# Patient Record
Sex: MALE | Race: WHITE | NOT HISPANIC OR LATINO | Employment: PART TIME | URBAN - METROPOLITAN AREA
[De-identification: names, ages, dates, MRNs, and addresses within clinical notes are randomized per-mention and may not be internally consistent; named-entity substitution may affect disease eponyms.]

---

## 2017-06-01 PROBLEM — E78.5 DYSLIPIDEMIA: Status: ACTIVE | Noted: 2017-06-01

## 2017-06-01 PROBLEM — F41.1 GAD (GENERALIZED ANXIETY DISORDER): Status: ACTIVE | Noted: 2017-06-01

## 2017-06-01 PROBLEM — K21.00 GASTROESOPHAGEAL REFLUX DISEASE WITH ESOPHAGITIS: Status: ACTIVE | Noted: 2017-06-01

## 2018-11-08 ENCOUNTER — TELEPHONE (OUTPATIENT)
Dept: FAMILY MEDICINE | Facility: CLINIC | Age: 28
End: 2018-11-08

## 2018-11-08 ENCOUNTER — OFFICE VISIT (OUTPATIENT)
Dept: FAMILY MEDICINE | Facility: CLINIC | Age: 28
End: 2018-11-08
Payer: COMMERCIAL

## 2018-11-08 ENCOUNTER — HOSPITAL ENCOUNTER (OUTPATIENT)
Dept: RADIOLOGY | Facility: HOSPITAL | Age: 28
Discharge: HOME | End: 2018-11-08
Attending: FAMILY MEDICINE
Payer: COMMERCIAL

## 2018-11-08 VITALS
RESPIRATION RATE: 17 BRPM | HEIGHT: 66 IN | WEIGHT: 243.63 LBS | DIASTOLIC BLOOD PRESSURE: 78 MMHG | HEART RATE: 92 BPM | BODY MASS INDEX: 39.15 KG/M2 | OXYGEN SATURATION: 98 % | SYSTOLIC BLOOD PRESSURE: 122 MMHG

## 2018-11-08 DIAGNOSIS — E78.5 DYSLIPIDEMIA: Primary | ICD-10-CM

## 2018-11-08 DIAGNOSIS — R20.2 NUMBNESS AND TINGLING OF HAND: ICD-10-CM

## 2018-11-08 DIAGNOSIS — M79.671 RIGHT FOOT PAIN: ICD-10-CM

## 2018-11-08 DIAGNOSIS — R20.0 NUMBNESS AND TINGLING OF HAND: ICD-10-CM

## 2018-11-08 DIAGNOSIS — D72.828 OTHER ELEVATED WHITE BLOOD CELL (WBC) COUNT: ICD-10-CM

## 2018-11-08 DIAGNOSIS — E66.09 CLASS 2 OBESITY DUE TO EXCESS CALORIES WITHOUT SERIOUS COMORBIDITY WITH BODY MASS INDEX (BMI) OF 39.0 TO 39.9 IN ADULT: ICD-10-CM

## 2018-11-08 DIAGNOSIS — E55.9 VITAMIN D DEFICIENCY: ICD-10-CM

## 2018-11-08 DIAGNOSIS — R74.8 ABNORMAL LIVER ENZYMES: ICD-10-CM

## 2018-11-08 DIAGNOSIS — S92.324A NONDISPLACED FRACTURE OF SECOND METATARSAL BONE, RIGHT FOOT, INITIAL ENCOUNTER FOR CLOSED FRACTURE: ICD-10-CM

## 2018-11-08 DIAGNOSIS — M25.561 CHRONIC PAIN OF RIGHT KNEE: ICD-10-CM

## 2018-11-08 DIAGNOSIS — G89.29 CHRONIC PAIN OF RIGHT KNEE: ICD-10-CM

## 2018-11-08 DIAGNOSIS — R00.0 TACHYCARDIA: ICD-10-CM

## 2018-11-08 PROCEDURE — 99214 OFFICE O/P EST MOD 30 MIN: CPT | Mod: S$GLB,,, | Performed by: FAMILY MEDICINE

## 2018-11-08 PROCEDURE — 73630 X-RAY EXAM OF FOOT: CPT | Mod: TC,FY,PO,RT

## 2018-11-08 PROCEDURE — 99999 PR PBB SHADOW E&M-NEW PATIENT-LVL IV: CPT | Mod: PBBFAC,,, | Performed by: FAMILY MEDICINE

## 2018-11-08 PROCEDURE — 3008F BODY MASS INDEX DOCD: CPT | Mod: CPTII,S$GLB,, | Performed by: FAMILY MEDICINE

## 2018-11-08 PROCEDURE — 73630 X-RAY EXAM OF FOOT: CPT | Mod: 26,RT,, | Performed by: RADIOLOGY

## 2018-11-08 RX ORDER — DEXTROAMPHETAMINE SACCHARATE, AMPHETAMINE ASPARTATE MONOHYDRATE, DEXTROAMPHETAMINE SULFATE AND AMPHETAMINE SULFATE 7.5; 7.5; 7.5; 7.5 MG/1; MG/1; MG/1; MG/1
CAPSULE, EXTENDED RELEASE ORAL
Refills: 0 | COMMUNITY
Start: 2018-10-23 | End: 2023-03-01 | Stop reason: ALTCHOICE

## 2018-11-08 RX ORDER — GABAPENTIN 300 MG/1
300 CAPSULE ORAL NIGHTLY
Qty: 30 CAPSULE | Refills: 2 | Status: SHIPPED | OUTPATIENT
Start: 2018-11-08 | End: 2019-01-03

## 2018-11-08 RX ORDER — PROPRANOLOL HYDROCHLORIDE 20 MG/1
TABLET ORAL
Refills: 5 | COMMUNITY
Start: 2018-09-14 | End: 2018-11-08

## 2018-11-08 NOTE — TELEPHONE ENCOUNTER
----- Message from Jessie Mcclain sent at 11/8/2018  3:52 PM CST -----  Contact: Self  Type:  Patient Returning Call    Who Called:  Patient   Who Left Message for Patient:  Nurse   Does the patient know what this is regarding?:  Yes   Best Call Back Number:  [phone] (home)     Additional Information:  Schedule a test

## 2018-11-08 NOTE — PATIENT INSTRUCTIONS
Carpal Tunnel Syndrome    Carpal tunnel syndrome is a painful condition of the wrist and arm. It is caused by pressure on the median nerve.  The median nerve is one of the nerves that give feeling and movement to the hand. It passes through a tunnel in the wrist called the carpal tunnel. This tunnel is made up of bones and ligaments. Narrowing of this tunnel or swelling of the tissues inside the tunnel puts pressure on the median nerve. This causes numbness, pins and needles, or electric shooting pains in your hand and forearm. Often the pain is worse at night and may wake you when you are asleep.  Carpal tunnel syndrome may occur during pregnancy and with use of birth control pills. It is more common in workers who must often bend their wrists. It is also common in people who work with power tools that cause strong vibrations.  Home care  · Rest the painful wrist. Avoid repeated bending of the wrist back and forth. This puts pressure on the median nerve. Avoid using power tools with strong vibrations.  · If you were given a splint, wear it at night while you sleep. You may also wear it during the day for comfort.  · Move your fingers and wrists often to avoid stiffness.  · Elevate your arms on pillows when you lie down.  · Try using the unaffected hand more.  · Try not to hold your wrists in a bent, downward position.  · Sometimes changes in the work place may ease symptoms. If you type most of the day, it may help to change the position of your keyboard or add a wrist support. Your wrist should be in a neutral position and not bent back when typing.  · You may use over-the-counter pain medicine to treat pain and inflammation, unless another medicine was prescribed. Anti-inflammatory pain medicines, such as ibuprofen or naproxen may be more effective than acetaminophen, which treats pain, but not inflammation. If you have chronic liver or kidney disease or ever had a stomach ulcer or GI bleeding, talk with your  doctor before using these medicines.  · Opioid pain medicine will only give temporary relief and does not treat the problem. If pain continues, you may need a shot of a steroid drug into your wrist.  · If the above methods fail, you may need surgery. This will open the carpal tunnel and release the pressure on the trapped nerve.  Follow-up care  Follow up with your healthcare provider, or as advised, if the pain doesnt begin to improve within the next week.  If X-rays were taken, you will be notified of any new findings that may affect your care.  When to seek medical advice  Call your healthcare provider right away if any of these occur:  · Pain not improving with the above treatment  · Fingers or hand become cold, blue, numb, or tingly  · Your whole arm becomes swollen or weak  Date Last Reviewed: 11/23/2015  © 6138-3925 [employer]. [address] [place] [address]. All rights reserved. This information is not intended as a substitute for professional medical care. Always follow your healthcare professional's instructions.        Eating to Prevent Gout  Gout is a painful form of arthritis caused by an excess of uric acid. This is a waste product made by the body. It builds up in the body and forms crystals that collect in the joints, bringing on a gout attack. Alcohol and certain foods can trigger a gout attack. Below are some guidelines for changing your diet to help you manage gout. Your healthcare provider can work with you to determine the best eating plan for you. Know that diet is only one part of managing gout. Take your medicines as prescribed and follow the other guidelines your healthcare provider has given you.  Foods to limit  Eating too many foods containing purines may increase the levels of uric acid in your body and increase your risk for a gout attack. It may be best to limit these high-purine foods:  · Alcohol (beer, red wine). You may be told to avoid alcohol  completely.  · Certain fish (anchovies, sardines, fish roes, herring, tuna, mussels, codfish, scallops, trout, and goldie)  · Certain meats (red meat, processed meat, rodgers, turkey, wild game, and goose)  · Sauces and gravies made with meat  · Organ meats (such as liver, kidneys, sweetbreads, and tripe)  · Legumes (such as dried beans, peas)  · Mushrooms, spinach, asparagus, and cauliflower  · Yeast and yeast extract supplements  Foods to try  Some foods may be helpful for people with gout. You may want to try adding some of the following foods to your diet:  · Dark berries: These include blueberries, blackberries, and cherries. These berries contain chemicals that may lower uric acid.  · Tofu: Tofu, which is made from soy, is a good source of protein. Studies have shown that it may be a better choice than meat for people with gout.  · Omega fatty acids: These acids are found in fatty fish (such as salmon), certain oils (such as flax, olive, or nut oils), or nuts. They may help prevent inflammation due to gout.  The following guidelines are recommended by the American Medical Association for people with gout. Your diet should be:  · High in fiber, whole grains, fruits, and vegetables.  · Low in protein (15% of calories should come from protein. Choose lean sources such as soy, lean meats, and poultry).  · Low in fat (no more than 30% of calories should come from fat, with only 10% coming from animal fat).   Date Last Reviewed: 6/17/2015  © 5047-9485 [employer]. [address] [place] [address], [place] [address]. All rights reserved. This information is not intended as a substitute for professional medical care. Always follow your healthcare professional's instructions.        Eating Heart-Healthy Foods  Eating has a big impact on your heart health. In fact, eating healthier can improve several of your heart risks at once. For instance, it helps you manage weight, cholesterol, and blood pressure. Here are  ideas to help you make heart-healthy changes without giving up all the foods and flavors you love.  Getting started  · Talk with your health care provider about eating plans, such as the DASH or Mediterranean diet. You may also be referred to a dietitian.  · Change a few things at a time. Give yourself time to get used to a few eating changes before adding more.  · Work to create a tasty, healthy eating plan that you can stick to for the rest of your life.    Goals for healthy eating  Below are some tips to improve your eating habits:  · Limit saturated fats and trans fats. Saturated fats raise your levels of cholesterol, so keep these fats to a minimum. They are found in foods such as fatty meats, whole milk, cheese, and palm and coconut oils. Avoid trans fats because they lower good cholesterol as well as raise bad cholesterol. Trans fats are most often found in processed foods.  · Reduce sodium (salt) intake. Eating too much salt may increase your blood pressure. Limit your sodium intake to 2,300 milligrams (mg) per day, or less if your health care provider recommends it. Dining out less often and eating fewer processed foods are two great ways to decrease the amount of salt you consume.  · Managing calories. A calorie is a unit of energy. Your body burns calories for fuel, but if you eat more calories than your body burns, the extras are stored as fat. Your health care provider can help you create a diet plan to manage your calories. This will likely include eating healthier foods as well as exercising regularly. To help you track your progress, keep a diary to record what you eat and how often you exercise.  Choose the right foods  Aim to make these foods staples of your diet. If you have diabetes, you may have different recommendations than what is listed here:  · Fruits and vegetable provide plenty of nutrients without a lot of calories. At meals, fill half your plate with these foods. Split the other half of  your plate between whole grains and lean protein.  · Whole grains are high in fiber and rich in vitamins and nutrients. Good choices include whole-wheat bread, pasta, and brown rice.  · Lean proteins give you nutrition with less fat. Good choices include fish, skinless chicken, and beans.  · Low-fat or nonfat dairy provides nutrients without a lot of fat. Try low-fat or nonfat milk, cheese, or yogurt.  · Healthy fats can be good for you in small amounts. These are unsaturated fats, such as olive oil, nuts, and fish. Try to have at least 2 servings per week of fatty fish such as salmon, sardines, mackerel, rainbow trout, and albacore tuna. These contain omega-3 fatty acids, which are good for your heart. Flaxseed is another source of a heart-healthy fat.  More on heart healthy eating    Read food labels  Healthy eating starts at the grocery store. Be sure to pay attention to food labels on packaged foods. Look for products that are high in fiber and protein, and low in saturated fat, cholesterol, and sodium. Avoid products that contain trans fat. And pay close attention to serving size. For instance, if you plan to eat two servings, double all the numbers on the label.  Prepare food right  A key part of healthy cooking is cutting down on added fat and salt. Look on the internet for lower-fat, lower-sodium recipes. Also, try these tips:  · Remove fat from meat and skin from poultry before cooking.  · Skim fat from the surface of soups and sauces.  · Broil, boil, bake, steam, grill, and microwave food without added fats.  · Choose ingredients that spice up your food without adding calories, fat, or sodium. Try these items: horseradish, hot sauce, lemon, mustard, nonfat salad dressings, and vinegar. For salt-free herbs and spices, try basil, cilantro, cinnamon, pepper, and rosemary.  Date Last Reviewed: 6/25/2015  © 0895-9894 [employer]. [address] [place] [address], [place] [address]. All rights reserved. This  information is not intended as a substitute for professional medical care. Always follow your healthcare professional's instructions.

## 2018-11-08 NOTE — PROGRESS NOTES
Subjective:       Patient ID: Yovani Malik is a 28 y.o. male.    Chief Complaint: Establish Care (numbess in right arm, right foot pain that began last week )    The patient complains of numbness and tingling sensation on the hands and goes all the way up to the elbow, the symptoms are getting worse, he has this before but is coming more often and daily, the patient is coming here for assessment.    Upon review of the blood work, the patient leukocyte count was elevated, cholesterol and liver enzymes were elevated, vitamin-D levels were low, does levels were checked in 2016.      Hyperlipidemia   This is a chronic problem. The current episode started more than 1 year ago. The problem is uncontrolled. Recent lipid tests were reviewed and are high. Exacerbating diseases include obesity. He has no history of chronic renal disease, diabetes, hypothyroidism, liver disease or nephrotic syndrome. Factors aggravating his hyperlipidemia include fatty foods. Pertinent negatives include no chest pain, focal sensory loss, focal weakness, leg pain, myalgias or shortness of breath. He is currently on no antihyperlipidemic treatment. The current treatment provides no improvement of lipids. Compliance problems include adherence to diet and adherence to exercise.  Risk factors for coronary artery disease include male sex, obesity and dyslipidemia.   Foot Injury    There was no injury mechanism. The pain is present in the right foot. The pain is severe. The pain has been worsening since onset. Associated symptoms include an inability to bear weight, a loss of motion and numbness (Right hand). Pertinent negatives include no loss of sensation or muscle weakness. He reports no foreign bodies present. The symptoms are aggravated by weight bearing and movement. He has tried nothing for the symptoms. The treatment provided no relief.   Knee Pain    Incident onset: The patient has history of surgery on the right knee arthroscopic, after 6  months of surgery started to develop pain and discomfort, was told that he needed an MRI but he never did, the patient stated that his knee feels weak. The pain is present in the right knee. The quality of the pain is described as aching. The pain is moderate. The pain has been intermittent since onset. Associated symptoms include an inability to bear weight, a loss of motion and numbness (Right hand). Pertinent negatives include no loss of sensation or muscle weakness. He reports no foreign bodies present. The symptoms are aggravated by weight bearing. He has tried elevation and rest for the symptoms. The treatment provided mild relief.        Past medical history, past social history, past Family history, past surgical history was reviewed and discussed with the patient.    Review of Systems   Constitutional: Negative for activity change and appetite change.   HENT: Negative for congestion and ear discharge.    Eyes: Negative for discharge and itching.   Respiratory: Negative for choking, chest tightness and shortness of breath.    Cardiovascular: Negative for chest pain and leg swelling.   Gastrointestinal: Negative for abdominal distention and abdominal pain.   Endocrine: Negative for cold intolerance and heat intolerance.   Genitourinary: Negative for dysuria and flank pain.   Musculoskeletal: Positive for arthralgias. Negative for back pain and myalgias.   Skin: Negative for pallor and rash.   Allergic/Immunologic: Negative for environmental allergies and food allergies.   Neurological: Positive for numbness (Right hand). Negative for dizziness, focal weakness and facial asymmetry.   Hematological: Negative for adenopathy. Does not bruise/bleed easily.   Psychiatric/Behavioral: Negative for agitation, confusion and sleep disturbance.       Objective:      Physical Exam   Constitutional: He appears well-developed and well-nourished. No distress.   HENT:   Head: Normocephalic and atraumatic.   Right Ear:  External ear normal.   Left Ear: External ear normal.   Nose: Nose normal.   Eyes: Pupils are equal, round, and reactive to light. Left eye exhibits no discharge.   Neck: Normal range of motion. Neck supple.   Cardiovascular: Normal rate, regular rhythm and normal heart sounds. Exam reveals no friction rub.   No murmur heard.  Pulmonary/Chest: Effort normal and breath sounds normal. No respiratory distress.   Abdominal: Soft. Bowel sounds are normal. There is no tenderness.   Musculoskeletal: He exhibits edema (Right foot) and tenderness (Tenderness to palpation on the right foot with decreased range of motion, swelling).   Neurological: No cranial nerve deficit.   Tinel sign on the right hand was positive   Skin: Skin is warm and dry. No rash noted. He is not diaphoretic. No erythema.   Psychiatric: He has a normal mood and affect. His behavior is normal. Judgment and thought content normal.   Nursing note and vitals reviewed.      Assessment:       1. Dyslipidemia    2. Abnormal liver enzymes    3. Other elevated white blood cell (WBC) count    4. Vitamin D deficiency    5. Tachycardia    6. Class 2 obesity due to excess calories without serious comorbidity with body mass index (BMI) of 39.0 to 39.9 in adult    7. Numbness and tingling of hand    8. Right foot pain    9. Chronic pain of right knee    10. Nondisplaced fracture of second metatarsal bone, right foot, initial encounter for closed fracture        Plan:       Dyslipidemia:  Uncontrolled  -     Lipid panel; Future; Expected date: 11/09/2018    Abnormal liver enzymes:  New problem, workup needed  -     Comprehensive metabolic panel; Future; Expected date: 11/09/2018    Other elevated white blood cell (WBC) count:  New problem, workup needed  -     CBC auto differential; Future; Expected date: 11/09/2018    Vitamin D deficiency:  New problem, workup needed  -     Vitamin D; Future; Expected date: 11/09/2018    Tachycardia:  New problem, workup  needed    Class 2 obesity due to excess calories without serious comorbidity with body mass index (BMI) of 39.0 to 39.9 in adult:  Worsening    Numbness and tingling of hand:  New problem, workup needed  -     Vitamin B12; Future; Expected date: 11/09/2018  -     Folate; Future; Expected date: 11/09/2018  -     EMG W/ ULTRASOUND AND NERVE CONDUCTION TEST 1 Extremity; Future; Expected date: 11/09/2018  -     gabapentin (NEURONTIN) 300 MG capsule; Take 1 capsule (300 mg total) by mouth every evening.  Dispense: 30 capsule; Refill: 2  Right foot pain:  New problem, workup needed  -     Uric acid; Future; Expected date: 11/08/2018  -     X-Ray Foot Complete Right; Future; Expected date: 11/08/2018    Chronic pain of right knee:  New problem, workup needed  -     MRI Knee Without Contrast Right; Future; Expected date: 11/08/2018    Nondisplaced fracture of second metatarsal bone, right foot, initial encounter for closed fracture:  New problem workup needed  -     Ambulatory referral to Orthopedics      Will call the patient after we have the results of the test.  Will refer the patient to orthopedic specialist as soon as possible.  The patient's BMI has been recorded in the chart. The patient has been provided educational materials regarding the benefits of attaining and maintaining a normal weight. We will continue to address and follow this issue during follow up visits.Patient agreed with assessment and plan. Patient verbalized understanding.

## 2018-11-09 ENCOUNTER — LAB VISIT (OUTPATIENT)
Dept: LAB | Facility: HOSPITAL | Age: 28
End: 2018-11-09
Attending: FAMILY MEDICINE
Payer: COMMERCIAL

## 2018-11-09 ENCOUNTER — OFFICE VISIT (OUTPATIENT)
Dept: ORTHOPEDICS | Facility: CLINIC | Age: 28
End: 2018-11-09
Payer: COMMERCIAL

## 2018-11-09 VITALS
HEART RATE: 94 BPM | DIASTOLIC BLOOD PRESSURE: 91 MMHG | HEIGHT: 66 IN | BODY MASS INDEX: 39.15 KG/M2 | SYSTOLIC BLOOD PRESSURE: 132 MMHG | WEIGHT: 243.63 LBS

## 2018-11-09 DIAGNOSIS — E78.5 DYSLIPIDEMIA: ICD-10-CM

## 2018-11-09 DIAGNOSIS — M79.671 RIGHT FOOT PAIN: ICD-10-CM

## 2018-11-09 DIAGNOSIS — R20.0 NUMBNESS AND TINGLING OF HAND: ICD-10-CM

## 2018-11-09 DIAGNOSIS — R20.2 NUMBNESS AND TINGLING OF HAND: ICD-10-CM

## 2018-11-09 DIAGNOSIS — M84.374A METATARSAL STRESS FRACTURE OF RIGHT FOOT, INITIAL ENCOUNTER: ICD-10-CM

## 2018-11-09 DIAGNOSIS — D72.828 OTHER ELEVATED WHITE BLOOD CELL (WBC) COUNT: ICD-10-CM

## 2018-11-09 DIAGNOSIS — E55.9 VITAMIN D DEFICIENCY: ICD-10-CM

## 2018-11-09 DIAGNOSIS — R74.8 ABNORMAL LIVER ENZYMES: ICD-10-CM

## 2018-11-09 LAB
25(OH)D3+25(OH)D2 SERPL-MCNC: 26 NG/ML
ALBUMIN SERPL BCP-MCNC: 3.9 G/DL
ALP SERPL-CCNC: 54 U/L
ALT SERPL W/O P-5'-P-CCNC: 47 U/L
ANION GAP SERPL CALC-SCNC: 9 MMOL/L
AST SERPL-CCNC: 33 U/L
BASOPHILS # BLD AUTO: 0.05 K/UL
BASOPHILS NFR BLD: 0.7 %
BILIRUB SERPL-MCNC: 0.7 MG/DL
BUN SERPL-MCNC: 15 MG/DL
CALCIUM SERPL-MCNC: 9.7 MG/DL
CHLORIDE SERPL-SCNC: 101 MMOL/L
CHOLEST SERPL-MCNC: 214 MG/DL
CHOLEST/HDLC SERPL: 4.9 {RATIO}
CO2 SERPL-SCNC: 29 MMOL/L
CREAT SERPL-MCNC: 0.9 MG/DL
DIFFERENTIAL METHOD: ABNORMAL
EOSINOPHIL # BLD AUTO: 0.5 K/UL
EOSINOPHIL NFR BLD: 7.1 %
ERYTHROCYTE [DISTWIDTH] IN BLOOD BY AUTOMATED COUNT: 12.7 %
EST. GFR  (AFRICAN AMERICAN): >60 ML/MIN/1.73 M^2
EST. GFR  (NON AFRICAN AMERICAN): >60 ML/MIN/1.73 M^2
FOLATE SERPL-MCNC: 10.7 NG/ML
GLUCOSE SERPL-MCNC: 97 MG/DL
HCT VFR BLD AUTO: 44.2 %
HDLC SERPL-MCNC: 44 MG/DL
HDLC SERPL: 20.6 %
HGB BLD-MCNC: 14.2 G/DL
IMM GRANULOCYTES # BLD AUTO: 0.01 K/UL
IMM GRANULOCYTES NFR BLD AUTO: 0.1 %
LDLC SERPL CALC-MCNC: 146.2 MG/DL
LYMPHOCYTES # BLD AUTO: 2 K/UL
LYMPHOCYTES NFR BLD: 27.4 %
MCH RBC QN AUTO: 26.9 PG
MCHC RBC AUTO-ENTMCNC: 32.1 G/DL
MCV RBC AUTO: 84 FL
MONOCYTES # BLD AUTO: 0.6 K/UL
MONOCYTES NFR BLD: 8.2 %
NEUTROPHILS # BLD AUTO: 4.1 K/UL
NEUTROPHILS NFR BLD: 56.5 %
NONHDLC SERPL-MCNC: 170 MG/DL
NRBC BLD-RTO: 0 /100 WBC
PLATELET # BLD AUTO: 337 K/UL
PMV BLD AUTO: 11.1 FL
POTASSIUM SERPL-SCNC: 4.7 MMOL/L
PROT SERPL-MCNC: 7.7 G/DL
RBC # BLD AUTO: 5.28 M/UL
SODIUM SERPL-SCNC: 139 MMOL/L
TRIGL SERPL-MCNC: 119 MG/DL
URATE SERPL-MCNC: 7.1 MG/DL
VIT B12 SERPL-MCNC: 592 PG/ML
WBC # BLD AUTO: 7.2 K/UL

## 2018-11-09 PROCEDURE — 99203 OFFICE O/P NEW LOW 30 MIN: CPT | Mod: 57,25,S$GLB, | Performed by: ORTHOPAEDIC SURGERY

## 2018-11-09 PROCEDURE — 80053 COMPREHEN METABOLIC PANEL: CPT

## 2018-11-09 PROCEDURE — 80061 LIPID PANEL: CPT

## 2018-11-09 PROCEDURE — 28470 CLTX METATARSAL FX WO MNP EA: CPT | Mod: RT,S$GLB,, | Performed by: ORTHOPAEDIC SURGERY

## 2018-11-09 PROCEDURE — 84550 ASSAY OF BLOOD/URIC ACID: CPT

## 2018-11-09 PROCEDURE — 99999 PR PBB SHADOW E&M-EST. PATIENT-LVL III: CPT | Mod: PBBFAC,,, | Performed by: ORTHOPAEDIC SURGERY

## 2018-11-09 PROCEDURE — 36415 COLL VENOUS BLD VENIPUNCTURE: CPT | Mod: PO

## 2018-11-09 PROCEDURE — 82306 VITAMIN D 25 HYDROXY: CPT

## 2018-11-09 PROCEDURE — 3008F BODY MASS INDEX DOCD: CPT | Mod: CPTII,S$GLB,, | Performed by: ORTHOPAEDIC SURGERY

## 2018-11-09 PROCEDURE — 97760 ORTHOTIC MGMT&TRAING 1ST ENC: CPT | Mod: GP,S$GLB,, | Performed by: ORTHOPAEDIC SURGERY

## 2018-11-09 PROCEDURE — 85025 COMPLETE CBC W/AUTO DIFF WBC: CPT

## 2018-11-09 PROCEDURE — 82746 ASSAY OF FOLIC ACID SERUM: CPT

## 2018-11-09 PROCEDURE — 82607 VITAMIN B-12: CPT

## 2018-11-09 NOTE — LETTER
November 13, 2018      Courtney Donnelly MD  [address] [facility] [address] [place] [address]           [facility]  [address] [facility] [address] [place] [address]  Phone: [phone]          Patient: Yovani Malik   MR Number: [id number]   YOB: 1990   Date of Visit: 11/9/2018       Dear Dr. Courtney Donnelly:    Thank you for referring Yovani Malik to me for evaluation. Attached you will find relevant portions of my assessment and plan of care.    If you have questions, please do not hesitate to call me. I look forward to following Yovani Malik along with you.    Sincerely,    Emmanuel Peterson MD    Enclosure  CC:  No Recipients    If you would like to receive this communication electronically, please contact [email or web address] or [phone] to request more information on [employer] Link access.    For providers and/or their staff who would like to refer a patient to [facility], please contact us through our one-stop-shop provider referral line, [facility] , at [phone].    If you feel you have received this communication in error or would no longer like to receive these types of communications, please e-mail [email or web address]

## 2018-11-12 ENCOUNTER — TELEPHONE (OUTPATIENT)
Dept: FAMILY MEDICINE | Facility: CLINIC | Age: 28
End: 2018-11-12

## 2018-11-12 DIAGNOSIS — M1A.0710 IDIOPATHIC CHRONIC GOUT OF RIGHT FOOT WITHOUT TOPHUS: ICD-10-CM

## 2018-11-12 DIAGNOSIS — R74.8 INCREASED LIVER ENZYMES: Primary | ICD-10-CM

## 2018-11-12 RX ORDER — ALLOPURINOL 100 MG/1
TABLET ORAL
Qty: 90 TABLET | Refills: 1 | Status: SHIPPED | OUTPATIENT
Start: 2018-11-12 | End: 2019-01-03

## 2018-11-12 RX ORDER — COLCHICINE 0.6 MG/1
0.6 TABLET ORAL DAILY
Qty: 30 TABLET | Refills: 1 | Status: SHIPPED | OUTPATIENT
Start: 2018-11-12 | End: 2019-01-03

## 2018-11-12 RX ORDER — ALLOPURINOL 100 MG/1
100 TABLET ORAL DAILY
Qty: 30 TABLET | Refills: 1 | Status: SHIPPED | OUTPATIENT
Start: 2018-11-12 | End: 2018-11-12 | Stop reason: SDUPTHER

## 2018-11-12 RX ORDER — VIT C/E/ZN/COPPR/LUTEIN/ZEAXAN 250MG-90MG
1000 CAPSULE ORAL DAILY
Qty: 90 CAPSULE | Refills: 2 | Status: SHIPPED | OUTPATIENT
Start: 2018-11-12 | End: 2019-01-03

## 2018-11-13 ENCOUNTER — PATIENT MESSAGE (OUTPATIENT)
Dept: ORTHOPEDICS | Facility: CLINIC | Age: 28
End: 2018-11-13

## 2018-11-13 PROBLEM — M84.374A: Status: ACTIVE | Noted: 2018-11-13

## 2018-11-13 RX ORDER — ERGOCALCIFEROL 1.25 MG/1
50000 CAPSULE ORAL
Qty: 4 CAPSULE | Refills: 0 | Status: SHIPPED | OUTPATIENT
Start: 2018-11-13 | End: 2018-11-13 | Stop reason: SDUPTHER

## 2018-11-13 RX ORDER — ERGOCALCIFEROL 1.25 MG/1
CAPSULE ORAL
Qty: 12 CAPSULE | Refills: 0 | Status: ON HOLD | OUTPATIENT
Start: 2018-11-13 | End: 2020-01-23

## 2018-11-13 NOTE — TELEPHONE ENCOUNTER
[facility] is requesting additional information, clinicals have already been sent.  Please have rebekah johnson or ma call [facility] @ [phone] with case # [id number] to provide additional information to see if they will approve the MRI.  Thanks Jessica

## 2018-11-13 NOTE — PROGRESS NOTES
"HPI: Yovani Malik is a 28 y.o. male who was referred to me by Dr. Donnelly and was seen in consultation today for right foot pain. He says the pain began about 2 months ago when he hit a baby gate. He says he is not sure that is what did it as the pain resolved.  He noticed increased sharp on 11/7/18. The pain is worse with walking and standing.     PAST MEDICAL/SURGICAL/FAMILY/SOCIAL/ HISTORY: REVIEWED    ALLERGIES/MEDICATIONS: REVIEWED       Review of Systems:     Constitution: Negative.   HEENT: Negative.   Eyes: Negative.   Cardiovascular: Negative.   Respiratory: Negative.   Endocrine: Negative.   Hematologic/Lymphatic: Negative.   Skin: Negative.   Musculoskeletal: Positive for right foot pain   Gastrointestinal: Negative.   Genitourinary: Negative.   Neurological: Negative.   Psychiatric/Behavioral: Negative.   Allergic/Immunologic: Negative.       PHYSICAL EXAM:  Vitals:    11/09/18 1046   BP: (!) 132/91   Pulse: 94     Ht Readings from Last 1 Encounters:   11/09/18 5' 6" (1.676 m)     Wt Readings from Last 1 Encounters:   11/09/18 110.5 kg (243 lb 9.7 oz)       GENERAL: Well developed, well nourished, no acute distress.  SKIN: Skin is intact. No atrophy, abrasions or lesions are noted.   Neurological: Normal mental status. Appropriate and conversant. Alert and oriented x 3.  GAIT: Walks with an antalgic gait.    Right lower extremity compared with LLE:  2+ dorsalis pedis pulse.  Capillary refill < 3 seconds.  Normal range of motion tibiotalar and subtalar joints. Normal alignment of the forefoot and the hindfoot.  5/5 strength EHL, FHL, tibialis anterior, gastrocsoleus, tibialis posterior and peroneals. Sensation to light touch intact sural, saphenous, superficial peroneal and deep peroneal nerves. + swelling of the forefoot. very tender to palpation at the 2nd metatarsal neck. No ecchymosis or deformity. No lymphadenopathy, no masses or tumors palpated.      XRAYS:   3 views of right foot obtained and " reviewed today reveal non-displaced stress fracture of the 2nd metatarsal neck.       ASSESSMENT:        Encounter Diagnosis   Name Primary?    Metatarsal stress fracture of right foot, initial encounter        PLAN:    We performed a custom orthotic/brace adjustment, fitting and training with the patient today. The patient demonstrated understanding and proper care. This was performed for 15 minutes.  Short boot  was given.   Weight bearing as tolerated. I will check his Vitamin D level which is pending and supplement if needed. F/u 3 weeks with xray of the right foot.

## 2018-11-13 NOTE — TELEPHONE ENCOUNTER
Can you please contact them, patient has history of chronic knee pain, and weakness that happened 6 months after his  meniscal surgery, he was told that he needed MRI, but he never did, the symptoms are getting worse.  Thank you

## 2018-11-14 ENCOUNTER — TELEPHONE (OUTPATIENT)
Dept: FAMILY MEDICINE | Facility: CLINIC | Age: 28
End: 2018-11-14

## 2018-11-14 NOTE — TELEPHONE ENCOUNTER
----- Message from Teofilo Barba sent at 11/13/2018  4:27 PM CST -----  Type:  Patient Returning Call    Who Called:  Patient  Who Left Message for Patient:  Niru  Does the patient know what this is regarding?:  Test results  Best Call Back Number:  [phone]

## 2018-11-15 ENCOUNTER — TELEPHONE (OUTPATIENT)
Dept: FAMILY MEDICINE | Facility: CLINIC | Age: 28
End: 2018-11-15

## 2018-11-15 DIAGNOSIS — R20.0 HAND NUMBNESS: Primary | ICD-10-CM

## 2018-11-15 NOTE — TELEPHONE ENCOUNTER
----- Message from Margareth Irwin sent at 11/15/2018  8:41 AM CST -----  Contact: self   Patient need a referral for Dr Larsen, patient rescheduled appointment and need a updated referral please call back at [phone] (home)

## 2018-11-15 NOTE — TELEPHONE ENCOUNTER
----- Message from Margareth Irwin sent at 11/15/2018  8:41 AM CST -----  Contact: self   Patient need a referral for Dr Larsen, patient rescheduled appointment and need a updated referral please call back at [phone] (home)

## 2018-11-28 DIAGNOSIS — M84.374A METATARSAL STRESS FRACTURE OF RIGHT FOOT, INITIAL ENCOUNTER: Primary | ICD-10-CM

## 2018-11-30 ENCOUNTER — HOSPITAL ENCOUNTER (OUTPATIENT)
Dept: RADIOLOGY | Facility: HOSPITAL | Age: 28
Discharge: HOME | End: 2018-11-30
Attending: ORTHOPAEDIC SURGERY
Payer: COMMERCIAL

## 2018-11-30 ENCOUNTER — OFFICE VISIT (OUTPATIENT)
Dept: ORTHOPEDICS | Facility: CLINIC | Age: 28
End: 2018-11-30
Payer: COMMERCIAL

## 2018-11-30 VITALS
WEIGHT: 243.63 LBS | BODY MASS INDEX: 39.15 KG/M2 | DIASTOLIC BLOOD PRESSURE: 98 MMHG | HEART RATE: 93 BPM | SYSTOLIC BLOOD PRESSURE: 136 MMHG | HEIGHT: 66 IN

## 2018-11-30 DIAGNOSIS — M84.374A METATARSAL STRESS FRACTURE OF RIGHT FOOT, INITIAL ENCOUNTER: ICD-10-CM

## 2018-11-30 DIAGNOSIS — M84.374A METATARSAL STRESS FRACTURE OF RIGHT FOOT, INITIAL ENCOUNTER: Primary | ICD-10-CM

## 2018-11-30 PROCEDURE — 99024 POSTOP FOLLOW-UP VISIT: CPT | Mod: S$GLB,,, | Performed by: ORTHOPAEDIC SURGERY

## 2018-11-30 PROCEDURE — 3008F BODY MASS INDEX DOCD: CPT | Mod: CPTII,S$GLB,, | Performed by: ORTHOPAEDIC SURGERY

## 2018-11-30 PROCEDURE — 73630 X-RAY EXAM OF FOOT: CPT | Mod: TC,PO,RT

## 2018-11-30 PROCEDURE — 99999 PR PBB SHADOW E&M-EST. PATIENT-LVL III: CPT | Mod: PBBFAC,,, | Performed by: ORTHOPAEDIC SURGERY

## 2018-11-30 PROCEDURE — 73630 X-RAY EXAM OF FOOT: CPT | Mod: 26,RT,, | Performed by: RADIOLOGY

## 2018-12-04 NOTE — PROGRESS NOTES
"HPI: Yovani Malik is a 28 y.o. male who is here for f/u today for right foot 2nd metatarsal stress frx.  He noticed increased sharp on 11/7/18. The pain is improving.   PAST MEDICAL/SURGICAL/FAMILY/SOCIAL/ HISTORY: REVIEWED    ALLERGIES/MEDICATIONS: REVIEWED     PHYSICAL EXAM:  Vitals:    11/30/18 0810   BP: (!) 136/98   Pulse: 93     Ht Readings from Last 1 Encounters:   11/30/18 5' 6" (1.676 m)     Wt Readings from Last 1 Encounters:   11/30/18 110.5 kg (243 lb 9.7 oz)       GENERAL: Well developed, well nourished, no acute distress.  GAIT: Walks with an antalgic gait.    Right lower extremity compared with LLE:  2+ dorsalis pedis pulse.  Capillary refill < 3 seconds.  + Mild swelling of the forefoot. + tender to palpation at the 2nd metatarsal neck.    XRAYS:   3 views of right foot obtained and reviewed today reveal non-displaced stress fracture of the 2nd metatarsal neck. There is interval progression of healing.       ASSESSMENT:        Encounter Diagnosis   Name Primary?    Metatarsal stress fracture of right foot, initial encounter Yes       PLAN:    Continue wbat in boot.  F/u 4 weeks with xray of the right foot.   "

## 2019-01-02 DIAGNOSIS — M84.374A METATARSAL STRESS FRACTURE OF RIGHT FOOT, INITIAL ENCOUNTER: Primary | ICD-10-CM

## 2019-01-03 ENCOUNTER — OFFICE VISIT (OUTPATIENT)
Dept: ORTHOPEDICS | Facility: CLINIC | Age: 29
End: 2019-01-03
Payer: COMMERCIAL

## 2019-01-03 ENCOUNTER — HOSPITAL ENCOUNTER (OUTPATIENT)
Dept: RADIOLOGY | Facility: HOSPITAL | Age: 29
Discharge: HOME | End: 2019-01-03
Attending: ORTHOPAEDIC SURGERY
Payer: COMMERCIAL

## 2019-01-03 VITALS
WEIGHT: 243.63 LBS | HEART RATE: 92 BPM | DIASTOLIC BLOOD PRESSURE: 98 MMHG | BODY MASS INDEX: 39.15 KG/M2 | SYSTOLIC BLOOD PRESSURE: 146 MMHG | HEIGHT: 66 IN

## 2019-01-03 DIAGNOSIS — M84.374A METATARSAL STRESS FRACTURE OF RIGHT FOOT, INITIAL ENCOUNTER: ICD-10-CM

## 2019-01-03 DIAGNOSIS — M84.374A METATARSAL STRESS FRACTURE OF RIGHT FOOT, INITIAL ENCOUNTER: Primary | ICD-10-CM

## 2019-01-03 PROCEDURE — 3008F BODY MASS INDEX DOCD: CPT | Mod: CPTII,S$GLB,, | Performed by: ORTHOPAEDIC SURGERY

## 2019-01-03 PROCEDURE — 99024 PR POST-OP FOLLOW-UP VISIT: ICD-10-PCS | Mod: S$GLB,,, | Performed by: ORTHOPAEDIC SURGERY

## 2019-01-03 PROCEDURE — 3008F PR BODY MASS INDEX (BMI) DOCUMENTED: ICD-10-PCS | Mod: CPTII,S$GLB,, | Performed by: ORTHOPAEDIC SURGERY

## 2019-01-03 PROCEDURE — 73630 X-RAY EXAM OF FOOT: CPT | Mod: 26,RT,, | Performed by: RADIOLOGY

## 2019-01-03 PROCEDURE — 73630 XR FOOT COMPLETE 3 VIEW RIGHT: ICD-10-PCS | Mod: 26,RT,, | Performed by: RADIOLOGY

## 2019-01-03 PROCEDURE — 99024 POSTOP FOLLOW-UP VISIT: CPT | Mod: S$GLB,,, | Performed by: ORTHOPAEDIC SURGERY

## 2019-01-03 PROCEDURE — 99999 PR PBB SHADOW E&M-EST. PATIENT-LVL III: ICD-10-PCS | Mod: PBBFAC,,, | Performed by: ORTHOPAEDIC SURGERY

## 2019-01-03 PROCEDURE — 73630 X-RAY EXAM OF FOOT: CPT | Mod: TC,PO,RT

## 2019-01-03 PROCEDURE — 99999 PR PBB SHADOW E&M-EST. PATIENT-LVL III: CPT | Mod: PBBFAC,,, | Performed by: ORTHOPAEDIC SURGERY

## 2019-01-29 ENCOUNTER — TELEPHONE (OUTPATIENT)
Dept: NEUROLOGY | Facility: CLINIC | Age: 29
End: 2019-01-29

## 2019-02-13 DIAGNOSIS — M25.561 ACUTE PAIN OF BOTH KNEES: Primary | ICD-10-CM

## 2019-02-13 DIAGNOSIS — M25.562 ACUTE PAIN OF BOTH KNEES: Primary | ICD-10-CM

## 2019-02-28 ENCOUNTER — HOSPITAL ENCOUNTER (OUTPATIENT)
Dept: RADIOLOGY | Facility: HOSPITAL | Age: 29
Discharge: HOME | End: 2019-02-28
Attending: ORTHOPAEDIC SURGERY
Payer: COMMERCIAL

## 2019-02-28 ENCOUNTER — OFFICE VISIT (OUTPATIENT)
Dept: ORTHOPEDICS | Facility: CLINIC | Age: 29
End: 2019-02-28
Payer: COMMERCIAL

## 2019-02-28 VITALS — BODY MASS INDEX: 39.05 KG/M2 | WEIGHT: 243 LBS | HEIGHT: 66 IN

## 2019-02-28 DIAGNOSIS — M25.562 ACUTE PAIN OF BOTH KNEES: ICD-10-CM

## 2019-02-28 DIAGNOSIS — G89.29 CHRONIC PAIN OF RIGHT KNEE: Primary | ICD-10-CM

## 2019-02-28 DIAGNOSIS — M25.561 ACUTE PAIN OF BOTH KNEES: ICD-10-CM

## 2019-02-28 DIAGNOSIS — M25.561 CHRONIC PAIN OF RIGHT KNEE: Primary | ICD-10-CM

## 2019-02-28 PROCEDURE — 73562 X-RAY EXAM OF KNEE 3: CPT | Mod: TC,50,PO

## 2019-02-28 PROCEDURE — 73562 PR  X-RAY KNEE 3 VIEW: ICD-10-PCS | Mod: 26,RT,, | Performed by: RADIOLOGY

## 2019-02-28 PROCEDURE — 73562 X-RAY EXAM OF KNEE 3: CPT | Mod: 26,LT,, | Performed by: RADIOLOGY

## 2019-02-28 PROCEDURE — 73562 X-RAY EXAM OF KNEE 3: CPT | Mod: 26,RT,, | Performed by: RADIOLOGY

## 2019-02-28 PROCEDURE — 99999 PR PBB SHADOW E&M-EST. PATIENT-LVL III: CPT | Mod: PBBFAC,,, | Performed by: ORTHOPAEDIC SURGERY

## 2019-02-28 PROCEDURE — 99999 PR PBB SHADOW E&M-EST. PATIENT-LVL III: ICD-10-PCS | Mod: PBBFAC,,, | Performed by: ORTHOPAEDIC SURGERY

## 2019-02-28 PROCEDURE — 99243 OFF/OP CNSLTJ NEW/EST LOW 30: CPT | Mod: S$GLB,,, | Performed by: ORTHOPAEDIC SURGERY

## 2019-02-28 PROCEDURE — 99243 PR OFFICE CONSULTATION,LEVEL III: ICD-10-PCS | Mod: S$GLB,,, | Performed by: ORTHOPAEDIC SURGERY

## 2019-02-28 NOTE — PROGRESS NOTES
A 28 years old, right knee pain for a few years' time.  Apparently had knee   arthroscopy, but remained persistent pain in the lateral side of the knee.  Pain   with walking long distances.  Pain has been driving for a while with his knee   bent.  Pain is 5/10 on good days, 10/10 on bad days.  Ice and heat seemed to   help.  He has tried bracing.  Again, he had knee arthroscopy in 2016.    PHYSICAL EXAMINATION:  Today shows that he has tenderness at the lateral side of   the knee.  Kwadwo testing is positive.  Skin is intact.  Compartments are   soft.  Positive patellar crunch test.    X-rays show well-preserved joint spacing.    ASSESSMENT:  Right knee pain in a 28-year-old, possible meniscal tear,   chondromalacia of the knee.    PLAN:  Get an MRI of that right knee.  We will see him back after that to   discuss different treatment options.      JL/VITO  dd: 02/28/2019 15:17:53 (CST)  td: 03/01/2019 01:23:50 (CST)  Doc ID   #[id number]  Job ID #[id number]    CC:      Consult from Dr. NETTA Peterson.. Communication via Epic    Further History  Aching pain  Worse with activity  Relieved with rest  No other associated symptoms  No other radiation    Further Exam  Alert and oriented  Pleasant  Contralateral limb has appropriate range of motion for age and condition  Contralateral limb has appropriate strength for age and condition  Contralateral limb has appropriate stability  for age and condition  No adenopathy  Pulses are appropriate for current condition  Skin is intact        Chief Complaint    Chief Complaint   Patient presents with    Right Knee - Pain       HPI  Yovani Malik is a 28 y.o.  male who presents with       Past Medical History  Past Medical History:   Diagnosis Date    Anxiety     Borderline diabetes     Hyperlipidemia     Hypertension        Past Surgical History  Past Surgical History:   Procedure Laterality Date    KNEE SURGERY      x 2        Medications  Current Outpatient Medications   Medication  Sig    dextroamphetamine-amphetamine (ADDERALL XR) 30 MG 24 hr capsule TK TWO CAPSULES PO D IN THE EARLY MORNING    ergocalciferol (ERGOCALCIFEROL) 50,000 unit Cap TAKE 1 CAPSULE BY MOUTH EVERY 7 DAYS     No current facility-administered medications for this visit.        Allergies  Review of patient's allergies indicates:  No Known Allergies    Family History  Family History   Problem Relation Age of Onset    Diabetes Mother     Hypertension Mother     Hyperlipidemia Mother     Diabetes Father     Diabetes Maternal Grandmother     Cancer Maternal Grandmother         breast       Social History  Social History     Socioeconomic History    Marital status:      Spouse name: Not on file    Number of children: Not on file    Years of education: Not on file    Highest education level: Not on file   Social Needs    Financial resource strain: Not on file    Food insecurity - worry: Not on file    Food insecurity - inability: Not on file    Transportation needs - medical: Not on file    Transportation needs - non-medical: Not on file   Occupational History    Not on file   Tobacco Use    Smoking status: Former Smoker     Last attempt to quit: 2017     Years since quittin.7    Smokeless tobacco: Never Used   Substance and Sexual Activity    Alcohol use: No    Drug use: No    Sexual activity: Yes     Partners: Female   Other Topics Concern    Not on file   Social History Narrative    Not on file               Review of Systems     Constitutional: Negative    HENT: Negative  Eyes: Negative  Respiratory: Negative  Cardiovascular: Negative  Musculoskeletal: HPI  Skin: Negative  Neurological: Negative  Hematological: Negative  Endocrine: Negative                 Physical Exam    There were no vitals filed for this visit.  Body mass index is 39.22 kg/m².  Physical Examination:     General appearance -  well appearing, and in no distress  Mental status - awake  Neck - supple  Chest -   symmetric air entry  Heart - normal rate   Abdomen - soft      Assessment     1. Chronic pain of right knee          Plan

## 2019-02-28 NOTE — LETTER
February 28, 2019      Emmanuel Peterson MD  [address] [facility] [address] [place] [address]           [facility]  [address] [facility] [address] [place] [address]  Phone: [phone]          Patient: Yovani Malik   MR Number: [id number]   YOB: 1990   Date of Visit: 2/28/2019       Dear Dr. Emmanuel Petesron:    Thank you for referring Yovani Mlaik to me for evaluation. Attached you will find relevant portions of my assessment and plan of care.    If you have questions, please do not hesitate to call me. I look forward to following Yovani Malik along with you.    Sincerely,    Ifrah Win LPN    Enclosure  CC:  No Recipients    If you would like to receive this communication electronically, please contact [email or web address] or [phone] to request more information on [employer] Link access.    For providers and/or their staff who would like to refer a patient to [facility], please contact us through our one-stop-shop provider referral line, [facility], at [phone].    If you feel you have received this communication in error or would no longer like to receive these types of communications, please e-mail [email or web address]

## 2019-03-07 ENCOUNTER — HOSPITAL ENCOUNTER (OUTPATIENT)
Dept: RADIOLOGY | Facility: HOSPITAL | Age: 29
Discharge: HOME | End: 2019-03-07
Attending: ORTHOPAEDIC SURGERY
Payer: COMMERCIAL

## 2019-03-07 DIAGNOSIS — G89.29 CHRONIC PAIN OF RIGHT KNEE: ICD-10-CM

## 2019-03-07 DIAGNOSIS — M25.561 CHRONIC PAIN OF RIGHT KNEE: ICD-10-CM

## 2019-03-07 PROCEDURE — 73721 MRI JNT OF LWR EXTRE W/O DYE: CPT | Mod: TC,PO,RT

## 2019-03-07 PROCEDURE — 73721 MRI KNEE WITHOUT CONTRAST RIGHT: ICD-10-PCS | Mod: 26,RT,, | Performed by: RADIOLOGY

## 2019-03-07 PROCEDURE — 73721 MRI JNT OF LWR EXTRE W/O DYE: CPT | Mod: 26,RT,, | Performed by: RADIOLOGY

## 2019-03-15 ENCOUNTER — OFFICE VISIT (OUTPATIENT)
Dept: ORTHOPEDICS | Facility: CLINIC | Age: 29
End: 2019-03-15
Payer: COMMERCIAL

## 2019-03-15 VITALS — HEIGHT: 66 IN | WEIGHT: 243 LBS | BODY MASS INDEX: 39.05 KG/M2

## 2019-03-15 DIAGNOSIS — Z71.2 ENCOUNTER TO DISCUSS TEST RESULTS: ICD-10-CM

## 2019-03-15 DIAGNOSIS — G89.29 CHRONIC PAIN OF RIGHT KNEE: Primary | ICD-10-CM

## 2019-03-15 DIAGNOSIS — M25.561 CHRONIC PAIN OF RIGHT KNEE: Primary | ICD-10-CM

## 2019-03-15 DIAGNOSIS — M22.41 PATELLOFEMORAL CHONDROSIS OF RIGHT KNEE: ICD-10-CM

## 2019-03-15 PROCEDURE — 99213 OFFICE O/P EST LOW 20 MIN: CPT | Mod: S$GLB,,, | Performed by: ORTHOPAEDIC SURGERY

## 2019-03-15 PROCEDURE — 99213 PR OFFICE/OUTPT VISIT, EST, LEVL III, 20-29 MIN: ICD-10-PCS | Mod: S$GLB,,, | Performed by: ORTHOPAEDIC SURGERY

## 2019-03-15 PROCEDURE — 99999 PR PBB SHADOW E&M-EST. PATIENT-LVL II: ICD-10-PCS | Mod: PBBFAC,,, | Performed by: ORTHOPAEDIC SURGERY

## 2019-03-15 PROCEDURE — 3008F PR BODY MASS INDEX (BMI) DOCUMENTED: ICD-10-PCS | Mod: CPTII,S$GLB,, | Performed by: ORTHOPAEDIC SURGERY

## 2019-03-15 PROCEDURE — 3008F BODY MASS INDEX DOCD: CPT | Mod: CPTII,S$GLB,, | Performed by: ORTHOPAEDIC SURGERY

## 2019-03-15 PROCEDURE — 99999 PR PBB SHADOW E&M-EST. PATIENT-LVL II: CPT | Mod: PBBFAC,,, | Performed by: ORTHOPAEDIC SURGERY

## 2019-03-15 NOTE — PROGRESS NOTES
HISTORY OF PRESENT ILLNESS:  A 28-year-old, followup of MRI of his knee, showed   certain changes in the lateral compartment including lateral meniscus.  Again   had arthroscopic surgery in 2016.    At this point, we recommended conservative course, strengthening, activity   modifications, bracing as needed.  Follow up as needed.      JL/VITO  dd: 03/15/2019 10:52:04 (CDT)  td: 03/16/2019 01:05:37 (NINFAT)  Doc ID   #[id number]  Job ID #[id number]    CC:

## 2019-07-12 ENCOUNTER — OFFICE VISIT (OUTPATIENT)
Dept: FAMILY MEDICINE | Facility: CLINIC | Age: 29
End: 2019-07-12
Payer: COMMERCIAL

## 2019-07-12 ENCOUNTER — TELEPHONE (OUTPATIENT)
Dept: PHYSICAL MEDICINE AND REHAB | Facility: CLINIC | Age: 29
End: 2019-07-12

## 2019-07-12 ENCOUNTER — PATIENT MESSAGE (OUTPATIENT)
Dept: PHYSICAL MEDICINE AND REHAB | Facility: CLINIC | Age: 29
End: 2019-07-12

## 2019-07-12 VITALS
HEIGHT: 66 IN | DIASTOLIC BLOOD PRESSURE: 86 MMHG | SYSTOLIC BLOOD PRESSURE: 126 MMHG | WEIGHT: 250 LBS | HEART RATE: 80 BPM | BODY MASS INDEX: 40.18 KG/M2 | OXYGEN SATURATION: 95 %

## 2019-07-12 DIAGNOSIS — R20.2 NUMBNESS AND TINGLING OF RIGHT ARM: ICD-10-CM

## 2019-07-12 DIAGNOSIS — R74.8 INCREASED LIVER ENZYMES: ICD-10-CM

## 2019-07-12 DIAGNOSIS — M25.521 RIGHT ELBOW PAIN: Primary | ICD-10-CM

## 2019-07-12 DIAGNOSIS — K21.00 GASTROESOPHAGEAL REFLUX DISEASE WITH ESOPHAGITIS: ICD-10-CM

## 2019-07-12 DIAGNOSIS — R20.0 NUMBNESS AND TINGLING OF RIGHT ARM: ICD-10-CM

## 2019-07-12 DIAGNOSIS — G89.29 CHRONIC PAIN OF RIGHT KNEE: ICD-10-CM

## 2019-07-12 DIAGNOSIS — M25.561 CHRONIC PAIN OF RIGHT KNEE: ICD-10-CM

## 2019-07-12 DIAGNOSIS — E66.01 CLASS 3 SEVERE OBESITY DUE TO EXCESS CALORIES WITH SERIOUS COMORBIDITY AND BODY MASS INDEX (BMI) OF 40.0 TO 44.9 IN ADULT: ICD-10-CM

## 2019-07-12 PROCEDURE — 99999 PR PBB SHADOW E&M-EST. PATIENT-LVL IV: ICD-10-PCS | Mod: PBBFAC,,, | Performed by: FAMILY MEDICINE

## 2019-07-12 PROCEDURE — 3008F BODY MASS INDEX DOCD: CPT | Mod: CPTII,S$GLB,, | Performed by: FAMILY MEDICINE

## 2019-07-12 PROCEDURE — 99214 OFFICE O/P EST MOD 30 MIN: CPT | Mod: S$GLB,,, | Performed by: FAMILY MEDICINE

## 2019-07-12 PROCEDURE — 99999 PR PBB SHADOW E&M-EST. PATIENT-LVL IV: CPT | Mod: PBBFAC,,, | Performed by: FAMILY MEDICINE

## 2019-07-12 PROCEDURE — 99214 PR OFFICE/OUTPT VISIT, EST, LEVL IV, 30-39 MIN: ICD-10-PCS | Mod: S$GLB,,, | Performed by: FAMILY MEDICINE

## 2019-07-12 PROCEDURE — 3008F PR BODY MASS INDEX (BMI) DOCUMENTED: ICD-10-PCS | Mod: CPTII,S$GLB,, | Performed by: FAMILY MEDICINE

## 2019-07-12 RX ORDER — ALPRAZOLAM 0.25 MG/1
TABLET ORAL
Refills: 1 | COMMUNITY
Start: 2019-05-08 | End: 2022-08-09

## 2019-07-12 RX ORDER — PANTOPRAZOLE SODIUM 40 MG/1
40 TABLET, DELAYED RELEASE ORAL DAILY
Qty: 30 TABLET | Refills: 11 | Status: SHIPPED | OUTPATIENT
Start: 2019-07-12 | End: 2020-06-17

## 2019-07-12 NOTE — PROGRESS NOTES
Subjective:       Patient ID: Yovani Malik is a 29 y.o. male.    Chief Complaint: Gastroesophageal Reflux; Carpal Tunnel; and Skin Tags    The patient is coming here today for a follow-up visit, the patient stated that he has still eating unhealthy food, he gained 7 lb since his last office visit.    Elbow Injury   This is a chronic problem. The current episode started more than 1 month ago. The problem occurs intermittently. The problem has been gradually worsening. Associated symptoms include arthralgias and numbness. Pertinent negatives include no abdominal pain, chest pain, chills, congestion, coughing, fatigue, fever, headaches, joint swelling, myalgias, nausea, neck pain, sore throat, swollen glands, urinary symptoms, vertigo or visual change. The symptoms are aggravated by bending (Picking up objects). He has tried acetaminophen, NSAIDs and position changes for the symptoms. The treatment provided mild relief.   Gastroesophageal Reflux   He complains of belching and heartburn. He reports no abdominal pain, no chest pain, no choking, no coughing, no dysphagia, no globus sensation, no hoarse voice, no nausea, no sore throat, no stridor, no tooth decay, no water brash or no wheezing. This is a chronic problem. The current episode started more than 1 year ago. The problem occurs frequently. The problem has been gradually worsening. The heartburn duration is more than one hour. The heartburn is located in the substernum. The heartburn is of moderate intensity. The heartburn does not wake him from sleep. The heartburn limits his activity. The symptoms are aggravated by certain foods. Pertinent negatives include no anemia, fatigue, melena, muscle weakness, orthopnea or weight loss. Risk factors include obesity, lack of exercise and smoking/tobacco exposure. He has tried nothing for the symptoms. The treatment provided no relief. Past procedures do not include an EGD. Past invasive treatments do not include  gastroplasty.   Knee Pain    There was no injury mechanism. The pain is present in the right knee. The quality of the pain is described as aching. The pain is moderate. The pain has been intermittent since onset. Associated symptoms include numbness. Pertinent negatives include no inability to bear weight, loss of sensation or muscle weakness. He reports no foreign bodies present. The symptoms are aggravated by movement and weight bearing. He has tried elevation, acetaminophen and NSAIDs (The patient stated that he had surgery on the same knee secondary to meniscal tear in the past, he was recommended by the orthopedic doctor to have another surgery but he does not want to have surgeries at this time.  He wants 2nd opinion) for the symptoms. The treatment provided mild relief.      Past medical history, past social history was reviewed and discussed with the patient.      Review of Systems   Constitutional: Negative for activity change, appetite change, chills, fatigue, fever and weight loss.   HENT: Negative for congestion, hoarse voice and sore throat.    Respiratory: Negative for cough, choking, chest tightness and wheezing.    Cardiovascular: Negative for chest pain.   Gastrointestinal: Positive for abdominal distention and heartburn. Negative for abdominal pain, blood in stool, diarrhea, dysphagia, melena and nausea.        Acid reflux   Musculoskeletal: Positive for arthralgias. Negative for joint swelling, myalgias, muscle weakness and neck pain.   Neurological: Positive for numbness. Negative for vertigo and headaches.       Objective:      Physical Exam   Constitutional: He appears well-developed and well-nourished. No distress.   HENT:   Head: Normocephalic and atraumatic.   Right Ear: External ear normal.   Left Ear: External ear normal.   Nose: Nose normal.   Mouth/Throat: No oropharyngeal exudate.   Eyes: Pupils are equal, round, and reactive to light. Right eye exhibits no discharge. Left eye exhibits  no discharge.   Neck: Normal range of motion. Neck supple. No thyromegaly present.   Cardiovascular: Normal rate, regular rhythm and normal heart sounds.   No murmur heard.  Pulmonary/Chest: Effort normal and breath sounds normal. No respiratory distress. He has no wheezes.   Abdominal: There is tenderness (Epigastrium).   Musculoskeletal: He exhibits tenderness (Right elbow with decreased range of motion, tenderness to palpation.). He exhibits no edema.   Right knee with decreased range of motion, crepitus on the right knee   Neurological: No cranial nerve deficit. Coordination normal.   Skin: Skin is warm and dry. No rash noted. He is not diaphoretic. No erythema.   Psychiatric: He has a normal mood and affect. His behavior is normal. Judgment and thought content normal.   Nursing note and vitals reviewed.      Assessment:       1. Right elbow pain    2. Numbness and tingling of right arm    3. Chronic pain of right knee    4. Gastroesophageal reflux disease with esophagitis    5. Increased liver enzymes    6. Class 3 severe obesity due to excess calories with serious comorbidity and body mass index (BMI) of 40.0 to 44.9 in adult        Plan:     Right elbow pain:  Worsening  -     EMG W/ ULTRASOUND AND NERVE CONDUCTION TEST 1 Extremity; Future    Numbness and tingling of right arm:  Worsening  -     EMG W/ ULTRASOUND AND NERVE CONDUCTION TEST 1 Extremity; Future    Chronic pain of right knee:  Worsening  -     Ambulatory referral to Physical Medicine Rehab    Gastroesophageal reflux disease with esophagitis:  Worsening  -     pantoprazole (PROTONIX) 40 MG tablet; Take 1 tablet (40 mg total) by mouth once daily.  Dispense: 30 tablet; Refill: 11  -     Ambulatory referral to Gastroenterology    Increased liver enzymes:  New problem, workup needed    Class 3 severe obesity due to excess calories with serious comorbidity and body mass index (BMI) of 40.0 to 44.9 in adult:  Worsening    Will schedule the patient for  electromyography studies secondary to numbness and tingling sensation in worsening pain on the right elbow.  Will refer the patient to physical medicine for 2nd opinion per patient preference on the right knee.  For acid reflux will start patient on Protonix 40 mg 1 tablet p.o. q.day, because of the severity of the symptoms, will refer the patient to the gastroenterologist.  Will recheck liver enzymes, will call the patient after we have the results of the test, advised the patient for healthy eating habits, 3 meals a day, 3 snacks and small portions, decrease carbohydrate intake, drink more water.  He can applied on the elbow eyes alternated with hot compresses, and take Tylenol extra-strength 500 mg every 6 hr as needed for pain.The patient's BMI has been recorded in the chart. The patient has been provided educational materials regarding the benefits of attaining and maintaining a normal weight. We will continue to address and follow this issue during follow up visits.Patient agreed with assessment and plan. Patient verbalized understanding.

## 2019-07-12 NOTE — TELEPHONE ENCOUNTER
----- Message from Lauren Khan MA sent at 7/12/2019  9:13 AM CDT -----  Type: Needs Medical Advice    Who Called: Patient    Best Call Back Number: [phone] (home)     Additional Information: Patient has an order for EMG study in epic from Dr. Donnelly, please contact patient to schedule appt. Thank you

## 2019-07-12 NOTE — PATIENT INSTRUCTIONS

## 2019-07-25 ENCOUNTER — INITIAL CONSULT (OUTPATIENT)
Dept: PHYSICAL MEDICINE AND REHAB | Facility: CLINIC | Age: 29
End: 2019-07-25
Payer: COMMERCIAL

## 2019-07-25 VITALS — BODY MASS INDEX: 40.18 KG/M2 | HEIGHT: 66 IN | WEIGHT: 250 LBS

## 2019-07-25 DIAGNOSIS — G89.29 CHRONIC PAIN OF RIGHT KNEE: Primary | ICD-10-CM

## 2019-07-25 DIAGNOSIS — M25.561 CHRONIC PAIN OF RIGHT KNEE: Primary | ICD-10-CM

## 2019-07-25 PROCEDURE — 99999 PR PBB SHADOW E&M-EST. PATIENT-LVL II: ICD-10-PCS | Mod: PBBFAC,,, | Performed by: PHYSICAL MEDICINE & REHABILITATION

## 2019-07-25 PROCEDURE — 99204 OFFICE O/P NEW MOD 45 MIN: CPT | Mod: 25,S$GLB,, | Performed by: PHYSICAL MEDICINE & REHABILITATION

## 2019-07-25 PROCEDURE — 99204 PR OFFICE/OUTPT VISIT, NEW, LEVL IV, 45-59 MIN: ICD-10-PCS | Mod: 25,S$GLB,, | Performed by: PHYSICAL MEDICINE & REHABILITATION

## 2019-07-25 PROCEDURE — 20611 DRAIN/INJ JOINT/BURSA W/US: CPT | Mod: RT,S$GLB,, | Performed by: PHYSICAL MEDICINE & REHABILITATION

## 2019-07-25 PROCEDURE — 20611 LARGE JOINT ASPIRATION/INJECTION: R KNEE: ICD-10-PCS | Mod: RT,S$GLB,, | Performed by: PHYSICAL MEDICINE & REHABILITATION

## 2019-07-25 PROCEDURE — 99999 PR PBB SHADOW E&M-EST. PATIENT-LVL II: CPT | Mod: PBBFAC,,, | Performed by: PHYSICAL MEDICINE & REHABILITATION

## 2019-07-25 RX ORDER — TRIAMCINOLONE ACETONIDE 40 MG/ML
40 INJECTION, SUSPENSION INTRA-ARTICULAR; INTRAMUSCULAR
Status: DISCONTINUED | OUTPATIENT
Start: 2019-07-25 | End: 2019-07-25 | Stop reason: HOSPADM

## 2019-07-25 RX ADMIN — TRIAMCINOLONE ACETONIDE 40 MG: 40 INJECTION, SUSPENSION INTRA-ARTICULAR; INTRAMUSCULAR at 06:07
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
## 2019-07-25 NOTE — LETTER
July 25, 2019      Courtney Donnelly MD  [address] [facility] [address] [place] [address]           [facility]  [address] [facility] [address] [place] [address]  Phone: [phone]  Fax: [phone]          Patient: Yovani Malik   MR Number: [id number]   YOB: 1990   Date of Visit: 7/25/2019       Dear Dr. Courtney Donnelly:    Thank you for referring Yovani Malik to me for evaluation. Attached you will find relevant portions of my assessment and plan of care.    If you have questions, please do not hesitate to call me. I look forward to following Yovani Malik along with you.    Sincerely,    Chris Larsen MD    Enclosure  CC:  No Recipients    If you would like to receive this communication electronically, please contact [email or web address] or [phone] to request more information on [employer] Link access.    For providers and/or their staff who would like to refer a patient to [facility], please contact us through our one-stop-shop provider referral line, [facility], at [phone].    If you feel you have received this communication in error or would no longer like to receive these types of communications, please e-mail [email or web address]

## 2019-07-25 NOTE — PROCEDURES
Large Joint Aspiration/Injection: R knee  Date/Time: 7/25/2019 6:51 PM  Performed by: Chris Larsen MD  Authorized by: Chris Larsen MD     Consent Done?:  Yes (Verbal)  Indications:  Pain  Procedure site marked: Yes    Timeout: Prior to procedure the correct patient, procedure, and site was verified      Location:  Knee  Site:  R knee  Prep: Patient was prepped and draped in usual sterile fashion    Ultrasonic Guidance for needle placement: Yes  Images are saved and documented.  Needle size:  25 G  Approach: Needle in plane, lateral to medial.  Medications:  40 mg triamcinolone acetonide 40 mg/mL  Patient tolerance:  Patient tolerated the procedure well with no immediate complications    Additional Comments: Ultrasound guidance was used for correct needle placement, the images were saved will be uploaded to EMR.

## 2019-08-15 ENCOUNTER — OFFICE VISIT (OUTPATIENT)
Dept: GASTROENTEROLOGY | Facility: CLINIC | Age: 29
End: 2019-08-15
Payer: COMMERCIAL

## 2019-08-15 VITALS — WEIGHT: 243.81 LBS | BODY MASS INDEX: 39.18 KG/M2 | HEIGHT: 66 IN

## 2019-08-15 DIAGNOSIS — R10.13 DYSPEPSIA: ICD-10-CM

## 2019-08-15 DIAGNOSIS — K21.9 GASTROESOPHAGEAL REFLUX DISEASE, ESOPHAGITIS PRESENCE NOT SPECIFIED: Primary | ICD-10-CM

## 2019-08-15 PROCEDURE — 99203 OFFICE O/P NEW LOW 30 MIN: CPT | Mod: S$GLB,,, | Performed by: INTERNAL MEDICINE

## 2019-08-15 PROCEDURE — 3008F BODY MASS INDEX DOCD: CPT | Mod: CPTII,S$GLB,, | Performed by: INTERNAL MEDICINE

## 2019-08-15 PROCEDURE — 99999 PR PBB SHADOW E&M-EST. PATIENT-LVL II: CPT | Mod: PBBFAC,,, | Performed by: INTERNAL MEDICINE

## 2019-08-15 PROCEDURE — 99999 PR PBB SHADOW E&M-EST. PATIENT-LVL II: ICD-10-PCS | Mod: PBBFAC,,, | Performed by: INTERNAL MEDICINE

## 2019-08-15 PROCEDURE — 3008F PR BODY MASS INDEX (BMI) DOCUMENTED: ICD-10-PCS | Mod: CPTII,S$GLB,, | Performed by: INTERNAL MEDICINE

## 2019-08-15 PROCEDURE — 99203 PR OFFICE/OUTPT VISIT, NEW, LEVL III, 30-44 MIN: ICD-10-PCS | Mod: S$GLB,,, | Performed by: INTERNAL MEDICINE

## 2019-08-15 NOTE — PROGRESS NOTES
Pt presents for evaluation chronic GERD symptoms. Pt describes many years of pyrosis, waterbrash, and regurgitation. Positive dyspepsia and belching. Positive vomiting. No dysphagia. No abd pain. No bleeding. Denies diarrhea or constipation. Appetite and weight stable. Symptoms progressing, worse past several months. No fever or jaundice. No prior EGD. Symptoms worse supine position. Tried OTC antacids and prescription acid suppressive medication without benefit.    REVIEW OF SYSTEMS:   Constitutional: Negative for fever, appetite change and unexpected weight change.  HENT: Negative for sore throat and trouble swallowing.  Eyes: Negative for visual disturbance.  Respiratory: Negative for chest tightness, shortness of breath and wheezing.  Cardiovascular: Negative for chest pain.  Gastrointestinal:  as per HPI  Genitourinary: Negative for dysuria, frequency and hematuria.  Musculoskeletal: Negative for myalgias, joint swelling and arthralgias.  Skin: Negative for color change and rash.   Neurological: Negative for syncope, weakness and headaches.    PHYSICAL EXAMINATION:                                                        GENERAL:  Comfortable, in no acute distress.      SKIN: Non-jaundiced.                             HEENT EXAM:  Nonicteric.  No adenopathy.  Oropharynx is clear.               NECK:  Supple.                                                               LUNGS:  Clear.                                                               CARDIAC:  Regular rate and rhythm.  S1, S2.  No murmur.                      ABDOMEN:  Soft, positive bowel sounds, nontender.  No hepatosplenomegaly or masses.  No rebound or guarding.                                             EXTREMITIES:  No edema.     NEURO: CN II-XII intact; motor/sensory non-focal.    IMP: 1. GERD          2. Dyspepsia          3. N/V    PLAN: EGD

## 2019-08-15 NOTE — LETTER
August 15, 2019      Courtney Donnelly MD  [address] [facility] [address] [place] [address]           [facility]  [address] [facility] [address] [place] [address]  Phone: [phone]          Patient: Yovani Malik   MR Number: [id number]   YOB: 1990   Date of Visit: 8/15/2019       Dear Dr. Courtney Donnelly:    Thank you for referring Yovani Malik to me for evaluation. Attached you will find relevant portions of my assessment and plan of care.    If you have questions, please do not hesitate to call me. I look forward to following Yovani Malik along with you.    Sincerely,    Yovani Dominguez MD    Enclosure  CC:  No Recipients    If you would like to receive this communication electronically, please contact [email or web address] or [phone] to request more information on [employer] Link access.    For providers and/or their staff who would like to refer a patient to [facility], please contact us through our one-stop-shop provider referral line, [facility] , at [phone].    If you feel you have received this communication in error or would no longer like to receive these types of communications, please e-mail [email or web address]

## 2019-08-22 ENCOUNTER — OFFICE VISIT (OUTPATIENT)
Dept: PHYSICAL MEDICINE AND REHAB | Facility: CLINIC | Age: 29
End: 2019-08-22
Payer: COMMERCIAL

## 2019-08-22 VITALS — WEIGHT: 243 LBS | HEIGHT: 66 IN | BODY MASS INDEX: 39.05 KG/M2

## 2019-08-22 DIAGNOSIS — M25.561 CHRONIC PAIN OF RIGHT KNEE: ICD-10-CM

## 2019-08-22 DIAGNOSIS — G89.29 CHRONIC KNEE PAIN, UNSPECIFIED LATERALITY: Primary | ICD-10-CM

## 2019-08-22 DIAGNOSIS — G89.29 CHRONIC PAIN OF RIGHT KNEE: ICD-10-CM

## 2019-08-22 DIAGNOSIS — M25.569 CHRONIC KNEE PAIN, UNSPECIFIED LATERALITY: Primary | ICD-10-CM

## 2019-08-22 PROCEDURE — 99999 PR PBB SHADOW E&M-EST. PATIENT-LVL III: CPT | Mod: PBBFAC,,, | Performed by: PHYSICAL MEDICINE & REHABILITATION

## 2019-08-22 PROCEDURE — 99999 PR PBB SHADOW E&M-EST. PATIENT-LVL III: ICD-10-PCS | Mod: PBBFAC,,, | Performed by: PHYSICAL MEDICINE & REHABILITATION

## 2019-08-22 PROCEDURE — 99213 PR OFFICE/OUTPT VISIT, EST, LEVL III, 20-29 MIN: ICD-10-PCS | Mod: S$GLB,,, | Performed by: PHYSICAL MEDICINE & REHABILITATION

## 2019-08-22 PROCEDURE — 99213 OFFICE O/P EST LOW 20 MIN: CPT | Mod: S$GLB,,, | Performed by: PHYSICAL MEDICINE & REHABILITATION

## 2019-08-22 PROCEDURE — 3008F PR BODY MASS INDEX (BMI) DOCUMENTED: ICD-10-PCS | Mod: CPTII,S$GLB,, | Performed by: PHYSICAL MEDICINE & REHABILITATION

## 2019-08-22 PROCEDURE — 3008F BODY MASS INDEX DOCD: CPT | Mod: CPTII,S$GLB,, | Performed by: PHYSICAL MEDICINE & REHABILITATION
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
## 2019-08-28 DIAGNOSIS — M25.561 RIGHT KNEE PAIN, UNSPECIFIED CHRONICITY: Primary | ICD-10-CM

## 2019-08-29 ENCOUNTER — HOSPITAL ENCOUNTER (OUTPATIENT)
Dept: RADIOLOGY | Facility: HOSPITAL | Age: 29
Discharge: HOME | End: 2019-08-29
Attending: ORTHOPAEDIC SURGERY
Payer: COMMERCIAL

## 2019-08-29 ENCOUNTER — OFFICE VISIT (OUTPATIENT)
Dept: ORTHOPEDICS | Facility: CLINIC | Age: 29
End: 2019-08-29
Payer: COMMERCIAL

## 2019-08-29 VITALS
SYSTOLIC BLOOD PRESSURE: 125 MMHG | DIASTOLIC BLOOD PRESSURE: 70 MMHG | BODY MASS INDEX: 39.05 KG/M2 | HEIGHT: 66 IN | WEIGHT: 243 LBS | HEART RATE: 60 BPM

## 2019-08-29 DIAGNOSIS — M23.206 OLD COMPLEX TEAR OF MENISCUS OF RIGHT KNEE, UNSPECIFIED MENISCUS: Primary | ICD-10-CM

## 2019-08-29 DIAGNOSIS — M25.561 RIGHT KNEE PAIN, UNSPECIFIED CHRONICITY: ICD-10-CM

## 2019-08-29 PROCEDURE — 3008F PR BODY MASS INDEX (BMI) DOCUMENTED: ICD-10-PCS | Mod: CPTII,S$GLB,, | Performed by: ORTHOPAEDIC SURGERY

## 2019-08-29 PROCEDURE — 99999 PR PBB SHADOW E&M-EST. PATIENT-LVL III: CPT | Mod: PBBFAC,,, | Performed by: ORTHOPAEDIC SURGERY

## 2019-08-29 PROCEDURE — 73562 X-RAY EXAM OF KNEE 3: CPT | Mod: 26,RT,, | Performed by: RADIOLOGY

## 2019-08-29 PROCEDURE — 3008F BODY MASS INDEX DOCD: CPT | Mod: CPTII,S$GLB,, | Performed by: ORTHOPAEDIC SURGERY

## 2019-08-29 PROCEDURE — 99214 PR OFFICE/OUTPT VISIT, EST, LEVL IV, 30-39 MIN: ICD-10-PCS | Mod: S$GLB,,, | Performed by: ORTHOPAEDIC SURGERY

## 2019-08-29 PROCEDURE — 73560 X-RAY EXAM OF KNEE 1 OR 2: CPT | Mod: TC,PO,LT

## 2019-08-29 PROCEDURE — 73560 XR KNEE ORTHO RIGHT: ICD-10-PCS | Mod: 26,59,LT, | Performed by: RADIOLOGY

## 2019-08-29 PROCEDURE — 99214 OFFICE O/P EST MOD 30 MIN: CPT | Mod: S$GLB,,, | Performed by: ORTHOPAEDIC SURGERY

## 2019-08-29 PROCEDURE — 99999 PR PBB SHADOW E&M-EST. PATIENT-LVL III: ICD-10-PCS | Mod: PBBFAC,,, | Performed by: ORTHOPAEDIC SURGERY

## 2019-08-29 PROCEDURE — 73560 X-RAY EXAM OF KNEE 1 OR 2: CPT | Mod: 26,59,LT, | Performed by: RADIOLOGY

## 2019-08-29 PROCEDURE — 73562 XR KNEE ORTHO RIGHT: ICD-10-PCS | Mod: 26,RT,, | Performed by: RADIOLOGY

## 2019-08-29 PROCEDURE — 73562 X-RAY EXAM OF KNEE 3: CPT | Mod: TC,PO,RT

## 2019-08-29 RX ORDER — DICLOFENAC SODIUM 10 MG/G
4 GEL TOPICAL 2 TIMES DAILY PRN
Qty: 100 G | Refills: 0 | Status: ON HOLD | OUTPATIENT
Start: 2019-08-29 | End: 2020-01-23

## 2019-08-29 NOTE — LETTER
September 4, 2019      Chris Larsen MD  [address] [place] [address]   [address]  [facility] [address]           [facility] [address] [facility] [address] [place] [address]  Phone: [phone]          Patient: Yovani Malik   MR Number: [id number]   YOB: 1990   Date of Visit: 8/29/2019       Dear Dr. Chris Larsen:    Thank you for referring Yovani Malik to me for evaluation. Attached you will find relevant portions of my assessment and plan of care.    If you have questions, please do not hesitate to call me. I look forward to following Yovani Malik along with you.    Sincerely,    Eagle Irwin MD    Enclosure  CC:  No Recipients    If you would like to receive this communication electronically, please contact [email or web address] or [phone] to request more information on [employer] Link access.    For providers and/or their staff who would like to refer a patient to [facility], please contact us through our one-stop-shop provider referral line, [facility], at [phone].    If you feel you have received this communication in error or would no longer like to receive these types of communications, please e-mail [email or web address]

## 2019-08-29 NOTE — PROGRESS NOTES
Chief Complaint   Patient presents with    Right Knee - Pain         HPI:   This is a 29 y.o. who presents to clinic today complaining of right knee pain for 1 years after no known trauma. He is status post right knee arthroscopy 7 years ago at [facility].  States he had an MRI in February that revealed a lateral meniscus tear. Pain is progressively worsening. No numbness or tingling. No associated signs or symptoms.    Past Medical History:   Diagnosis Date    Anxiety     Borderline diabetes     Hyperlipidemia     Hypertension      Past Surgical History:   Procedure Laterality Date    KNEE ARTHROSCOPY      KNEE SURGERY      x 2      Current Outpatient Medications on File Prior to Visit   Medication Sig Dispense Refill    ALPRAZolam (XANAX) 0.25 MG tablet   1    dextroamphetamine-amphetamine (ADDERALL XR) 30 MG 24 hr capsule TK TWO CAPSULES PO D IN THE EARLY MORNING  0    ergocalciferol (ERGOCALCIFEROL) 50,000 unit Cap TAKE 1 CAPSULE BY MOUTH EVERY 7 DAYS 12 capsule 0    pantoprazole (PROTONIX) 40 MG tablet Take 1 tablet (40 mg total) by mouth once daily. 30 tablet 11     No current facility-administered medications on file prior to visit.      Review of patient's allergies indicates:  No Known Allergies  Family History   Problem Relation Age of Onset    Diabetes Mother     Hypertension Mother     Hyperlipidemia Mother     Diabetes Father     Diabetes Maternal Grandmother     Cancer Maternal Grandmother         breast     Social History     Socioeconomic History    Marital status:      Spouse name: Not on file    Number of children: Not on file    Years of education: Not on file    Highest education level: Not on file   Occupational History    Not on file   Social Needs    Financial resource strain: Not on file    Food insecurity:     Worry: Not on file     Inability: Not on file    Transportation needs:     Medical: Not on file     Non-medical: Not on file   Tobacco Use    Smoking status:  Former Smoker     Last attempt to quit: 2017     Years since quittin.2    Smokeless tobacco: Never Used   Substance and Sexual Activity    Alcohol use: No    Drug use: No    Sexual activity: Yes     Partners: Female   Lifestyle    Physical activity:     Days per week: Not on file     Minutes per session: Not on file    Stress: Not on file   Relationships    Social connections:     Talks on phone: Not on file     Gets together: Not on file     Attends Buddhist service: Not on file     Active member of club or organization: Not on file     Attends meetings of clubs or organizations: Not on file     Relationship status: Not on file   Other Topics Concern    Not on file   Social History Narrative    Not on file       Review of Systems:  Constitutional:  Denies fever or chills   Eyes:  Denies change in visual acuity   HENT:  Denies nasal congestion or sore throat   Respiratory:  Denies cough or shortness of breath   Cardiovascular:  Denies chest pain or edema   GI:  Denies abdominal pain, nausea, vomiting, bloody stools or diarrhea   :  Denies dysuria   Integument:  Denies rash   Neurologic:  Denies headache, focal weakness or sensory changes   Endocrine:  Denies polyuria or polydipsia   Lymphatic:  Denies swollen glands   Psychiatric:  Denies depression or anxiety     Physical Exam:   Constitutional:  Well developed, well nourished, no acute distress, non-toxic appearance   Integument:  Well hydrated, no rash   Lymphatic:  No lymphadenopathy noted   Neurologic:  Alert & oriented x 3  Psychiatric:  Speech and behavior appropriate   Eyes: EOMI  Gi: abdomen soft    Bilateral Knee Exam    right Knee Exam     Tenderness   The patient is experiencing tenderness in the lateral joint line.    Range of Motion   Extension: abnormal   Flexion: abnormal     Muscle Strength     The patient has normal knee strength.    Tests   Kwadwo:  Medial - negative  Lachman:  Anterior - negative      Varus:  negative  Valgus: positive  Patellar Apprehension: negative    Other   Erythema: absent  Sensation: normal  Pulse: present  Swelling: mild      left Knee Exam   left knee exam performed same as contralateral side and is normal.        X-rays were performed, personally reviewed by me and findings discussed with the patient.  3 views of the right knee show tricompartmental degenerative change most pronounced in the medial compartment with complex tearing of the menisci    There are no diagnoses linked to this encounter.      Referral to Dr. Wells in 1 week.

## 2019-09-04 ENCOUNTER — LAB VISIT (OUTPATIENT)
Dept: LAB | Facility: HOSPITAL | Age: 29
End: 2019-09-04
Attending: ORTHOPAEDIC SURGERY
Payer: COMMERCIAL

## 2019-09-04 ENCOUNTER — OFFICE VISIT (OUTPATIENT)
Dept: ORTHOPEDICS | Facility: CLINIC | Age: 29
End: 2019-09-04
Payer: COMMERCIAL

## 2019-09-04 VITALS
HEIGHT: 66 IN | BODY MASS INDEX: 39.05 KG/M2 | HEART RATE: 122 BPM | SYSTOLIC BLOOD PRESSURE: 137 MMHG | DIASTOLIC BLOOD PRESSURE: 79 MMHG | WEIGHT: 243 LBS

## 2019-09-04 DIAGNOSIS — Z01.818 PRE-OP TESTING: Primary | ICD-10-CM

## 2019-09-04 DIAGNOSIS — M23.200 OLD COMPLEX TEAR OF LATERAL MENISCUS OF RIGHT KNEE: Primary | ICD-10-CM

## 2019-09-04 DIAGNOSIS — Z01.818 PRE-OP TESTING: ICD-10-CM

## 2019-09-04 PROBLEM — M23.206: Status: ACTIVE | Noted: 2019-09-04

## 2019-09-04 PROCEDURE — 99999 PR PBB SHADOW E&M-EST. PATIENT-LVL III: CPT | Mod: PBBFAC,,, | Performed by: ORTHOPAEDIC SURGERY

## 2019-09-04 PROCEDURE — 80048 BASIC METABOLIC PNL TOTAL CA: CPT

## 2019-09-04 PROCEDURE — 99999 PR PBB SHADOW E&M-EST. PATIENT-LVL III: ICD-10-PCS | Mod: PBBFAC,,, | Performed by: ORTHOPAEDIC SURGERY

## 2019-09-04 PROCEDURE — 3008F PR BODY MASS INDEX (BMI) DOCUMENTED: ICD-10-PCS | Mod: CPTII,S$GLB,, | Performed by: ORTHOPAEDIC SURGERY

## 2019-09-04 PROCEDURE — 3008F BODY MASS INDEX DOCD: CPT | Mod: CPTII,S$GLB,, | Performed by: ORTHOPAEDIC SURGERY

## 2019-09-04 PROCEDURE — 36415 COLL VENOUS BLD VENIPUNCTURE: CPT | Mod: PO

## 2019-09-04 PROCEDURE — 99214 PR OFFICE/OUTPT VISIT, EST, LEVL IV, 30-39 MIN: ICD-10-PCS | Mod: 57,S$GLB,, | Performed by: ORTHOPAEDIC SURGERY

## 2019-09-04 PROCEDURE — 99214 OFFICE O/P EST MOD 30 MIN: CPT | Mod: 57,S$GLB,, | Performed by: ORTHOPAEDIC SURGERY

## 2019-09-04 PROCEDURE — 85025 COMPLETE CBC W/AUTO DIFF WBC: CPT

## 2019-09-04 NOTE — LETTER
September 5, 2019      Eagle Irwin MD  [address] [facility] [address]  [facility] [address]           [facility]  [address] [facility] [place] [address]  Phone: [phone]          Patient: Yovani Malik   MR Number: [id number]   YOB: 1990   Date of Visit: 9/4/2019       Dear Dr. Eagle Irwin:    Thank you for referring Yovani Malik to me for evaluation. Attached you will find relevant portions of my assessment and plan of care.    If you have questions, please do not hesitate to call me. I look forward to following Yovani Malik along with you.    Sincerely,    Anurag Wells MD    Enclosure  CC:  No Recipients    If you would like to receive this communication electronically, please contact [email or web address] or [phone] to request more information on [employer] Link access.    For providers and/or their staff who would like to refer a patient to [facility], please contact us through our one-stop-shop provider referral line, [facility], at [phone].    If you feel you have received this communication in error or would no longer like to receive these types of communications, please e-mail [email or web address]

## 2019-09-05 ENCOUNTER — OFFICE VISIT (OUTPATIENT)
Dept: PHYSICAL MEDICINE AND REHAB | Facility: CLINIC | Age: 29
End: 2019-09-05
Payer: COMMERCIAL

## 2019-09-05 DIAGNOSIS — G56.03 BILATERAL CARPAL TUNNEL SYNDROME: Primary | ICD-10-CM

## 2019-09-05 DIAGNOSIS — R20.0 NUMBNESS AND TINGLING OF RIGHT ARM: ICD-10-CM

## 2019-09-05 DIAGNOSIS — R20.2 NUMBNESS AND TINGLING OF RIGHT ARM: ICD-10-CM

## 2019-09-05 DIAGNOSIS — G56.01 CARPAL TUNNEL SYNDROME OF RIGHT WRIST: Primary | ICD-10-CM

## 2019-09-05 DIAGNOSIS — M25.521 RIGHT ELBOW PAIN: ICD-10-CM

## 2019-09-05 LAB
ANION GAP SERPL CALC-SCNC: 11 MMOL/L (ref 8–16)
BASOPHILS # BLD AUTO: 0.03 K/UL (ref 0–0.2)
BASOPHILS NFR BLD: 0.5 % (ref 0–1.9)
BUN SERPL-MCNC: 14 MG/DL (ref 6–20)
CALCIUM SERPL-MCNC: 9.7 MG/DL (ref 8.7–10.5)
CHLORIDE SERPL-SCNC: 102 MMOL/L (ref 95–110)
CO2 SERPL-SCNC: 27 MMOL/L (ref 23–29)
CREAT SERPL-MCNC: 0.9 MG/DL (ref 0.5–1.4)
DIFFERENTIAL METHOD: NORMAL
EOSINOPHIL # BLD AUTO: 0.2 K/UL (ref 0–0.5)
EOSINOPHIL NFR BLD: 2.9 % (ref 0–8)
ERYTHROCYTE [DISTWIDTH] IN BLOOD BY AUTOMATED COUNT: 13.6 % (ref 11.5–14.5)
EST. GFR  (AFRICAN AMERICAN): >60 ML/MIN/1.73 M^2
EST. GFR  (NON AFRICAN AMERICAN): >60 ML/MIN/1.73 M^2
GLUCOSE SERPL-MCNC: 118 MG/DL (ref 70–110)
HCT VFR BLD AUTO: 45.3 % (ref 40–54)
HGB BLD-MCNC: 14.8 G/DL (ref 14–18)
IMM GRANULOCYTES # BLD AUTO: 0.01 K/UL (ref 0–0.04)
IMM GRANULOCYTES NFR BLD AUTO: 0.2 % (ref 0–0.5)
LYMPHOCYTES # BLD AUTO: 2.3 K/UL (ref 1–4.8)
LYMPHOCYTES NFR BLD: 39.1 % (ref 18–48)
MCH RBC QN AUTO: 27.3 PG (ref 27–31)
MCHC RBC AUTO-ENTMCNC: 32.7 G/DL (ref 32–36)
MCV RBC AUTO: 84 FL (ref 82–98)
MONOCYTES # BLD AUTO: 0.4 K/UL (ref 0.3–1)
MONOCYTES NFR BLD: 6.7 % (ref 4–15)
NEUTROPHILS # BLD AUTO: 3 K/UL (ref 1.8–7.7)
NEUTROPHILS NFR BLD: 50.6 % (ref 38–73)
NRBC BLD-RTO: 0 /100 WBC
PLATELET # BLD AUTO: 302 K/UL (ref 150–350)
PMV BLD AUTO: 11.5 FL (ref 9.2–12.9)
POTASSIUM SERPL-SCNC: 4.7 MMOL/L (ref 3.5–5.1)
RBC # BLD AUTO: 5.42 M/UL (ref 4.6–6.2)
SODIUM SERPL-SCNC: 140 MMOL/L (ref 136–145)
WBC # BLD AUTO: 5.93 K/UL (ref 3.9–12.7)

## 2019-09-05 PROCEDURE — 99211 OFF/OP EST MAY X REQ PHY/QHP: CPT | Mod: 25,S$GLB,, | Performed by: PHYSICAL MEDICINE & REHABILITATION

## 2019-09-05 PROCEDURE — 99999 PR PBB SHADOW E&M-EST. PATIENT-LVL I: ICD-10-PCS | Mod: PBBFAC,,, | Performed by: PHYSICAL MEDICINE & REHABILITATION

## 2019-09-05 PROCEDURE — 99999 PR PBB SHADOW E&M-EST. PATIENT-LVL I: CPT | Mod: PBBFAC,,, | Performed by: PHYSICAL MEDICINE & REHABILITATION

## 2019-09-05 PROCEDURE — 95886 PR EMG COMPLETE, W/ NERVE CONDUCTION STUDIES, 5+ MUSCLES: ICD-10-PCS | Mod: S$GLB,,, | Performed by: PHYSICAL MEDICINE & REHABILITATION

## 2019-09-05 PROCEDURE — 95910 NRV CNDJ TEST 7-8 STUDIES: CPT | Mod: S$GLB,,, | Performed by: PHYSICAL MEDICINE & REHABILITATION

## 2019-09-05 PROCEDURE — 99499 UNLISTED E&M SERVICE: CPT | Mod: S$GLB,,, | Performed by: PHYSICAL MEDICINE & REHABILITATION

## 2019-09-05 PROCEDURE — 76942 ECHO GUIDE FOR BIOPSY: CPT | Mod: S$GLB,,, | Performed by: PHYSICAL MEDICINE & REHABILITATION

## 2019-09-05 PROCEDURE — 99211 PR OFFICE/OUTPT VISIT, EST, LEVL I: ICD-10-PCS | Mod: 25,S$GLB,, | Performed by: PHYSICAL MEDICINE & REHABILITATION

## 2019-09-05 PROCEDURE — 99499 NO LOS: ICD-10-PCS | Mod: S$GLB,,, | Performed by: PHYSICAL MEDICINE & REHABILITATION

## 2019-09-05 PROCEDURE — 95886 MUSC TEST DONE W/N TEST COMP: CPT | Mod: S$GLB,,, | Performed by: PHYSICAL MEDICINE & REHABILITATION

## 2019-09-05 PROCEDURE — 20526 THER INJECTION CARP TUNNEL: CPT | Mod: RT,S$GLB,, | Performed by: PHYSICAL MEDICINE & REHABILITATION

## 2019-09-05 PROCEDURE — 20526 CARPAL TUNNEL: R INTERCARPAL: ICD-10-PCS | Mod: RT,S$GLB,, | Performed by: PHYSICAL MEDICINE & REHABILITATION

## 2019-09-05 PROCEDURE — 76942 CARPAL TUNNEL: R INTERCARPAL: ICD-10-PCS | Mod: S$GLB,,, | Performed by: PHYSICAL MEDICINE & REHABILITATION

## 2019-09-05 PROCEDURE — 95910 PR NERVE CONDUCTION STUDY; 7-8 STUDIES: ICD-10-PCS | Mod: S$GLB,,, | Performed by: PHYSICAL MEDICINE & REHABILITATION

## 2019-09-05 RX ORDER — BETAMETHASONE SODIUM PHOSPHATE AND BETAMETHASONE ACETATE 3; 3 MG/ML; MG/ML
6 INJECTION, SUSPENSION INTRA-ARTICULAR; INTRALESIONAL; INTRAMUSCULAR; SOFT TISSUE
Status: DISCONTINUED | OUTPATIENT
Start: 2019-09-05 | End: 2019-09-05 | Stop reason: HOSPADM

## 2019-09-05 RX ADMIN — BETAMETHASONE SODIUM PHOSPHATE AND BETAMETHASONE ACETATE 6 MG: 3; 3 INJECTION, SUSPENSION INTRA-ARTICULAR; INTRALESIONAL; INTRAMUSCULAR; SOFT TISSUE at 11:09

## 2019-09-05 NOTE — PROGRESS NOTES
[facility]    CHIEF COMPLAINT: Right arm pain    HISTORY OF PRESENT ILLNESS: Yovani Malik is a 29 y.o. male who presents to me for EMG/NCS of the right upper extremity.  Findings were consistent with carpal tunnel syndrome.  We discussed conservative treatment and wished to proceed with injection of corticosteroid to the right wrist.    Review of Systems   Constitutional: Negative for fever.   HENT: Negative for drooling.    Eyes: Negative for discharge.   Respiratory: Negative for choking.    Cardiovascular: Negative for chest pain.   Genitourinary: Negative for flank pain.   Skin: Negative for wound.   Allergic/Immunologic: Negative for immunocompromised state.   Neurological: Negative for tremors and syncope.   Psychiatric/Behavioral: Negative for behavioral problems.     Past Medical History:   Past Medical History:   Diagnosis Date    Anxiety     Borderline diabetes     Hyperlipidemia     Hypertension        Past Surgical History:   Past Surgical History:   Procedure Laterality Date    KNEE ARTHROSCOPY      KNEE SURGERY      x 2        Family History:   Family History   Problem Relation Age of Onset    Diabetes Mother     Hypertension Mother     Hyperlipidemia Mother     Diabetes Father     Diabetes Maternal Grandmother     Cancer Maternal Grandmother         breast       Medications:   Current Outpatient Medications on File Prior to Visit   Medication Sig Dispense Refill    ALPRAZolam (XANAX) 0.25 MG tablet   1    dextroamphetamine-amphetamine (ADDERALL XR) 30 MG 24 hr capsule TK TWO CAPSULES PO D IN THE EARLY MORNING  0    diclofenac sodium (VOLTAREN) 1 % Gel Apply 4 g topically 2 (two) times daily as needed. 100 g 0    ergocalciferol (ERGOCALCIFEROL) 50,000 unit Cap TAKE 1 CAPSULE BY MOUTH EVERY 7 DAYS 12 capsule 0    pantoprazole (PROTONIX) 40 MG tablet Take 1 tablet (40 mg total) by mouth once daily. 30 tablet 11     No current facility-administered medications on file  prior to visit.        Allergies: Review of patient's allergies indicates:  No Known Allergies    Social History:   Social History     Socioeconomic History    Marital status:      Spouse name: Not on file    Number of children: Not on file    Years of education: Not on file    Highest education level: Not on file   Occupational History    Not on file   Social Needs    Financial resource strain: Not on file    Food insecurity:     Worry: Not on file     Inability: Not on file    Transportation needs:     Medical: Not on file     Non-medical: Not on file   Tobacco Use    Smoking status: Former Smoker     Last attempt to quit: 2017     Years since quittin.2    Smokeless tobacco: Never Used   Substance and Sexual Activity    Alcohol use: No    Drug use: No    Sexual activity: Yes     Partners: Female   Lifestyle    Physical activity:     Days per week: Not on file     Minutes per session: Not on file    Stress: Not on file   Relationships    Social connections:     Talks on phone: Not on file     Gets together: Not on file     Attends Sikh service: Not on file     Active member of club or organization: Not on file     Attends meetings of clubs or organizations: Not on file     Relationship status: Not on file   Other Topics Concern    Not on file   Social History Narrative    Not on file     PHYSICAL EXAMINATION:   General  VSS  Constitutional: Oriented to person, place, and time. No apparent distress. Appears well-developed and well-nourished. Pleasant.  HENT:   Head: Normocephalic and atraumatic.   Eyes: Right eye exhibits no discharge. Left eye exhibits no discharge. No scleral icterus.   Pulmonary/Chest: Effort normal. No respiratory distress.   Abdominal: There is no guarding.   Neurological: Alert and oriented to person, place, and time.   Psychiatric: Behavior is normal.   Right Hand Exam     Tenderness   The patient is experiencing no tenderness.     Range of Motion    Wrist   Extension: 45   Flexion: 80   Pronation: normal   Supination: normal     Muscle Strength   Wrist extension: 5/5   Wrist flexion: 5/5   : 5/5     Other   Erythema: absent  Scars: absent  Sensation: normal  Pulse: present      Left Hand Exam     Tenderness   The patient is experiencing no tenderness.     Range of Motion   Wrist   Extension: normal   Flexion: normal   Pronation: normal   Supination: normal         INSPECTION: There is no swelling, ecchymoses, erythema or gross deformity of the hands.  No gross thenar atrophy.    ASSESSMENT:   1. Carpal tunnel syndrome of right wrist      PLAN:     1. Time was spent reviewing the above diagnosis in depth with Yovani today, including acute management and rehabilitation.     2.  We proceed today with ultrasound-guided injection of corticosteroid to the right carpal tunnel.  See separate procedure note.    3.  He was also issued a night splint to wear religiously.     4.  I recommend he return to clinic in the next 6-8 weeks if not improved.    The above note was completed, in part, with the aid of Dragon dictation software/hardware. Translation errors may be present.
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
## 2019-09-05 NOTE — PROGRESS NOTES
[facility]  [address] [facility] [address]  [place] [address]             Full Name: JADEN BLUE Gender: Male  Patient ID: [id number] YOB: 1990  History: Pt reports right sided numbness in the forearm and hand, worsening over the last few months. He denies neck pain or left sided symptoms.      Visit Date: 8/29/2019 13:40  Age: 29 Years 2 Months Old  Examining Physician: MIGUELINA  Referring Physician: EFFIE      Sensory NCS      Nerve / Sites Rec. Site Onset Lat Peak Lat NP Amp PP Amp Segments Distance Velocity     ms ms µV µV  cm m/s   L Median - Digit III (Antidromic)      Wrist Dig III 3.91 5.10 15.6 16.9 Wrist - Dig III 14 36      Mid palm Dig III 1.61 2.14 7.9 45.0 Mid palm - Dig III 7 43   R Median - Digit III (Antidromic)      Wrist Dig III 4.32 5.21 0.46 4.3 Wrist - Dig III 14 32      Mid palm Dig III 1.61 1.98 6.3 32.1 Mid palm - Dig III 7 43   L Ulnar - Digit V (Antidromic)      Wrist Dig V 2.34 3.18 35.2 55.2 Wrist - Dig V 14 60   R Ulnar - Digit V (Antidromic)      Wrist Dig V 2.45 3.13 29.5 49.6 Wrist - Dig V 14 57       Motor NCS      Nerve / Sites Muscle Latency Amplitude Amp % Duration Segments Distance Lat Diff Velocity     ms mV % ms  cm ms m/s   R Median - APB      Wrist APB 4.58 12.8 100 6.87 Wrist - APB 8        Elbow APB 9.79 12.0 94 6.82 Elbow - Wrist 23 5.21 44   L Median - APB      Wrist APB 4.79 8.3 100 5.83 Wrist - APB 8        Elbow APB 9.32 10.1 121 5.89 Elbow - Wrist 23 4.53 51   R Ulnar - ADM      Wrist ADM 2.92 9.4 100 7.71 Wrist - ADM 8        B.Elbow ADM 6.20 9.0 96.2 7.76 B.Elbow - Wrist 19 3.28 58      A.Elbow ADM 8.18 8.7 92.1 7.97 A.Elbow - B.Elbow 10 1.98 51       EMG         EMG Summary Table     Spontaneous MUAP Recruitment   Muscle IA Fib PSW Fasc H.F. Amp Dur. PPP Pattern   R. Deltoid N None None None None N N N N   R. Biceps brachii N None None None None N N N N   R. Triceps brachii N None None None None N N N N   R. Pronator teres N None None None None N N  N N   R. First dorsal interosseous N None None None None N N N N   R. Abductor pollicis brevis N None None None None N N N N       Summary    The motor conduction test was performed on 3 nerve(s). The results were normal in 1 nerve(s): R Ulnar - ADM. Results outside the specified normal range were found in 2 nerve(s), as follows:   In the R Median - APB study  o the take off latency result was increased for Wrist stimulation  o the take off velocity result was reduced for Elbow - Wrist segment   In the L Median - APB study  o the take off latency result was increased for Wrist stimulation    The sensory conduction test had results within normal range in 2 of the tested nerves:  R Ulnar - Digit V (Antidromic), L Ulnar - Digit V (Antidromic).  There were results outside of the specified normal range in 2 of the tested nerves:   In the L Median - Digit III (Antidromic) study  o the peak latency result was increased for Wrist stimulation   In the R Median - Digit III (Antidromic) study  o the peak latency result was increased for Wrist stimulation  o the peak amplitude result was reduced for Wrist stimulation    The needle EMG study was normal in all 6 tested muscles: R. Deltoid, R. Biceps brachii, R. Triceps brachii, R. Pronator teres, R. First dorsal interosseous, R. Abductor pollicis brevis.      Electrodiagnostic Impression:  1. There is electrodiagnostic evidence of mild to moderate left and moderate right median neuropathy at the wrists.  Needle EMG examination of the median nerve innervated abductor pollicis brevis muscle on the right failed to show signs of active axonal denervation.  2. There was insufficient electrodiagnostic evidence for diagnoses of peripheral polyneuropathy, myopathy, or active axonal cervical radiculopathy/brachial plexopathy of the right upper extremity.    Summary:  Mild to moderate left and moderate right carpal tunnel syndrome.    Plan:  We discussed conservative versus surgical  treatment for carpal tunnel syndrome.  He wishes to proceed with conservative treatment.  We will proceed today with ultrasound-guided injection of corticosteroid to the right carpal tunnel, as well as issue him a night splint.  See separate procedure note.  Today's test results will also be sent to his primary care physician, Dr. Donnelly, for further review.    Thank you very much for the referral. Please call if you have any questions regarding this study or the report.       -------------------------------  Chris Larsen M.D.
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
## 2019-09-05 NOTE — LETTER
September 5, 2019      Courtney Donnelly MD  [address] [facility] [address] [place] [address]           [facility]  [address] [facility] [place] [address]  Phone: [phone]  Fax: [phone]          Patient: Yovani Malik   MR Number: [id number]   YOB: 1990   Date of Visit: 9/5/2019       Dear Dr. Courtney Donnelly:    Thank you for referring Yovani Malik to me for evaluation. Attached you will find relevant portions of my assessment and plan of care.    If you have questions, please do not hesitate to call me. I look forward to following Yovani Malik along with you.    Sincerely,    Chris Larsen MD    Enclosure  CC:  No Recipients    If you would like to receive this communication electronically, please contact [email or web address] or [phone] to request more information on [email or web address] Link access.    For providers and/or their staff who would like to refer a patient to [facility], please contact us through our one-stop-shop provider referral line, [facility], at [phone].    If you feel you have received this communication in error or would no longer like to receive these types of communications, please e-mail [email or web address]

## 2019-09-05 NOTE — PROCEDURES
Carpal Tunnel: R intercarpal  Date/Time: 9/5/2019 11:26 AM  Performed by: Chris Larsen MD  Authorized by: Chris Larsen MD     Consent Done?:  Yes (Verbal)  Indications:  Pain  Site marked: The procedure site was marked    Timeout: Prior to procedure the correct patient, procedure, and site was verified      Location:  Wrist  Site:  R intercarpal  Prep: Patient was prepped and draped in usual sterile fashion    Ultrasonic Guidance for needle placement: Yes  Images are saved and documented.  Needle size:  27 G  Approach: Needle in plane, ulnar to radial.  Medications:  6 mg betamethasone acetate-betamethasone sodium phosphate 6 mg/mL  Patient tolerance:  Patient tolerated the procedure well with no immediate complications     Additional Comments: Ultrasound guidance was used for correct needle placement, the images were saved will be uploaded to EMR.         ORTHO PROGRESS NOTE    2019  Admit Date:   2019    Post Op day: 1 Day Post-Op    Subjective:    Ever Junk states that she continues with pain in the right shoulder > Left wrist > and left knee. She states that pain medication has not been helping much. States that she was able to ambulate without issue overnight. Trouble getting labs overnight due to vascular access issues. Now has PICC line. S/P washout of all three above joints yesterday which reportedly showed more synovitis that any gross purulence. On vanc and cefepime. G- Rods seen from cultures. Is tearful about having to stay in hospital for an extended period again.   Tolerating diet  Denies N/V/SOB or CP    PT/OT:   Gait:                    Vital Signs:    Patient Vitals for the past 8 hrs:   BP Temp Pulse Resp SpO2   19 0939 116/77 97.7 °F (36.5 °C) 69 16 97 %   19 0437 136/82 98.3 °F (36.8 °C) 60 16 96 %     Temp (24hrs), Av.1 °F (36.7 °C), Min:97.7 °F (36.5 °C), Max:98.7 °F (37.1 °C)      Pain Control:   Pain Assessment  Pain Scale 1: Numeric (0 - 10)  Pain Intensity 1: 7  Pain Location 1: Leg, Arm  Pain Orientation 1: Anterior  Pain Description 1: Aching  Pain Intervention(s) 1: Medication (see MAR)    Meds:    Current Facility-Administered Medications   Medication Dose Route Frequency    [Held by provider] methadone (DOLOPHINE) tablet 15 mg  15 mg Oral DAILY    oxyCODONE IR (ROXICODONE) tablet 5 mg  5 mg Oral ONCE    vancomycin (VANCOCIN) 1,000 mg in 0.9% sodium chloride (MBP/ADV) 250 mL  1,000 mg IntraVENous Q12H    LORazepam (ATIVAN) tablet 1 mg  1 mg Oral Q6H PRN    oxyCODONE IR (ROXICODONE) tablet 5 mg  5 mg Oral Q6H PRN    ketorolac (TORADOL) injection 15 mg  15 mg IntraVENous Q8H PRN    acetaminophen (TYLENOL) tablet 650 mg  650 mg Oral TID    sodium chloride (NS) flush 5-40 mL  5-40 mL IntraVENous Q8H    sodium chloride (NS) flush 5-40 mL  5-40 mL IntraVENous PRN    ondansetron (ZOFRAN) injection 4 mg  4 mg IntraVENous Q4H PRN    bisacodyl (DULCOLAX) tablet 5 mg  5 mg Oral DAILY PRN    heparin (porcine) injection 5,000 Units  5,000 Units SubCUTAneous Q8H    cefepime (MAXIPIME) 2 g in 0.9% sodium chloride (MBP/ADV) 100 mL  2 g IntraVENous Q12H    nicotine (NICODERM CQ) 21 mg/24 hr patch 1 Patch  1 Patch TransDERmal DAILY    0.9% sodium chloride infusion  125 mL/hr IntraVENous CONTINUOUS    Vancomycin- Pharmacy to Dose   Other Rx Dosing/Monitoring       LAB:    Recent Labs     09/03/19  0827   HCT 38.7   HGB 12.8       Transfuse PRBC's:      Assessment & Physician's Comment:  Right shoulder with clean and dry dressing but pain on palpation and with any movement  Left wrist TTP but moves actively  Left knee without palpable effusion and with minimal TTP - actively ranges    Dressing is clean, dry, and intact  Neurovascular checks within normal limits  Orientation:  Oriented    Principal Problem:    Pyogenic arthritis of right shoulder region [facility]) (9/2/2019)    Active Problems:    Septic arthritis ([facility] [address] (9/2/2019)        Plan:    Cont Abx for now - Awaiting ID   Labs  Pain control will be an issue - would consider restarting methadone as she is likely to be here an extended period  Question need for heparin as patient is able to mobilize on own  Medical management per primary team      Sheryl Pugh PA-C   Orthopedic Trauma Service  [address] [facility]

## 2019-09-06 RX ORDER — MUPIROCIN 20 MG/G
OINTMENT TOPICAL
Status: CANCELLED | OUTPATIENT
Start: 2019-09-06

## 2019-09-12 ENCOUNTER — ANESTHESIA EVENT (OUTPATIENT)
Dept: SURGERY | Facility: HOSPITAL | Age: 29
End: 2019-09-12
Payer: COMMERCIAL

## 2019-09-13 ENCOUNTER — ANESTHESIA (OUTPATIENT)
Dept: SURGERY | Facility: HOSPITAL | Age: 29
End: 2019-09-13
Payer: COMMERCIAL

## 2019-09-13 ENCOUNTER — HOSPITAL ENCOUNTER (OUTPATIENT)
Facility: HOSPITAL | Age: 29
Discharge: HOME | End: 2019-09-13
Attending: ORTHOPAEDIC SURGERY | Admitting: ORTHOPAEDIC SURGERY
Payer: COMMERCIAL

## 2019-09-13 DIAGNOSIS — M23.200 OLD COMPLEX TEAR OF LATERAL MENISCUS OF RIGHT KNEE: ICD-10-CM

## 2019-09-13 PROCEDURE — 25000003 PHARM REV CODE 250: Mod: PO | Performed by: ORTHOPAEDIC SURGERY

## 2019-09-13 PROCEDURE — 63600175 PHARM REV CODE 636 W HCPCS: Mod: PO | Performed by: NURSE ANESTHETIST, CERTIFIED REGISTERED

## 2019-09-13 PROCEDURE — 37000009 HC ANESTHESIA EA ADD 15 MINS: Mod: PO | Performed by: ORTHOPAEDIC SURGERY

## 2019-09-13 PROCEDURE — 25000003 PHARM REV CODE 250: Mod: PO | Performed by: ANESTHESIOLOGY

## 2019-09-13 PROCEDURE — 27201423 OPTIME MED/SURG SUP & DEVICES STERILE SUPPLY: Mod: PO | Performed by: ORTHOPAEDIC SURGERY

## 2019-09-13 PROCEDURE — 25000003 PHARM REV CODE 250: Mod: PO | Performed by: NURSE ANESTHETIST, CERTIFIED REGISTERED

## 2019-09-13 PROCEDURE — 63600175 PHARM REV CODE 636 W HCPCS: Mod: PO | Performed by: ANESTHESIOLOGY

## 2019-09-13 PROCEDURE — 63600175 PHARM REV CODE 636 W HCPCS: Mod: PO | Performed by: ORTHOPAEDIC SURGERY

## 2019-09-13 PROCEDURE — 27200651 HC AIRWAY, LMA: Mod: PO | Performed by: NURSE ANESTHETIST, CERTIFIED REGISTERED

## 2019-09-13 PROCEDURE — D9220A PRA ANESTHESIA: Mod: ANES,,, | Performed by: ANESTHESIOLOGY

## 2019-09-13 PROCEDURE — D9220A PRA ANESTHESIA: Mod: CRNA,,, | Performed by: NURSE ANESTHETIST, CERTIFIED REGISTERED

## 2019-09-13 PROCEDURE — 36000711: Mod: PO | Performed by: ORTHOPAEDIC SURGERY

## 2019-09-13 PROCEDURE — 71000015 HC POSTOP RECOV 1ST HR: Mod: PO | Performed by: ORTHOPAEDIC SURGERY

## 2019-09-13 PROCEDURE — 37000008 HC ANESTHESIA 1ST 15 MINUTES: Mod: PO | Performed by: ORTHOPAEDIC SURGERY

## 2019-09-13 PROCEDURE — 36000710: Mod: PO | Performed by: ORTHOPAEDIC SURGERY

## 2019-09-13 PROCEDURE — 29882 PR KNEE SCOPE,MED OR LAT MENIS REPAIR: ICD-10-PCS | Mod: RT,,, | Performed by: ORTHOPAEDIC SURGERY

## 2019-09-13 PROCEDURE — 71000033 HC RECOVERY, INTIAL HOUR: Mod: PO | Performed by: ORTHOPAEDIC SURGERY

## 2019-09-13 PROCEDURE — D9220A PRA ANESTHESIA: ICD-10-PCS | Mod: ANES,,, | Performed by: ANESTHESIOLOGY

## 2019-09-13 PROCEDURE — 29882 ARTHRS KNE SRG MNISC RPR M/L: CPT | Mod: RT,,, | Performed by: ORTHOPAEDIC SURGERY

## 2019-09-13 PROCEDURE — D9220A PRA ANESTHESIA: ICD-10-PCS | Mod: CRNA,,, | Performed by: NURSE ANESTHETIST, CERTIFIED REGISTERED

## 2019-09-13 PROCEDURE — C1713 ANCHOR/SCREW BN/BN,TIS/BN: HCPCS | Mod: PO | Performed by: ORTHOPAEDIC SURGERY

## 2019-09-13 DEVICE — CARTRIDGE NOVOSTITCH PLUS 2-0: Type: IMPLANTABLE DEVICE | Site: KNEE | Status: FUNCTIONAL

## 2019-09-13 DEVICE — SYS NOVOSTITCH PRO MENIS 2-0: Type: IMPLANTABLE DEVICE | Site: KNEE | Status: FUNCTIONAL

## 2019-09-13 RX ORDER — KETAMINE HYDROCHLORIDE 100 MG/ML
INJECTION, SOLUTION INTRAMUSCULAR; INTRAVENOUS
Status: DISCONTINUED | OUTPATIENT
Start: 2019-09-13 | End: 2019-09-13

## 2019-09-13 RX ORDER — PROMETHAZINE HYDROCHLORIDE 25 MG/ML
INJECTION, SOLUTION INTRAMUSCULAR; INTRAVENOUS
Status: DISCONTINUED | OUTPATIENT
Start: 2019-09-13 | End: 2019-09-13

## 2019-09-13 RX ORDER — CEFAZOLIN SODIUM 2 G/50ML
2 SOLUTION INTRAVENOUS
Status: COMPLETED | OUTPATIENT
Start: 2019-09-13 | End: 2019-09-13

## 2019-09-13 RX ORDER — LIDOCAINE HCL/PF 100 MG/5ML
SYRINGE (ML) INTRAVENOUS
Status: DISCONTINUED | OUTPATIENT
Start: 2019-09-13 | End: 2019-09-13

## 2019-09-13 RX ORDER — MEPERIDINE HYDROCHLORIDE 50 MG/ML
12.5 INJECTION INTRAMUSCULAR; INTRAVENOUS; SUBCUTANEOUS ONCE AS NEEDED
Status: DISCONTINUED | OUTPATIENT
Start: 2019-09-13 | End: 2019-09-13 | Stop reason: HOSPADM

## 2019-09-13 RX ORDER — MEPERIDINE HYDROCHLORIDE 50 MG/ML
25 INJECTION INTRAMUSCULAR; INTRAVENOUS; SUBCUTANEOUS ONCE
Status: COMPLETED | OUTPATIENT
Start: 2019-09-13 | End: 2019-09-13

## 2019-09-13 RX ORDER — SODIUM CHLORIDE 0.9 G/100ML
IRRIGANT IRRIGATION
Status: DISCONTINUED | OUTPATIENT
Start: 2019-09-13 | End: 2019-09-13 | Stop reason: HOSPADM

## 2019-09-13 RX ORDER — FENTANYL CITRATE 50 UG/ML
25 INJECTION, SOLUTION INTRAMUSCULAR; INTRAVENOUS EVERY 5 MIN PRN
Status: DISCONTINUED | OUTPATIENT
Start: 2019-09-13 | End: 2019-09-13 | Stop reason: HOSPADM

## 2019-09-13 RX ORDER — MIDAZOLAM HYDROCHLORIDE 1 MG/ML
INJECTION, SOLUTION INTRAMUSCULAR; INTRAVENOUS
Status: DISCONTINUED | OUTPATIENT
Start: 2019-09-13 | End: 2019-09-13

## 2019-09-13 RX ORDER — SODIUM CHLORIDE 0.9 % (FLUSH) 0.9 %
3 SYRINGE (ML) INJECTION
Status: DISCONTINUED | OUTPATIENT
Start: 2019-09-13 | End: 2019-09-13 | Stop reason: HOSPADM

## 2019-09-13 RX ORDER — SODIUM CHLORIDE, SODIUM LACTATE, POTASSIUM CHLORIDE, CALCIUM CHLORIDE 600; 310; 30; 20 MG/100ML; MG/100ML; MG/100ML; MG/100ML
INJECTION, SOLUTION INTRAVENOUS CONTINUOUS
Status: DISCONTINUED | OUTPATIENT
Start: 2019-09-13 | End: 2019-09-13 | Stop reason: HOSPADM

## 2019-09-13 RX ORDER — EPINEPHRINE 1 MG/ML
INJECTION, SOLUTION INTRACARDIAC; INTRAMUSCULAR; INTRAVENOUS; SUBCUTANEOUS
Status: DISCONTINUED | OUTPATIENT
Start: 2019-09-13 | End: 2019-09-13 | Stop reason: HOSPADM

## 2019-09-13 RX ORDER — HYDROMORPHONE HYDROCHLORIDE 2 MG/ML
0.2 INJECTION, SOLUTION INTRAMUSCULAR; INTRAVENOUS; SUBCUTANEOUS EVERY 5 MIN PRN
Status: DISCONTINUED | OUTPATIENT
Start: 2019-09-13 | End: 2019-09-13 | Stop reason: HOSPADM

## 2019-09-13 RX ORDER — BUPIVACAINE HYDROCHLORIDE AND EPINEPHRINE 2.5; 5 MG/ML; UG/ML
INJECTION, SOLUTION EPIDURAL; INFILTRATION; INTRACAUDAL; PERINEURAL
Status: DISCONTINUED | OUTPATIENT
Start: 2019-09-13 | End: 2019-09-13 | Stop reason: HOSPADM

## 2019-09-13 RX ORDER — DIPHENHYDRAMINE HYDROCHLORIDE 50 MG/ML
25 INJECTION INTRAMUSCULAR; INTRAVENOUS EVERY 6 HOURS PRN
Status: DISCONTINUED | OUTPATIENT
Start: 2019-09-13 | End: 2019-09-13 | Stop reason: HOSPADM

## 2019-09-13 RX ORDER — ONDANSETRON 2 MG/ML
INJECTION INTRAMUSCULAR; INTRAVENOUS
Status: DISCONTINUED | OUTPATIENT
Start: 2019-09-13 | End: 2019-09-13

## 2019-09-13 RX ORDER — HYDROCODONE BITARTRATE AND ACETAMINOPHEN 5; 325 MG/1; MG/1
1 TABLET ORAL EVERY 6 HOURS PRN
Qty: 28 TABLET | Refills: 0 | Status: SHIPPED | OUTPATIENT
Start: 2019-09-13 | End: 2019-09-16

## 2019-09-13 RX ORDER — FENTANYL CITRATE 50 UG/ML
INJECTION, SOLUTION INTRAMUSCULAR; INTRAVENOUS
Status: DISCONTINUED | OUTPATIENT
Start: 2019-09-13 | End: 2019-09-13

## 2019-09-13 RX ORDER — OXYCODONE HYDROCHLORIDE 5 MG/1
5 TABLET ORAL
Status: DISCONTINUED | OUTPATIENT
Start: 2019-09-13 | End: 2019-09-13 | Stop reason: HOSPADM

## 2019-09-13 RX ORDER — LIDOCAINE HYDROCHLORIDE 10 MG/ML
1 INJECTION, SOLUTION EPIDURAL; INFILTRATION; INTRACAUDAL; PERINEURAL ONCE
Status: DISCONTINUED | OUTPATIENT
Start: 2019-09-13 | End: 2019-09-13 | Stop reason: HOSPADM

## 2019-09-13 RX ORDER — PROPOFOL 10 MG/ML
VIAL (ML) INTRAVENOUS
Status: DISCONTINUED | OUTPATIENT
Start: 2019-09-13 | End: 2019-09-13

## 2019-09-13 RX ORDER — MUPIROCIN 20 MG/G
OINTMENT TOPICAL
Status: DISCONTINUED | OUTPATIENT
Start: 2019-09-13 | End: 2019-09-13 | Stop reason: HOSPADM

## 2019-09-13 RX ORDER — ACETAMINOPHEN 10 MG/ML
INJECTION, SOLUTION INTRAVENOUS
Status: DISCONTINUED | OUTPATIENT
Start: 2019-09-13 | End: 2019-09-13

## 2019-09-13 RX ADMIN — MIDAZOLAM HYDROCHLORIDE 2 MG: 1 INJECTION, SOLUTION INTRAMUSCULAR; INTRAVENOUS at 08:09

## 2019-09-13 RX ADMIN — FENTANYL CITRATE 100 MCG: 50 INJECTION INTRAMUSCULAR; INTRAVENOUS at 09:09

## 2019-09-13 RX ADMIN — HYDROMORPHONE HYDROCHLORIDE 0.2 MG: 2 INJECTION, SOLUTION INTRAMUSCULAR; INTRAVENOUS; SUBCUTANEOUS at 10:09

## 2019-09-13 RX ADMIN — FENTANYL CITRATE 50 MCG: 50 INJECTION, SOLUTION INTRAMUSCULAR; INTRAVENOUS at 08:09

## 2019-09-13 RX ADMIN — ONDANSETRON 4 MG: 2 INJECTION, SOLUTION INTRAMUSCULAR; INTRAVENOUS at 08:09

## 2019-09-13 RX ADMIN — SODIUM CHLORIDE, SODIUM LACTATE, POTASSIUM CHLORIDE, AND CALCIUM CHLORIDE: .6; .31; .03; .02 INJECTION, SOLUTION INTRAVENOUS at 08:09

## 2019-09-13 RX ADMIN — PROPOFOL 150 MG: 10 INJECTION, EMULSION INTRAVENOUS at 08:09

## 2019-09-13 RX ADMIN — SODIUM CHLORIDE, SODIUM LACTATE, POTASSIUM CHLORIDE, AND CALCIUM CHLORIDE: .6; .31; .03; .02 INJECTION, SOLUTION INTRAVENOUS at 09:09

## 2019-09-13 RX ADMIN — MUPIROCIN: 20 OINTMENT TOPICAL at 07:09

## 2019-09-13 RX ADMIN — MEPERIDINE HYDROCHLORIDE 25 MG: 50 INJECTION INTRAMUSCULAR; INTRAVENOUS; SUBCUTANEOUS at 10:09

## 2019-09-13 RX ADMIN — CEFAZOLIN SODIUM 2 G: 2 SOLUTION INTRAVENOUS at 08:09

## 2019-09-13 RX ADMIN — PROMETHAZINE HYDROCHLORIDE 6.25 MG: 25 INJECTION INTRAMUSCULAR; INTRAVENOUS at 09:09

## 2019-09-13 RX ADMIN — ACETAMINOPHEN 1000 MG: 10 INJECTION, SOLUTION INTRAVENOUS at 08:09

## 2019-09-13 RX ADMIN — LIDOCAINE HYDROCHLORIDE 75 MG: 20 INJECTION PARENTERAL at 08:09

## 2019-09-13 RX ADMIN — KETAMINE HYDROCHLORIDE 30 MG: 100 INJECTION, SOLUTION, CONCENTRATE INTRAMUSCULAR; INTRAVENOUS at 08:09

## 2019-09-13 RX ADMIN — OXYCODONE HYDROCHLORIDE 5 MG: 5 TABLET ORAL at 10:09

## 2019-09-13 NOTE — ANESTHESIA PREPROCEDURE EVALUATION
09/13/2019  Yovani Malik is a 29 y.o., male.    Anesthesia Evaluation    I have reviewed the Patient Summary Reports.    I have reviewed the Nursing Notes.   I have reviewed the Medications.     Review of Systems  Anesthesia Hx:  No problems with previous Anesthesia    Social:  Smoker    Cardiovascular:   Hypertension, well controlled hyperlipidemia    Pulmonary:  Pulmonary Normal    Renal/:  Renal/ Normal     Neurological:  Neurology Normal    Endocrine:  Endocrine Normal    Psych:   Psychiatric History anxiety          Physical Exam  General:  Well nourished, Obesity    Airway/Jaw/Neck:  Airway Findings: Mouth Opening: Normal Tongue: Normal  General Airway Assessment: Adult  Oropharynx Findings:  Mallampati: II  Jaw/Neck Findings:  Neck ROM: Normal ROM     Eyes/Ears/Nose:  Eyes/Ears/Nose Findings:    Dental:  Dental Findings:   Chest/Lungs:  Chest/Lungs Findings: Normal Respiratory Rate     Heart/Vascular:  Heart Findings: Rate: Normal  Rhythm: Regular Rhythm        Mental Status:  Mental Status Findings:  Cooperative, Alert and Oriented         Anesthesia Plan  Type of Anesthesia, risks & benefits discussed:  Anesthesia Type:  general  Patient's Preference:   Intra-op Monitoring Plan: standard ASA monitors  Intra-op Monitoring Plan Comments:   Post Op Pain Control Plan: multimodal analgesia  Post Op Pain Control Plan Comments:   Induction:   IV  Beta Blocker:  Patient is not currently on a Beta-Blocker (No further documentation required).       Informed Consent: Patient understands risks and agrees with Anesthesia plan.  Questions answered. Anesthesia consent signed with patient.  ASA Score: 2     Day of Surgery Review of History & Physical:  There are no significant changes.   H&P completed by Anesthesiologist.       Ready For Surgery From Anesthesia Perspective.

## 2019-09-13 NOTE — DISCHARGE INSTRUCTIONS
1.Diet: Ice chips, clear liquids, and then diet as tolerated. Drink plenty of liquids.  2.Ice the area at least three times a day (20 minutes per session).  POLAR ICE DEVICE CONNECTED IN RECOVERY ROOM  3.Elevate the extremity above the level of the heart to help reduce swelling.  4.Pain medication can be taken every four to six hours as needed. It is helpful to take pain medication prior to physical therapy.  5.Any activity that requires precise thinking or accuracy should be avoided for a minimum of 72 hours after surgery and while on narcotic pain medication. This includes operating machinery and/or driving a vehicle.  6.All sutures/staples will be removed approximately 14 days from the time of surgery. Leave steri-strips (skin tapes) in place until sutures are removed.  7. If skin glue is used instead of stitches, do not apply ointments or solutions to the incision. Keep the incision dry. The skin glue will peel off in 3-4 weeks.  8. Change dressing on the first post-op day. Use gauze for the first 3 days, then start using Band-Aids over the incision sites. Use an Ace Wrap for 2 weeks unless instructed otherwise.  9. All casts, splints, braces, slings, crutches, abduction pillows, etc... Are to be worn as instructed.Use crutchesas ordered  10. Keep the incision dry for 10-14 days. A waterproof dressing (purchase at [employer], [employer], etc) can be used to shower. No bath, pool, hot tub until instructed.  11. Call [phone] with any questions or concerns.      Discharge Instructions: After Your Surgery  Youve just had surgery. During surgery, you were given medicine called anesthesia to keep you relaxed and free of pain. After surgery, you may have some pain or nausea. This is common. Here are some tips for feeling better and getting well after surgery.     Stay on schedule with your medicine.   Going home  Your healthcare provider will show you how to take care of yourself when you go home. He or she will also answer  your questions. Have an adult family member or friend drive you home. For the first 24 hours after your surgery:  · Do not drive or use heavy equipment.  · Do not make important decisions or sign legal papers.  · Do not drink alcohol.  · Have someone stay with you, if needed. He or she can watch for problems and help keep you safe.  Be sure to go to all follow-up visits with your healthcare provider. And rest after your surgery for as long as your healthcare provider tells you to.  Coping with pain  If you have pain after surgery, pain medicine will help you feel better. Take it as told, before pain becomes severe. Also, ask your healthcare provider or pharmacist about other ways to control pain. This might be with heat, ice, or relaxation. And follow any other instructions your surgeon or nurse gives you.  Tips for taking pain medicine  To get the best relief possible, remember these points:  · Pain medicines can upset your stomach. Taking them with a little food may help.  · Most pain relievers taken by mouth need at least 20 to 30 minutes to start to work.  · Taking medicine on a schedule can help you remember to take it. Try to time your medicine so that you can take it before starting an activity. This might be before you get dressed, go for a walk, or sit down for dinner.  · Constipation is a common side effect of pain medicines. Call your healthcare provider before taking any medicines such as laxatives or stool softeners to help ease constipation. Also ask if you should skip any foods. Drinking lots of fluids and eating foods such as fruits and vegetables that are high in fiber can also help. Remember, do not take laxatives unless your surgeon has prescribed them.  · Drinking alcohol and taking pain medicine can cause dizziness and slow your breathing. It can even be deadly. Do not drink alcohol while taking pain medicine.  · Pain medicine can make you react more slowly to things. Do not drive or run  machinery while taking pain medicine.  Your healthcare provider may tell you to take acetaminophen to help ease your pain. Ask him or her how much you are supposed to take each day. Acetaminophen or other pain relievers may interact with your prescription medicines or other over-the-counter (OTC) medicines. Some prescription medicines have acetaminophen and other ingredients. Using both prescription and OTC acetaminophen for pain can cause you to overdose. Read the labels on your OTC medicines with care. This will help you to clearly know the list of ingredients, how much to take, and any warnings. It may also help you not take too much acetaminophen. If you have questions or do not understand the information, ask your pharmacist or healthcare provider to explain it to you before you take the OTC medicine.  Managing nausea  Some people have an upset stomach after surgery. This is often because of anesthesia, pain, or pain medicine, or the stress of surgery. These tips will help you handle nausea and eat healthy foods as you get better. If you were on a special food plan before surgery, ask your healthcare provider if you should follow it while you get better. These tips may help:  · Do not push yourself to eat. Your body will tell you when to eat and how much.  · Start off with clear liquids and soup. They are easier to digest.  · Next try semi-solid foods, such as mashed potatoes, applesauce, and gelatin, as you feel ready.  · Slowly move to solid foods. Dont eat fatty, rich, or spicy foods at first.  · Do not force yourself to have 3 large meals a day. Instead eat smaller amounts more often.  · Take pain medicines with a small amount of solid food, such as crackers or toast, to avoid nausea.     Call your surgeon if  · You still have pain an hour after taking medicine. The medicine may not be strong enough.  · You feel too sleepy, dizzy, or groggy. The medicine may be too strong.  · You have side effects like  nausea, vomiting, or skin changes, such as rash, itching, or hives.       If you have obstructive sleep apnea  You were given anesthesia medicine during surgery to keep you comfortable and free of pain. After surgery, you may have more apnea spells because of this medicine and other medicines you were given. The spells may last longer than usual.   At home:  · Keep using the continuous positive airway pressure (CPAP) device when you sleep. Unless your healthcare provider tells you not to, use it when you sleep, day or night. CPAP is a common device used to treat obstructive sleep apnea.  · Talk with your provider before taking any pain medicine, muscle relaxants, or sedatives. Your provider will tell you about the possible dangers of taking these medicines.  Date Last Reviewed: 12/1/2016  © 7547-7429 [employer]. [address] [place] [address], [place] [address]. All rights reserved. This information is not intended as a substitute for professional medical care. Always follow your healthcare professional's instructions.

## 2019-09-13 NOTE — ANESTHESIA POSTPROCEDURE EVALUATION
Anesthesia Post Evaluation    Patient: Yovani Malik    Procedure(s) Performed: Procedure(s) (LRB):  ARTHROSCOPY, KNEE (Right)  REPAIR, MENISCUS, KNEE (Right)    Final Anesthesia Type: general  Patient location during evaluation: PACU  Patient participation: Yes- Able to Participate  Level of consciousness: awake and alert and oriented  Post-procedure vital signs: reviewed and stable  Pain management: adequate  Airway patency: patent  PONV status at discharge: No PONV  Anesthetic complications: no      Cardiovascular status: blood pressure returned to baseline and stable  Respiratory status: unassisted and spontaneous ventilation  Hydration status: euvolemic  Follow-up not needed.          Vitals Value Taken Time   /87 9/13/2019 10:45 AM   Temp 36.3 °C (97.4 °F) 9/13/2019 10:45 AM   Pulse 72 9/13/2019 10:45 AM   Resp 14 9/13/2019 10:45 AM   SpO2 96 % 9/13/2019 10:45 AM         Event Time     Out of Recovery 10:30:00          Pain/Isaak Score: Pain Rating Prior to Med Admin: 6 (9/13/2019 10:45 AM)  Isaak Score: 8 (9/13/2019  9:59 AM)

## 2019-09-13 NOTE — DISCHARGE SUMMARY
"[facility]  Discharge Note  Short Stay    Admit Date: 9/13/2019    Discharge Date and Time: 9/13/2019    Attending Physician: Anurag Wells MD     Discharge Provider: Anurag Wells    Diagnoses:  Active Hospital Problems    Diagnosis  POA    *Old complex tear of lateral meniscus of right knee [M23.200]  Yes      Resolved Hospital Problems   No resolved problems to display.       Discharged Condition: good    Hospital Course: Patient was admitted for an outpatient procedure and tolerated the procedure well with no complications.    Final Diagnoses: Same as principal problem.    Disposition: Home or Self Care    Follow up/Patient Instructions:    Medications:  Reconciled Home Medications:      Medication List      START taking these medications    HYDROcodone-acetaminophen 5-325 mg per tablet  Commonly known as:  NORCO  Take 1 tablet by mouth every 6 (six) hours as needed for Pain.        CONTINUE taking these medications    ALPRAZolam 0.25 MG tablet  Commonly known as:  XANAX     dextroamphetamine-amphetamine 30 MG 24 hr capsule  Commonly known as:  ADDERALL XR  TK TWO CAPSULES PO D IN THE EARLY MORNING     diclofenac sodium 1 % Gel  Commonly known as:  VOLTAREN  Apply 4 g topically 2 (two) times daily as needed.     ergocalciferol 50,000 unit Cap  Commonly known as:  ERGOCALCIFEROL  TAKE 1 CAPSULE BY MOUTH EVERY 7 DAYS     pantoprazole 40 MG tablet  Commonly known as:  PROTONIX  Take 1 tablet (40 mg total) by mouth once daily.          Discharge Procedure Orders   CRUTCHES FOR HOME USE     Order Specific Question Answer Comments   Type: Axillary    Height: 5' 6" (1.676 m)    Weight: 111.1 kg (245 lb)    Length of need (1-99 months): 1      Remove dressing in 48 hours     Follow-up Information     Anurag Wells MD In 2 weeks.    Specialties:  Sports Medicine, Orthopedic Surgery  Contact information:  [address] [place] [address] [address] [place] [id number]  [phone]             " "      Discharge Procedure Orders (must include Diet, Follow-up, Activity):   Discharge Procedure Orders (must include Diet, Follow-up, Activity)   CRUTCHES FOR HOME USE     Order Specific Question Answer Comments   Type: Axillary    Height: 5' 6" (1.676 m)    Weight: 111.1 kg (245 lb)    Length of need (1-99 months): 1      Remove dressing in 48 hours        "

## 2019-09-13 NOTE — TRANSFER OF CARE
"Anesthesia Transfer of Care Note    Patient: Yovani Malik    Procedure(s) Performed: Procedure(s) (LRB):  ARTHROSCOPY, KNEE (Right)  REPAIR, MENISCUS, KNEE (Right)    Patient location: PACU    Anesthesia Type: general    Transport from OR: Transported from OR on room air with adequate spontaneous ventilation    Post pain: adequate analgesia    Post assessment: no apparent anesthetic complications and tolerated procedure well    Post vital signs: stable    Level of consciousness: sedated    Nausea/Vomiting: no nausea/vomiting    Complications: none    Transfer of care protocol was followed      Last vitals:   Visit Vitals  BP (!) 184/78   Pulse 64   Temp 36.3 °C (97.3 °F) (Skin)   Resp 15   Ht 5' 6" (1.676 m)   Wt 111.1 kg (245 lb)   SpO2 100%   BMI 39.54 kg/m²     "

## 2019-09-13 NOTE — OP NOTE
[facility]  Orthopedic Surgery  Operative Note    SUMMARY     Date of Procedure: 9/13/2019     Procedure: Procedure(s) (LRB):  ARTHROSCOPY, KNEE REPAIR, LATERAL MENISCUS, KNEE (Right)       Surgeon(s) and Role:     * Anurag Wells MD - Primary    Assistant: Nasim Phillips    Pre-Operative Diagnosis: Old complex tear of lateral meniscus of right knee [M23.200]    Post-Operative Diagnosis: Post-Op Diagnosis Codes:     * Old complex tear of lateral meniscus of right knee [M23.200]    Anesthesia: General    Diagnostic arthroscopy findings: Diagnostic arthroscopy findings, the patient's medial compartment was thoroughly examined. The patient had no cartilage wear and no distinct meniscal tear. ACL and PCL were both intact with   good tension. Lateral compartment showed a complex tear involving the posterior horn, body and extending to the anterior horn.  The primary component of the tear was horizontal with a large loose inferior portion.  wear. In the patellofemoral joint, the patient had good central tracking without tilt or chondromalacia    Complications: No    Estimated Blood Loss (EBL): 10ml    Tourniquet Time: 25min at 300mmHg           Implants:   Implant Name Type Inv. Item Serial No.  Lot No. LRB No. Used   CARTRIDGE NOVOSTITCH PLUS 2-0 - [id number]  CARTRIDGE NOVOSTITCH PLUS 2-0  [employer] [id number] Right 1   SYS NOVOSTITCH PLUS MENISCAL - [id number]  SYS NOVOSTITCH PLUS MENISCAL  [employer] [id number] Right 1       Specimens:   Specimen (12h ago, onward)    None                  Condition: Good    Disposition: PACU - hemodynamically stable.    Attestation: I was present and scrubbed for the entire procedure.    INDICATIONS FOR THE PROCEDURE:  29-year-old male with a history of a right knee injury.  Patient had an MRI which showed a lateral meniscal tear.  After discussion with the patient the patient was to proceed with possible meniscectomy versus  repair.    PROCEDURE IN DETAIL: Risks, benefits and alternatives of the procedure were   explained to the patient including, but not limited to damage to nerves,   arteries, blood vessels. Also explained risk of infection, DVT, PE, continued pain due to arthritis,  as well as   anesthetic complications including seizure, stroke, heart attack and death. They  understood this and signed informed consent. The patient's Right knee was marked prior to coming to the Operating Room. Once there a formal   timeout was done in which correct patient, procedure and op site were all   correctly identified and confirmed by the entire operating team. Ancef 2 g was   given prior to surgical incision. Laryngeal mask anesthesia was induced. The   patient's Right lower extremity was prepped and draped in normal sterile fashion.   Leg was then exsanguinated with a tourniquet and tourniquet was inflated up   300 mmHg. Standard inferior lateral portal was then made. A spinal needle was   used to localize an inferior medial portal and this was made under direct   arthroscopic visualization. Diagnostic arthroscopy was then performed with the   findings listed above. Shaver was used to remove redundant fat pad and   Synovium within the notch.  The lateral meniscal tear was inspected.  There was a large horizontal component extending from at the posterior horn near the posterior horn body junction anteriorly to the anterior root area.  There was no detachment of the anterior root.  We performed a resection of some of the more frayed and degenerative tissue as well as resecting a portion of the inferior leaflet.  We then prepared for repair of the horizontal component.  A meniscal rasp was used to rasp the tissue throughout.  We then used a series of microfracture awls to do a microfracture of the notch to get good bleeding and healing elements into the joint. We then used the Smith and Nephew Nova stitch to place a hay bale stitch right in  the posterior horn body junction.  This was then tied and cut. A 2nd stitch was placed in the posterior horn put the suture cutter actually cut the knot.  We then used a outside in series of spinal needles to perform a hay bale stitch from outside in around the anterior horn body junction and this was then tied and cut and buried underneath the skin.  Seeing good stabilization of the tear had both apex is, we proceeded with closing.  We did attempt to draw enough blood to do PRP but the patient's venous access was not adequate to obtain enough blood.    Proceeded with closing. All   excess water was removed from the knee joint. Portals were closed using   nylons. Portal was then injected with 0.25% Marcaine with epinephrine. Sterile   dressing was then applied. They were then extubated and awakened and transferred   from the Operating Room to the Recovery Room in stable condition.     Postop course is for an arthroscopic lateral meniscal repair with micro fracturing of the notch.

## 2019-09-16 VITALS
RESPIRATION RATE: 14 BRPM | WEIGHT: 245 LBS | HEIGHT: 66 IN | DIASTOLIC BLOOD PRESSURE: 87 MMHG | HEART RATE: 72 BPM | BODY MASS INDEX: 39.37 KG/M2 | OXYGEN SATURATION: 96 % | SYSTOLIC BLOOD PRESSURE: 124 MMHG | TEMPERATURE: 97 F

## 2019-09-16 RX ORDER — OXYCODONE AND ACETAMINOPHEN 5; 325 MG/1; MG/1
1 TABLET ORAL EVERY 4 HOURS PRN
Qty: 40 TABLET | Refills: 0 | Status: SHIPPED | OUTPATIENT
Start: 2019-09-16 | End: 2019-09-23 | Stop reason: SDUPTHER

## 2019-09-16 NOTE — TELEPHONE ENCOUNTER
Pt states pain medication not helping. Taking 2 tabs Norco 5-325mg q6.     Requesting something stronger for pain. Please advise. Thanks!

## 2019-09-16 NOTE — TELEPHONE ENCOUNTER
----- Message from Ganesh Gibson sent at 9/16/2019  9:25 AM CDT -----  Contact: Pema / wife  Post op care questions, [phone]

## 2019-09-23 RX ORDER — OXYCODONE AND ACETAMINOPHEN 5; 325 MG/1; MG/1
1 TABLET ORAL EVERY 4 HOURS PRN
Qty: 40 TABLET | Refills: 0 | Status: SHIPPED | OUTPATIENT
Start: 2019-09-23 | End: 2019-09-30 | Stop reason: SDUPTHER

## 2019-09-23 NOTE — TELEPHONE ENCOUNTER
----- Message from Yasmine Tena MA sent at 9/23/2019  8:56 AM CDT -----  Contact: wife, mayur   Refill on pain medication   pharmacy  Levine, Susan. \[facility].\""   Call back

## 2019-09-25 ENCOUNTER — OFFICE VISIT (OUTPATIENT)
Dept: ORTHOPEDICS | Facility: CLINIC | Age: 29
End: 2019-09-25
Payer: COMMERCIAL

## 2019-09-25 VITALS — HEIGHT: 66 IN | BODY MASS INDEX: 39.37 KG/M2 | WEIGHT: 245 LBS

## 2019-09-25 DIAGNOSIS — M25.561 RIGHT KNEE PAIN, UNSPECIFIED CHRONICITY: Primary | ICD-10-CM

## 2019-09-25 DIAGNOSIS — M23.200 OLD COMPLEX TEAR OF LATERAL MENISCUS OF RIGHT KNEE: Primary | ICD-10-CM

## 2019-09-25 PROCEDURE — 99024 PR POST-OP FOLLOW-UP VISIT: ICD-10-PCS | Mod: S$GLB,,, | Performed by: ORTHOPAEDIC SURGERY

## 2019-09-25 PROCEDURE — 99999 PR PBB SHADOW E&M-EST. PATIENT-LVL II: ICD-10-PCS | Mod: PBBFAC,,, | Performed by: ORTHOPAEDIC SURGERY

## 2019-09-25 PROCEDURE — 99999 PR PBB SHADOW E&M-EST. PATIENT-LVL II: CPT | Mod: PBBFAC,,, | Performed by: ORTHOPAEDIC SURGERY

## 2019-09-25 PROCEDURE — 99024 POSTOP FOLLOW-UP VISIT: CPT | Mod: S$GLB,,, | Performed by: ORTHOPAEDIC SURGERY

## 2019-09-25 NOTE — PROGRESS NOTES
Past Medical History:   Diagnosis Date    Anxiety     Borderline diabetes     Hyperlipidemia     Hypertension        Past Surgical History:   Procedure Laterality Date    ARTHROSCOPY OF KNEE Right 9/13/2019    Procedure: ARTHROSCOPY, KNEE;  Surgeon: Anurag Wells MD;  Location: [facility] OR;  Service: Orthopedics;  Laterality: Right;    KNEE ARTHROSCOPY      KNEE SURGERY Bilateral     x 2     REPAIR OF MENISCUS OF KNEE Right 9/13/2019    Procedure: REPAIR, MENISCUS, KNEE;  Surgeon: Anurag Wells MD;  Location: [facility] OR;  Service: Orthopedics;  Laterality: Right;  lateral meniscal repair       Current Outpatient Medications   Medication Sig    ALPRAZolam (XANAX) 0.25 MG tablet     dextroamphetamine-amphetamine (ADDERALL XR) 30 MG 24 hr capsule TK TWO CAPSULES PO D IN THE EARLY MORNING    diclofenac sodium (VOLTAREN) 1 % Gel Apply 4 g topically 2 (two) times daily as needed.    ergocalciferol (ERGOCALCIFEROL) 50,000 unit Cap TAKE 1 CAPSULE BY MOUTH EVERY 7 DAYS    oxyCODONE-acetaminophen (PERCOCET) 5-325 mg per tablet Take 1 tablet by mouth every 4 (four) hours as needed for Pain.    pantoprazole (PROTONIX) 40 MG tablet Take 1 tablet (40 mg total) by mouth once daily.     No current facility-administered medications for this visit.        Review of patient's allergies indicates:  No Known Allergies    Family History   Problem Relation Age of Onset    Diabetes Mother     Hypertension Mother     Hyperlipidemia Mother     Diabetes Father     Diabetes Maternal Grandmother     Cancer Maternal Grandmother         breast       Social History     Socioeconomic History    Marital status:      Spouse name: Not on file    Number of children: Not on file    Years of education: Not on file    Highest education level: Not on file   Occupational History    Not on file   Social Needs    Financial resource strain: Not on file    Food insecurity:     Worry: Not on file     Inability: Not  on file    Transportation needs:     Medical: Not on file     Non-medical: Not on file   Tobacco Use    Smoking status: Current Some Day Smoker     Packs/day: 0.50     Last attempt to quit: 2017     Years since quittin.3    Smokeless tobacco: Never Used    Tobacco comment: maybe 0.5 pack a week   Substance and Sexual Activity    Alcohol use: No    Drug use: No    Sexual activity: Yes     Partners: Female   Lifestyle    Physical activity:     Days per week: Not on file     Minutes per session: Not on file    Stress: Not on file   Relationships    Social connections:     Talks on phone: Not on file     Gets together: Not on file     Attends Jewish service: Not on file     Active member of club or organization: Not on file     Attends meetings of clubs or organizations: Not on file     Relationship status: Not on file   Other Topics Concern    Not on file   Social History Narrative    Not on file       Chief Complaint:   Chief Complaint   Patient presents with    Post-op Evaluation     s/p right knee scope 19        Date of surgery:  2019    History of present illness:  This is a 29-year-old male underwent right arthroscopic lateral meniscal repair for a horizontal tear of the anterior horn body and posterior horn.  Patient is doing very well.  He is compliant with the brace and the crutches.  Pain is a 3/10.  No significant swelling.      Review of Systems:    Musculoskeletal:  See HPI        Physical Examination:    Vital Signs:  There were no vitals filed for this visit.    Body mass index is 39.54 kg/m².    This a well-developed, well nourished patient in no acute distress.  They are alert and oriented and cooperative to examination.  Pt. walks using the crutches    Examination of the right knee shows well-healing surgical portals.  No erythema or drainage. No significant effusion. No calf pain. Negative Homans sign.    X-rays:  None     Assessment::  Status post right  arthroscopic lateral meniscal repair    Plan:  I reviewed the arthroscopic photos with him today.  We took out the stitches.  We will get him started with physical therapy for meniscal repair protocol.  Continue the brace and crutches for 2 more weeks.  Follow up in 4 weeks.    This note was created using [employer] voice recognition software that occasionally misinterpreted phrases or words.

## 2019-09-30 ENCOUNTER — CLINICAL SUPPORT (OUTPATIENT)
Dept: REHABILITATION | Facility: HOSPITAL | Age: 29
End: 2019-09-30
Attending: ORTHOPAEDIC SURGERY
Payer: COMMERCIAL

## 2019-09-30 DIAGNOSIS — G89.29 CHRONIC PAIN OF RIGHT KNEE: ICD-10-CM

## 2019-09-30 DIAGNOSIS — M25.561 CHRONIC PAIN OF RIGHT KNEE: ICD-10-CM

## 2019-09-30 DIAGNOSIS — M25.661 STIFFNESS OF RIGHT KNEE: ICD-10-CM

## 2019-09-30 PROCEDURE — 97110 THERAPEUTIC EXERCISES: CPT | Mod: PO

## 2019-09-30 PROCEDURE — 97162 PT EVAL MOD COMPLEX 30 MIN: CPT | Mod: PO

## 2019-09-30 NOTE — PLAN OF CARE
[facility]  Physical Therapy Initial Evaluation    Name: Yovani Malik  Clinic Number: [id number]    Therapy Diagnosis:   Encounter Diagnoses   Name Primary?    Chronic pain of right knee     Stiffness of right knee      Physician: Anurag Wells,*    Physician Orders: PT Eval and Treat  Medical Diagnosis from Referral: Right knee pain, unspecified chronicity   Evaluation Date: 9/30/2019  Authorization Period Expiration: 12/31/2019   Plan of Care Expiration: 12/13/2019  Visit # / Visits authorized: 1/ 20    Time In: 1500  Time Out: 1600  Total Billable Time: 60 minutes    Precautions: Standard and Weightbearing    Subjective   Date of onset: Chronic onset, hurting since 2013. S/p ARTHROSCOPY, KNEE REPAIR, LATERAL MENISCUS, KNEE (Right)    History of current condition - Yovani reports: He underwent surgery on his (R) lateral meniscus repair on 9/13/2019. He wore the brace for about 2 weeks, which was removed on 9/25/2019. He reports he is not allowed to put any weight through the leg for 2 more weeks per MD orders. He has been using the crutches at home. He reports a prior knee surgery in 2013 with a lateral menisectomy. Patient states constant pain throughout the day.      Medical History:   Past Medical History:   Diagnosis Date    Anxiety     Borderline diabetes     Hyperlipidemia     Hypertension        Surgical History:   Yovani Malik  has a past surgical history that includes Knee surgery (Bilateral); Knee arthroscopy; Arthroscopy of knee (Right, 9/13/2019); and Repair of meniscus of knee (Right, 9/13/2019).    Medications:   Yovani has a current medication list which includes the following prescription(s): alprazolam, dextroamphetamine-amphetamine, diclofenac sodium, ergocalciferol, oxycodone-acetaminophen, and pantoprazole.    Allergies:   Review of patient's allergies indicates:  No Known Allergies     Imaging, X-ray:   Medial compartment narrowing is noted  bilaterally.  Osseous demineralization is noted.  No significant suprapatellar joint effusion on the right is noted.  Minimal lateral subluxation may be noted to the right patella greater than left within the inter condylar notches.          Prior Therapy: None   Social History: Lives with wife and 3 year old girl   Occupation: Manages gas station, requires a lot of lifting, twisting, walkin, and standing  Prior Level of Function: Pain with prolonged walking, stairs, run around with his daughter  Current Level of Function: Unable to put weight through his (R) LE, currently on crutches     Pain:  Current 7/10, worst 7/10, best 7/10   Location: right knee   Description: Throbbing  Aggravating Factors: constant throughout the day   Easing Factors: pain medication and ice    Pts goals: To be able to run around with his daughter    Objective     Observation: Patient in no acute distress     Posture: Uses crutches for ambulation, with TTWB      Range of Motion:   Knee Left active Left Passive Right Active R passive   Flexion 122 NT NT 90 deg (seated)    Extension 0  5 2       Lower Extremity Strength  Right LE  Left LE    Knee extension: 3+/5 Knee extension: 5/5   Knee flexion: 3+/5 Knee flexion: 5/5   Hip flexion: 4-/5 Hip flexion: 5/5   Hip extension:  4+/5 Hip extension: 5/5   Hip abduction: 4+/5 Hip abduction: 5/5   Hip adduction: 4/5 Hip adduction 4/5   Ankle dorsiflexion: 4/5 Ankle dorsiflexion: 5/5   Ankle plantarflexion: 4/5 Ankle plantarflexion: 5/5     Special Tests: Not Tested s/p ARTHROSCOPY, KNEE REPAIR, LATERAL MENISCUS, KNEE (Right)      Function:    - SLS R: NT  - SLS L: NT  - Squat: NT   - Sit <--> Stand:Mod I with crutches  - Bed Mobility: Independent     Joint Mobility: Mild  Patellar hypomobility (R)     Palpation: Mild pain lateral (R) knee joint line, lateral quad     Sensation: Intact to LT (B) LEs     Flexibility:    Ely's test: R = NT degrees ; L = NT degrees   Popliteal Angle: R = NT degrees ; L  = NT degrees   Hamstring: R = Moderately limited; L = Moderately     Edema: Mild swelling noted at (R) knee    Girth Measurement Joint line 5 cm below 10 cm above   Left 44 cm 36.5 cm 47 cm   Right 46 cm 36 cm 44.5 cm       CMS Impairment/Limitation/Restriction for FOTO Knee Survey    Therapist reviewed FOTO scores for Yovani Malik on 9/30/2019.   FOTO documents entered into [facility] - see Media section.    Limitation Score: 74%  Category: Mobility         TREATMENT   Treatment Time In: 1535  Treatment Time Out: 1555  Total Treatment time separate from Evaluation: 20 minutes    Yovani received therapeutic exercises to develop strength and ROM for 20 minutes including:  Quad sets 2 x 10 (3 second hold)  Calf stretch with strap seated on mat 3 x 30s   SLR 2 x 10  SL Hip abduction 2 x 10  Prone hip extension 2 x 10  PROM to (R) into flexion seated    Home Exercises and Patient Education Provided    Education provided:   - Role of PT, PT POC, HEP    Written Home Exercises Provided: yes.  Exercises were reviewed and Yovani was able to demonstrate them prior to the end of the session.  Yovani demonstrated good  understanding of the education provided.     See EMR under Media for exercises provided 9/30/2019.    Assessment   Yovani is a 29 y.o. male referred to outpatient Physical Therapy with a medical diagnosis of ARTHROSCOPY, KNEE REPAIR, LATERAL MENISCUS, KNEE (Right). Physical exam is consistent with medical diagnosis. Quad lag noted and decreased knee ROM. Instructed patient to continue to avoid active knee flexion. Will progress per protocol. Primary impairments include AROM, PROM, joint mobility, strength, balance, soft tissue restrictions, and pain which limits functional mobility. This pt is a good candidate for skilled PT tx and stands to benefit from a combination of manual therapy including joint mobilizations with trigger point/myofacscial release, therapeutic exercise to establish core/joint stability,  neuromuscular re-education, dry needling and modalities Prn. The pt has been educated on their dx/POC and consents to further PT tx.    Pt prognosis is Good.   Pt will benefit from skilled outpatient Physical Therapy to address the deficits stated above and in the chart below, provide pt/family education, and to maximize pt's level of independence.     Plan of care discussed with patient: Yes  Pt's spiritual, cultural and educational needs considered and patient is agreeable to the plan of care and goals as stated below:     Anticipated Barriers for therapy: None    Medical Necessity is demonstrated by the following  History  Co-morbidities and personal factors that may impact the plan of care Co-morbidities:   anxiety and prior knee surgery    Personal Factors:   lifestyle  (job duties require prolonged standing and lifting, walking     moderate   Examination  Body Structures and Functions, activity limitations and participation restrictions that may impact the plan of care Body Regions:   lower extremities    Body Systems:    ROM  strength  gross coordinated movement  balance  gait    Participation Restrictions:   Unable to weight-bear s/p surgery    Activity limitations:   Learning and applying knowledge  no deficits    General Tasks and Commands  no deficits    Communication  no deficits    Mobility  walking  driving (bike, car, motorcycle)    Self care  dressing    Domestic Life  doing house work (cleaning house, washing dishes, laundry)    Interactions/Relationships  family relationships    Life Areas  employment    Community and Social Life  recreation and leisure         moderate   Clinical Presentation evolving clinical presentation with changing clinical characteristics moderate   Decision Making/ Complexity Score: moderate     Goals:  Short-Term Goals: 4 weeks  - The patient will be independent with initial home exercise program.  - The patient will increase strength to at least 4+/5 to perform functional  mobility including ascending/descending stairs and ambulation  - The patient will increase PROM grossly from 0 to 120 to perform ambulation with pain < 2/10.    Long-Term Goals: 8 weeks  - Pt to achieve <26% limitation as measured by the FOTO to demonstrate decreased disability.  - The patient will be independent with home exercise program and symptom management.  - The patient will be independent amb with no assistive device on all surfaces for community distances.  - The patient will increase strength to at least 5/5 to perform functional mobility including ambulation, ascending descending stairs, and job duties  - The patient will increase AROM grossly to 0 to 120 degrees L to perform ambulation with pain < 1/10.      Plan   Plan of care Certification: 9/30/2019 to 12/13/2019.    Outpatient Physical Therapy 2 times weekly for 16 visits to include the following interventions: Electrical Stimulation prn, Gait Training, Manual Therapy, Moist Heat/ Ice, Neuromuscular Re-ed, Patient Education, Therapeutic Activites and Therapeutic Exercise.     Sean Wakefield, PT

## 2019-09-30 NOTE — TELEPHONE ENCOUNTER
----- Message from Yasmine Tena MA sent at 9/30/2019  8:05 AM CDT -----  Contact: mayur  wife   Refill on pain medication   Pharmacy [facility]   Call back

## 2019-10-01 RX ORDER — OXYCODONE AND ACETAMINOPHEN 5; 325 MG/1; MG/1
1 TABLET ORAL EVERY 4 HOURS PRN
Qty: 40 TABLET | Refills: 0 | Status: SHIPPED | OUTPATIENT
Start: 2019-10-01 | End: 2019-10-14 | Stop reason: SDUPTHER

## 2019-10-03 ENCOUNTER — ANESTHESIA EVENT (OUTPATIENT)
Dept: ENDOSCOPY | Facility: HOSPITAL | Age: 29
End: 2019-10-03
Payer: COMMERCIAL

## 2019-10-03 ENCOUNTER — HOSPITAL ENCOUNTER (OUTPATIENT)
Facility: HOSPITAL | Age: 29
Discharge: HOME | End: 2019-10-03
Attending: INTERNAL MEDICINE | Admitting: INTERNAL MEDICINE
Payer: COMMERCIAL

## 2019-10-03 ENCOUNTER — ANESTHESIA (OUTPATIENT)
Dept: ENDOSCOPY | Facility: HOSPITAL | Age: 29
End: 2019-10-03
Payer: COMMERCIAL

## 2019-10-03 DIAGNOSIS — K21.9 GERD (GASTROESOPHAGEAL REFLUX DISEASE): ICD-10-CM

## 2019-10-03 PROCEDURE — 43239 EGD BIOPSY SINGLE/MULTIPLE: CPT | Mod: PO | Performed by: INTERNAL MEDICINE

## 2019-10-03 PROCEDURE — 88305 TISSUE EXAM BY PATHOLOGIST: CPT | Performed by: PATHOLOGY

## 2019-10-03 PROCEDURE — D9220A PRA ANESTHESIA: Mod: ANES,,, | Performed by: ANESTHESIOLOGY

## 2019-10-03 PROCEDURE — D9220A PRA ANESTHESIA: ICD-10-PCS | Mod: ANES,,, | Performed by: ANESTHESIOLOGY

## 2019-10-03 PROCEDURE — 63600175 PHARM REV CODE 636 W HCPCS: Mod: PO | Performed by: INTERNAL MEDICINE

## 2019-10-03 PROCEDURE — 43239 EGD BIOPSY SINGLE/MULTIPLE: CPT | Mod: ,,, | Performed by: INTERNAL MEDICINE

## 2019-10-03 PROCEDURE — 27201012 HC FORCEPS, HOT/COLD, DISP: Mod: PO | Performed by: INTERNAL MEDICINE

## 2019-10-03 PROCEDURE — 88342 IMHCHEM/IMCYTCHM 1ST ANTB: CPT | Performed by: PATHOLOGY

## 2019-10-03 PROCEDURE — D9220A PRA ANESTHESIA: Mod: CRNA,,, | Performed by: NURSE ANESTHETIST, CERTIFIED REGISTERED

## 2019-10-03 PROCEDURE — 88342 TISSUE SPECIMEN TO PATHOLOGY - SURGERY: ICD-10-PCS | Mod: 26,,, | Performed by: PATHOLOGY

## 2019-10-03 PROCEDURE — 63600175 PHARM REV CODE 636 W HCPCS: Mod: PO | Performed by: NURSE ANESTHETIST, CERTIFIED REGISTERED

## 2019-10-03 PROCEDURE — 88305 TISSUE SPECIMEN TO PATHOLOGY - SURGERY: ICD-10-PCS | Mod: 26,,, | Performed by: PATHOLOGY

## 2019-10-03 PROCEDURE — 37000009 HC ANESTHESIA EA ADD 15 MINS: Mod: PO | Performed by: INTERNAL MEDICINE

## 2019-10-03 PROCEDURE — 43239 PR EGD, FLEX, W/BIOPSY, SGL/MULTI: ICD-10-PCS | Mod: ,,, | Performed by: INTERNAL MEDICINE

## 2019-10-03 PROCEDURE — 37000008 HC ANESTHESIA 1ST 15 MINUTES: Mod: PO | Performed by: INTERNAL MEDICINE

## 2019-10-03 PROCEDURE — 88342 IMHCHEM/IMCYTCHM 1ST ANTB: CPT | Mod: 26,,, | Performed by: PATHOLOGY

## 2019-10-03 PROCEDURE — 88305 TISSUE EXAM BY PATHOLOGIST: CPT | Mod: 26,,, | Performed by: PATHOLOGY

## 2019-10-03 PROCEDURE — D9220A PRA ANESTHESIA: ICD-10-PCS | Mod: CRNA,,, | Performed by: NURSE ANESTHETIST, CERTIFIED REGISTERED

## 2019-10-03 RX ORDER — PROPOFOL 10 MG/ML
VIAL (ML) INTRAVENOUS
Status: DISCONTINUED | OUTPATIENT
Start: 2019-10-03 | End: 2019-10-03

## 2019-10-03 RX ORDER — LIDOCAINE HCL/PF 100 MG/5ML
SYRINGE (ML) INTRAVENOUS
Status: DISCONTINUED | OUTPATIENT
Start: 2019-10-03 | End: 2019-10-03

## 2019-10-03 RX ORDER — SODIUM CHLORIDE, SODIUM LACTATE, POTASSIUM CHLORIDE, CALCIUM CHLORIDE 600; 310; 30; 20 MG/100ML; MG/100ML; MG/100ML; MG/100ML
INJECTION, SOLUTION INTRAVENOUS CONTINUOUS
Status: DISCONTINUED | OUTPATIENT
Start: 2019-10-03 | End: 2019-10-03 | Stop reason: HOSPADM

## 2019-10-03 RX ORDER — SODIUM CHLORIDE 0.9 % (FLUSH) 0.9 %
10 SYRINGE (ML) INJECTION
Status: DISCONTINUED | OUTPATIENT
Start: 2019-10-03 | End: 2019-10-03 | Stop reason: HOSPADM

## 2019-10-03 RX ORDER — ESOMEPRAZOLE MAGNESIUM 40 MG/1
40 CAPSULE, DELAYED RELEASE ORAL DAILY
Qty: 30 CAPSULE | Refills: 2 | Status: ON HOLD | OUTPATIENT
Start: 2019-10-03 | End: 2020-01-23

## 2019-10-03 RX ADMIN — PROPOFOL 50 MG: 10 INJECTION, EMULSION INTRAVENOUS at 08:10

## 2019-10-03 RX ADMIN — SODIUM CHLORIDE, SODIUM LACTATE, POTASSIUM CHLORIDE, AND CALCIUM CHLORIDE: .6; .31; .03; .02 INJECTION, SOLUTION INTRAVENOUS at 07:10

## 2019-10-03 RX ADMIN — LIDOCAINE HYDROCHLORIDE 100 MG: 20 INJECTION, SOLUTION INTRAVENOUS at 08:10

## 2019-10-03 RX ADMIN — LIDOCAINE HYDROCHLORIDE 50 MG: 20 INJECTION, SOLUTION INTRAVENOUS at 08:10

## 2019-10-03 RX ADMIN — PROPOFOL 100 MG: 10 INJECTION, EMULSION INTRAVENOUS at 08:10

## 2019-10-03 NOTE — TRANSFER OF CARE
"Anesthesia Transfer of Care Note    Patient: Yovani Malik    Procedure(s) Performed: Procedure(s) (LRB):  EGD (ESOPHAGOGASTRODUODENOSCOPY) (N/A)    Patient location: GI    Anesthesia Type: general    Transport from OR: Transported from OR on room air with adequate spontaneous ventilation    Post pain: adequate analgesia    Post assessment: no apparent anesthetic complications and tolerated procedure well    Post vital signs: stable    Level of consciousness: awake, alert and oriented    Nausea/Vomiting: no nausea/vomiting    Complications: none    Transfer of care protocol was followed      Last vitals:   Visit Vitals  /78 (BP Location: Right arm, Patient Position: Lying)   Pulse 72   Temp 36.5 °C (97.7 °F) (Skin)   Resp 18   Ht 5' 6" (1.676 m)   Wt 113.4 kg (250 lb)   SpO2 98%   BMI 40.35 kg/m²     "

## 2019-10-03 NOTE — H&P
History & Physical - Short Stay  Gastroenterology      SUBJECTIVE:     Procedure: EGD    Chief Complaint/Indication for Procedure: Reflux    PTA Medications   Medication Sig    ALPRAZolam (XANAX) 0.25 MG tablet     dextroamphetamine-amphetamine (ADDERALL XR) 30 MG 24 hr capsule TK TWO CAPSULES PO D IN THE EARLY MORNING    oxyCODONE-acetaminophen (PERCOCET) 5-325 mg per tablet Take 1 tablet by mouth every 4 (four) hours as needed for Pain. Medication medically necessary for chronic pain >7days.    diclofenac sodium (VOLTAREN) 1 % Gel Apply 4 g topically 2 (two) times daily as needed.    ergocalciferol (ERGOCALCIFEROL) 50,000 unit Cap TAKE 1 CAPSULE BY MOUTH EVERY 7 DAYS    pantoprazole (PROTONIX) 40 MG tablet Take 1 tablet (40 mg total) by mouth once daily.       Review of patient's allergies indicates:  No Known Allergies     Past Medical History:   Diagnosis Date    Anxiety     Borderline diabetes     Hyperlipidemia     Hypertension      Past Surgical History:   Procedure Laterality Date    ARTHROSCOPY OF KNEE Right 9/13/2019    Procedure: ARTHROSCOPY, KNEE;  Surgeon: Anurag Wells MD;  Location: [facility] OR;  Service: Orthopedics;  Laterality: Right;    KNEE ARTHROSCOPY      KNEE SURGERY Bilateral     x 2     REPAIR OF MENISCUS OF KNEE Right 9/13/2019    Procedure: REPAIR, MENISCUS, KNEE;  Surgeon: Anurag Wells MD;  Location: [facility] OR;  Service: Orthopedics;  Laterality: Right;  lateral meniscal repair     Family History   Problem Relation Age of Onset    Diabetes Mother     Hypertension Mother     Hyperlipidemia Mother     Diabetes Father     Diabetes Maternal Grandmother     Cancer Maternal Grandmother         breast     Social History     Tobacco Use    Smoking status: Current Some Day Smoker     Packs/day: 0.50     Types: Cigarettes    Smokeless tobacco: Never Used    Tobacco comment: maybe 0.5 pack a week   Substance Use Topics    Alcohol use: No    Drug use: No          OBJECTIVE:     Vital Signs (Most Recent)  Temp: 97.7 °F (36.5 °C) (10/03/19 0741)  Pulse: 72 (10/03/19 0741)  Resp: 18 (10/03/19 0741)  BP: 130/78 (10/03/19 0741)  SpO2: 98 % (10/03/19 0741)    Physical Exam:                                                       GENERAL:  Comfortable, in no acute distress.                                 HEENT EXAM:  Nonicteric.  No adenopathy.  Oropharynx is clear.               NECK:  Supple.                                                               LUNGS:  Clear.                                                               CARDIAC:  Regular rate and rhythm.  S1, S2.  No murmur.                      ABDOMEN:  Soft, positive bowel sounds, nontender.  No hepatosplenomegaly or masses.  No rebound or guarding.                                             EXTREMITIES:  No edema.     MENTAL STATUS:  Normal, alert and oriented.      ASSESSMENT/PLAN:     Assessment: Reflux    Plan: EGD    Anesthesia Plan: General    ASA Grade: ASA 2 - Patient with mild systemic disease with no functional limitations    MALLAMPATI SCORE:  I (soft palate, uvula, fauces, and tonsillar pillars visible)

## 2019-10-03 NOTE — ANESTHESIA PREPROCEDURE EVALUATION
10/03/2019  Yovani Malik is a 29 y.o., male.    Pre-op Assessment    I have reviewed the Patient Summary Reports.     I have reviewed the Nursing Notes.   I have reviewed the Medications.     Review of Systems  Anesthesia Hx:  No problems with previous Anesthesia    Social:  Smoker    Cardiovascular:   Hypertension, well controlled hyperlipidemia    Pulmonary:  Pulmonary Normal    Renal/:  Renal/ Normal     Neurological:  Neurology Normal    Endocrine:  Endocrine Normal    Psych:   Psychiatric History anxiety          Physical Exam  General:  Well nourished, Obesity    Airway/Jaw/Neck:  Airway Findings: Mouth Opening: Normal Tongue: Normal  General Airway Assessment: Adult  Oropharynx Findings:  Mallampati: II  Jaw/Neck Findings:  Neck ROM: Normal ROM     Eyes/Ears/Nose:  Eyes/Ears/Nose Findings:    Dental:  Dental Findings:   Chest/Lungs:  Chest/Lungs Findings: Normal Respiratory Rate     Heart/Vascular:  Heart Findings: Rate: Normal  Rhythm: Regular Rhythm        Mental Status:  Mental Status Findings:  Cooperative, Alert and Oriented         Anesthesia Plan  Type of Anesthesia, risks & benefits discussed:  Anesthesia Type:  general  Patient's Preference:   Intra-op Monitoring Plan: standard ASA monitors  Intra-op Monitoring Plan Comments:   Post Op Pain Control Plan: multimodal analgesia  Post Op Pain Control Plan Comments:   Induction:   IV  Beta Blocker:  Patient is not currently on a Beta-Blocker (No further documentation required).       Informed Consent: Patient understands risks and agrees with Anesthesia plan.  Questions answered. Anesthesia consent signed with patient.  ASA Score: 2     Day of Surgery Review of History & Physical:    H&P update referred to the surgeon.         Ready For Surgery From Anesthesia Perspective.

## 2019-10-03 NOTE — [employer] PATIENT INSTRUCTIONS
Discharge Summary/Instructions after an Endoscopic Procedure  Patient Name: Yovani Malik  Patient MRN: [id number]  Patient YOB: 1990 Thursday, October 03, 2019  Yovani Dominguez MD  RESTRICTIONS:  During your procedure today, you received medications for sedation.  These   medications may affect your judgment, balance and coordination.  Therefore,   for 24 hours, you have the following restrictions:   - DO NOT drive a car, operate machinery, make legal/financial decisions,   sign important papers or drink alcohol.    ACTIVITY:  Today: no heavy lifting, straining or running due to procedural   sedation/anesthesia.  The following day: return to full activity including work.  DIET:  Eat and drink normally unless instructed otherwise.     TREATMENT FOR COMMON SIDE EFFECTS:  - Mild abdominal pain, nausea, belching, bloating or excessive gas:  rest,   eat lightly and use a heating pad.  - Sore Throat: treat with throat lozenges and/or gargle with warm salt   water.  - Because air was used during the procedure, expelling large amounts of air   from your rectum or belching is normal.  - If a bowel prep was taken, you may not have a bowel movement for 1-3 days.    This is normal.  SYMPTOMS TO WATCH FOR AND REPORT TO YOUR PHYSICIAN:  1. Abdominal pain or bloating, other than gas cramps.  2. Chest pain.  3. Back pain.  4. Signs of infection such as: chills or fever occurring within 24 hours   after the procedure.  5. Rectal bleeding, which would show as bright red, maroon, or black stools.   (A tablespoon of blood from the rectum is not serious, especially if   hemorrhoids are present.)  6. Vomiting.  7. Weakness or dizziness.  GO DIRECTLY TO THE NEAREST EMERGENCY ROOM IF YOU HAVE ANY OF THE FOLLOWING:      Difficulty breathing              Chills and/or fever over 101 F   Persistent vomiting and/or vomiting blood   Severe abdominal pain   Severe chest pain   Black, tarry stools   Bleeding- more than one  tablespoon   Any other symptom or condition that you feel may need urgent attention  Your doctor recommends these additional instructions:  If any biopsies were taken, your doctors clinic will contact you in 1 to 2   weeks with any results.  We are waiting for your pathology results.   Continue your present medications.   Your physician has recommended a repeat upper endoscopy in eight weeks to   check healing.   You are being discharged to home.  For questions, problems or results please call your physician - Yovani Dominguez MD at Work:  [phone].  EMERGENCY PHONE NUMBER: [phone], LAB RESULTS: [phone]  IF A COMPLICATION OR EMERGENCY SITUATION ARISES AND YOU ARE UNABLE TO REACH   YOUR PHYSICIAN - GO DIRECTLY TO THE EMERGENCY ROOM.  ___________________________________________  Nurse Signature  ___________________________________________  Patient/Designated Responsible Party Signature  Yovani Dominguez MD  10/3/2019 8:46:58 AM  This report has been verified and signed electronically.  [employer]

## 2019-10-03 NOTE — DISCHARGE SUMMARY
Discharge Note  Short Stay      SUMMARY     Admit Date: 10/3/2019    Attending Physician: Yovani Dominguez MD     Discharge Physician: Yovani Dominguez MD    Discharge Date: 10/3/2019 8:47 AM    Final Diagnosis: Gastroesophageal reflux disease, esophagitis presence not specified [K21.9]    Disposition: HOME OR SELF CARE    Patient Instructions:   Current Discharge Medication List      START taking these medications    Details   esomeprazole (NEXIUM) 40 MG capsule Take 1 capsule (40 mg total) by mouth once daily.  Qty: 30 capsule, Refills: 2         CONTINUE these medications which have NOT CHANGED    Details   ALPRAZolam (XANAX) 0.25 MG tablet Refills: 1      dextroamphetamine-amphetamine (ADDERALL XR) 30 MG 24 hr capsule TK TWO CAPSULES PO D IN THE EARLY MORNING  Refills: 0      oxyCODONE-acetaminophen (PERCOCET) 5-325 mg per tablet Take 1 tablet by mouth every 4 (four) hours as needed for Pain. Medication medically necessary for chronic pain >7days.  Qty: 40 tablet, Refills: 0    Comments: Quantity prescribed more than 7 day supply? Yes, quantity medically necessary for chronic pain >7days.      diclofenac sodium (VOLTAREN) 1 % Gel Apply 4 g topically 2 (two) times daily as needed.  Qty: 100 g, Refills: 0      ergocalciferol (ERGOCALCIFEROL) 50,000 unit Cap TAKE 1 CAPSULE BY MOUTH EVERY 7 DAYS  Qty: 12 capsule, Refills: 0    Comments: **Patient requests 90 days supply**      pantoprazole (PROTONIX) 40 MG tablet Take 1 tablet (40 mg total) by mouth once daily.  Qty: 30 tablet, Refills: 11    Associated Diagnoses: Gastroesophageal reflux disease with esophagitis             Discharge Procedure Orders (must include Diet, Follow-up, Activity)    Follow Up:  Follow up with PCP as previously scheduled  Resume routine diet.  Activity as tolerated.    No driving day of procedure.

## 2019-10-03 NOTE — ANESTHESIA POSTPROCEDURE EVALUATION
Anesthesia Post Evaluation    Patient: Yovani Malik    Procedure(s) Performed: Procedure(s) (LRB):  EGD (ESOPHAGOGASTRODUODENOSCOPY) (N/A)    Final Anesthesia Type: general  Patient location during evaluation: PACU  Patient participation: Yes- Able to Participate  Level of consciousness: sedated and awake  Post-procedure vital signs: reviewed and stable  Pain management: adequate  Airway patency: patent  PONV status at discharge: No PONV  Anesthetic complications: no      Cardiovascular status: hypertensive and blood pressure returned to baseline  Respiratory status: spontaneous ventilation  Hydration status: euvolemic  Follow-up not needed.          Vitals Value Taken Time   /87 10/3/2019  9:16 AM   Temp 36.6 °C (97.8 °F) 10/3/2019  8:50 AM   Pulse 88 10/3/2019  9:16 AM   Resp 18 10/3/2019  9:16 AM   SpO2 98 % 10/3/2019  9:16 AM         No case tracking events are documented in the log.      Pain/Isaak Score: Isaak Score: 10 (10/3/2019  9:17 AM)

## 2019-10-04 VITALS
WEIGHT: 250 LBS | BODY MASS INDEX: 40.18 KG/M2 | RESPIRATION RATE: 18 BRPM | SYSTOLIC BLOOD PRESSURE: 132 MMHG | HEART RATE: 88 BPM | HEIGHT: 66 IN | OXYGEN SATURATION: 98 % | DIASTOLIC BLOOD PRESSURE: 87 MMHG | TEMPERATURE: 98 F

## 2019-10-07 NOTE — PROGRESS NOTES
Physical Therapy Daily Treatment Note     Name: Yovani Malik  Clinic Number: [id number]    Therapy Diagnosis:   Encounter Diagnoses   Name Primary?    Chronic pain of right knee     Stiffness of right knee      Physician: Anurag Wells,*    Visit Date: 10/8/2019  Physician Orders: PT Eval and Treat  Medical Diagnosis from Referral: Right knee pain, unspecified chronicity   Evaluation Date: 9/30/2019  Authorization Period Expiration: 12/31/2019   Plan of Care Expiration: 12/13/2019  Visit # / Visits authorized: 2/ 20    Time In: 1300  Time Out: 1400  Total Billable Time: 50 minutes    Precautions: Standard, Weightbearing and s/p surgery on 9/13 (3 weeks post op)    Subjective     Pt reports: He reports he has been doing well at home with his exercises.  He was compliant with home exercise program.  Response to previous treatment: Muscle soreness  Functional change: Too soon to tell    Pain: 6/10  Location: right knee      Objective     Yovani received therapeutic exercises to develop strength, ROM, flexibility and posture for 30 minutes including:  Quad sets 2 x 10 (3 second hold)  Calf stretch with strap seated on mat 3 x 30s   SLR 2 x 10 2#  SL Hip abduction 2 x 10 2#  Prone hip extension 2 x 10 2#  LAQS 2 x 10 3#  PROM to (R) into flexion seated    Yovani received the following manual therapy techniques: Soft tissue Mobilization were applied to the: (L) quadriceps  for 10 minutes, including:  IASTM to quad     Yovani received the following supervised modalities after being cleared for contradictions: NMES Electrical Stimulation:  Yovani received NMES Electrical Stimulation to elicit muscle contraction of the (L) quadricep. Pt received stimulation, ramp of 3 seconds with 5 second on time and 10 second off time. Patient tolerated treatment well without any adverse effects.  Performed with quad sets x 15 min with contractions.       Home Exercises Provided and Patient Education Provided     Education  provided:   - Continue HEP    Written Home Exercises Provided: yes.  Exercises were reviewed and Yovani was able to demonstrate them prior to the end of the session.  Yovani demonstrated good  understanding of the education provided.     See EMR under Media for exercises provided prior visit.    Assessment     Yovani tolerated treatment very well including progression of resistance with straight leg raises. Able to achieve knee flexion PROM to ~105 degrees while seated on mat. Will continue to progress as tolerated per protocol.     Yovani is progressing well towards his goals.   Pt prognosis is Excellent.     Pt will continue to benefit from skilled outpatient physical therapy to address the deficits listed in the problem list box on initial evaluation, provide pt/family education and to maximize pt's level of independence in the home and community environment.     Pt's spiritual, cultural and educational needs considered and pt agreeable to plan of care and goals.    Anticipated barriers to physical therapy: None    Goals:   Short-Term Goals: 4 weeks  - The patient will be independent with initial home exercise program.  - The patient will increase strength to at least 4+/5 to perform functional mobility including ascending/descending stairs and ambulation  - The patient will increase PROM grossly from 0 to 120 to perform ambulation with pain < 2/10.     Long-Term Goals: 8 weeks  - Pt to achieve <26% limitation as measured by the FOTO to demonstrate decreased disability.  - The patient will be independent with home exercise program and symptom management.  - The patient will be independent amb with no assistive device on all surfaces for community distances.  - The patient will increase strength to at least 5/5 to perform functional mobility including ambulation, ascending descending stairs, and job duties  - The patient will increase AROM grossly to 0 to 120 degrees L to perform ambulation with pain <  1/10.    Plan     Continue progressing per protocol    Sean Wakefield, PT

## 2019-10-08 ENCOUNTER — CLINICAL SUPPORT (OUTPATIENT)
Dept: REHABILITATION | Facility: HOSPITAL | Age: 29
End: 2019-10-08
Attending: ORTHOPAEDIC SURGERY
Payer: COMMERCIAL

## 2019-10-08 DIAGNOSIS — G89.29 CHRONIC PAIN OF RIGHT KNEE: ICD-10-CM

## 2019-10-08 DIAGNOSIS — M25.561 CHRONIC PAIN OF RIGHT KNEE: ICD-10-CM

## 2019-10-08 DIAGNOSIS — M25.661 STIFFNESS OF RIGHT KNEE: ICD-10-CM

## 2019-10-08 PROCEDURE — 97014 ELECTRIC STIMULATION THERAPY: CPT | Mod: PO

## 2019-10-08 PROCEDURE — 97140 MANUAL THERAPY 1/> REGIONS: CPT | Mod: PO

## 2019-10-08 PROCEDURE — 97110 THERAPEUTIC EXERCISES: CPT | Mod: PO

## 2019-10-09 ENCOUNTER — TELEPHONE (OUTPATIENT)
Dept: GASTROENTEROLOGY | Facility: CLINIC | Age: 29
End: 2019-10-09

## 2019-10-14 ENCOUNTER — CLINICAL SUPPORT (OUTPATIENT)
Dept: REHABILITATION | Facility: HOSPITAL | Age: 29
End: 2019-10-14
Attending: ORTHOPAEDIC SURGERY
Payer: COMMERCIAL

## 2019-10-14 DIAGNOSIS — G89.29 CHRONIC PAIN OF RIGHT KNEE: ICD-10-CM

## 2019-10-14 DIAGNOSIS — M25.561 CHRONIC PAIN OF RIGHT KNEE: ICD-10-CM

## 2019-10-14 DIAGNOSIS — M25.661 STIFFNESS OF RIGHT KNEE: ICD-10-CM

## 2019-10-14 PROCEDURE — 97140 MANUAL THERAPY 1/> REGIONS: CPT | Mod: PO

## 2019-10-14 PROCEDURE — 97014 ELECTRIC STIMULATION THERAPY: CPT | Mod: PO

## 2019-10-14 PROCEDURE — 97110 THERAPEUTIC EXERCISES: CPT | Mod: PO

## 2019-10-14 RX ORDER — OXYCODONE AND ACETAMINOPHEN 5; 325 MG/1; MG/1
1 TABLET ORAL EVERY 6 HOURS PRN
Qty: 40 TABLET | Refills: 0 | Status: SHIPPED | OUTPATIENT
Start: 2019-10-14 | End: 2019-10-21 | Stop reason: ALTCHOICE

## 2019-10-14 NOTE — PROGRESS NOTES
Physical Therapy Daily Treatment Note     Name: Yovani Malik  Clinic Number: [id number]    Therapy Diagnosis:   Encounter Diagnoses   Name Primary?    Chronic pain of right knee     Stiffness of right knee      Physician: Anurag Wells,*    Visit Date: 10/14/2019  Physician Orders: PT Eval and Treat  Medical Diagnosis from Referral: Right knee pain, unspecified chronicity   Evaluation Date: 9/30/2019  Authorization Period Expiration: 12/31/2019   Plan of Care Expiration: 12/13/2019  Visit # / Visits authorized: 3/ 20    Time In: 1057  Time Out: 1200  Total Billable Time: 60 minutes    Precautions: Standard, Weightbearing and s/p surgery on 9/13 (4 weeks)    Subjective     Pt reports: He has been continuing to use the crutches and has not had any increased pain with his exercises.   He was compliant with home exercise program.  Response to previous treatment: Muscle soreness  Functional change: Too soon to tell    Pain: 3/10  Location: right knee      Objective     Yovani received therapeutic exercises to develop strength, ROM, flexibility and posture for 35 minutes including:  Quad sets 2 x 10 (3 second hold)  Calf stretch with strap seated on mat x 2 min  SLR 2 x 10 3#  SL Hip abduction 2 x 10 3#  Prone hip extension 2 x 10 3#  Hip adduction 2 x 10 3#  LAQS 2 x 10 3#  PROM to (R) into flexion seated, prone, supine   Heel slides 2 x 10 (strap and board)  Supine hip flexor stretch x 3 minutes with strap   Seated HS stretch 2  x 30s - Progress to supine     Yovani received the following manual therapy techniques: Soft tissue Mobilization were applied to the: (L) quadriceps  for 10 minutes, including:  IASTM to quad/HS    Yovani received the following supervised modalities after being cleared for contradictions: NMES Electrical Stimulation:  Yovani received NMES Electrical Stimulation to elicit muscle contraction of the (L) quadricep. Pt received stimulation, ramp of 3 seconds with 5 second on time and 10  second off time. Patient tolerated treatment well without any adverse effects.  Performed with quad sets x 10min with contractions.       Home Exercises Provided and Patient Education Provided     Education provided:   - Continue HEP    Written Home Exercises Provided: yes.  Exercises were reviewed and Yovani was able to demonstrate them prior to the end of the session.  Yovani demonstrated good  understanding of the education provided.     See EMR under Media for exercises provided prior visit.    Assessment     Yovani continues to respond well to physical therapy treatment. Instructed patient to bring hinge knee brace to therapy so he progress and weight-bear per protocol. Will continue to progress per protocol.  Able to achieve ~105 knee flexion in sitting, 85 degrees in supine with PROM.    Yovani is progressing well towards his goals.   Pt prognosis is Excellent.     Pt will continue to benefit from skilled outpatient physical therapy to address the deficits listed in the problem list box on initial evaluation, provide pt/family education and to maximize pt's level of independence in the home and community environment.     Pt's spiritual, cultural and educational needs considered and pt agreeable to plan of care and goals.    Anticipated barriers to physical therapy: None    Goals:   Short-Term Goals: 4 weeks  - The patient will be independent with initial home exercise program.  - The patient will increase strength to at least 4+/5 to perform functional mobility including ascending/descending stairs and ambulation  - The patient will increase PROM grossly from 0 to 120 to perform ambulation with pain < 2/10.     Long-Term Goals: 8 weeks  - Pt to achieve <26% limitation as measured by the FOTO to demonstrate decreased disability.  - The patient will be independent with home exercise program and symptom management.  - The patient will be independent amb with no assistive device on all surfaces for community  distances.  - The patient will increase strength to at least 5/5 to perform functional mobility including ambulation, ascending descending stairs, and job duties  - The patient will increase AROM grossly to 0 to 120 degrees L to perform ambulation with pain < 1/10.    Plan     Continue progressing per protocol    Sean Wakefield, PT

## 2019-10-14 NOTE — PROGRESS NOTES
Physical Therapy Daily Treatment Note     Name: Yovani Malik  Clinic Number: [id number]    Therapy Diagnosis:   Encounter Diagnoses   Name Primary?    Chronic pain of right knee     Stiffness of right knee      Physician: Anurag Wells,*    Visit Date: 10/16/2019  Physician Orders: PT Eval and Treat  Medical Diagnosis from Referral: Right knee pain, unspecified chronicity   Evaluation Date: 9/30/2019  Authorization Period Expiration: 12/31/2019   Plan of Care Expiration: 12/13/2019  Visit # / Visits authorized: 4/ 20    Time In: 1200  Time Out: 1300  Total Billable Time: 60 minutes    Precautions: Standard, Weightbearing and s/p surgery on 9/13 (4 weeks)    Subjective     Pt reports: He brought the hinge brace to increase his weight-bearing in standing. He reports he did a lot of hobbling around on his crutches yesterday and is in more pain.   He was compliant with home exercise program.  Response to previous treatment: Muscle soreness  Functional change: Too soon to tell    Pain: 6/10  Location: right knee      Objective     Knee Extension (Supine): 0 degrees   Knee flexion (Seated): 105 degrees      Yovani received therapeutic exercises to develop strength, ROM, flexibility and posture for 35 minutes including:  Quad sets 2 x 10 (3 second hold)  Calf stretch with strap seated on mat x 2 min  SLR 3 x 10 3#  SL Hip abduction 3 x 10 3#  Prone hip extension 3x 10 3#  Hip adduction 2 x 10 3#  LAQS 3 x 10 3#  PROM to (R) into flexion seated, prone, supine   Heel slides x 5 minutes  (strap and board)  Supine hip flexor stretch x 3 minutes with strap    Supine HS stretch with strap 2  x 30s     Yovani received the following manual therapy techniques: Soft tissue Mobilization were applied to the: (L) quadriceps  for 10 minutes, including:  IASTM to quad     Yovani received the following supervised modalities after being cleared for contradictions: NMES Electrical Stimulation:  Yovani received NMES Electrical  Stimulation to elicit muscle contraction of the (L) quadricep. Pt received stimulation, ramp of 3 seconds with 5 second on time and 10 second off time. Patient tolerated treatment well without any adverse effects.  Performed with quad sets x 10min with contractions.       Yovani participated in gait training to improve functional mobility and safety for 5  minutes, including:  Ambulation with crutches and partial weight-bearing while in hinge knee brace locked into extension      Home Exercises Provided and Patient Education Provided     Education provided:   - Continue HEP    Written Home Exercises Provided: yes.  Exercises were reviewed and Yovani was able to demonstrate them prior to the end of the session.  Yovani demonstrated good  understanding of the education provided.     See EMR under Media for exercises provided prior visit.    Assessment     Yovani reported increased knee pain yesterday after being on his feet a lot. Progressed with weight-bearing with brace locked per protocol with instruction to increase weight-bearing with ambulation as long as pain and swelling do not significantly increase. Pt verbalized understanding and is following up with MD next Wednesday.     Yovani is progressing well towards his goals.   Pt prognosis is Excellent.     Pt will continue to benefit from skilled outpatient physical therapy to address the deficits listed in the problem list box on initial evaluation, provide pt/family education and to maximize pt's level of independence in the home and community environment.     Pt's spiritual, cultural and educational needs considered and pt agreeable to plan of care and goals.    Anticipated barriers to physical therapy: None    Goals:   Short-Term Goals: 4 weeks  - The patient will be independent with initial home exercise program.  - The patient will increase strength to at least 4+/5 to perform functional mobility including ascending/descending stairs and ambulation  - The  patient will increase PROM grossly from 0 to 120 to perform ambulation with pain < 2/10.     Long-Term Goals: 8 weeks  - Pt to achieve <26% limitation as measured by the FOTO to demonstrate decreased disability.  - The patient will be independent with home exercise program and symptom management.  - The patient will be independent amb with no assistive device on all surfaces for community distances.  - The patient will increase strength to at least 5/5 to perform functional mobility including ambulation, ascending descending stairs, and job duties  - The patient will increase AROM grossly to 0 to 120 degrees L to perform ambulation with pain < 1/10.    Plan     Continue progressing per protocol    Sean Wakeifeld, PT

## 2019-10-15 ENCOUNTER — TELEPHONE (OUTPATIENT)
Dept: GASTROENTEROLOGY | Facility: CLINIC | Age: 29
End: 2019-10-15

## 2019-10-15 NOTE — TELEPHONE ENCOUNTER
----- Message from Lupe Dangelo sent at 10/15/2019  9:25 AM CDT -----  Contact: PT  Type:  Patient Returning Call    Who Called:  PT  Who Left Message for Patient:  Nurse  Does the patient know what this is regarding?:  unknown  Best Call Back Number:  [phone]  Additional Information:  Please Advise ---Thank you

## 2019-10-15 NOTE — TELEPHONE ENCOUNTER
Call placed to patient who stated he was returning a call from our office. Advised patient that I checked the messages and there are no open messages from anyone in Gastro trying to contact him. He stated a gentleman had called earlier from the department. Advised again that there are no males, except for the physicians in the gastro department. Understanding verbalized.

## 2019-10-16 ENCOUNTER — CLINICAL SUPPORT (OUTPATIENT)
Dept: REHABILITATION | Facility: HOSPITAL | Age: 29
End: 2019-10-16
Attending: ORTHOPAEDIC SURGERY
Payer: COMMERCIAL

## 2019-10-16 DIAGNOSIS — G89.29 CHRONIC PAIN OF RIGHT KNEE: ICD-10-CM

## 2019-10-16 DIAGNOSIS — M25.661 STIFFNESS OF RIGHT KNEE: ICD-10-CM

## 2019-10-16 DIAGNOSIS — M25.561 CHRONIC PAIN OF RIGHT KNEE: ICD-10-CM

## 2019-10-16 PROCEDURE — 97110 THERAPEUTIC EXERCISES: CPT | Mod: PO

## 2019-10-16 PROCEDURE — 97140 MANUAL THERAPY 1/> REGIONS: CPT | Mod: PO

## 2019-10-16 PROCEDURE — 97014 ELECTRIC STIMULATION THERAPY: CPT | Mod: PO

## 2019-10-21 RX ORDER — HYDROCODONE BITARTRATE AND ACETAMINOPHEN 5; 325 MG/1; MG/1
1 TABLET ORAL EVERY 6 HOURS PRN
Qty: 40 TABLET | Refills: 0 | Status: SHIPPED | OUTPATIENT
Start: 2019-10-21 | End: 2019-10-29 | Stop reason: SDUPTHER

## 2019-10-21 NOTE — PROGRESS NOTES
Physical Therapy Daily Treatment Note     Name: Yovani Malik  Clinic Number: [id number]    Therapy Diagnosis:   Encounter Diagnoses   Name Primary?    Chronic pain of right knee     Stiffness of right knee      Physician: Anurag Wells,*    Visit Date: 10/23/2019  Physician Orders: PT Eval and Treat  Medical Diagnosis from Referral: Right knee pain, unspecified chronicity   Evaluation Date: 9/30/2019  Authorization Period Expiration: 12/31/2019   Plan of Care Expiration: 12/13/2019  Visit # / Visits authorized: 5/ 20    Time In: 1100  Time Out: 1158  Total Billable Time: 58 minutes    Precautions: Standard, Weightbearing and s/p surgery on 9/13 (5 weeks)    Subjective     Pt reports: He is ambulating with the cane and no brace with no significant increase in pain.   He was compliant with home exercise program.  Response to previous treatment: Muscle soreness  Functional change: Too soon to tell    Pain: 3/10  Location: right knee      Objective     Knee Extension (Supine): 0 degrees   Knee flexion (Supine): 110 degrees    Yovani received therapeutic exercises to develop strength, ROM, flexibility and posture for 45 minutes including:  Quad sets 2 x 10 (3 second hold)  Heel slides x 5 minutes  (strap and board)  GS stretch x 3 min   Supine hip flexor stretch x 3 minutes with strap    Supine HS stretch with strap 3  x 30s   Calf stretch with strap seated on mat x 2 min  SLR 3 x 10 3#  SL Hip abduction 3 x 10 3#  Prone hip extension 3x 10 3#  Hip adduction 2 x 10 3#  Clamshells GTB 2 x 10   LAQS 3 x 10 3#    Standing hip abduction 2 x 10 RTB   Standing hip extension 2 x 10 RTB  Step ups 2 x 10 (6 inch)    Leg press (seat at 11) 2 x 10 60#  Mini Squats 2 x10 with green ball on wall  PROM to (R) into flexion seated, prone, supine     Yovani received the following manual therapy techniques: Soft tissue Mobilization were applied to the: (L) quadriceps  for 10 minutes, including:  IASTM to quad   Tibiofemoral  mobs grades I - II AP/PA  Patella mobs  Med/lat, inferior/superior    Yovani participated in neuromuscular re-education activities to improve: Balance, Sense and Proprioception for 5 minutes. The following activities were included:  Narrow AKILAH on foam x 30s  Tandem stance 2 x30s (modified tandem with (R) LE in back due to increased difficulty)  Weight shifting onto (R) LE x 10     Home Exercises Provided and Patient Education Provided     Education provided:   - Continue HEP    Written Home Exercises Provided: yes.  Exercises were reviewed and Yovani was able to demonstrate them prior to the end of the session.  Yovani demonstrated good  understanding of the education provided.     See EMR under Media for exercises provided prior visit.    Assessment     Yovani tolerated progression of LE strengthening and CKC very well with no increase in pain. Able to squat to about ~60 deg knee flexion without pain. His knee flexion ROM continues to progress. He is ambulating with an antalgic gait pattern, which improves with SPC to allow for good weightshift to (R) LE. Instructed patient to use SPC in contralateral hand, but he reported he prefers the cane on the ipsilateral side. Will continue to progress per protocol.     Yovani is progressing well towards his goals.   Pt prognosis is Excellent.     Pt will continue to benefit from skilled outpatient physical therapy to address the deficits listed in the problem list box on initial evaluation, provide pt/family education and to maximize pt's level of independence in the home and community environment.     Pt's spiritual, cultural and educational needs considered and pt agreeable to plan of care and goals.    Anticipated barriers to physical therapy: None    Goals:   Short-Term Goals: 4 weeks  - The patient will be independent with initial home exercise program.  - The patient will increase strength to at least 4+/5 to perform functional mobility including ascending/descending  stairs and ambulation  - The patient will increase PROM grossly from 0 to 120 to perform ambulation with pain < 2/10.     Long-Term Goals: 8 weeks  - Pt to achieve <26% limitation as measured by the FOTO to demonstrate decreased disability.  - The patient will be independent with home exercise program and symptom management.  - The patient will be independent amb with no assistive device on all surfaces for community distances.  - The patient will increase strength to at least 5/5 to perform functional mobility including ambulation, ascending descending stairs, and job duties  - The patient will increase AROM grossly to 0 to 120 degrees L to perform ambulation with pain < 1/10.    Plan     Continue progressing per protocol    Sean Wakefield, PT

## 2019-10-23 ENCOUNTER — CLINICAL SUPPORT (OUTPATIENT)
Dept: REHABILITATION | Facility: HOSPITAL | Age: 29
End: 2019-10-23
Attending: ORTHOPAEDIC SURGERY
Payer: COMMERCIAL

## 2019-10-23 ENCOUNTER — OFFICE VISIT (OUTPATIENT)
Dept: ORTHOPEDICS | Facility: CLINIC | Age: 29
End: 2019-10-23
Payer: COMMERCIAL

## 2019-10-23 VITALS
HEART RATE: 91 BPM | WEIGHT: 250 LBS | HEIGHT: 66 IN | DIASTOLIC BLOOD PRESSURE: 77 MMHG | SYSTOLIC BLOOD PRESSURE: 133 MMHG | BODY MASS INDEX: 40.18 KG/M2

## 2019-10-23 DIAGNOSIS — M23.200 OLD COMPLEX TEAR OF LATERAL MENISCUS OF RIGHT KNEE: Primary | ICD-10-CM

## 2019-10-23 DIAGNOSIS — G89.29 CHRONIC PAIN OF RIGHT KNEE: ICD-10-CM

## 2019-10-23 DIAGNOSIS — M25.561 CHRONIC PAIN OF RIGHT KNEE: ICD-10-CM

## 2019-10-23 DIAGNOSIS — M25.661 STIFFNESS OF RIGHT KNEE: ICD-10-CM

## 2019-10-23 PROCEDURE — 97140 MANUAL THERAPY 1/> REGIONS: CPT | Mod: PO

## 2019-10-23 PROCEDURE — 99024 POSTOP FOLLOW-UP VISIT: CPT | Mod: S$GLB,,, | Performed by: ORTHOPAEDIC SURGERY

## 2019-10-23 PROCEDURE — 97110 THERAPEUTIC EXERCISES: CPT | Mod: PO

## 2019-10-23 PROCEDURE — 99999 PR PBB SHADOW E&M-EST. PATIENT-LVL III: CPT | Mod: PBBFAC,,, | Performed by: ORTHOPAEDIC SURGERY

## 2019-10-23 PROCEDURE — 99024 PR POST-OP FOLLOW-UP VISIT: ICD-10-PCS | Mod: S$GLB,,, | Performed by: ORTHOPAEDIC SURGERY

## 2019-10-23 PROCEDURE — 99999 PR PBB SHADOW E&M-EST. PATIENT-LVL III: ICD-10-PCS | Mod: PBBFAC,,, | Performed by: ORTHOPAEDIC SURGERY

## 2019-10-23 NOTE — PROGRESS NOTES
Physical Therapy Daily Treatment Note     Name: Yovani Malik  Clinic Number: [id number]    Therapy Diagnosis:   Encounter Diagnoses   Name Primary?    Chronic pain of right knee     Stiffness of right knee      Physician: Anurag Wells,*    Visit Date: 10/25/2019  Physician Orders: PT Eval and Treat  Medical Diagnosis from Referral: Right knee pain, unspecified chronicity   Evaluation Date: 9/30/2019  Authorization Period Expiration: 12/31/2019   Plan of Care Expiration: 12/13/2019  Visit # / Visits authorized: 6/ 20    Time In: 0857  Time Out: 0950  Total Billable Time: 53 minutes    Precautions: Standard, Weightbearing and s/p surgery on 9/13 (5 weeks)    Subjective     Pt reports: He reports he is having increased generalized knee pain this morning. He reports good muscle soreness following.   He was compliant with home exercise program.  Response to previous treatment: Muscle soreness  Functional change: Tolerating increased weight    Pain: 7/10  Location: right knee      Objective     Knee Extension (Supine): 0 degrees   Knee flexion (Supine): 110 degrees    Yovani received therapeutic exercises to develop strength, ROM, flexibility and posture for 40 minutes including:  Quad sets 2 x 10 (3 second hold)  Heel slides x 5 minutes  (strap and board)  GS stretch x 3 min   Supine hip flexor stretch x 3 minutes with strap    Supine HS stretch with strap 3  x 30s   SLR 3 x 10 3#  SL Hip abduction 3 x 10 3#  Prone hip extension 3x 10 3#  Hip adduction 2 x 10 3#  Clamshells GTB 3 x 10   LAQS 3 x 10 3#  Heel prop passive extension x 2 minutes    Standing hip abduction 2 x 10 RTB   Standing hip extension 2 x 10 RTB  Step ups 2 x 10 (8 inch)    Leg press (seat at 11) 2 x 10 7 0#  Mini Squats 2 x10 with green ball on wall  PROM to (R) into flexion seated, prone, supine   Heel raises 2 x 10 (SL)     Yovani received the following manual therapy techniques: Soft tissue Mobilization were applied to the: (L)  quadriceps  for 10 minutes, including:  IASTM to quad   Tibiofemoral mobs grades I - II AP/PA  Patella mobs  Med/lat, inferior/superior    Yovani participated in neuromuscular re-education activities to improve: Balance, Sense and Proprioception for 3 minutes. The following activities were included:  Tandem stance 2 x30s (modified tandem with (R) LE in back due to increased difficulty)    Home Exercises Provided and Patient Education Provided     Education provided:   - Continue HEP    Written Home Exercises Provided: yes.  Exercises were reviewed and Yovani was able to demonstrate them prior to the end of the session.  Yovani demonstrated good  understanding of the education provided.     See EMR under Media for exercises provided prior visit.    Assessment     Yovani is ambulating without the SPC and demo decreased knee flexion with ambulation. Improved weight-bearing through (R) LE. He tolerated progression to closed chain exercises well with appropriate muscle soreness following PT.      Yovani is progressing well towards his goals.   Pt prognosis is Excellent.     Pt will continue to benefit from skilled outpatient physical therapy to address the deficits listed in the problem list box on initial evaluation, provide pt/family education and to maximize pt's level of independence in the home and community environment.     Pt's spiritual, cultural and educational needs considered and pt agreeable to plan of care and goals.    Anticipated barriers to physical therapy: None    Goals:   Short-Term Goals: 4 weeks  - The patient will be independent with initial home exercise program.  - The patient will increase strength to at least 4+/5 to perform functional mobility including ascending/descending stairs and ambulation  - The patient will increase PROM grossly from 0 to 120 to perform ambulation with pain < 2/10.     Long-Term Goals: 8 weeks  - Pt to achieve <26% limitation as measured by the FOTO to demonstrate  decreased disability.  - The patient will be independent with home exercise program and symptom management.  - The patient will be independent amb with no assistive device on all surfaces for community distances.  - The patient will increase strength to at least 5/5 to perform functional mobility including ambulation, ascending descending stairs, and job duties  - The patient will increase AROM grossly to 0 to 120 degrees L to perform ambulation with pain < 1/10.    Plan     Continue progressing per protocol    Sean Wakefield, PT

## 2019-10-23 NOTE — PROGRESS NOTES
Past Medical History:   Diagnosis Date    Anxiety     Borderline diabetes     Hyperlipidemia     Hypertension        Past Surgical History:   Procedure Laterality Date    ARTHROSCOPY OF KNEE Right 9/13/2019    Procedure: ARTHROSCOPY, KNEE;  Surgeon: Anurag Wells MD;  Location: [facility] OR;  Service: Orthopedics;  Laterality: Right;    ESOPHAGOGASTRODUODENOSCOPY N/A 10/3/2019    Procedure: EGD (ESOPHAGOGASTRODUODENOSCOPY);  Surgeon: Yovani Dominguez MD;  Location: [facility] ENDO;  Service: Endoscopy;  Laterality: N/A;    KNEE ARTHROSCOPY      KNEE SURGERY Bilateral     x 2     REPAIR OF MENISCUS OF KNEE Right 9/13/2019    Procedure: REPAIR, MENISCUS, KNEE;  Surgeon: Anurag Wells MD;  Location: [facility] OR;  Service: Orthopedics;  Laterality: Right;  lateral meniscal repair       Current Outpatient Medications   Medication Sig    ALPRAZolam (XANAX) 0.25 MG tablet     dextroamphetamine-amphetamine (ADDERALL XR) 30 MG 24 hr capsule TK TWO CAPSULES PO D IN THE EARLY MORNING    diclofenac sodium (VOLTAREN) 1 % Gel Apply 4 g topically 2 (two) times daily as needed.    ergocalciferol (ERGOCALCIFEROL) 50,000 unit Cap TAKE 1 CAPSULE BY MOUTH EVERY 7 DAYS    esomeprazole (NEXIUM) 40 MG capsule Take 1 capsule (40 mg total) by mouth once daily.    HYDROcodone-acetaminophen (NORCO) 5-325 mg per tablet Take 1 tablet by mouth every 6 (six) hours as needed for Pain.    pantoprazole (PROTONIX) 40 MG tablet Take 1 tablet (40 mg total) by mouth once daily.     No current facility-administered medications for this visit.        Review of patient's allergies indicates:  No Known Allergies    Family History   Problem Relation Age of Onset    Diabetes Mother     Hypertension Mother     Hyperlipidemia Mother     Diabetes Father     Diabetes Maternal Grandmother     Cancer Maternal Grandmother         breast       Social History     Socioeconomic History    Marital status:      Spouse name: Not on  file    Number of children: Not on file    Years of education: Not on file    Highest education level: Not on file   Occupational History    Not on file   Social Needs    Financial resource strain: Not on file    Food insecurity:     Worry: Not on file     Inability: Not on file    Transportation needs:     Medical: Not on file     Non-medical: Not on file   Tobacco Use    Smoking status: Current Some Day Smoker     Packs/day: 0.50     Types: Cigarettes    Smokeless tobacco: Never Used    Tobacco comment: maybe 0.5 pack a week   Substance and Sexual Activity    Alcohol use: No    Drug use: No    Sexual activity: Yes     Partners: Female   Lifestyle    Physical activity:     Days per week: Not on file     Minutes per session: Not on file    Stress: Not on file   Relationships    Social connections:     Talks on phone: Not on file     Gets together: Not on file     Attends Shinto service: Not on file     Active member of club or organization: Not on file     Attends meetings of clubs or organizations: Not on file     Relationship status: Not on file   Other Topics Concern    Not on file   Social History Narrative    Not on file       Chief Complaint:   Chief Complaint   Patient presents with    Post-op Evaluation     s/p right knee scope 9/13/19        Date of surgery:  September 13, 2019    History of present illness:  This is a 29-year-old male underwent right arthroscopic lateral meniscal repair for a horizontal tear of the anterior horn body and posterior horn.  Patient is doing very well.  He is compliant with a crutch and has been doing physical therapy.  Has some pain around the patella. Pain is up to an 8/10 at times.      Review of Systems:    Musculoskeletal:  See HPI        Physical Examination:    Vital Signs:    Vitals:    10/23/19 1018   BP: 133/77   Pulse: 91       Body mass index is 40.35 kg/m².    This a well-developed, well nourished patient in no acute distress.  They are alert  and oriented and cooperative to examination.  Pt. walks using the crutches    Examination of the right knee shows healed surgical portals.  No erythema or drainage. No significant effusion. No calf pain. Negative Homans sign.  Full range of motion.    X-rays:  None     Assessment::  Status post right arthroscopic lateral meniscal repair    Plan:  Continue with physical therapy for meniscal repair protocol.  Okay to stop the crutches.  Needs to start getting off the pain medication.  Follow-up in 6 weeks.    This note was created using M Modal voice recognition software that occasionally misinterpreted phrases or words.

## 2019-10-25 ENCOUNTER — CLINICAL SUPPORT (OUTPATIENT)
Dept: REHABILITATION | Facility: HOSPITAL | Age: 29
End: 2019-10-25
Attending: ORTHOPAEDIC SURGERY
Payer: COMMERCIAL

## 2019-10-25 DIAGNOSIS — G89.29 CHRONIC PAIN OF RIGHT KNEE: ICD-10-CM

## 2019-10-25 DIAGNOSIS — M25.661 STIFFNESS OF RIGHT KNEE: ICD-10-CM

## 2019-10-25 DIAGNOSIS — M25.561 CHRONIC PAIN OF RIGHT KNEE: ICD-10-CM

## 2019-10-25 PROCEDURE — 97110 THERAPEUTIC EXERCISES: CPT | Mod: PO

## 2019-10-25 PROCEDURE — 97140 MANUAL THERAPY 1/> REGIONS: CPT | Mod: PO

## 2019-10-29 RX ORDER — HYDROCODONE BITARTRATE AND ACETAMINOPHEN 5; 325 MG/1; MG/1
1 TABLET ORAL EVERY 6 HOURS PRN
Qty: 40 TABLET | Refills: 0 | Status: SHIPPED | OUTPATIENT
Start: 2019-10-29 | End: 2019-11-11 | Stop reason: SDUPTHER

## 2019-10-30 NOTE — PROGRESS NOTES
Physical Therapy Daily Treatment Note     Name: Yovani Malik  Clinic Number: [id number]    Therapy Diagnosis:   Encounter Diagnoses   Name Primary?   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   He was compliant with home exercise program.  Response to previous treatment: Muscle soreness  Functional change: Tolerating increased weight    Pain: 3/10  Location: right knee      Objective     Knee Extension (Supine): 0 degrees   Knee flexion (Supine): 110 degrees    Yovani received therapeutic exercises to develop strength, ROM, flexibility and posture for 39 minutes including:  Upright Bike seated 11 x 5 minutes for knee ROM and increased tissue perfusion   Quad sets 2 x 10 (3 second hold)  Heel slides x 5 minutes  (strap and board)  GS stretch x 3 min   Supine hip flexor stretch x 3 minutes with strap  - NP  Supine HS stretch with strap 3  x 30s  - NP  SLR 3 x 10 4#   SL Hip abduction 3 x 10 4#  Prone hip extension 3x 10 4#  Hip adduction 2 x 10 4#  Clamshells BTB 3 x 10   LAQS 3 x 10 4#    Standing hip abduction 2 x 10 GTB   Standing hip extension 2 x 10 GTB  Step ups 2 x 10 (8 inch)    Leg press (seat at 9) 3 x 10 80#  Mini Squats 2 x10 with green ball on wall  Heel raises 2 x 10 (SL)      Yovani received the following manual therapy techniques: Soft tissue Mobilization were applied to the: (L)  quadriceps  for 9 minutes, including:  IASTM to quad   Tibiofemoral mobs grades I - II AP/PA  Patella mobs  Med/lat, inferior/superior    Yovani participated in neuromuscular re-education activities to improve: Balance, Sense and Proprioception for 2 minutes. The following activities were included:  Tandem stance 2 x30s (modified tandem with (R) LE in back due to increased difficulty)    Home Exercises Provided and Patient Education Provided     Education provided:   - Continue HEP    Written Home Exercises Provided: yes.  Exercises were reviewed and Yovani was able to demonstrate them prior to the end of the session.  Yovani demonstrated good  understanding of the education provided.     See EMR under Media for exercises provided prior visit.    Assessment     Yovani tolerated treatment well including increased resistance with squats with increase knee ROM. He remains limited with his knee flexion ROM and functional mobility due to pain with increased activity. He will continue to benefit from physical therapy to maximize his LE strength and functional mobility.      Yovani is progressing well towards his goals.   Pt prognosis is Excellent.     Pt will continue to benefit from skilled outpatient physical therapy to address the deficits listed in the problem list box on initial evaluation, provide pt/family education and to maximize pt's level of independence in the home and community environment.     Pt's spiritual, cultural and educational needs considered and pt agreeable to plan of care and goals.    Anticipated barriers to physical therapy: None    Goals:   Short-Term Goals: 4 weeks  - The patient will be independent with initial home exercise program. (Met)  - The patient will increase strength to at least 4+/5 to perform functional mobility including ascending/descending stairs and ambulation. (Met)  - The patient will increase PROM grossly from 0 to 120 to perform ambulation with pain < 2/10. (Progressing,  not me)    Long-Term Goals: 8 weeks  - Pt to achieve <26% limitation as measured by the FOTO to demonstrate decreased disability. (Progressing, not met)  - The patient will be independent with home exercise program and symptom management. (Met)  - The patient will be independent amb with no assistive device on all surfaces for community distances. (Met)  - The patient will increase strength to at least 5/5 to perform functional mobility including ambulation, ascending descending stairs, and job duties. (Progressing, not met)  - The patient will increase AROM grossly to 0 to 120 degrees L to perform ambulation with pain < 1/10. (Progressing, not met)      Plan     Continue progressing per protocol    Sean Wakefield, PT

## 2019-10-31 ENCOUNTER — CLINICAL SUPPORT (OUTPATIENT)
Dept: REHABILITATION | Facility: HOSPITAL | Age: 29
End: 2019-10-31
Attending: ORTHOPAEDIC SURGERY
Payer: COMMERCIAL

## 2019-10-31 DIAGNOSIS — M25.661 STIFFNESS OF RIGHT KNEE: ICD-10-CM

## 2019-10-31 DIAGNOSIS — G89.29 CHRONIC PAIN OF RIGHT KNEE: ICD-10-CM

## 2019-10-31 DIAGNOSIS — M25.561 CHRONIC PAIN OF RIGHT KNEE: ICD-10-CM

## 2019-10-31 PROCEDURE — 97110 THERAPEUTIC EXERCISES: CPT | Mod: PO

## 2019-10-31 PROCEDURE — 97140 MANUAL THERAPY 1/> REGIONS: CPT | Mod: PO

## 2019-10-31 NOTE — PLAN OF CARE
"[facility]  Physical Therapy Reassessment    Name: Yovani Malik  Clinic Number: [id number]    Therapy Diagnosis:   Encounter Diagnoses   Name Primary?    Chronic pain of right knee     Stiffness of right knee      Physician: Anurag Wells,*    Visit Date: 10/31/2019  Physician Orders: PT Eval and Treat  Medical Diagnosis from Referral: Right knee pain, unspecified chronicity   Evaluation Date: 9/30/2019  Authorization Period Expiration: 12/31/2019   Plan of Care Expiration: 12/13/2019  Visit # / Visits authorized: 7/ 20    Precautions: Standard and Weightbearing    Subjective   Date of onset: Chronic onset, hurting since 2013. S/p ARTHROSCOPY, KNEE REPAIR, LATERAL MENISCUS, KNEE (Right)    History of current condition - Yovani reports: He underwent surgery on his (R) lateral meniscus repair on 9/13/2019. He wore the brace for about 2 weeks, which was removed on 9/25/2019. He reports he is not allowed to put any weight through the leg for 2 more weeks per MD orders. He has been using the crutches at home. He reports a prior knee surgery in 2013 with a lateral menisectomy. Patient states constant pain throughout the day.     Reassessment 10/31/2019: Yovani reports he is "a lot better" since beginning physical therapy, at least 50%". He reports he is walking better and has been able to modify his work duties to allow him to return to work. He is still having mild knee pain with ambulating longer distances. He reports he still has difficulty with lifting and carrying objects. He reports mild stiffness in the knee.      Medical History:   Past Medical History:   Diagnosis Date    Anxiety     Borderline diabetes     Hyperlipidemia     Hypertension        Surgical History:   Yovani Malik  has a past surgical history that includes Knee surgery (Bilateral); Knee arthroscopy; Arthroscopy of knee (Right, 9/13/2019); Repair of meniscus of knee (Right, 9/13/2019); and " Esophagogastroduodenoscopy (N/A, 10/3/2019).    Medications:   Yovani has a current medication list which includes the following prescription(s): alprazolam, dextroamphetamine-amphetamine, diclofenac sodium, ergocalciferol, esomeprazole, hydrocodone-acetaminophen, and pantoprazole.    Allergies:   Review of patient's allergies indicates:  No Known Allergies     Imaging, X-ray:   Medial compartment narrowing is noted bilaterally.  Osseous demineralization is noted.  No significant suprapatellar joint effusion on the right is noted.  Minimal lateral subluxation may be noted to the right patella greater than left within the inter condylar notches.          Prior Therapy: None   Social History: Lives with wife and 3 year old girl   Occupation: Manages gas station, requires a lot of lifting, twisting, walkin, and standing  Prior Level of Function: Pain with prolonged walking, stairs, run around with his daughter  Current Level of Function: Unable to put weight through his (R) LE, currently on crutches     Pain:  Current 7/10, worst 7/10, best 7/10   Location: right knee   Description: Throbbing  Aggravating Factors: constant throughout the day   Easing Factors: pain medication and ice    Pts goals: To be able to run around with his daughter    Objective     Observation: Patient in no acute distress     Posture: Uses crutches for ambulation, with TTWB      Range of Motion:   Knee Left active Left Passive Right Active R passive   Flexion 122  110 deg (supine)   Extension 0 NT 0 0       Lower Extremity Strength  Right LE  Left LE    Knee extension: 5/5 Knee extension: 5/5   Knee flexion: 5/5 Knee flexion: 5/5   Hip flexion: 5/5 Hip flexion: 5/5   Hip extension:  5/5 Hip extension: 5/5   Hip abduction: 4+/5 Hip abduction: 5/5   Hip adduction: 5/5 Hip adduction 5/5   Ankle dorsiflexion: 5/5 Ankle dorsiflexion: 5/5   Ankle plantarflexion: 5/5 Ankle plantarflexion: 5/5     Special Tests: Not Tested s/p ARTHROSCOPY, KNEE  REPAIR, LATERAL MENISCUS, KNEE (Right)      Function:    - SLS R: NT  - SLS L: NT  - Squat: NT   - Sit <--> Stand:Mod I with crutches  - Bed Mobility: Independent     Joint Mobility: Mild  Patellar hypomobility (R)     Palpation: Mild pain lateral (R) knee joint line, lateral quad     Sensation: Intact to LT (B) LEs     Flexibility:    Ely's test: R = NT degrees ; L = NT degrees   Popliteal Angle: R = NT degrees ; L = NT degrees   Hamstring: R = Moderately limited; L = Moderately     Edema: Mild swelling noted at (R) knee    Girth Measurement Joint line 5 cm below 10 cm above   Left 44 cm 36.5 cm 47 cm   Right 46 cm 36 cm 44.5 cm       CMS Impairment/Limitation/Restriction for FOTO Knee Survey    Therapist reviewed FOTO scores for Yovani Malik on 10/31/2019.   FOTO documents entered into EPIC - see Media section.    Limitation Score: 28%  Category: Mobility         TREATMENT   See Daily Note     Assessment   Yovani is a 29 y.o. male referred to outpatient Physical Therapy with a medical diagnosis of ARTHROSCOPY, KNEE REPAIR, LATERAL MENISCUS, KNEE (Right). Physical exam is consistent with medical diagnosis. Since beginning PT, Yovani has been seen 7 times since initial evaluation on 09/30/2019. Overall, Yovani has made good, steady progress with his PT treatments and has worked hard towards all of his PT goals as evidenced by subjective and objective improvements. Despite these improvements, he remains with deficits with LE strength, balance, and functional mobility. He still has difficulty with lifting, squatting, and turning at work. He will continue to benefit from skilled PT to address remaining limitations and increase functional mobility. Goals updated to reflect progress.     Pt prognosis is Good.   Pt will benefit from skilled outpatient Physical Therapy to address the deficits stated above and in the chart below, provide pt/family education, and to maximize pt's level of independence.     Plan of care  discussed with patient: Yes  Pt's spiritual, cultural and educational needs considered and patient is agreeable to the plan of care and goals as stated below:     Anticipated Barriers for therapy: None    Medical Necessity is demonstrated by the following  History  Co-morbidities and personal factors that may impact the plan of care Co-morbidities:   anxiety and prior knee surgery    Personal Factors:   lifestyle  (job duties require prolonged standing and lifting, walking     moderate   Examination  Body Structures and Functions, activity limitations and participation restrictions that may impact the plan of care Body Regions:   lower extremities    Body Systems:    ROM  strength  gross coordinated movement  balance  gait    Participation Restrictions:   Unable to weight-bear s/p surgery    Activity limitations:   Learning and applying knowledge  no deficits    General Tasks and Commands  no deficits    Communication  no deficits    Mobility  walking  driving (bike, car, motorcycle)    Self care  dressing    Domestic Life  doing house work (cleaning house, washing dishes, laundry)    Interactions/Relationships  family relationships    Life Areas  employment    Community and Social Life  recreation and leisure         moderate   Clinical Presentation evolving clinical presentation with changing clinical characteristics moderate   Decision Making/ Complexity Score: moderate     Goals:  Short-Term Goals: 4 weeks  - The patient will be independent with initial home exercise program. (Met)  - The patient will increase strength to at least 4+/5 to perform functional mobility including ascending/descending stairs and ambulation. (Met)  - The patient will increase PROM grossly from 0 to 120 to perform ambulation with pain < 2/10. (Progressing, not me)    Long-Term Goals: 8 weeks  - Pt to achieve <26% limitation as measured by the FOTO to demonstrate decreased disability. (Progressing, not met)  - The patient will be  independent with home exercise program and symptom management. (Met)  - The patient will be independent amb with no assistive device on all surfaces for community distances. (Met)  - The patient will increase strength to at least 5/5 to perform functional mobility including ambulation, ascending descending stairs, and job duties. (Progressing, not met)  - The patient will increase AROM grossly to 0 to 120 degrees L to perform ambulation with pain < 1/10. (Progressing, not met)      Plan   Plan of care Certification: 10/31/2019 to 12/13/2019.    Outpatient Physical Therapy 2 times weekly for 16 visits to include the following interventions: Electrical Stimulation prn, Gait Training, Manual Therapy, Moist Heat/ Ice, Neuromuscular Re-ed, Patient Education, Therapeutic Activites and Therapeutic Exercise.     Sean Wakefield, PT

## 2019-11-04 ENCOUNTER — TELEPHONE (OUTPATIENT)
Dept: GASTROENTEROLOGY | Facility: CLINIC | Age: 29
End: 2019-11-04

## 2019-11-04 NOTE — TELEPHONE ENCOUNTER
----- Message from Bhargavi Fish sent at 11/4/2019 10:26 AM CST -----  Contact: Patient  Type: Needs Medical Advice    Who Called:  Patient  Best Call Back Number:   Additional Information: Calling to reschedule his upcoming procedure.

## 2019-11-06 RX ORDER — OMEPRAZOLE 40 MG/1
40 CAPSULE, DELAYED RELEASE ORAL DAILY
Qty: 30 CAPSULE | Refills: 2 | Status: ON HOLD | OUTPATIENT
Start: 2019-11-06 | End: 2020-01-23

## 2019-11-07 ENCOUNTER — CLINICAL SUPPORT (OUTPATIENT)
Dept: REHABILITATION | Facility: HOSPITAL | Age: 29
End: 2019-11-07
Attending: ORTHOPAEDIC SURGERY
Payer: COMMERCIAL

## 2019-11-07 DIAGNOSIS — M25.561 CHRONIC PAIN OF RIGHT KNEE: ICD-10-CM

## 2019-11-07 DIAGNOSIS — M25.661 STIFFNESS OF RIGHT KNEE: ICD-10-CM

## 2019-11-07 DIAGNOSIS — G89.29 CHRONIC PAIN OF RIGHT KNEE: ICD-10-CM

## 2019-11-07 PROCEDURE — 97140 MANUAL THERAPY 1/> REGIONS: CPT | Mod: PO

## 2019-11-07 PROCEDURE — 97110 THERAPEUTIC EXERCISES: CPT | Mod: PO

## 2019-11-07 NOTE — PROGRESS NOTES
Physical Therapy Daily Treatment Note     Name: Yovani Malik  Clinic Number: [id number]    Therapy Diagnosis:   Encounter Diagnoses   Name Primary?    Chronic pain of right knee     Stiffness of right knee      Physician: Anurag Wells,*    Visit Date: 11/7/2019  Physician Orders: PT Eval and Treat  Medical Diagnosis from Referral: Right knee pain, unspecified chronicity   Evaluation Date: 9/30/2019  Authorization Period Expiration: 12/31/2019   Plan of Care Expiration: 12/13/2019  Visit # / Visits authorized: 8/ 20    Time In: 4:00 (pt 2 hrs late for appoint)  Time Out: 500  Total Billable Time: 55 minutes    Precautions: Standard, Weightbearing and s/p surgery on 9/13 (8 weeks)    Subjective     Pt reports: that his knee pn is 4/10 today. He states that he gest feeling of instability and popping with pivoting and certain movements .   He was compliant with home exercise program.  Response to previous treatment: Muscle soreness  Functional change: Tolerating increased weight    Pain: 4/10  Location: right knee      Objective     Yovani received therapeutic exercises to develop strength, ROM, flexibility and posture for 39 minutes including:  Upright Bike seated 11 x 5 minutes for knee ROM and increased tissue perfusion   Quad sets 2 x 10 (3 second hold)  Heel slides x 5 minutes  (strap and board)  GS stretch x 3 min   Supine hip flexor stretch x 3 minutes with strap  - NP  Supine HS stretch with strap 3  x 30s  - NP  SLR 3 x 10 4#   SL Hip abduction 3 x 10 4#  Prone hip extension 3x 10 4#  Hip adduction 2 x 10 4#  Clamshells BTB 3 x 10   LAQS 3 x 10 4#    Standing hip abduction 2 x 10 GTB   Standing hip extension 2 x 10 GTB  Step ups 2 x 10 (8 inch)    Leg press (seat at 9) 3 x 10 80#  Mini Squats 2 x10 with green ball on wall  Heel raises 2 x 10 (SL)      Yovani received the following manual therapy techniques: Soft tissue Mobilization were applied to the: (L) quadriceps  for 10 minutes,  including:  Tibiofemoral mobs grades I - II AP/PA  Patella mobs  Med/lat, inferior/superior    Yovani participated in neuromuscular re-education activities to improve: Balance, Sense and Proprioception for 2 minutes. The following activities were included:  Tandem stance 2 x30s (modified tandem with (R) LE in back due to increased difficulty)    Home Exercises Provided and Patient Education Provided     Education provided:   - Continue HEP    Written Home Exercises Provided: yes.  Exercises were reviewed and Yovani was able to demonstrate them prior to the end of the session.  Yovani demonstrated good  understanding of the education provided.     See EMR under Media for exercises provided prior visit.    Assessment     Yovani tolerated treatment well  He remains limited with his knee strength and ROM and functional mobility due to pain and weakness . He presents with decreased patella mobility at onset of mobs but does achieve improved with reps.. He will continue to benefit from physical therapy to maximize his LE strength and functional mobility.      Yovani is progressing well towards his goals.   Pt prognosis is Excellent.     Pt will continue to benefit from skilled outpatient physical therapy to address the deficits listed in the problem list box on initial evaluation, provide pt/family education and to maximize pt's level of independence in the home and community environment.     Pt's spiritual, cultural and educational needs considered and pt agreeable to plan of care and goals.    Anticipated barriers to physical therapy: None    Goals:   Short-Term Goals: 4 weeks  - The patient will be independent with initial home exercise program. (Met)  - The patient will increase strength to at least 4+/5 to perform functional mobility including ascending/descending stairs and ambulation. (Met)  - The patient will increase PROM grossly from 0 to 120 to perform ambulation with pain < 2/10. (Progressing, not  me)    Long-Term Goals: 8 weeks  - Pt to achieve <26% limitation as measured by the FOTO to demonstrate decreased disability. (Progressing, not met)  - The patient will be independent with home exercise program and symptom management. (Met)  - The patient will be independent amb with no assistive device on all surfaces for community distances. (Met)  - The patient will increase strength to at least 5/5 to perform functional mobility including ambulation, ascending descending stairs, and job duties. (Progressing, not met)  - The patient will increase AROM grossly to 0 to 120 degrees L to perform ambulation with pain < 1/10. (Progressing, not met)      Plan     Continue progressing per protocol    Fransisco Franco, PTA

## 2019-11-07 NOTE — PROGRESS NOTES
Physical Therapy Daily Treatment Note     Name: Yovani Malik  Clinic Number: [id number]    Therapy Diagnosis:   Encounter Diagnoses   Name Primary?    Chronic pain of right knee     Stiffness of right knee      Physician: Anurag Wells,*    Visit Date: 11/8/2019  Physician Orders: PT Eval and Treat  Medical Diagnosis from Referral: Right knee pain, unspecified chronicity   Evaluation Date: 9/30/2019  Authorization Period Expiration: 12/31/2019   Plan of Care Expiration: 12/13/2019  Visit # / Visits authorized: 7/ 20    Time In: 1010  Time Out: 1100  Total Billable Time: 25 minutes    Precautions: Standard, Weightbearing and s/p surgery on 9/13 (8 weeks)    Subjective     Pt reports: He reports he felt perfectly fine yesterday when he went to bed, but reports a significant increase in pain this morning. He states today he woke up unable to fully bend and straighten his knee. He is ambulating with the SPC today.   He was compliant with home exercise program.  Response to previous treatment: Muscle soreness  Functional change: Tolerating increased weight    Pain: 9/10  Location: right knee      Objective     Knee Extension (Supine): 0 degrees   Knee flexion (Supine): 110 degrees    Yovani received hot pack for 10 minutes to (R) knee to increase circulation and promote tissue healing at the end of treatment.     Yovani received therapeutic exercises to develop strength, ROM, flexibility and posture for 25 minutes including:  Quad sets 2 x 10 (3 second hold)  Heel slides x 5 minutes  (strap and board)  GS stretch x 3 min   Supine hip flexor stretch x 3 minutes with strap    Supine HS stretch with strap 3  x 30s     SLR 3 x 10 3#  SL Hip abduction 3 x 10 3#  Prone hip extension 3x 10 3#  Hip adduction 2 x 10 3#  Clamshells BTB 3 x 10   LAQS 3 x 10 3#  Heel prop passive extension x 2 minutes    Not performed due to increased pain :  Standing hip abduction 2 x 10 RTB   Standing hip extension 2 x 10 RTB  Step ups  2 x 10 (8 inch)    Leg press (seat at 11) 2 x 10 7 0#  Mini Squats 2 x10 with green ball on wall  PROM to (R) into flexion seated, prone, supine   Heel raises 2 x 10 (SL)     Yovani received the following manual therapy techniques: Soft tissue Mobilization were applied to the: (L) quadriceps  for 15 minutes, including:  IASTM to quad   Tibiofemoral mobs grades I - II AP/PA  Patella mobs  Med/lat, inferior/superior    Yovani participated in neuromuscular re-education activities to improve: Balance, Sense and Proprioception for 00 minutes. The following activities were included:  Tandem stance 2 x30s (modified tandem with (R) LE in back due to increased difficulty) - NP    Home Exercises Provided and Patient Education Provided     Education provided:   - Continue HEP    Written Home Exercises Provided: yes.  Exercises were reviewed and Yovani was able to demonstrate them prior to the end of the session.  Yovani demonstrated good  understanding of the education provided.     See EMR under Media for exercises provided prior visit.    Assessment     Yovani is ambulating with the SPC today due a significant increase in lateral knee pain this morning. He reports he felt good after physical therapy yesterday afternoon with no pain the rest of the day or that night. Held standing exercises today to focus on unloaded motions, which he tolerated well. He reported a decrease in pain following treatment to 7/10 with improvements with his knee ROM.     Yovani is progressing well towards his goals.   Pt prognosis is Excellent.     Pt will continue to benefit from skilled outpatient physical therapy to address the deficits listed in the problem list box on initial evaluation, provide pt/family education and to maximize pt's level of independence in the home and community environment.     Pt's spiritual, cultural and educational needs considered and pt agreeable to plan of care and goals.    Anticipated barriers to physical therapy:  None    Goals:   Short-Term Goals: 4 weeks  - The patient will be independent with initial home exercise program.  - The patient will increase strength to at least 4+/5 to perform functional mobility including ascending/descending stairs and ambulation  - The patient will increase PROM grossly from 0 to 120 to perform ambulation with pain < 2/10.     Long-Term Goals: 8 weeks  - Pt to achieve <26% limitation as measured by the FOTO to demonstrate decreased disability.  - The patient will be independent with home exercise program and symptom management.  - The patient will be independent amb with no assistive device on all surfaces for community distances.  - The patient will increase strength to at least 5/5 to perform functional mobility including ambulation, ascending descending stairs, and job duties  - The patient will increase AROM grossly to 0 to 120 degrees L to perform ambulation with pain < 1/10.    Plan     Continue progressing per protocol    Sean Wakefield, PT

## 2019-11-08 ENCOUNTER — CLINICAL SUPPORT (OUTPATIENT)
Dept: REHABILITATION | Facility: HOSPITAL | Age: 29
End: 2019-11-08
Attending: ORTHOPAEDIC SURGERY
Payer: COMMERCIAL

## 2019-11-08 DIAGNOSIS — M25.661 STIFFNESS OF RIGHT KNEE: ICD-10-CM

## 2019-11-08 DIAGNOSIS — G89.29 CHRONIC PAIN OF RIGHT KNEE: ICD-10-CM

## 2019-11-08 DIAGNOSIS — M25.561 CHRONIC PAIN OF RIGHT KNEE: ICD-10-CM

## 2019-11-08 PROCEDURE — 97110 THERAPEUTIC EXERCISES: CPT | Mod: PO

## 2019-11-11 ENCOUNTER — PATIENT MESSAGE (OUTPATIENT)
Dept: ORTHOPEDICS | Facility: CLINIC | Age: 29
End: 2019-11-11

## 2019-11-11 RX ORDER — METHYLPREDNISOLONE 4 MG/1
TABLET ORAL
Qty: 1 PACKAGE | Refills: 0 | Status: SHIPPED | OUTPATIENT
Start: 2019-11-11 | End: 2019-12-02

## 2019-11-11 RX ORDER — HYDROCODONE BITARTRATE AND ACETAMINOPHEN 5; 325 MG/1; MG/1
1 TABLET ORAL EVERY 6 HOURS PRN
Qty: 15 TABLET | Refills: 0 | Status: SHIPPED | OUTPATIENT
Start: 2019-11-11 | End: 2019-11-27 | Stop reason: SDUPTHER

## 2019-11-11 NOTE — PROGRESS NOTES
Physical Therapy Daily Treatment Note     Name: Yovani Malik  Clinic Number: [id number]    Therapy Diagnosis:   Encounter Diagnoses   Name Primary?    Chronic pain of right knee     Stiffness of right knee      Physician: Anurag Wells,*    Visit Date: 11/13/2019  Physician Orders: PT Eval and Treat  Medical Diagnosis from Referral: Right knee pain, unspecified chronicity   Evaluation Date: 9/30/2019  Authorization Period Expiration: 12/31/2019   Plan of Care Expiration: 12/13/2019  Visit # / Visits authorized: 8/ 20    Time In: 1201  Time Out: 1255  Total Billable Time: 53 minutes    Precautions: Standard, Weightbearing and s/p surgery on 9/13 (8.5weeks)    Subjective     Pt reports: He reports his knee started feeling better over the weekend and he has not needed to use the cane. He started taking a steroid pack from the MD yesterday.   He was compliant with home exercise program.  Response to previous treatment: Muscle soreness  Functional change: Tolerating increased weight    Pain: 3/10  Location: right knee      Objective     Knee Extension (Supine): 0 degrees   Knee flexion (Supine): 110 degrees    Yovani received therapeutic exercises to develop strength, ROM, flexibility and posture for 40 minutes including:  Quad sets 2 x 10 (3 second hold)  Heel slides 2 x 10 minutes  (strap and board)  GS stretch x 3 min   Supine hip flexor stretch x 3 minutes with strap    Supine HS stretch with strap 3  x 30s     SLR 3 x 10 3#  SL Hip abduction 3 x 10 3#  Prone hip extension 3x 10 3#  Hip adduction 2 x 10 3#  Clamshells BTB 3 x 10   Heel prop passive extension x 2 minutes - NP  Bridges on the ball  LAQS 3 x 10 3#    Standing hip abduction 2 x 10 GTB   Standing hip extension 2 x 10 GTB  Step ups 2 x 10 (8 inch)    Lateral step ups 2 x 10 (8 inch)  Leg press (seat at 11) 2 x 10 80#  Mini Squats 2 x10 with green ball on wall  Heel raises 2 x 10 (SL)     Yovani received the following manual therapy techniques:  Soft tissue Mobilization were applied to the: (L) quadriceps  for 10 minutes, including:  IASTM to quad   Tibiofemoral mobs grades I - II AP/PA  Patella mobs  Med/lat, inferior/superior    Yovani participated in neuromuscular re-education activities to improve: Balance, Sense and Proprioception for 3 minutes. The following activities were included:  Tandem stance 2 x30s (modified tandem with (R) LE in back due to increased difficulty)     Home Exercises Provided and Patient Education Provided     Education provided:   - Continue HEP    Written Home Exercises Provided: yes.  Exercises were reviewed and Yovani was able to demonstrate them prior to the end of the session.  Yovani demonstrated good  understanding of the education provided.     See EMR under Media for exercises provided prior visit.    Assessment     Yovani reported significant improvements with his pain over the weekend and he is ambulating with no assistive device today. He tolerated re-introduction of exercises very well with no increase in pain or his signs/symptoms and is doing well with squats through limited ROM per the protocol.     Yovani is progressing well towards his goals.   Pt prognosis is Excellent.     Pt will continue to benefit from skilled outpatient physical therapy to address the deficits listed in the problem list box on initial evaluation, provide pt/family education and to maximize pt's level of independence in the home and community environment.     Pt's spiritual, cultural and educational needs considered and pt agreeable to plan of care and goals.    Anticipated barriers to physical therapy: None    Goals:   Short-Term Goals: 4 weeks  - The patient will be independent with initial home exercise program.  - The patient will increase strength to at least 4+/5 to perform functional mobility including ascending/descending stairs and ambulation  - The patient will increase PROM grossly from 0 to 120 to perform ambulation with pain <  2/10.     Long-Term Goals: 8 weeks  - Pt to achieve <26% limitation as measured by the FOTO to demonstrate decreased disability.  - The patient will be independent with home exercise program and symptom management.  - The patient will be independent amb with no assistive device on all surfaces for community distances.  - The patient will increase strength to at least 5/5 to perform functional mobility including ambulation, ascending descending stairs, and job duties  - The patient will increase AROM grossly to 0 to 120 degrees L to perform ambulation with pain < 1/10.    Plan     Continue progressing per protocol    Sean Wakefield, PT

## 2019-11-13 ENCOUNTER — CLINICAL SUPPORT (OUTPATIENT)
Dept: REHABILITATION | Facility: HOSPITAL | Age: 29
End: 2019-11-13
Attending: ORTHOPAEDIC SURGERY
Payer: COMMERCIAL

## 2019-11-13 DIAGNOSIS — M25.661 STIFFNESS OF RIGHT KNEE: ICD-10-CM

## 2019-11-13 DIAGNOSIS — M25.561 CHRONIC PAIN OF RIGHT KNEE: ICD-10-CM

## 2019-11-13 DIAGNOSIS — G89.29 CHRONIC PAIN OF RIGHT KNEE: ICD-10-CM

## 2019-11-13 PROCEDURE — 97110 THERAPEUTIC EXERCISES: CPT | Mod: PO

## 2019-11-13 PROCEDURE — 97140 MANUAL THERAPY 1/> REGIONS: CPT | Mod: PO

## 2019-11-14 NOTE — PROGRESS NOTES
Physical Therapy Daily Treatment Note     Name: Yovani Malik  Clinic Number: [id number]    Therapy Diagnosis:   Encounter Diagnoses   Name Primary?    Chronic pain of right knee     Stiffness of right knee      Physician: Anurag Wells,*    Visit Date: 11/15/2019  Physician Orders: PT Eval and Treat  Medical Diagnosis from Referral: Right knee pain, unspecified chronicity   Evaluation Date: 9/30/2019  Authorization Period Expiration: 12/31/2019   Plan of Care Expiration: 12/13/2019  Visit # / Visits authorized: 9/ 20    Time In: 11:52  Time Out: 12:48pm  Total Billable Time: 56 minutes    Precautions: Standard, Weightbearing and s/p surgery on 9/13 (9 weeks)    Subjective     Pt reports: He reports his knee has been feeling much better. He reports he still has some mild pain with ambulation, but he is overall doing much better.   He was compliant with home exercise program.  Response to previous treatment: Muscle soreness  Functional change: Tolerating increased weight    Pain: 2/10  Location: right knee      Objective     Knee Extension (Supine): 0 degrees   Knee flexion (Supine): 110 degrees    Yovani received therapeutic exercises to develop strength, ROM, flexibility and posture for 41 minutes including:  Quad sets 2 x 10 (3 second hold)  Heel slides 2 x 10 minutes  (strap and board) - NP  GS stretch x 3 min   Supine hip flexor stretch x 3 minutes with strap    Supine HS stretch with strap 3  x 30s     SLR 3 x 10 4#  SL Hip abduction 3 x 10 4#  Prone hip extension 3x 10 4#  Hip adduction 2 x 10 4#  Clamshells Black TB 3 x 10   Heel prop passive extension x 2 minutes - NP  Bridges on the ball 2 x 10  LAQS 3 x 10 4#    Standing hip abduction 2 x 10 GTB   Standing hip extension 2 x 10 GTB  Step ups 2 x 10 (8 inch)    Lateral step ups 2 x 10 (8 inch)  Leg press (seat at 10) 2 x 10 90#  Squats 2 x10 with green ball on wall  Heel raises 2 x 10 (SL)     Yovani received the following manual therapy  techniques: Soft tissue Mobilization were applied to the: (L) quadriceps  for 10 minutes, including:  IASTM to quad   Tibiofemoral mobs grades I - II AP/PA  Patella mobs  Med/lat, inferior/superior    Yovani participated in neuromuscular re-education activities to improve: Balance, Sense and Proprioception for 5 minutes. The following activities were included:  Tandem stance 2 x30s (modified tandem with (R) LE in back due to increased difficulty)   SLS 2 x 30s on green theradisk    Home Exercises Provided and Patient Education Provided     Education provided:   - Continue HEP    Written Home Exercises Provided: yes.  Exercises were reviewed and Yovani was able to demonstrate them prior to the end of the session.  Yovani demonstrated good  understanding of the education provided.     See EMR under Media for exercises provided prior visit.    Assessment     Yovani continues to respond well to LE strengthening and he tolerated increased resistance very well today with no episodes of pain or increased signs and symptoms. Will reduce frequency to 1x/week as he continues to progress.     Yovani is progressing well towards his goals.   Pt prognosis is Excellent.     Pt will continue to benefit from skilled outpatient physical therapy to address the deficits listed in the problem list box on initial evaluation, provide pt/family education and to maximize pt's level of independence in the home and community environment.     Pt's spiritual, cultural and educational needs considered and pt agreeable to plan of care and goals.    Anticipated barriers to physical therapy: None    Goals:   Short-Term Goals: 4 weeks  - The patient will be independent with initial home exercise program.  - The patient will increase strength to at least 4+/5 to perform functional mobility including ascending/descending stairs and ambulation  - The patient will increase PROM grossly from 0 to 120 to perform ambulation with pain < 2/10.     Long-Term  Goals: 8 weeks  - Pt to achieve <26% limitation as measured by the FOTO to demonstrate decreased disability.  - The patient will be independent with home exercise program and symptom management.  - The patient will be independent amb with no assistive device on all surfaces for community distances.  - The patient will increase strength to at least 5/5 to perform functional mobility including ambulation, ascending descending stairs, and job duties  - The patient will increase AROM grossly to 0 to 120 degrees L to perform ambulation with pain < 1/10.    Plan     Continue progressing per protocol    Sean Wakefield, PT

## 2019-11-15 ENCOUNTER — CLINICAL SUPPORT (OUTPATIENT)
Dept: REHABILITATION | Facility: HOSPITAL | Age: 29
End: 2019-11-15
Attending: ORTHOPAEDIC SURGERY
Payer: COMMERCIAL

## 2019-11-15 DIAGNOSIS — G89.29 CHRONIC PAIN OF RIGHT KNEE: ICD-10-CM

## 2019-11-15 DIAGNOSIS — M25.561 CHRONIC PAIN OF RIGHT KNEE: ICD-10-CM

## 2019-11-15 DIAGNOSIS — M25.661 STIFFNESS OF RIGHT KNEE: ICD-10-CM

## 2019-11-15 PROCEDURE — 97140 MANUAL THERAPY 1/> REGIONS: CPT | Mod: PO

## 2019-11-15 PROCEDURE — 97110 THERAPEUTIC EXERCISES: CPT | Mod: PO

## 2019-11-26 NOTE — TELEPHONE ENCOUNTER
Patient is requesting a refill on meds. Patient was last refilled Norco 5/325mg tabs # 15 on 11/11/19

## 2019-11-27 RX ORDER — HYDROCODONE BITARTRATE AND ACETAMINOPHEN 5; 325 MG/1; MG/1
1 TABLET ORAL EVERY 6 HOURS PRN
Qty: 15 TABLET | Refills: 0 | Status: SHIPPED | OUTPATIENT
Start: 2019-11-27 | End: 2019-12-04 | Stop reason: SDUPTHER

## 2019-12-03 ENCOUNTER — TELEPHONE (OUTPATIENT)
Dept: GASTROENTEROLOGY | Facility: CLINIC | Age: 29
End: 2019-12-03

## 2019-12-04 ENCOUNTER — OFFICE VISIT (OUTPATIENT)
Dept: ORTHOPEDICS | Facility: CLINIC | Age: 29
End: 2019-12-04
Payer: COMMERCIAL

## 2019-12-04 VITALS
HEART RATE: 107 BPM | HEIGHT: 66 IN | SYSTOLIC BLOOD PRESSURE: 143 MMHG | BODY MASS INDEX: 40.18 KG/M2 | DIASTOLIC BLOOD PRESSURE: 95 MMHG | WEIGHT: 250 LBS

## 2019-12-04 DIAGNOSIS — M17.11 ARTHRITIS OF RIGHT KNEE: ICD-10-CM

## 2019-12-04 DIAGNOSIS — M23.200 OLD COMPLEX TEAR OF LATERAL MENISCUS OF RIGHT KNEE: Primary | ICD-10-CM

## 2019-12-04 PROCEDURE — 99999 PR PBB SHADOW E&M-EST. PATIENT-LVL III: CPT | Mod: PBBFAC,,, | Performed by: ORTHOPAEDIC SURGERY

## 2019-12-04 PROCEDURE — 99024 PR POST-OP FOLLOW-UP VISIT: ICD-10-PCS | Mod: S$GLB,,, | Performed by: ORTHOPAEDIC SURGERY

## 2019-12-04 PROCEDURE — 99999 PR PBB SHADOW E&M-EST. PATIENT-LVL III: ICD-10-PCS | Mod: PBBFAC,,, | Performed by: ORTHOPAEDIC SURGERY

## 2019-12-04 PROCEDURE — 99024 POSTOP FOLLOW-UP VISIT: CPT | Mod: S$GLB,,, | Performed by: ORTHOPAEDIC SURGERY

## 2019-12-04 RX ORDER — HYDROCODONE BITARTRATE AND ACETAMINOPHEN 5; 325 MG/1; MG/1
1 TABLET ORAL EVERY 6 HOURS PRN
Qty: 15 TABLET | Refills: 0 | Status: SHIPPED | OUTPATIENT
Start: 2019-12-04 | End: 2019-12-23 | Stop reason: SDUPTHER

## 2019-12-04 NOTE — PROGRESS NOTES
Past Medical History:   Diagnosis Date    Anxiety     Borderline diabetes     Hyperlipidemia     Hypertension        Past Surgical History:   Procedure Laterality Date    ARTHROSCOPY OF KNEE Right 9/13/2019    Procedure: ARTHROSCOPY, KNEE;  Surgeon: Anurag Wells MD;  Location: [facility] OR;  Service: Orthopedics;  Laterality: Right;    ESOPHAGOGASTRODUODENOSCOPY N/A 10/3/2019    Procedure: EGD (ESOPHAGOGASTRODUODENOSCOPY);  Surgeon: Yovani Dominguez MD;  Location: [facility] ENDO;  Service: Endoscopy;  Laterality: N/A;    KNEE ARTHROSCOPY      KNEE SURGERY Bilateral     x 2     REPAIR OF MENISCUS OF KNEE Right 9/13/2019    Procedure: REPAIR, MENISCUS, KNEE;  Surgeon: Anurag Wells MD;  Location: [facility] OR;  Service: Orthopedics;  Laterality: Right;  lateral meniscal repair       Current Outpatient Medications   Medication Sig    ALPRAZolam (XANAX) 0.25 MG tablet     dextroamphetamine-amphetamine (ADDERALL XR) 30 MG 24 hr capsule TK TWO CAPSULES PO D IN THE EARLY MORNING    diclofenac sodium (VOLTAREN) 1 % Gel Apply 4 g topically 2 (two) times daily as needed.    ergocalciferol (ERGOCALCIFEROL) 50,000 unit Cap TAKE 1 CAPSULE BY MOUTH EVERY 7 DAYS    esomeprazole (NEXIUM) 40 MG capsule Take 1 capsule (40 mg total) by mouth once daily.    HYDROcodone-acetaminophen (NORCO) 5-325 mg per tablet Take 1 tablet by mouth every 6 (six) hours as needed for Pain.    omeprazole (PRILOSEC) 40 MG capsule Take 1 capsule (40 mg total) by mouth once daily.    pantoprazole (PROTONIX) 40 MG tablet Take 1 tablet (40 mg total) by mouth once daily.     No current facility-administered medications for this visit.        Review of patient's allergies indicates:  No Known Allergies    Family History   Problem Relation Age of Onset    Diabetes Mother     Hypertension Mother     Hyperlipidemia Mother     Diabetes Father     Diabetes Maternal Grandmother     Cancer Maternal Grandmother         breast        Social History     Socioeconomic History    Marital status:      Spouse name: Not on file    Number of children: Not on file    Years of education: Not on file    Highest education level: Not on file   Occupational History    Not on file   Social Needs    Financial resource strain: Not on file    Food insecurity:     Worry: Not on file     Inability: Not on file    Transportation needs:     Medical: Not on file     Non-medical: Not on file   Tobacco Use    Smoking status: Current Some Day Smoker     Packs/day: 0.50     Types: Cigarettes    Smokeless tobacco: Never Used    Tobacco comment: maybe 0.5 pack a week   Substance and Sexual Activity    Alcohol use: No    Drug use: No    Sexual activity: Yes     Partners: Female   Lifestyle    Physical activity:     Days per week: Not on file     Minutes per session: Not on file    Stress: Not on file   Relationships    Social connections:     Talks on phone: Not on file     Gets together: Not on file     Attends Congregation service: Not on file     Active member of club or organization: Not on file     Attends meetings of clubs or organizations: Not on file     Relationship status: Not on file   Other Topics Concern    Not on file   Social History Narrative    Not on file       Chief Complaint:   Chief Complaint   Patient presents with    Right Knee - Post-op Evaluation       Date of surgery:  September 13, 2019    History of present illness:  This is a 29-year-old male underwent right arthroscopic lateral meniscal repair for a horizontal tear of the anterior horn body and posterior horn.  Patient is having a little more pain over the lateral compartment now.  The pain over the patella has improved.  Pain is a 6/10 at times.  Still doing the physical therapy.      Review of Systems:    Musculoskeletal:  See HPI        Physical Examination:    Vital Signs:    Vitals:    12/04/19 1018   BP: (!) 143/95   Pulse: 107       Body mass index is 40.35  kg/m².    This a well-developed, well nourished patient in no acute distress.  They are alert and oriented and cooperative to examination.  Pt. walks using the crutches    Examination of the right knee shows healed surgical portals.  No erythema or drainage. No significant effusion. No calf pain. Negative Homans sign.  Full range of motion. Pain over the lateral compartment.    X-rays:  None     Assessment::  Status post right arthroscopic lateral meniscal repair  Right knee arthritis    Plan:  Continue with physical therapy for meniscal repair protocol.  I will trying get authorization for some hyaluronic acid to treat his arthritis and joint line pain.  I do not want to put cortisone in at this time and possibly slow down the healing of his repair.  Follow-up once approval is obtained.    This note was created using [employer] voice recognition software that occasionally misinterpreted phrases or words.

## 2019-12-11 ENCOUNTER — OFFICE VISIT (OUTPATIENT)
Dept: FAMILY MEDICINE | Facility: CLINIC | Age: 29
End: 2019-12-11
Payer: COMMERCIAL

## 2019-12-11 VITALS
HEART RATE: 75 BPM | OXYGEN SATURATION: 95 % | WEIGHT: 254 LBS | SYSTOLIC BLOOD PRESSURE: 122 MMHG | HEIGHT: 66 IN | TEMPERATURE: 98 F | BODY MASS INDEX: 40.82 KG/M2 | DIASTOLIC BLOOD PRESSURE: 78 MMHG

## 2019-12-11 DIAGNOSIS — R68.89 FLU-LIKE SYMPTOMS: Primary | ICD-10-CM

## 2019-12-11 DIAGNOSIS — R05.9 COUGH: ICD-10-CM

## 2019-12-11 DIAGNOSIS — J02.9 SORE THROAT: ICD-10-CM

## 2019-12-11 DIAGNOSIS — R09.82 POST-NASAL DRIP: ICD-10-CM

## 2019-12-11 LAB
INFLUENZA A, MOLECULAR: NEGATIVE
INFLUENZA B, MOLECULAR: NEGATIVE
SPECIMEN SOURCE: NORMAL

## 2019-12-11 PROCEDURE — 99999 PR PBB SHADOW E&M-EST. PATIENT-LVL III: ICD-10-PCS | Mod: PBBFAC,,, | Performed by: FAMILY MEDICINE

## 2019-12-11 PROCEDURE — 87502 INFLUENZA DNA AMP PROBE: CPT | Mod: PO

## 2019-12-11 PROCEDURE — 3008F BODY MASS INDEX DOCD: CPT | Mod: CPTII,S$GLB,, | Performed by: FAMILY MEDICINE

## 2019-12-11 PROCEDURE — 3008F PR BODY MASS INDEX (BMI) DOCUMENTED: ICD-10-PCS | Mod: CPTII,S$GLB,, | Performed by: FAMILY MEDICINE

## 2019-12-11 PROCEDURE — 87081 CULTURE SCREEN ONLY: CPT

## 2019-12-11 PROCEDURE — 99213 PR OFFICE/OUTPT VISIT, EST, LEVL III, 20-29 MIN: ICD-10-PCS | Mod: S$GLB,,, | Performed by: FAMILY MEDICINE

## 2019-12-11 PROCEDURE — 99999 PR PBB SHADOW E&M-EST. PATIENT-LVL III: CPT | Mod: PBBFAC,,, | Performed by: FAMILY MEDICINE

## 2019-12-11 PROCEDURE — 99213 OFFICE O/P EST LOW 20 MIN: CPT | Mod: S$GLB,,, | Performed by: FAMILY MEDICINE

## 2019-12-11 RX ORDER — MONTELUKAST SODIUM 10 MG/1
10 TABLET ORAL NIGHTLY
Qty: 30 TABLET | Refills: 0 | Status: SHIPPED | OUTPATIENT
Start: 2019-12-11 | End: 2020-01-10

## 2019-12-11 RX ORDER — PROMETHAZINE HYDROCHLORIDE AND DEXTROMETHORPHAN HYDROBROMIDE 6.25; 15 MG/5ML; MG/5ML
5 SYRUP ORAL EVERY 6 HOURS PRN
Qty: 180 ML | Refills: 0 | Status: SHIPPED | OUTPATIENT
Start: 2019-12-11 | End: 2019-12-21

## 2019-12-11 NOTE — LETTER
December 11, 2019      [facility]  [address] [place] [address]  Phone: [phone]  Fax: [phone]       Patient: Yovani Malik   YOB: 1990  Date of Visit: 12/11/2019    To Whom It May Concern:    Kamran Malik  was at [facility] on 12/11/2019. He may return to work/school on 12/13/2019 with no restrictions. If you have any questions or concerns, or if I can be of further assistance, please do not hesitate to contact me.    Sincerely,          Dr. Courtney Bearden M.D

## 2019-12-11 NOTE — PATIENT INSTRUCTIONS
Viral Upper Respiratory Illness (Adult)  You have a viral upper respiratory illness (URI), which is another term for the common cold. This illness is contagious during the first few days. It is spread through the air by coughing and sneezing. It may also be spread by direct contact (touching the sick person and then touching your own eyes, nose, or mouth). Frequent handwashing will decrease risk of spread. Most viral illnesses go away within 7 to 10 days with rest and simple home remedies. Sometimes the illness may last for several weeks. Antibiotics will not kill a virus, and they are generally not prescribed for this condition.    Home care  · If symptoms are severe, rest at home for the first 2 to 3 days. When you resume activity, don't let yourself get too tired.  · Avoid being exposed to cigarette smoke (yours or others).  · You may use acetaminophen or ibuprofen to control pain and fever, unless another medicine was prescribed. (Note: If you have chronic liver or kidney disease, have ever had a stomach ulcer or gastrointestinal bleeding, or are taking blood-thinning medicines, talk with your healthcare provider before using these medicines.) Aspirin should never be given to anyone under 18 years of age who is ill with a viral infection or fever. It may cause severe liver or brain damage.  · Your appetite may be poor, so a light diet is fine. Avoid dehydration by drinking 6 to 8 glasses of fluids per day (water, soft drinks, juices, tea, or soup). Extra fluids will help loosen secretions in the nose and lungs.  · Over-the-counter cold medicines will not shorten the length of time youre sick, but they may be helpful for the following symptoms: cough, sore throat, and nasal and sinus congestion. (Note: Do not use decongestants if you have high blood pressure.)  Follow-up care  Follow up with your healthcare provider, or as advised.  When to seek medical advice  Call your healthcare provider right away if any  of these occur:  · Cough with lots of colored sputum (mucus)  · Severe headache; face, neck, or ear pain  · Difficulty swallowing due to throat pain  · Fever of 100.4°F (38°C)  Call 911, or get immediate medical care  Call emergency services right away if any of these occur:  · Chest pain, shortness of breath, wheezing, or difficulty breathing  · Coughing up blood  · Inability to swallow due to throat pain  Date Last Reviewed: 9/13/2015 © 2000-2017 [employer]. [address] [place] [address]. All rights reserved. This information is not intended as a substitute for professional medical care. Always follow your healthcare professional's instructions.        Eating Heart-Healthy Food: Using the DASH Plan    Eating for your heart doesnt have to be hard or boring. You just need to know how to make healthier choices. The DASH eating plan has been developed to help you do just that. DASH stands for Dietary Approaches to Stop Hypertension. It is a plan that has been proven to be healthier for your heart and to lower your risk for high blood pressure. It can also help lower your risk for cancer, heart disease, osteoporosis, and diabetes.  Choosing from each food group  Choose foods from each of the food groups below each day. Try to get the recommended number of servings for each food group. The serving numbers are based on a diet of 2,000 calories a day. Talk to your doctor if youre unsure about your calorie needs. Along with getting the correct servings, the DASH plan also recommends a sodium intake less than 2,300 mg per day.        Grains  Servings: 6 to 8 a day  A serving is:  · 1 slice bread  · 1 ounce dry cereal  · Half a cup cooked rice, pasta or cereal  Best choices: Whole grains and any grains high in fiber. Vegetables  Servings: 4 to 5 a day  A serving is:  · 1 cup raw leafy vegetable  · Half a cup cut-up raw or cooked vegetable  · Half a cup vegetable juice  Best choices: Fresh or  frozen vegetables prepared without added salt or fat.   Fruits  Servings: 4 to 5 a day  A serving is:  · 1 medium fruit  · One-quarter cup dried fruit  · Half a cup fresh, frozen, or canned fruit  · Half a cup of 100% fruit juices  Best choices: A variety of fresh fruits of different colors. Whole fruits are a better choice than fruit juices. Low-fat or fat-free dairy  Servings: 2 to 3 a day  A serving is:  · 1 cup milk  · 1 cup yogurt  · One and a half ounces cheese  Best choices: Skim or 1% milk, low-fat or fat-free yogurt or buttermilk, and low-fat cheeses.         Lean meats, poultry, fish  Servings: 6 or fewer a day  A serving is:  · 1 ounce cooked meats, poultry, or fish  · 1 egg  Best choices: Lean poultry and fish. Trim away visible fat. Broil, grill, roast, or boil instead of frying. Remove skin from poultry before eating. Limit how much red meat you eat.  Nuts, seeds, beans  Servings: 4 to 5 a week  A serving is:  · One-third cup nuts (one and a half ounces)  · 2 tablespoons nut butter or seeds  · Half a cup cooked dry beans or legumes  Best choices: Dry roasted nuts with no salt added, lentils, kidney beans, garbanzo beans, and whole khalil beans.   Fats and oils  Servings: 2 to 3 a day  A serving is:  · 1 teaspoon vegetable oil  · 1 teaspoon soft margarine  · 1 tablespoon mayonnaise  · 2 tablespoons salad dressing  Best choices: Nut and vegetable oils (nontropical vegetable oils), such as olive and canola oil. Sweets  Servings: 5 a week or fewer  A serving is:  · 1 tablespoon sugar, maple syrup, or honey  · 1 tablespoon jam or jelly  · 1 half-ounce jelly beans (about 15)  · 1 cup lemonade  Best choices: Dried fruit can be a satisfying sweet. Choose low-fat sweets. And watch your serving sizes!      For more on the DASH eating plan, visit:  www.nhlbi.nih.gov/health/health-topics/topics/dash   Date Last Reviewed: 6/1/2016  © 2244-0049 [employer]. [address] [place] [address], [place] [address]. All  rights reserved. This information is not intended as a substitute for professional medical care. Always follow your healthcare professional's instructions.

## 2019-12-11 NOTE — PROGRESS NOTES
Subjective:       Patient ID: Yovani Malik is a 29 y.o. male.    Chief Complaint: Fatigue; Sinus Problem; Sore Throat; and Shortness of Breath    Cough   This is a new problem. The current episode started in the past 7 days. The problem has been gradually worsening. The problem occurs every few minutes. The cough is non-productive. Associated symptoms include chills, myalgias, nasal congestion, postnasal drip, rhinorrhea, a sore throat, shortness of breath and sweats. Pertinent negatives include no chest pain, ear congestion, ear pain, fever, headaches, heartburn, rash, weight loss or wheezing. Nothing aggravates the symptoms. Treatments tried: Over-the-counter DayQuil. The treatment provided mild relief. There is no history of asthma, bronchiectasis, COPD, emphysema, environmental allergies or pneumonia.     Past medical history, past social history was reviewed and discussed with the patient.    Review of Systems   Constitutional: Positive for chills and fatigue. Negative for activity change, appetite change, fever and weight loss.   HENT: Positive for congestion, postnasal drip, rhinorrhea, sinus pressure and sore throat. Negative for ear discharge and ear pain.    Eyes: Negative for discharge and itching.   Respiratory: Positive for cough and shortness of breath. Negative for choking, chest tightness and wheezing.    Cardiovascular: Negative for chest pain and leg swelling.   Gastrointestinal: Negative for abdominal distention, abdominal pain and heartburn.   Endocrine: Negative for cold intolerance and heat intolerance.   Genitourinary: Negative for dysuria and flank pain.   Musculoskeletal: Positive for myalgias. Negative for arthralgias and back pain.   Skin: Negative for pallor and rash.   Allergic/Immunologic: Negative for environmental allergies and food allergies.   Neurological: Negative for dizziness, facial asymmetry and headaches.   Hematological: Negative for adenopathy. Does not bruise/bleed  easily.   Psychiatric/Behavioral: Negative for agitation and confusion.       Objective:      Physical Exam   Constitutional: He appears well-developed and well-nourished. No distress.   HENT:   Head: Normocephalic and atraumatic.   Right Ear: External ear normal.   Left Ear: External ear normal.   Nose: Mucosal edema and rhinorrhea present.   Mouth/Throat: Posterior oropharyngeal erythema present. No oropharyngeal exudate.   Eyes: Pupils are equal, round, and reactive to light. Left eye exhibits no discharge. No scleral icterus.   Neck: Normal range of motion. Neck supple. No tracheal deviation present. No thyromegaly present.   Cardiovascular: Normal rate, regular rhythm and normal heart sounds. Exam reveals no friction rub.   No murmur heard.  Pulmonary/Chest: Effort normal and breath sounds normal. No respiratory distress. He has no wheezes.   Abdominal: Soft. Bowel sounds are normal. There is no tenderness. There is no guarding.   Musculoskeletal: He exhibits no edema or tenderness.   Neurological: He displays normal reflexes. No cranial nerve deficit. He exhibits normal muscle tone. Coordination normal.   Skin: Skin is warm and dry. No rash noted. He is not diaphoretic. No erythema.   Psychiatric: He has a normal mood and affect. His behavior is normal. Judgment and thought content normal.   Nursing note and vitals reviewed.      Assessment:       1. Flu-like symptoms    2. Sore throat        Plan:       Flu-like symptoms:  New problem workup needed  -     Influenza A & B by Molecular    Sore throat:  New problem workup needed  -     POCT Rapid Strep A  -     Strep A culture, throat    Post-nasal drip:  New problem, next visit workup  -     montelukast (SINGULAIR) 10 mg tablet; Take 1 tablet (10 mg total) by mouth every evening.  Dispense: 30 tablet; Refill: 0    Cough:  New problem, next visit workup  -     promethazine-dextromethorphan (PROMETHAZINE-DM) 6.25-15 mg/5 mL Syrp; Take 5 mLs by mouth every 6 (six)  hours as needed.  Dispense: 180 mL; Refill: 0    The patient was advised to drink plenty water, montelukast 10 mg at bedtime promethazine DM was given to the patient.  Will call the patient when we have the results of the test.Patient agreed with assessment and plan. Patient verbalized understanding.

## 2019-12-13 LAB — BACTERIA THROAT CULT: NORMAL

## 2019-12-23 ENCOUNTER — DOCUMENTATION ONLY (OUTPATIENT)
Dept: REHABILITATION | Facility: HOSPITAL | Age: 29
End: 2019-12-23

## 2019-12-23 RX ORDER — HYDROCODONE BITARTRATE AND ACETAMINOPHEN 5; 325 MG/1; MG/1
1 TABLET ORAL EVERY 6 HOURS PRN
Qty: 15 TABLET | Refills: 0 | Status: SHIPPED | OUTPATIENT
Start: 2019-12-23 | End: 2020-01-06 | Stop reason: SDUPTHER

## 2019-12-23 NOTE — PROGRESS NOTES
Outpatient Therapy Discharge Summary     Name: Yovani Malik  [facility] Number: [id number]    Therapy Diagnosis: Chronic pain of right knee, Stiffness of right knee   Physician: Anurag Wells,*    Physician Orders: PT Eval and Treat  Medical Diagnosis from Referral: Right knee pain, unspecified chronicity   Evaluation Date: 9/30/2019    Date of Last visit: 11/15/2019  Total Visits Received: 9  Cancelled Visits: 3  No Show Visits: 1    Assessment    Goals:   Short-Term Goals: 4 weeks  - The patient will be independent with initial home exercise program.  - The patient will increase strength to at least 4+/5 to perform functional mobility including ascending/descending stairs and ambulation  - The patient will increase PROM grossly from 0 to 120 to perform ambulation with pain < 2/10.     Long-Term Goals: 8 weeks  - Pt to achieve <26% limitation as measured by the FOTO to demonstrate decreased disability.  - The patient will be independent with home exercise program and symptom management.  - The patient will be independent amb with no assistive device on all surfaces for community distances.  - The patient will increase strength to at least 5/5 to perform functional mobility including ambulation, ascending descending stairs, and job duties  - The patient will increase AROM grossly to 0 to 120 degrees L to perform ambulation with pain < 1/10.    Discharge reason: Patient has not attended therapy since 11/15/2019    Plan   This patient is discharged from Physical Therapy    Sean Wakefield, PT, DPT  12/23/2019

## 2020-01-06 RX ORDER — HYDROCODONE BITARTRATE AND ACETAMINOPHEN 5; 325 MG/1; MG/1
1 TABLET ORAL EVERY 6 HOURS PRN
Qty: 15 TABLET | Refills: 0 | Status: SHIPPED | OUTPATIENT
Start: 2020-01-06 | End: 2020-01-20 | Stop reason: SDUPTHER

## 2020-01-08 ENCOUNTER — OFFICE VISIT (OUTPATIENT)
Dept: ORTHOPEDICS | Facility: CLINIC | Age: 30
End: 2020-01-08
Payer: COMMERCIAL

## 2020-01-08 VITALS — BODY MASS INDEX: 40.66 KG/M2 | HEIGHT: 66 IN | RESPIRATION RATE: 18 BRPM | WEIGHT: 253 LBS

## 2020-01-08 DIAGNOSIS — M17.11 ARTHRITIS OF RIGHT KNEE: Primary | ICD-10-CM

## 2020-01-08 PROCEDURE — 20610 DRAIN/INJ JOINT/BURSA W/O US: CPT | Mod: RT,S$GLB,, | Performed by: ORTHOPAEDIC SURGERY

## 2020-01-08 PROCEDURE — 99499 NO LOS: ICD-10-PCS | Mod: S$GLB,,, | Performed by: ORTHOPAEDIC SURGERY

## 2020-01-08 PROCEDURE — 99499 UNLISTED E&M SERVICE: CPT | Mod: S$GLB,,, | Performed by: ORTHOPAEDIC SURGERY

## 2020-01-08 PROCEDURE — 99999 PR PBB SHADOW E&M-EST. PATIENT-LVL III: CPT | Mod: PBBFAC,,, | Performed by: ORTHOPAEDIC SURGERY

## 2020-01-08 PROCEDURE — 20610 LARGE JOINT ASPIRATION/INJECTION: R KNEE: ICD-10-PCS | Mod: RT,S$GLB,, | Performed by: ORTHOPAEDIC SURGERY

## 2020-01-08 PROCEDURE — 99999 PR PBB SHADOW E&M-EST. PATIENT-LVL III: ICD-10-PCS | Mod: PBBFAC,,, | Performed by: ORTHOPAEDIC SURGERY

## 2020-01-08 NOTE — PROCEDURES
Large Joint Aspiration/Injection: R knee  Date/Time: 1/8/2020 4:15 PM  Performed by: Anurag Wells MD  Authorized by: Anurag Wells MD     Consent Done?:  Yes (Verbal)  Indications:  Pain  Procedure site marked: Yes    Timeout: Prior to procedure the correct patient, procedure, and site was verified      Location:  Knee  Site:  R knee  Prep: Patient was prepped and draped in usual sterile fashion    Needle size:  20 G  Approach:  Anterolateral  Medications:  20 mg sodium hyaluronate (EUFLEXXA) 10 mg/mL(mw 2.4 -3.6 million)  Patient tolerance:  Patient tolerated the procedure well with no immediate complications

## 2020-01-08 NOTE — PROGRESS NOTES
Past Medical History:   Diagnosis Date    Anxiety     Borderline diabetes     Hyperlipidemia     Hypertension        Past Surgical History:   Procedure Laterality Date    ARTHROSCOPY OF KNEE Right 9/13/2019    Procedure: ARTHROSCOPY, KNEE;  Surgeon: Anurag Wells MD;  Location: [facility] OR;  Service: Orthopedics;  Laterality: Right;    ESOPHAGOGASTRODUODENOSCOPY N/A 10/3/2019    Procedure: EGD (ESOPHAGOGASTRODUODENOSCOPY);  Surgeon: Yovani Dominguez MD;  Location: [facility] ENDO;  Service: Endoscopy;  Laterality: N/A;    KNEE ARTHROSCOPY      KNEE SURGERY Bilateral     x 2     REPAIR OF MENISCUS OF KNEE Right 9/13/2019    Procedure: REPAIR, MENISCUS, KNEE;  Surgeon: Anurag Wells MD;  Location: [facility] OR;  Service: Orthopedics;  Laterality: Right;  lateral meniscal repair       Current Outpatient Medications   Medication Sig    ALPRAZolam (XANAX) 0.25 MG tablet     dextroamphetamine-amphetamine (ADDERALL XR) 30 MG 24 hr capsule TK TWO CAPSULES PO D IN THE EARLY MORNING    diclofenac sodium (VOLTAREN) 1 % Gel Apply 4 g topically 2 (two) times daily as needed.    ergocalciferol (ERGOCALCIFEROL) 50,000 unit Cap TAKE 1 CAPSULE BY MOUTH EVERY 7 DAYS    esomeprazole (NEXIUM) 40 MG capsule Take 1 capsule (40 mg total) by mouth once daily.    HYDROcodone-acetaminophen (NORCO) 5-325 mg per tablet Take 1 tablet by mouth every 6 (six) hours as needed for Pain.    montelukast (SINGULAIR) 10 mg tablet Take 1 tablet (10 mg total) by mouth every evening.    omeprazole (PRILOSEC) 40 MG capsule Take 1 capsule (40 mg total) by mouth once daily.    pantoprazole (PROTONIX) 40 MG tablet Take 1 tablet (40 mg total) by mouth once daily.     No current facility-administered medications for this visit.        Review of patient's allergies indicates:  No Known Allergies    Family History   Problem Relation Age of Onset    Diabetes Mother     Hypertension Mother     Hyperlipidemia Mother     Diabetes Father      Diabetes Maternal Grandmother     Cancer Maternal Grandmother         breast       Social History     Socioeconomic History    Marital status:      Spouse name: Not on file    Number of children: Not on file    Years of education: Not on file    Highest education level: Not on file   Occupational History    Not on file   Social Needs    Financial resource strain: Not on file    Food insecurity:     Worry: Not on file     Inability: Not on file    Transportation needs:     Medical: Not on file     Non-medical: Not on file   Tobacco Use    Smoking status: Current Some Day Smoker     Packs/day: 0.50     Types: Cigarettes    Smokeless tobacco: Never Used    Tobacco comment: maybe 0.5 pack a week   Substance and Sexual Activity    Alcohol use: No    Drug use: No    Sexual activity: Yes     Partners: Female   Lifestyle    Physical activity:     Days per week: Not on file     Minutes per session: Not on file    Stress: Not on file   Relationships    Social connections:     Talks on phone: Not on file     Gets together: Not on file     Attends Pentecostalism service: Not on file     Active member of club or organization: Not on file     Attends meetings of clubs or organizations: Not on file     Relationship status: Not on file   Other Topics Concern    Not on file   Social History Narrative    Not on file       Chief Complaint:   Chief Complaint   Patient presents with    Right Knee - Injections       Date of surgery:  September 13, 2019    History of present illness:  This is a 29-year-old male underwent right arthroscopic lateral meniscal repair for a horizontal tear of the anterior horn body and posterior horn.  Patient is having a little more pain over the lateral compartment now.  The pain over the patella has improved.  Pain is a 6/10 at times.  Still doing the physical therapy.      Review of Systems:    Musculoskeletal:  See HPI        Physical Examination:    Vital Signs:    Vitals:     01/08/20 1613   Resp: 18       Body mass index is 40.84 kg/m².    This a well-developed, well nourished patient in no acute distress.  They are alert and oriented and cooperative to examination.  Pt. walks using the crutches    Examination of the right knee shows healed surgical portals.  No erythema or drainage. No significant effusion. No calf pain. Negative Homans sign.  Full range of motion. Pain over the lateral compartment.    X-rays:  None     Assessment::  Status post right arthroscopic lateral meniscal repair  Right knee arthritis    Plan:  Injected his right knee with Euflexxa 1 of 3.  Follow up next week.    This note was created using [employer] voice recognition software that occasionally misinterpreted phrases or words.

## 2020-01-15 ENCOUNTER — OFFICE VISIT (OUTPATIENT)
Dept: ORTHOPEDICS | Facility: CLINIC | Age: 30
End: 2020-01-15
Payer: COMMERCIAL

## 2020-01-15 VITALS — HEIGHT: 66 IN | BODY MASS INDEX: 40.66 KG/M2 | WEIGHT: 253 LBS

## 2020-01-15 DIAGNOSIS — M17.11 ARTHRITIS OF RIGHT KNEE: Primary | ICD-10-CM

## 2020-01-15 PROCEDURE — 99999 PR PBB SHADOW E&M-EST. PATIENT-LVL II: ICD-10-PCS | Mod: PBBFAC,,, | Performed by: ORTHOPAEDIC SURGERY

## 2020-01-15 PROCEDURE — 20610 LARGE JOINT ASPIRATION/INJECTION: R KNEE: ICD-10-PCS | Mod: RT,S$GLB,, | Performed by: ORTHOPAEDIC SURGERY

## 2020-01-15 PROCEDURE — 99499 UNLISTED E&M SERVICE: CPT | Mod: S$GLB,,, | Performed by: ORTHOPAEDIC SURGERY

## 2020-01-15 PROCEDURE — 99999 PR PBB SHADOW E&M-EST. PATIENT-LVL II: CPT | Mod: PBBFAC,,, | Performed by: ORTHOPAEDIC SURGERY

## 2020-01-15 PROCEDURE — 20610 DRAIN/INJ JOINT/BURSA W/O US: CPT | Mod: RT,S$GLB,, | Performed by: ORTHOPAEDIC SURGERY

## 2020-01-15 PROCEDURE — 99499 NO LOS: ICD-10-PCS | Mod: S$GLB,,, | Performed by: ORTHOPAEDIC SURGERY

## 2020-01-20 RX ORDER — HYDROCODONE BITARTRATE AND ACETAMINOPHEN 5; 325 MG/1; MG/1
1 TABLET ORAL EVERY 6 HOURS PRN
Qty: 15 TABLET | Refills: 0 | Status: SHIPPED | OUTPATIENT
Start: 2020-01-20 | End: 2020-02-04 | Stop reason: SDUPTHER

## 2020-01-20 NOTE — PROGRESS NOTES
Past Medical History:   Diagnosis Date    Anxiety     Borderline diabetes     Hyperlipidemia     Hypertension        Past Surgical History:   Procedure Laterality Date    ARTHROSCOPY OF KNEE Right 9/13/2019    Procedure: ARTHROSCOPY, KNEE;  Surgeon: Anurag Wells MD;  Location: [facility] OR;  Service: Orthopedics;  Laterality: Right;    ESOPHAGOGASTRODUODENOSCOPY N/A 10/3/2019    Procedure: EGD (ESOPHAGOGASTRODUODENOSCOPY);  Surgeon: Yovani Dominguez MD;  Location: [facility] ENDO;  Service: Endoscopy;  Laterality: N/A;    KNEE ARTHROSCOPY      KNEE SURGERY Bilateral     x 2     REPAIR OF MENISCUS OF KNEE Right 9/13/2019    Procedure: REPAIR, MENISCUS, KNEE;  Surgeon: Anurag Wells MD;  Location: [facility] OR;  Service: Orthopedics;  Laterality: Right;  lateral meniscal repair       Current Outpatient Medications   Medication Sig    ALPRAZolam (XANAX) 0.25 MG tablet     dextroamphetamine-amphetamine (ADDERALL XR) 30 MG 24 hr capsule TK TWO CAPSULES PO D IN THE EARLY MORNING    diclofenac sodium (VOLTAREN) 1 % Gel Apply 4 g topically 2 (two) times daily as needed.    omeprazole (PRILOSEC) 40 MG capsule Take 1 capsule (40 mg total) by mouth once daily.    ergocalciferol (ERGOCALCIFEROL) 50,000 unit Cap TAKE 1 CAPSULE BY MOUTH EVERY 7 DAYS    esomeprazole (NEXIUM) 40 MG capsule Take 1 capsule (40 mg total) by mouth once daily.    HYDROcodone-acetaminophen (NORCO) 5-325 mg per tablet Take 1 tablet by mouth every 6 (six) hours as needed for Pain.    pantoprazole (PROTONIX) 40 MG tablet Take 1 tablet (40 mg total) by mouth once daily.     No current facility-administered medications for this visit.        Review of patient's allergies indicates:  No Known Allergies    Family History   Problem Relation Age of Onset    Diabetes Mother     Hypertension Mother     Hyperlipidemia Mother     Diabetes Father     Diabetes Maternal Grandmother     Cancer Maternal Grandmother         breast        Social History     Socioeconomic History    Marital status:      Spouse name: Not on file    Number of children: Not on file    Years of education: Not on file    Highest education level: Not on file   Occupational History    Not on file   Social Needs    Financial resource strain: Not on file    Food insecurity:     Worry: Not on file     Inability: Not on file    Transportation needs:     Medical: Not on file     Non-medical: Not on file   Tobacco Use    Smoking status: Current Some Day Smoker     Packs/day: 0.50     Types: Cigarettes    Smokeless tobacco: Never Used    Tobacco comment: maybe 0.5 pack a week   Substance and Sexual Activity    Alcohol use: No    Drug use: No    Sexual activity: Yes     Partners: Female   Lifestyle    Physical activity:     Days per week: Not on file     Minutes per session: Not on file    Stress: Not on file   Relationships    Social connections:     Talks on phone: Not on file     Gets together: Not on file     Attends Confucianist service: Not on file     Active member of club or organization: Not on file     Attends meetings of clubs or organizations: Not on file     Relationship status: Not on file   Other Topics Concern    Not on file   Social History Narrative    Not on file       Chief Complaint:   Chief Complaint   Patient presents with    Knee Pain     right knee-Euflexxa 2/3        Date of surgery:  September 13, 2019    History of present illness:  This is a 29-year-old male underwent right arthroscopic lateral meniscal repair for a horizontal tear of the anterior horn body and posterior horn.  Patient is having a little more pain over the lateral compartment now.  The pain over the patella has improved.  Pain is a 6/10 at times.  Still doing the physical therapy.      Review of Systems:    Musculoskeletal:  See HPI        Physical Examination:    Vital Signs:    There were no vitals filed for this visit.    Body mass index is 40.84  kg/m².    This a well-developed, well nourished patient in no acute distress.  They are alert and oriented and cooperative to examination.  Pt. walks using the crutches    Examination of the right knee shows healed surgical portals.  No erythema or drainage. No significant effusion. No calf pain. Negative Homans sign.  Full range of motion. Pain over the lateral compartment.    X-rays:  None     Assessment::  Status post right arthroscopic lateral meniscal repair  Right knee arthritis    Plan:  Injected his right knee with Euflexxa 2 of 3.  Follow up next week.    This note was created using [employer] voice recognition software that occasionally misinterpreted phrases or words.

## 2020-01-20 NOTE — PROCEDURES
Large Joint Aspiration/Injection: R knee  Date/Time: 1/15/2020 4:15 PM  Performed by: Anurag Wells MD  Authorized by: Anurag Wells MD     Consent Done?:  Yes (Verbal)  Indications:  Pain  Procedure site marked: Yes    Timeout: Prior to procedure the correct patient, procedure, and site was verified      Location:  Knee  Site:  R knee  Prep: Patient was prepped and draped in usual sterile fashion    Needle size:  20 G  Approach:  Anterolateral  Medications:  20 mg sodium hyaluronate (EUFLEXXA) 10 mg/mL(mw 2.4 -3.6 million)  Patient tolerance:  Patient tolerated the procedure well with no immediate complications

## 2020-01-22 ENCOUNTER — OFFICE VISIT (OUTPATIENT)
Dept: ORTHOPEDICS | Facility: CLINIC | Age: 30
End: 2020-01-22
Payer: COMMERCIAL

## 2020-01-22 VITALS — BODY MASS INDEX: 40.66 KG/M2 | HEIGHT: 66 IN | WEIGHT: 253 LBS | RESPIRATION RATE: 18 BRPM

## 2020-01-22 DIAGNOSIS — M17.11 ARTHRITIS OF RIGHT KNEE: Primary | ICD-10-CM

## 2020-01-22 PROCEDURE — 20610 DRAIN/INJ JOINT/BURSA W/O US: CPT | Mod: RT,S$GLB,, | Performed by: ORTHOPAEDIC SURGERY

## 2020-01-22 PROCEDURE — 20610 LARGE JOINT ASPIRATION/INJECTION: R KNEE: ICD-10-PCS | Mod: RT,S$GLB,, | Performed by: ORTHOPAEDIC SURGERY

## 2020-01-22 PROCEDURE — 99499 UNLISTED E&M SERVICE: CPT | Mod: S$GLB,,, | Performed by: ORTHOPAEDIC SURGERY

## 2020-01-22 PROCEDURE — 99499 NO LOS: ICD-10-PCS | Mod: S$GLB,,, | Performed by: ORTHOPAEDIC SURGERY

## 2020-01-22 PROCEDURE — 99999 PR PBB SHADOW E&M-EST. PATIENT-LVL III: CPT | Mod: PBBFAC,,, | Performed by: ORTHOPAEDIC SURGERY

## 2020-01-22 PROCEDURE — 99999 PR PBB SHADOW E&M-EST. PATIENT-LVL III: ICD-10-PCS | Mod: PBBFAC,,, | Performed by: ORTHOPAEDIC SURGERY

## 2020-01-22 NOTE — PROGRESS NOTES
Past Medical History:   Diagnosis Date    Anxiety     Borderline diabetes     Hyperlipidemia     Hypertension        Past Surgical History:   Procedure Laterality Date    ARTHROSCOPY OF KNEE Right 9/13/2019    Procedure: ARTHROSCOPY, KNEE;  Surgeon: Anurag Wlels MD;  Location: [facility] OR;  Service: Orthopedics;  Laterality: Right;    ESOPHAGOGASTRODUODENOSCOPY N/A 10/3/2019    Procedure: EGD (ESOPHAGOGASTRODUODENOSCOPY);  Surgeon: Yovani Dominguez MD;  Location: [facility] ENDO;  Service: Endoscopy;  Laterality: N/A;    KNEE ARTHROSCOPY      KNEE SURGERY Bilateral     x 2     REPAIR OF MENISCUS OF KNEE Right 9/13/2019    Procedure: REPAIR, MENISCUS, KNEE;  Surgeon: Anurag Wells MD;  Location: [facility] OR;  Service: Orthopedics;  Laterality: Right;  lateral meniscal repair       Current Outpatient Medications   Medication Sig    ALPRAZolam (XANAX) 0.25 MG tablet     dextroamphetamine-amphetamine (ADDERALL XR) 30 MG 24 hr capsule TK TWO CAPSULES PO D IN THE EARLY MORNING    diclofenac sodium (VOLTAREN) 1 % Gel Apply 4 g topically 2 (two) times daily as needed.    HYDROcodone-acetaminophen (NORCO) 5-325 mg per tablet Take 1 tablet by mouth every 6 (six) hours as needed for Pain.    omeprazole (PRILOSEC) 40 MG capsule Take 1 capsule (40 mg total) by mouth once daily.    ergocalciferol (ERGOCALCIFEROL) 50,000 unit Cap TAKE 1 CAPSULE BY MOUTH EVERY 7 DAYS    esomeprazole (NEXIUM) 40 MG capsule Take 1 capsule (40 mg total) by mouth once daily.    pantoprazole (PROTONIX) 40 MG tablet Take 1 tablet (40 mg total) by mouth once daily.     No current facility-administered medications for this visit.        Review of patient's allergies indicates:  No Known Allergies    Family History   Problem Relation Age of Onset    Diabetes Mother     Hypertension Mother     Hyperlipidemia Mother     Diabetes Father     Diabetes Maternal Grandmother     Cancer Maternal Grandmother         breast        Social History     Socioeconomic History    Marital status:      Spouse name: Not on file    Number of children: Not on file    Years of education: Not on file    Highest education level: Not on file   Occupational History    Not on file   Social Needs    Financial resource strain: Not on file    Food insecurity:     Worry: Not on file     Inability: Not on file    Transportation needs:     Medical: Not on file     Non-medical: Not on file   Tobacco Use    Smoking status: Current Some Day Smoker     Packs/day: 0.50     Types: Cigarettes    Smokeless tobacco: Never Used    Tobacco comment: maybe 0.5 pack a week   Substance and Sexual Activity    Alcohol use: No    Drug use: No    Sexual activity: Yes     Partners: Female   Lifestyle    Physical activity:     Days per week: Not on file     Minutes per session: Not on file    Stress: Not on file   Relationships    Social connections:     Talks on phone: Not on file     Gets together: Not on file     Attends Christian service: Not on file     Active member of club or organization: Not on file     Attends meetings of clubs or organizations: Not on file     Relationship status: Not on file   Other Topics Concern    Not on file   Social History Narrative    Not on file       Chief Complaint:   Chief Complaint   Patient presents with    Knee Pain     right knee-Euflexxa 3/3        Date of surgery:  September 13, 2019    History of present illness:  This is a 29-year-old male underwent right arthroscopic lateral meniscal repair for a horizontal tear of the anterior horn body and posterior horn.  Patient is having a little more pain over the lateral compartment now.  The pain over the patella has improved.  Pain is a 6/10 at times.  Still doing the physical therapy.      Review of Systems:    Musculoskeletal:  See HPI        Physical Examination:    Vital Signs:    Vitals:    01/22/20 1306   Resp: 18       Body mass index is 40.84  kg/m².    This a well-developed, well nourished patient in no acute distress.  They are alert and oriented and cooperative to examination.  Pt. walks using the crutches    Examination of the right knee shows healed surgical portals.  No erythema or drainage. No significant effusion. No calf pain. Negative Homans sign.  Full range of motion. Pain over the lateral compartment.    X-rays:  None     Assessment::  Status post right arthroscopic lateral meniscal repair  Right knee arthritis    Plan:  Injected his right knee with Euflexxa 3 of 3.  Follow up in 4 weeks.    This note was created using [employer] voice recognition software that occasionally misinterpreted phrases or words.

## 2020-01-22 NOTE — PROCEDURES
Large Joint Aspiration/Injection: R knee  Date/Time: 1/22/2020 1:15 PM  Performed by: Anurag Wells MD  Authorized by: Anurag Wells MD     Consent Done?:  Yes (Verbal)  Indications:  Pain  Procedure site marked: Yes    Timeout: Prior to procedure the correct patient, procedure, and site was verified      Location:  Knee  Site:  R knee  Prep: Patient was prepped and draped in usual sterile fashion    Needle size:  20 G  Approach:  Anterolateral  Medications:  20 mg sodium hyaluronate (EUFLEXXA) 10 mg/mL(mw 2.4 -3.6 million)  Patient tolerance:  Patient tolerated the procedure well with no immediate complications

## 2020-01-23 ENCOUNTER — ANESTHESIA (OUTPATIENT)
Dept: ENDOSCOPY | Facility: HOSPITAL | Age: 30
End: 2020-01-23
Payer: COMMERCIAL

## 2020-01-23 ENCOUNTER — ANESTHESIA EVENT (OUTPATIENT)
Dept: ENDOSCOPY | Facility: HOSPITAL | Age: 30
End: 2020-01-23
Payer: COMMERCIAL

## 2020-01-23 ENCOUNTER — HOSPITAL ENCOUNTER (OUTPATIENT)
Facility: HOSPITAL | Age: 30
Discharge: HOME | End: 2020-01-23
Attending: INTERNAL MEDICINE | Admitting: INTERNAL MEDICINE
Payer: COMMERCIAL

## 2020-01-23 DIAGNOSIS — K21.9 GERD (GASTROESOPHAGEAL REFLUX DISEASE): ICD-10-CM

## 2020-01-23 PROCEDURE — 27201012 HC FORCEPS, HOT/COLD, DISP: Mod: PO | Performed by: INTERNAL MEDICINE

## 2020-01-23 PROCEDURE — 88305 TISSUE EXAM BY PATHOLOGIST: CPT | Mod: 59 | Performed by: PATHOLOGY

## 2020-01-23 PROCEDURE — 43239 EGD BIOPSY SINGLE/MULTIPLE: CPT | Mod: ,,, | Performed by: INTERNAL MEDICINE

## 2020-01-23 PROCEDURE — 63600175 PHARM REV CODE 636 W HCPCS: Mod: PO | Performed by: INTERNAL MEDICINE

## 2020-01-23 PROCEDURE — D9220A PRA ANESTHESIA: ICD-10-PCS | Mod: ANES,,, | Performed by: ANESTHESIOLOGY

## 2020-01-23 PROCEDURE — 88305 TISSUE EXAM BY PATHOLOGIST: CPT | Mod: 26,,, | Performed by: PATHOLOGY

## 2020-01-23 PROCEDURE — 88305 TISSUE EXAM BY PATHOLOGIST: ICD-10-PCS | Mod: 26,,, | Performed by: PATHOLOGY

## 2020-01-23 PROCEDURE — D9220A PRA ANESTHESIA: ICD-10-PCS | Mod: CRNA,,, | Performed by: NURSE ANESTHETIST, CERTIFIED REGISTERED

## 2020-01-23 PROCEDURE — 43239 EGD BIOPSY SINGLE/MULTIPLE: CPT | Mod: PO | Performed by: INTERNAL MEDICINE

## 2020-01-23 PROCEDURE — 43239 PR EGD, FLEX, W/BIOPSY, SGL/MULTI: ICD-10-PCS | Mod: ,,, | Performed by: INTERNAL MEDICINE

## 2020-01-23 PROCEDURE — D9220A PRA ANESTHESIA: Mod: ANES,,, | Performed by: ANESTHESIOLOGY

## 2020-01-23 PROCEDURE — 63600175 PHARM REV CODE 636 W HCPCS: Mod: PO | Performed by: NURSE ANESTHETIST, CERTIFIED REGISTERED

## 2020-01-23 PROCEDURE — 37000009 HC ANESTHESIA EA ADD 15 MINS: Mod: PO | Performed by: INTERNAL MEDICINE

## 2020-01-23 PROCEDURE — 37000008 HC ANESTHESIA 1ST 15 MINUTES: Mod: PO | Performed by: INTERNAL MEDICINE

## 2020-01-23 PROCEDURE — D9220A PRA ANESTHESIA: Mod: CRNA,,, | Performed by: NURSE ANESTHETIST, CERTIFIED REGISTERED

## 2020-01-23 RX ORDER — SODIUM CHLORIDE, SODIUM LACTATE, POTASSIUM CHLORIDE, CALCIUM CHLORIDE 600; 310; 30; 20 MG/100ML; MG/100ML; MG/100ML; MG/100ML
INJECTION, SOLUTION INTRAVENOUS CONTINUOUS
Status: DISCONTINUED | OUTPATIENT
Start: 2020-01-23 | End: 2020-01-23 | Stop reason: HOSPADM

## 2020-01-23 RX ORDER — FAMOTIDINE 40 MG/1
40 TABLET, FILM COATED ORAL DAILY
Qty: 30 TABLET | Refills: 2 | Status: SHIPPED | OUTPATIENT
Start: 2020-01-23 | End: 2020-07-22

## 2020-01-23 RX ORDER — PROPOFOL 10 MG/ML
VIAL (ML) INTRAVENOUS
Status: DISCONTINUED | OUTPATIENT
Start: 2020-01-23 | End: 2020-01-23

## 2020-01-23 RX ORDER — SODIUM CHLORIDE 0.9 % (FLUSH) 0.9 %
10 SYRINGE (ML) INJECTION
Status: DISCONTINUED | OUTPATIENT
Start: 2020-01-23 | End: 2020-01-23 | Stop reason: HOSPADM

## 2020-01-23 RX ORDER — FENTANYL CITRATE 50 UG/ML
INJECTION, SOLUTION INTRAMUSCULAR; INTRAVENOUS
Status: DISCONTINUED | OUTPATIENT
Start: 2020-01-23 | End: 2020-01-23

## 2020-01-23 RX ORDER — LIDOCAINE HCL/PF 100 MG/5ML
SYRINGE (ML) INTRAVENOUS
Status: DISCONTINUED | OUTPATIENT
Start: 2020-01-23 | End: 2020-01-23

## 2020-01-23 RX ADMIN — PROPOFOL 50 MG: 10 INJECTION, EMULSION INTRAVENOUS at 09:01

## 2020-01-23 RX ADMIN — FENTANYL CITRATE 50 MCG: 50 INJECTION, SOLUTION INTRAMUSCULAR; INTRAVENOUS at 09:01

## 2020-01-23 RX ADMIN — PROPOFOL 25 MG: 10 INJECTION, EMULSION INTRAVENOUS at 09:01

## 2020-01-23 RX ADMIN — PROPOFOL 125 MG: 10 INJECTION, EMULSION INTRAVENOUS at 09:01

## 2020-01-23 RX ADMIN — LIDOCAINE HYDROCHLORIDE 100 MG: 20 INJECTION, SOLUTION INTRAVENOUS at 09:01

## 2020-01-23 RX ADMIN — SODIUM CHLORIDE, SODIUM LACTATE, POTASSIUM CHLORIDE, AND CALCIUM CHLORIDE: .6; .31; .03; .02 INJECTION, SOLUTION INTRAVENOUS at 08:01

## 2020-01-23 NOTE — DISCHARGE INSTRUCTIONS
Recovery After Procedural Sedation (Adult)  You have been given medicine by vein to make you sleep during your surgery. This may have included both a pain medicine and sleeping medicine. Most of the effects have worn off. But you may still have some drowsiness for the next 6 to 8 hours.  Home care  Follow these guidelines when you get home:  · For the next 8 hours, you should be watched by a responsible adult. This person should make sure your condition is not getting worse.  · Don't drink any alcohol for the next 24 hours.  · Don't drive, operate dangerous machinery, or make important business or personal decisions during the next 24 hours.  Note: Your healthcare provider may tell you not to take any medicine by mouth for pain or sleep in the next 4 hours. These medicines may react with the medicines you were given in the hospital. This could cause a much stronger response than usual.  Follow-up care  Follow up with your healthcare provider if you are not alert and back to your usual level of activity within 12 hours.  When to seek medical advice  Call your healthcare provider right away if any of these occur:  · Drowsiness gets worse  · Weakness or dizziness gets worse  · Repeated vomiting  · You can't be awakened   Date Last Reviewed: 10/18/2016  © 1415-9988 [email or web address]. [address] [place] [address], [place] [address]. All rights reserved. This information is not intended as a substitute for professional medical care. Always follow your healthcare professional's instructions.

## 2020-01-23 NOTE — DISCHARGE SUMMARY
Discharge Note  Short Stay      SUMMARY     Admit Date: 1/23/2020    Attending Physician: Yovani Dominguez MD     Discharge Physician: Yovani Dominguez MD    Discharge Date: 1/23/2020 9:28 AM    Final Diagnosis: Dyspepsia [R10.13]  Heartburn [R12]    Disposition: HOME OR SELF CARE    Patient Instructions:   Current Discharge Medication List      START taking these medications    Details   famotidine (PEPCID) 40 MG tablet Take 1 tablet (40 mg total) by mouth once daily.  Qty: 30 tablet, Refills: 2         CONTINUE these medications which have NOT CHANGED    Details   ALPRAZolam (XANAX) 0.25 MG tablet Refills: 1      dextroamphetamine-amphetamine (ADDERALL XR) 30 MG 24 hr capsule TK TWO CAPSULES PO D IN THE EARLY MORNING  Refills: 0      HYDROcodone-acetaminophen (NORCO) 5-325 mg per tablet Take 1 tablet by mouth every 6 (six) hours as needed for Pain.  Qty: 15 tablet, Refills: 0    Comments: Quantity prescribed more than 7 day supply? Yes, quantity medically necessary for chronic pain >7days      pantoprazole (PROTONIX) 40 MG tablet Take 1 tablet (40 mg total) by mouth once daily.  Qty: 30 tablet, Refills: 11    Associated Diagnoses: Gastroesophageal reflux disease with esophagitis             Discharge Procedure Orders (must include Diet, Follow-up, Activity)    Follow Up:  Follow up with PCP as previously scheduled  Resume routine diet.  Activity as tolerated.    No driving day of procedure.

## 2020-01-23 NOTE — H&P
History & Physical - Short Stay  Gastroenterology      SUBJECTIVE:     Procedure: EGD    Chief Complaint/Indication for Procedure: Reflux    PTA Medications   Medication Sig    ALPRAZolam (XANAX) 0.25 MG tablet     dextroamphetamine-amphetamine (ADDERALL XR) 30 MG 24 hr capsule TK TWO CAPSULES PO D IN THE EARLY MORNING    HYDROcodone-acetaminophen (NORCO) 5-325 mg per tablet Take 1 tablet by mouth every 6 (six) hours as needed for Pain.    omeprazole (PRILOSEC) 40 MG capsule Take 1 capsule (40 mg total) by mouth once daily.    diclofenac sodium (VOLTAREN) 1 % Gel Apply 4 g topically 2 (two) times daily as needed. (Patient not taking: Reported on 1/22/2020)    ergocalciferol (ERGOCALCIFEROL) 50,000 unit Cap TAKE 1 CAPSULE BY MOUTH EVERY 7 DAYS    esomeprazole (NEXIUM) 40 MG capsule Take 1 capsule (40 mg total) by mouth once daily. (Patient not taking: Reported on 1/22/2020)    pantoprazole (PROTONIX) 40 MG tablet Take 1 tablet (40 mg total) by mouth once daily.       Review of patient's allergies indicates:  No Known Allergies     Past Medical History:   Diagnosis Date    Anxiety     Borderline diabetes     Hyperlipidemia     Hypertension      Past Surgical History:   Procedure Laterality Date    ARTHROSCOPY OF KNEE Right 9/13/2019    Procedure: ARTHROSCOPY, KNEE;  Surgeon: Anurag Wells MD;  Location: [facility] OR;  Service: Orthopedics;  Laterality: Right;    ESOPHAGOGASTRODUODENOSCOPY N/A 10/3/2019    Procedure: EGD (ESOPHAGOGASTRODUODENOSCOPY);  Surgeon: Yovani Dominguez MD;  Location: [facility] ENDO;  Service: Endoscopy;  Laterality: N/A;    KNEE ARTHROSCOPY      KNEE SURGERY Bilateral     x 2     REPAIR OF MENISCUS OF KNEE Right 9/13/2019    Procedure: REPAIR, MENISCUS, KNEE;  Surgeon: Anurag Wells MD;  Location: [facility] OR;  Service: Orthopedics;  Laterality: Right;  lateral meniscal repair     Family History   Problem Relation Age of Onset    Diabetes Mother     Hypertension Mother      Hyperlipidemia Mother     Diabetes Father     Diabetes Maternal Grandmother     Cancer Maternal Grandmother         breast     Social History     Tobacco Use    Smoking status: Current Some Day Smoker     Packs/day: 0.50     Types: Cigarettes    Smokeless tobacco: Never Used    Tobacco comment: maybe 0.5 pack a week   Substance Use Topics    Alcohol use: No    Drug use: No         OBJECTIVE:     Vital Signs (Most Recent)       Physical Exam:                                                       GENERAL:  Comfortable, in no acute distress.                                 HEENT EXAM:  Nonicteric.  No adenopathy.  Oropharynx is clear.               NECK:  Supple.                                                               LUNGS:  Clear.                                                               CARDIAC:  Regular rate and rhythm.  S1, S2.  No murmur.                      ABDOMEN:  Soft, positive bowel sounds, nontender.  No hepatosplenomegaly or masses.  No rebound or guarding.                                             EXTREMITIES:  No edema.     MENTAL STATUS:  Normal, alert and oriented.      ASSESSMENT/PLAN:     Assessment: Reflux    Plan: EGD    Anesthesia Plan: General    ASA Grade: ASA 2 - Patient with mild systemic disease with no functional limitations    MALLAMPATI SCORE:  I (soft palate, uvula, fauces, and tonsillar pillars visible)

## 2020-01-23 NOTE — [employer] PATIENT INSTRUCTIONS
Discharge Summary/Instructions after an Endoscopic Procedure  Patient Name: Yovani Malik  Patient MRN: [id number]  Patient YOB: 1990 Thursday, January 23, 2020  Yovani Dominguez MD  RESTRICTIONS:  During your procedure today, you received medications for sedation.  These   medications may affect your judgment, balance and coordination.  Therefore,   for 24 hours, you have the following restrictions:   - DO NOT drive a car, operate machinery, make legal/financial decisions,   sign important papers or drink alcohol.    ACTIVITY:  Today: no heavy lifting, straining or running due to procedural   sedation/anesthesia.  The following day: return to full activity including work.  DIET:  Eat and drink normally unless instructed otherwise.     TREATMENT FOR COMMON SIDE EFFECTS:  - Mild abdominal pain, nausea, belching, bloating or excessive gas:  rest,   eat lightly and use a heating pad.  - Sore Throat: treat with throat lozenges and/or gargle with warm salt   water.  - Because air was used during the procedure, expelling large amounts of air   from your rectum or belching is normal.  - If a bowel prep was taken, you may not have a bowel movement for 1-3 days.    This is normal.  SYMPTOMS TO WATCH FOR AND REPORT TO YOUR PHYSICIAN:  1. Abdominal pain or bloating, other than gas cramps.  2. Chest pain.  3. Back pain.  4. Signs of infection such as: chills or fever occurring within 24 hours   after the procedure.  5. Rectal bleeding, which would show as bright red, maroon, or black stools.   (A tablespoon of blood from the rectum is not serious, especially if   hemorrhoids are present.)  6. Vomiting.  7. Weakness or dizziness.  GO DIRECTLY TO THE NEAREST EMERGENCY ROOM IF YOU HAVE ANY OF THE FOLLOWING:      Difficulty breathing              Chills and/or fever over 101 F   Persistent vomiting and/or vomiting blood   Severe abdominal pain   Severe chest pain   Black, tarry stools   Bleeding- more than one  tablespoon   Any other symptom or condition that you feel may need urgent attention  Your doctor recommends these additional instructions:  If any biopsies were taken, your doctors clinic will contact you in 1 to 2   weeks with any results.  We are waiting for your pathology results.   Continue your present medications.   You are being discharged to home.  For questions, problems or results please call your physician - Yovani Dominguez MD at Work:  [phone].  EMERGENCY PHONE NUMBER: [phone], LAB RESULTS: [phone]  IF A COMPLICATION OR EMERGENCY SITUATION ARISES AND YOU ARE UNABLE TO REACH   YOUR PHYSICIAN - GO DIRECTLY TO THE EMERGENCY ROOM.  ___________________________________________  Nurse Signature  ___________________________________________  Patient/Designated Responsible Party Signature  Yovani Dominguez MD  1/23/2020 9:27:53 AM  This report has been verified and signed electronically.  [employer]

## 2020-01-23 NOTE — ANESTHESIA PREPROCEDURE EVALUATION
01/23/2020  Yovani Malik is a 29 y.o., male.    Pre-op Assessment    I have reviewed the Patient Summary Reports.     I have reviewed the Nursing Notes.   I have reviewed the Medications.     Review of Systems  Anesthesia Hx:  No problems with previous Anesthesia    Social:  Smoker    Cardiovascular:   Hypertension, well controlled hyperlipidemia    Pulmonary:  Pulmonary Normal    Renal/:  Renal/ Normal     Neurological:  Neurology Normal    Endocrine:  Endocrine Normal    Psych:   Psychiatric History anxiety          Physical Exam  General:  Well nourished, Obesity    Airway/Jaw/Neck:  Airway Findings: Mouth Opening: Normal Tongue: Normal  General Airway Assessment: Adult  Oropharynx Findings:  Mallampati: II  Jaw/Neck Findings:  Neck ROM: Normal ROM     Eyes/Ears/Nose:  Eyes/Ears/Nose Findings:    Dental:  Dental Findings:   Chest/Lungs:  Chest/Lungs Findings: Normal Respiratory Rate     Heart/Vascular:  Heart Findings: Rate: Normal  Rhythm: Regular Rhythm        Mental Status:  Mental Status Findings:  Cooperative, Alert and Oriented         Anesthesia Plan  Type of Anesthesia, risks & benefits discussed:  Anesthesia Type:  general  Patient's Preference:   Intra-op Monitoring Plan: standard ASA monitors  Intra-op Monitoring Plan Comments:   Post Op Pain Control Plan: multimodal analgesia  Post Op Pain Control Plan Comments:   Induction:   IV  Beta Blocker:  Patient is not currently on a Beta-Blocker (No further documentation required).       Informed Consent: Patient understands risks and agrees with Anesthesia plan.  Questions answered. Anesthesia consent signed with patient.  ASA Score: 2     Day of Surgery Review of History & Physical:    H&P update referred to the surgeon.         Ready For Surgery From Anesthesia Perspective.

## 2020-01-23 NOTE — ANESTHESIA POSTPROCEDURE EVALUATION
Anesthesia Post Evaluation    Patient: Yovani Malik    Procedure(s) Performed: Procedure(s) (LRB):  EGD (ESOPHAGOGASTRODUODENOSCOPY) (N/A)    Final Anesthesia Type: general    Patient location during evaluation: PACU  Patient participation: Yes- Able to Participate  Level of consciousness: sedated and awake  Post-procedure vital signs: reviewed and stable  Pain management: adequate  Airway patency: patent    PONV status at discharge: No PONV  Anesthetic complications: no      Cardiovascular status: blood pressure returned to baseline  Respiratory status: spontaneous ventilation  Hydration status: euvolemic  Follow-up not needed.          Vitals Value Taken Time   BP 99/59 1/23/2020  9:32 AM   Temp 36.8 °C (98.2 °F) 1/23/2020  9:32 AM   Pulse 68 1/23/2020  9:32 AM   Resp 21 1/23/2020  9:32 AM   SpO2 97 % 1/23/2020  9:32 AM         No case tracking events are documented in the log.      Pain/Isaak Score: Isaak Score: 4 (1/23/2020  9:32 AM)

## 2020-01-23 NOTE — TRANSFER OF CARE
"Anesthesia Transfer of Care Note    Patient: Yovani Malik    Procedure(s) Performed: Procedure(s) (LRB):  EGD (ESOPHAGOGASTRODUODENOSCOPY) (N/A)    Patient location: PACU    Anesthesia Type: general    Transport from OR: Transported from OR on room air with adequate spontaneous ventilation    Post pain: adequate analgesia    Post assessment: no apparent anesthetic complications and tolerated procedure well    Post vital signs: stable    Level of consciousness: awake    Nausea/Vomiting: no nausea/vomiting    Complications: none    Transfer of care protocol was followed      Last vitals:   Visit Vitals  /66 (BP Location: Right arm, Patient Position: Lying)   Pulse 69   Temp 36.6 °C (97.9 °F) (Skin)   Resp 18   Ht 5' 6" (1.676 m)   Wt 114.8 kg (253 lb)   SpO2 98%   BMI 40.84 kg/m²     "

## 2020-01-24 VITALS
HEIGHT: 66 IN | HEART RATE: 72 BPM | WEIGHT: 253 LBS | BODY MASS INDEX: 40.66 KG/M2 | TEMPERATURE: 98 F | SYSTOLIC BLOOD PRESSURE: 114 MMHG | OXYGEN SATURATION: 100 % | RESPIRATION RATE: 18 BRPM | DIASTOLIC BLOOD PRESSURE: 80 MMHG

## 2020-02-04 LAB
FINAL PATHOLOGIC DIAGNOSIS: NORMAL
GROSS: NORMAL

## 2020-02-04 RX ORDER — HYDROCODONE BITARTRATE AND ACETAMINOPHEN 5; 325 MG/1; MG/1
1 TABLET ORAL EVERY 6 HOURS PRN
Qty: 15 TABLET | Refills: 0 | Status: SHIPPED | OUTPATIENT
Start: 2020-02-04 | End: 2020-03-06 | Stop reason: SDUPTHER

## 2020-02-22 ENCOUNTER — PATIENT OUTREACH (OUTPATIENT)
Dept: ADMINISTRATIVE | Facility: OTHER | Age: 30
End: 2020-02-22

## 2020-03-01 ENCOUNTER — PATIENT OUTREACH (OUTPATIENT)
Dept: ADMINISTRATIVE | Facility: OTHER | Age: 30
End: 2020-03-01

## 2020-03-06 RX ORDER — HYDROCODONE BITARTRATE AND ACETAMINOPHEN 5; 325 MG/1; MG/1
1 TABLET ORAL EVERY 6 HOURS PRN
Qty: 15 TABLET | Refills: 0 | Status: SHIPPED | OUTPATIENT
Start: 2020-03-06 | End: 2020-03-16 | Stop reason: SDUPTHER

## 2020-03-09 ENCOUNTER — PATIENT OUTREACH (OUTPATIENT)
Dept: ADMINISTRATIVE | Facility: OTHER | Age: 30
End: 2020-03-09

## 2020-03-16 ENCOUNTER — PATIENT OUTREACH (OUTPATIENT)
Dept: ADMINISTRATIVE | Facility: OTHER | Age: 30
End: 2020-03-16

## 2020-03-16 RX ORDER — HYDROCODONE BITARTRATE AND ACETAMINOPHEN 5; 325 MG/1; MG/1
1 TABLET ORAL EVERY 6 HOURS PRN
Qty: 15 TABLET | Refills: 0 | Status: SHIPPED | OUTPATIENT
Start: 2020-03-16 | End: 2020-03-25 | Stop reason: SDUPTHER

## 2020-03-25 RX ORDER — HYDROCODONE BITARTRATE AND ACETAMINOPHEN 5; 325 MG/1; MG/1
1 TABLET ORAL EVERY 6 HOURS PRN
Qty: 15 TABLET | Refills: 0 | Status: SHIPPED | OUTPATIENT
Start: 2020-03-25 | End: 2020-04-07 | Stop reason: SDUPTHER

## 2020-04-07 RX ORDER — HYDROCODONE BITARTRATE AND ACETAMINOPHEN 5; 325 MG/1; MG/1
1 TABLET ORAL EVERY 6 HOURS PRN
Qty: 15 TABLET | Refills: 0 | Status: SHIPPED | OUTPATIENT
Start: 2020-04-07 | End: 2020-04-18 | Stop reason: SDUPTHER

## 2020-04-14 ENCOUNTER — TELEPHONE (OUTPATIENT)
Dept: ORTHOPEDICS | Facility: CLINIC | Age: 30
End: 2020-04-14

## 2020-04-14 NOTE — TELEPHONE ENCOUNTER
Left message on voicemail for pt to call back. Calling to see if they would like to reschedule appt with Dr. Wells that was canceled due to COVID-19 concerns? Thanks, eLyla

## 2020-04-20 RX ORDER — HYDROCODONE BITARTRATE AND ACETAMINOPHEN 5; 325 MG/1; MG/1
1 TABLET ORAL EVERY 6 HOURS PRN
Qty: 15 TABLET | Refills: 0 | Status: SHIPPED | OUTPATIENT
Start: 2020-04-20 | End: 2020-04-29 | Stop reason: SDUPTHER

## 2020-04-29 RX ORDER — HYDROCODONE BITARTRATE AND ACETAMINOPHEN 5; 325 MG/1; MG/1
1 TABLET ORAL EVERY 12 HOURS PRN
Qty: 28 TABLET | Refills: 0 | Status: SHIPPED | OUTPATIENT
Start: 2020-04-29 | End: 2020-06-08 | Stop reason: SDUPTHER

## 2020-06-01 ENCOUNTER — PATIENT OUTREACH (OUTPATIENT)
Dept: ADMINISTRATIVE | Facility: OTHER | Age: 30
End: 2020-06-01

## 2020-06-03 ENCOUNTER — OFFICE VISIT (OUTPATIENT)
Dept: ORTHOPEDICS | Facility: CLINIC | Age: 30
End: 2020-06-03
Payer: COMMERCIAL

## 2020-06-03 VITALS — RESPIRATION RATE: 16 BRPM | HEIGHT: 66 IN | WEIGHT: 253 LBS | BODY MASS INDEX: 40.66 KG/M2

## 2020-06-03 DIAGNOSIS — M25.561 RIGHT KNEE PAIN, UNSPECIFIED CHRONICITY: Primary | ICD-10-CM

## 2020-06-03 DIAGNOSIS — S83.281D ACUTE LATERAL MENISCAL TEAR, RIGHT, SUBSEQUENT ENCOUNTER: Primary | ICD-10-CM

## 2020-06-03 PROCEDURE — 99213 PR OFFICE/OUTPT VISIT, EST, LEVL III, 20-29 MIN: ICD-10-PCS | Mod: S$GLB,,, | Performed by: ORTHOPAEDIC SURGERY

## 2020-06-03 PROCEDURE — 3008F PR BODY MASS INDEX (BMI) DOCUMENTED: ICD-10-PCS | Mod: CPTII,S$GLB,, | Performed by: ORTHOPAEDIC SURGERY

## 2020-06-03 PROCEDURE — 99999 PR PBB SHADOW E&M-EST. PATIENT-LVL III: CPT | Mod: PBBFAC,,, | Performed by: ORTHOPAEDIC SURGERY

## 2020-06-03 PROCEDURE — 99999 PR PBB SHADOW E&M-EST. PATIENT-LVL III: ICD-10-PCS | Mod: PBBFAC,,, | Performed by: ORTHOPAEDIC SURGERY

## 2020-06-03 PROCEDURE — 3008F BODY MASS INDEX DOCD: CPT | Mod: CPTII,S$GLB,, | Performed by: ORTHOPAEDIC SURGERY

## 2020-06-03 PROCEDURE — 99213 OFFICE O/P EST LOW 20 MIN: CPT | Mod: S$GLB,,, | Performed by: ORTHOPAEDIC SURGERY

## 2020-06-03 NOTE — PROGRESS NOTES
Past Medical History:   Diagnosis Date    Anxiety     Borderline diabetes     Hyperlipidemia     Hypertension        Past Surgical History:   Procedure Laterality Date    ARTHROSCOPY OF KNEE Right 9/13/2019    Procedure: ARTHROSCOPY, KNEE;  Surgeon: Anurag Wells MD;  Location: [facility] OR;  Service: Orthopedics;  Laterality: Right;    ESOPHAGOGASTRODUODENOSCOPY N/A 10/3/2019    Procedure: EGD (ESOPHAGOGASTRODUODENOSCOPY);  Surgeon: Yovani Dominguez MD;  Location: [facility] ENDO;  Service: Endoscopy;  Laterality: N/A;    ESOPHAGOGASTRODUODENOSCOPY N/A 1/23/2020    Procedure: EGD (ESOPHAGOGASTRODUODENOSCOPY);  Surgeon: Yovani Dominguez MD;  Location: [facility] ENDO;  Service: Endoscopy;  Laterality: N/A;    KNEE ARTHROSCOPY      KNEE SURGERY Bilateral     x 2     REPAIR OF MENISCUS OF KNEE Right 9/13/2019    Procedure: REPAIR, MENISCUS, KNEE;  Surgeon: Anurag Wells MD;  Location: [facility] OR;  Service: Orthopedics;  Laterality: Right;  lateral meniscal repair    TONSILLECTOMY         Current Outpatient Medications   Medication Sig    ALPRAZolam (XANAX) 0.25 MG tablet     dextroamphetamine-amphetamine (ADDERALL XR) 30 MG 24 hr capsule TK TWO CAPSULES PO D IN THE EARLY MORNING    famotidine (PEPCID) 40 MG tablet Take 1 tablet (40 mg total) by mouth once daily.    HYDROcodone-acetaminophen (NORCO) 5-325 mg per tablet Take 1 tablet by mouth every 12 (twelve) hours as needed for Pain.    pantoprazole (PROTONIX) 40 MG tablet Take 1 tablet (40 mg total) by mouth once daily.     No current facility-administered medications for this visit.        Review of patient's allergies indicates:  No Known Allergies    Family History   Problem Relation Age of Onset    Diabetes Mother     Hypertension Mother     Hyperlipidemia Mother     Diabetes Father     Diabetes Maternal Grandmother     Cancer Maternal Grandmother         breast       Social History     Socioeconomic History    Marital status:       Spouse name: Not on file    Number of children: Not on file    Years of education: Not on file    Highest education level: Not on file   Occupational History    Not on file   Social Needs    Financial resource strain: Not on file    Food insecurity:     Worry: Not on file     Inability: Not on file    Transportation needs:     Medical: Not on file     Non-medical: Not on file   Tobacco Use    Smoking status: Current Some Day Smoker     Packs/day: 0.50     Types: Cigarettes    Smokeless tobacco: Never Used    Tobacco comment: maybe 0.5 pack a week   Substance and Sexual Activity    Alcohol use: No    Drug use: No    Sexual activity: Yes     Partners: Female   Lifestyle    Physical activity:     Days per week: Not on file     Minutes per session: Not on file    Stress: Not on file   Relationships    Social connections:     Talks on phone: Not on file     Gets together: Not on file     Attends Taoist service: Not on file     Active member of club or organization: Not on file     Attends meetings of clubs or organizations: Not on file     Relationship status: Not on file   Other Topics Concern    Not on file   Social History Narrative    Not on file       Chief Complaint:   Chief Complaint   Patient presents with    Right Knee - Post-op Evaluation, Pain       Date of surgery:  September 13, 2019    History of present illness:  This is a 29-year-old male underwent right arthroscopic lateral meniscal repair for a horizontal tear of the anterior horn body and posterior horn.  Patient is having more pain over the lateral compartment now.  The pain over the patella has improved.  Pain is a 7/10 at times.  He feels like gets the same way as before surgery now.  Has a stabbing pain with walking.      Review of Systems:    Musculoskeletal:  See HPI        Physical Examination:    Vital Signs:    Vitals:    06/03/20 1112   Resp: 16       Body mass index is 40.84 kg/m².    This a well-developed, well  nourished patient in no acute distress.  They are alert and oriented and cooperative to examination.  Pt. walks using the crutches    Examination of the right knee shows healed surgical portals.  No erythema or drainage. No significant effusion. No calf pain. Negative Homans sign.  Full range of motion. Pain over the lateral compartment.    X-rays:  None     Assessment::  Status post right arthroscopic lateral meniscal repair  Possible recurrent meniscal tear versus arthritic change    Plan:  I am Going to check an MRI to see if the meniscus healed.  We can proceed from there.  Patient might end up needing a osteotomy to offload that lateral compartment depending what the MRI shows.      This note was created using [employer] voice recognition software that occasionally misinterpreted phrases or words.

## 2020-06-06 ENCOUNTER — HOSPITAL ENCOUNTER (OUTPATIENT)
Dept: RADIOLOGY | Facility: HOSPITAL | Age: 30
Discharge: HOME | End: 2020-06-06
Attending: ORTHOPAEDIC SURGERY
Payer: COMMERCIAL

## 2020-06-06 DIAGNOSIS — M25.561 RIGHT KNEE PAIN, UNSPECIFIED CHRONICITY: ICD-10-CM

## 2020-06-06 PROCEDURE — 73721 MRI JNT OF LWR EXTRE W/O DYE: CPT | Mod: 26,RT,, | Performed by: RADIOLOGY

## 2020-06-06 PROCEDURE — 73721 MRI KNEE WITHOUT CONTRAST RIGHT: ICD-10-PCS | Mod: 26,RT,, | Performed by: RADIOLOGY

## 2020-06-06 PROCEDURE — 73721 MRI JNT OF LWR EXTRE W/O DYE: CPT | Mod: TC,PO,RT

## 2020-06-08 RX ORDER — HYDROCODONE BITARTRATE AND ACETAMINOPHEN 5; 325 MG/1; MG/1
1 TABLET ORAL EVERY 12 HOURS PRN
Qty: 28 TABLET | Refills: 0 | Status: SHIPPED | OUTPATIENT
Start: 2020-06-08 | End: 2020-06-29 | Stop reason: SDUPTHER

## 2020-06-09 ENCOUNTER — TELEPHONE (OUTPATIENT)
Dept: ORTHOPEDICS | Facility: CLINIC | Age: 30
End: 2020-06-09

## 2020-06-09 NOTE — TELEPHONE ENCOUNTER
----- Message from Anurag Wells MD sent at 6/8/2020  7:29 AM CDT -----  Results noted. Pt needs appt to discuss results and treatment options.

## 2020-06-10 ENCOUNTER — OFFICE VISIT (OUTPATIENT)
Dept: ORTHOPEDICS | Facility: CLINIC | Age: 30
End: 2020-06-10
Payer: COMMERCIAL

## 2020-06-10 VITALS — BODY MASS INDEX: 40.66 KG/M2 | RESPIRATION RATE: 16 BRPM | HEIGHT: 66 IN | WEIGHT: 253 LBS

## 2020-06-10 DIAGNOSIS — M23.200 OLD COMPLEX TEAR OF LATERAL MENISCUS OF RIGHT KNEE: Primary | ICD-10-CM

## 2020-06-10 DIAGNOSIS — G89.29 CHRONIC PAIN OF RIGHT KNEE: ICD-10-CM

## 2020-06-10 DIAGNOSIS — M25.561 CHRONIC PAIN OF RIGHT KNEE: ICD-10-CM

## 2020-06-10 PROCEDURE — 99999 PR PBB SHADOW E&M-EST. PATIENT-LVL III: ICD-10-PCS | Mod: PBBFAC,,, | Performed by: ORTHOPAEDIC SURGERY

## 2020-06-10 PROCEDURE — 3008F BODY MASS INDEX DOCD: CPT | Mod: CPTII,S$GLB,, | Performed by: ORTHOPAEDIC SURGERY

## 2020-06-10 PROCEDURE — 3008F PR BODY MASS INDEX (BMI) DOCUMENTED: ICD-10-PCS | Mod: CPTII,S$GLB,, | Performed by: ORTHOPAEDIC SURGERY

## 2020-06-10 PROCEDURE — 99999 PR PBB SHADOW E&M-EST. PATIENT-LVL III: CPT | Mod: PBBFAC,,, | Performed by: ORTHOPAEDIC SURGERY

## 2020-06-10 PROCEDURE — 99213 PR OFFICE/OUTPT VISIT, EST, LEVL III, 20-29 MIN: ICD-10-PCS | Mod: S$GLB,,, | Performed by: ORTHOPAEDIC SURGERY

## 2020-06-10 PROCEDURE — 99213 OFFICE O/P EST LOW 20 MIN: CPT | Mod: S$GLB,,, | Performed by: ORTHOPAEDIC SURGERY

## 2020-06-10 NOTE — PROGRESS NOTES
Past Medical History:   Diagnosis Date    Anxiety     Borderline diabetes     Hyperlipidemia     Hypertension        Past Surgical History:   Procedure Laterality Date    ARTHROSCOPY OF KNEE Right 9/13/2019    Procedure: ARTHROSCOPY, KNEE;  Surgeon: Anurag Wells MD;  Location: [facility] OR;  Service: Orthopedics;  Laterality: Right;    ESOPHAGOGASTRODUODENOSCOPY N/A 10/3/2019    Procedure: EGD (ESOPHAGOGASTRODUODENOSCOPY);  Surgeon: Yovani Dominguez MD;  Location: [facility] ENDO;  Service: Endoscopy;  Laterality: N/A;    ESOPHAGOGASTRODUODENOSCOPY N/A 1/23/2020    Procedure: EGD (ESOPHAGOGASTRODUODENOSCOPY);  Surgeon: Yovani Dominguez MD;  Location: [facility] ENDO;  Service: Endoscopy;  Laterality: N/A;    KNEE ARTHROSCOPY      KNEE SURGERY Bilateral     x 2     REPAIR OF MENISCUS OF KNEE Right 9/13/2019    Procedure: REPAIR, MENISCUS, KNEE;  Surgeon: Anurag Wells MD;  Location: [facility] OR;  Service: Orthopedics;  Laterality: Right;  lateral meniscal repair    TONSILLECTOMY         Current Outpatient Medications   Medication Sig    ALPRAZolam (XANAX) 0.25 MG tablet     dextroamphetamine-amphetamine (ADDERALL XR) 30 MG 24 hr capsule TK TWO CAPSULES PO D IN THE EARLY MORNING    famotidine (PEPCID) 40 MG tablet Take 1 tablet (40 mg total) by mouth once daily.    HYDROcodone-acetaminophen (NORCO) 5-325 mg per tablet Take 1 tablet by mouth every 12 (twelve) hours as needed for Pain.    pantoprazole (PROTONIX) 40 MG tablet Take 1 tablet (40 mg total) by mouth once daily.     No current facility-administered medications for this visit.        Review of patient's allergies indicates:  No Known Allergies    Family History   Problem Relation Age of Onset    Diabetes Mother     Hypertension Mother     Hyperlipidemia Mother     Diabetes Father     Diabetes Maternal Grandmother     Cancer Maternal Grandmother         breast       Social History     Socioeconomic History    Marital status:       Spouse name: Not on file    Number of children: Not on file    Years of education: Not on file    Highest education level: Not on file   Occupational History    Not on file   Social Needs    Financial resource strain: Not on file    Food insecurity:     Worry: Not on file     Inability: Not on file    Transportation needs:     Medical: Not on file     Non-medical: Not on file   Tobacco Use    Smoking status: Current Some Day Smoker     Packs/day: 0.50     Types: Cigarettes    Smokeless tobacco: Never Used    Tobacco comment: maybe 0.5 pack a week   Substance and Sexual Activity    Alcohol use: No    Drug use: No    Sexual activity: Yes     Partners: Female   Lifestyle    Physical activity:     Days per week: Not on file     Minutes per session: Not on file    Stress: Not on file   Relationships    Social connections:     Talks on phone: Not on file     Gets together: Not on file     Attends [employer] service: Not on file     Active member of club or organization: Not on file     Attends meetings of clubs or organizations: Not on file     Relationship status: Not on file   Other Topics Concern    Not on file   Social History Narrative    Not on file       Chief Complaint:   Chief Complaint   Patient presents with    Knee Pain     right knee-mri results        Date of surgery:  September 13, 2019    History of present illness:  This is a 29-year-old male underwent right arthroscopic lateral meniscal repair for a horizontal tear of the anterior horn body and posterior horn.  Patient is having more pain over the lateral compartment now.  The pain over the patella has improved.  Pain is a 7/10 at times.  He feels like gets the same way as before surgery now.  Has a stabbing pain with walking.      Review of Systems:    Musculoskeletal:  See HPI        Physical Examination:    Vital Signs:    Vitals:    06/10/20 1314   Resp: 16       Body mass index is 40.84 kg/m².    This a well-developed, well  nourished patient in no acute distress.  They are alert and oriented and cooperative to examination.  Pt. walks using the crutches    Examination of the right knee shows healed surgical portals.  No erythema or drainage. No significant effusion. No calf pain. Negative Homans sign.  Full range of motion. Pain over the lateral compartment.    MRI of the right knee:Status post partial meniscectomy of the lateral meniscus, with probable recurrent tear.  Small joint effusion.     Assessment::  Status post right arthroscopic lateral meniscal repair  Possible recurrent meniscal tear versus arthritic change    Plan:  I reviewed the MRI with him today.  It certainly looks like it might be torn.  Patient has been told in the past he might benefit from a lateral meniscal allograft.  He also might need a distal femoral osteotomy to offload the lateral compartment.  I will send him to Dr. Lopez.    This note was created using [employer] voice recognition software that occasionally misinterpreted phrases or words.

## 2020-06-16 ENCOUNTER — PATIENT OUTREACH (OUTPATIENT)
Dept: ADMINISTRATIVE | Facility: OTHER | Age: 30
End: 2020-06-16

## 2020-06-17 ENCOUNTER — OFFICE VISIT (OUTPATIENT)
Dept: UROLOGY | Facility: CLINIC | Age: 30
End: 2020-06-17
Payer: COMMERCIAL

## 2020-06-17 VITALS
WEIGHT: 253.06 LBS | BODY MASS INDEX: 40.67 KG/M2 | SYSTOLIC BLOOD PRESSURE: 123 MMHG | DIASTOLIC BLOOD PRESSURE: 78 MMHG | HEIGHT: 66 IN | HEART RATE: 70 BPM

## 2020-06-17 DIAGNOSIS — Z30.2 ENCOUNTER FOR STERILIZATION: ICD-10-CM

## 2020-06-17 DIAGNOSIS — N40.0 BENIGN PROSTATIC HYPERPLASIA, UNSPECIFIED WHETHER LOWER URINARY TRACT SYMPTOMS PRESENT: Primary | ICD-10-CM

## 2020-06-17 DIAGNOSIS — A63.0 CONDYLOMA: ICD-10-CM

## 2020-06-17 PROCEDURE — 99204 PR OFFICE/OUTPT VISIT, NEW, LEVL IV, 45-59 MIN: ICD-10-PCS | Mod: S$GLB,,, | Performed by: UROLOGY

## 2020-06-17 PROCEDURE — 99204 OFFICE O/P NEW MOD 45 MIN: CPT | Mod: S$GLB,,, | Performed by: UROLOGY

## 2020-06-17 PROCEDURE — 3008F BODY MASS INDEX DOCD: CPT | Mod: CPTII,S$GLB,, | Performed by: UROLOGY

## 2020-06-17 PROCEDURE — 99999 PR PBB SHADOW E&M-EST. PATIENT-LVL III: CPT | Mod: PBBFAC,,, | Performed by: UROLOGY

## 2020-06-17 PROCEDURE — 99999 PR PBB SHADOW E&M-EST. PATIENT-LVL III: ICD-10-PCS | Mod: PBBFAC,,, | Performed by: UROLOGY

## 2020-06-17 PROCEDURE — 3008F PR BODY MASS INDEX (BMI) DOCUMENTED: ICD-10-PCS | Mod: CPTII,S$GLB,, | Performed by: UROLOGY

## 2020-06-17 RX ORDER — DIAZEPAM 10 MG/1
10 TABLET ORAL ONCE
Qty: 1 TABLET | Refills: 0 | Status: SHIPPED | OUTPATIENT
Start: 2020-06-17 | End: 2020-07-22

## 2020-06-17 NOTE — PROGRESS NOTES
Subjective:       Patient ID: Yovani Malik is a 30 y.o. male.    Chief Complaint: Consult (vasectomy)    HPI     29 year old with 1 kid.  He and his wife desire permanent sterility.  He says he has given this careful thought and he knows that vasectomy with result in permanent sterility.  He is otherwise healthy and he has no voiding complaints.   I explained the procedure in detail.  We discussed the risks including bleeding, infection, hematoma, persistent pain and failure.  He knows that he will not be immediately sterile and that he should use alternative birth control until azospermia can be confirmed microscopically.  We discussed expected post procedure pain and limitations.  He was given an Rx for Valium to be taken before procedure and he knows that he cannot drive home.  He was given written instructions and I answered all questions.  He also complains of a skin tag at the base of his penis which has been present for years but getting larger in size.  It does not cause any pain or discomfort.  I spent 30 minutes with the patient. Over 50% of the visit was spent in counseling.    Past Medical History:   Diagnosis Date    Anxiety     Borderline diabetes     Hyperlipidemia     Hypertension      Past Surgical History:   Procedure Laterality Date    ARTHROSCOPY OF KNEE Right 9/13/2019    Procedure: ARTHROSCOPY, KNEE;  Surgeon: Anurag Wells MD;  Location: [facility] OR;  Service: Orthopedics;  Laterality: Right;    ESOPHAGOGASTRODUODENOSCOPY N/A 10/3/2019    Procedure: EGD (ESOPHAGOGASTRODUODENOSCOPY);  Surgeon: Yovani Dominguez MD;  Location: [facility] ENDO;  Service: Endoscopy;  Laterality: N/A;    ESOPHAGOGASTRODUODENOSCOPY N/A 1/23/2020    Procedure: EGD (ESOPHAGOGASTRODUODENOSCOPY);  Surgeon: Yovani Dominguez MD;  Location: [facility] ENDO;  Service: Endoscopy;  Laterality: N/A;    KNEE ARTHROSCOPY      KNEE SURGERY Bilateral     x 2     REPAIR OF MENISCUS OF KNEE Right 9/13/2019    Procedure:  REPAIR, MENISCUS, KNEE;  Surgeon: Anurag Wells MD;  Location: [facility] OR;  Service: Orthopedics;  Laterality: Right;  lateral meniscal repair    TONSILLECTOMY         Current Outpatient Medications:     ALPRAZolam (XANAX) 0.25 MG tablet, , Disp: , Rfl: 1    dextroamphetamine-amphetamine (ADDERALL XR) 30 MG 24 hr capsule, TK TWO CAPSULES PO D IN THE EARLY MORNING, Disp: , Rfl: 0    famotidine (PEPCID) 40 MG tablet, Take 1 tablet (40 mg total) by mouth once daily., Disp: 30 tablet, Rfl: 2    HYDROcodone-acetaminophen (NORCO) 5-325 mg per tablet, Take 1 tablet by mouth every 12 (twelve) hours as needed for Pain., Disp: 28 tablet, Rfl: 0    diazePAM (VALIUM) 10 MG Tab, Take 1 tablet (10 mg total) by mouth once. for 1 dose, Disp: 1 tablet, Rfl: 0      Review of Systems   Constitutional: Negative for fever.   Eyes: Negative for visual disturbance.   Respiratory: Negative for shortness of breath.    Cardiovascular: Negative for chest pain.   Gastrointestinal: Negative for nausea.   Genitourinary: Negative for dysuria and hematuria.   Musculoskeletal: Negative for gait problem.   Skin: Negative for rash.   Neurological: Negative for seizures.   Psychiatric/Behavioral: Negative for confusion.       Objective:      Physical Exam  Vitals signs reviewed.   Constitutional:       Appearance: He is well-developed.   HENT:      Head: Normocephalic and atraumatic.   Eyes:      Conjunctiva/sclera: Conjunctivae normal.   Cardiovascular:      Rate and Rhythm: Normal rate.   Pulmonary:      Effort: Pulmonary effort is normal.   Genitourinary:     Penis: Normal and circumcised.       Scrotum/Testes: Normal.      Prostate: Enlarged. Not tender.      Rectum: No mass. Normal anal tone.          Comments: Bilateral vas are palpable  Musculoskeletal: Normal range of motion.      Right lower leg: No edema.      Left lower leg: No edema.   Skin:     General: Skin is warm and dry.      Findings: No rash.   Neurological:      Mental  Status: He is alert and oriented to person, place, and time.         Assessment:       1. Benign prostatic hyperplasia, unspecified whether lower urinary tract symptoms present    2. Encounter for sterilization    3. Condyloma        Plan:       Benign prostatic hyperplasia, unspecified whether lower urinary tract symptoms present  -     POCT URINE DIPSTICK WITHOUT MICROSCOPE    Encounter for sterilization  -     Vasectomy; Future    Condyloma    Other orders  -     diazePAM (VALIUM) 10 MG Tab; Take 1 tablet (10 mg total) by mouth once. for 1 dose  Dispense: 1 tablet; Refill: 0        Schedule vasectomy next available date.  We will plan on treating the condyloma at the same time.

## 2020-06-29 RX ORDER — HYDROCODONE BITARTRATE AND ACETAMINOPHEN 5; 325 MG/1; MG/1
1 TABLET ORAL EVERY 12 HOURS PRN
Qty: 28 TABLET | Refills: 0 | Status: SHIPPED | OUTPATIENT
Start: 2020-06-29 | End: 2020-07-17 | Stop reason: SDUPTHER

## 2020-06-29 RX ORDER — HYDROCODONE BITARTRATE AND ACETAMINOPHEN 5; 325 MG/1; MG/1
1 TABLET ORAL EVERY 12 HOURS PRN
Qty: 28 TABLET | Refills: 0 | Status: CANCELLED | OUTPATIENT
Start: 2020-06-29

## 2020-07-07 NOTE — PROGRESS NOTES
Subjective:          Chief Complaint: Yovani Malik is a 30 y.o. male who had concerns including Pain of the Right Knee.    Yovani Malik is a 30 y.o. male presents today for evaluation of his right knee.  Patient has a long history of surgery on the right knee.  He initially injured the knee in 2016 underwent a partial lateral meniscectomy.  This is done by a doctor at [facility] that he cannot remember.  Recovered fairly level continues to have symptoms when he saw Dr. Jhon Wells and underwent a lateral meniscus repair.  This was done September 13, 2019.  He has not recovered well from the surgery.  He continues to have lateral-sided pain.  No mechanical symptoms.  No instability.  He continues to have some swelling in the knee as well.  His pain is mostly lateral.  It is worse with weight-bearing worse with activity.  He has tried physical therapy to some relief.    Body mass index is 40.84 kg/m².           Review of Systems   Constitution: Negative. Negative for fever and night sweats.   HENT: Negative.  Negative for hearing loss.    Eyes: Negative.  Negative for blurred vision and visual disturbance.   Cardiovascular: Negative.  Negative for chest pain and leg swelling.   Respiratory: Negative.  Negative for shortness of breath.    Endocrine: Negative.  Negative for polyuria.   Hematologic/Lymphatic: Negative.  Negative for bleeding problem.   Skin: Negative.  Negative for rash.   Musculoskeletal: Positive for joint pain. Negative for back pain, joint swelling, muscle cramps and muscle weakness.   Gastrointestinal: Negative.  Negative for melena.   Genitourinary: Negative.  Negative for hematuria.   Neurological: Negative.  Negative for loss of balance, numbness and paresthesias.   Psychiatric/Behavioral: Negative.  Negative for altered mental status.   Allergic/Immunologic: Negative.        Pain Related Questions  Over the past 3 days, what was your average pain during activity? (I.e. running, jogging, walking,  climbing stairs, getting dressed, ect.): 7  Over the past 3 days, what was your highest pain level?: 9  Over the past 3 days, what was your lowest pain level? : 5    Other  How many nights a week are you awakened by your affected body part?: 7  Was the patient's HEIGHT measured or patient reported?: Patient Reported  Was the patient's WEIGHT measured or patient reported?: Measured      Objective:        General: Yovani is well-developed, well-nourished, appears stated age, in no acute distress, alert and oriented to time, place and person.     General    Vitals reviewed.  Constitutional: He is oriented to person, place, and time. He appears well-developed and well-nourished. No distress.   HENT:   Mouth/Throat: No oropharyngeal exudate.   Eyes: Right eye exhibits no discharge. Left eye exhibits no discharge.   Neck: Normal range of motion.   Pulmonary/Chest: Effort normal and breath sounds normal. No respiratory distress.   Neurological: He is alert and oriented to person, place, and time. He has normal reflexes. No cranial nerve deficit. Coordination normal.   Psychiatric: He has a normal mood and affect. His behavior is normal. Judgment and thought content normal.     General Musculoskeletal Exam   Gait: abnormal and antalgic       Right Knee Exam     Inspection   Erythema: absent  Scars: absent  Swelling: absent  Effusion: absent  Deformity: absent  Bruising: absent    Tenderness   The patient is tender to palpation of the lateral joint line.    Crepitus   The patient has crepitus of the lateral joint line (moderate).    Range of Motion   Extension: 0   Flexion: 140     Tests   Meniscus   Kwadwo:  Medial - negative Lateral - negative  Ligament Examination Lachman: normal (-1 to 2mm) PCL-Posterior Drawer: normal (0 to 2mm)     MCL - Valgus: normal (0 to 2mm)  LCL - Varus: normalPivot Shift: normal (Equal)Reverse Pivot Shift: normal (Equal)Dial Test at 30 degrees: normal (< 5 degrees)Dial Test at 90 degrees:  normal (< 5 degrees)  Posterior Sag Test: negative  Posterolateral Corner: unstable (>15 degrees difference)  Patella   Patellar apprehension: negative  Passive Patellar Tilt: neutral  Patellar Tracking: normal  Patellar Glide (quadrants): Lateral - 1   Medial - 2  Q-Angle at 90 degrees: normal  Patellar Grind: negative  J-Sign: none    Other   Meniscal Cyst: absent  Popliteal (Baker's) Cyst: absent  Sensation: normal    Comments:  Genu valgum    Left Knee Exam     Inspection   Erythema: absent  Scars: absent  Swelling: absent  Effusion: absent  Deformity: absent  Bruising: absent    Tenderness   The patient is experiencing no tenderness.     Range of Motion   Extension: 0   Flexion: 140     Tests   Meniscus   Kwadwo:  Medial - negative Lateral - negative  Stability Lachman: normal (-1 to 2mm) PCL-Posterior Drawer: normal (0 to 2mm)  MCL - Valgus: normal (0 to 2mm)  LCL - Varus: normal (0 to 2mm)Pivot Shift: normal (Equal)Reverse Pivot Shift: normal (Equal)Dial Test at 30 degrees: normal (< 5 degrees)Dial Test at 90 degrees: normal (< 5 degrees)  Posterior Sag Test: negative  Posterolateral Corner: unstable (>15 degrees difference)  Patella   Patellar apprehension: negative  Passive Patellar Tilt: neutral  Patellar Tracking: normal  Patellar Glide (Quadrants): Lateral - 1 Medial - 2  Q-Angle at 90 degrees: normal  Patellar Grind: negative  J-Sign: J sign absent    Other   Meniscal Cyst: absent  Popliteal (Baker's) Cyst: absent  Sensation: normal    Right Hip Exam     Tests   Marisa: negative  Left Hip Exam     Tests   Marisa: negative          Muscle Strength   Right Lower Extremity   Hip Abduction: 5/5   Quadriceps:  4/5   Hamstrin/5   Left Lower Extremity   Hip Abduction: 5/5   Quadriceps:  5/5   Hamstrin/5     Reflexes     Left Side  Quadriceps:  2+  Achilles:  2+    Right Side   Quadriceps:  2+  Achilles:  2+    Vascular Exam     Right Pulses  Dorsalis Pedis:      2+  Posterior Tibial:       2+        Left Pulses  Dorsalis Pedis:      2+  Posterior Tibial:      2+        Narrative & Impression    EXAMINATION:  XR KNEE ORTHO BILAT WITH FLEXION     CLINICAL HISTORY:  Pain in right knee     FINDINGS:  Four views bilateral.     Right: No fracture dislocation bone destruction or OCD seen.     Left: No fracture dislocation bone destruction or OCD seen.        Electronically signed by: Waldo Shelby MD  Date:                                            07/08/2020       EXAMINATION:  MRI KNEE WITHOUT CONTRAST RIGHT     CLINICAL HISTORY:  right knee pain;Pain in right knee     TECHNIQUE:  Multiplanar, multisequence images were performed about the knee.     COMPARISON:  03/07/2019     FINDINGS:  There is truncation of the body segment and anterior horn of the lateral meniscus compatible with prior tear and meniscectomy.  The body segment demonstrates oblique hyperintense signal extending to the inferior meniscal surface likely indicating a recurrent tear.     The medial meniscus, ACL, PCL, MCL, LCL complex, quadriceps, and patellar tendons are intact.     A small joint effusion is present.  No high-grade articular cartilage defects are identified.  Minimal marginal osteophyte formation about the lateral compartment is present.     Impression:     Status post partial meniscectomy of the lateral meniscus, with probable recurrent tear.     Small joint effusion.        Electronically signed by: Eyad Lopez MD  Date:                                            06/06/2020  Time:                                           12:17    Hip to ankle:  3 degrees valgus right Neutral left knee        Assessment:       Encounter Diagnoses   Name Primary?    Right knee pain, unspecified chronicity Yes    Acute lateral meniscus tear of right knee, initial encounter     Chondromalacia of right knee     Morbid obesity     Genu valgum, acquired, right           Plan:       1. IKDC, SF-12 and KOOS was filled out today in  clinic.     RTC in 6 weeks for preop appt with midlevel provider. Patient will not fill out IKDC, SF-12 and KOOS on return.     2. [id number] Misael Summers, performed a custom orthotic / brace adjustment, fitting and training with the patient. The patient demonstrated understanding and proper care. This was performed for 15 minutes.    3. Weight loss management discussed with patient with goal BMI <35    4. We reviewed with Yovani today, the pathology and natural history of his diagnosis. We have discussed a variety of treatment options including medications, physical therapy and other alternative treatments. I also explained the indications, risks and benefits of surgery. After discussion, Yovani decided to proceed with surgery. The decision was made to go forward with     Stage 1  1. Right distal femoral opening wedge osteotomy  2. Right knee arthroscopic lateral meniscectomy  3. Right knee arthroscopic limited synovectomy  4. Right knee arthroscopic lysis of adhesions     Stage 2  1. Right knee lateral meniscus allograft transplant  2. Right knee possible osteochondral allograft    The details of the surgical procedure were explained, including the location of probable incisions and a description of likely hardware and/or grafts to be used.  The patient understands the likely convalescence after surgery.  Also, we have thoroughly discussed the risks, benefits and alternatives to surgery, including, but not limited to, the risk of infection, joint stiffness, blood clot (including DVT and/or pulmonary embolus), neurologic and vascular injury.  It was explained that, if tissue has been repaired or reconstructed, there is a chance of failure, which may require further management.      All of the patient's questions were answered and informed consent was obtained. The patient will contact us if they have any questions or concerns in the interim.                     [facility] patient questionnaires have been collected  today.

## 2020-07-08 ENCOUNTER — PATIENT OUTREACH (OUTPATIENT)
Dept: ADMINISTRATIVE | Facility: OTHER | Age: 30
End: 2020-07-08

## 2020-07-08 ENCOUNTER — OFFICE VISIT (OUTPATIENT)
Dept: SPORTS MEDICINE | Facility: CLINIC | Age: 30
End: 2020-07-08
Payer: COMMERCIAL

## 2020-07-08 ENCOUNTER — HOSPITAL ENCOUNTER (OUTPATIENT)
Dept: RADIOLOGY | Facility: HOSPITAL | Age: 30
Discharge: HOME | End: 2020-07-08
Attending: ORTHOPAEDIC SURGERY
Payer: COMMERCIAL

## 2020-07-08 VITALS
HEIGHT: 66 IN | WEIGHT: 253 LBS | DIASTOLIC BLOOD PRESSURE: 91 MMHG | HEART RATE: 98 BPM | BODY MASS INDEX: 40.66 KG/M2 | SYSTOLIC BLOOD PRESSURE: 125 MMHG

## 2020-07-08 DIAGNOSIS — M25.561 RIGHT KNEE PAIN, UNSPECIFIED CHRONICITY: ICD-10-CM

## 2020-07-08 DIAGNOSIS — M21.061 GENU VALGUM, ACQUIRED, RIGHT: ICD-10-CM

## 2020-07-08 DIAGNOSIS — E66.01 MORBID OBESITY: ICD-10-CM

## 2020-07-08 DIAGNOSIS — S83.281A ACUTE LATERAL MENISCUS TEAR OF RIGHT KNEE, INITIAL ENCOUNTER: ICD-10-CM

## 2020-07-08 DIAGNOSIS — M94.261 CHONDROMALACIA OF RIGHT KNEE: ICD-10-CM

## 2020-07-08 DIAGNOSIS — M25.561 RIGHT KNEE PAIN, UNSPECIFIED CHRONICITY: Primary | ICD-10-CM

## 2020-07-08 PROCEDURE — 77073 BONE LENGTH STUDIES: CPT | Mod: TC

## 2020-07-08 PROCEDURE — 77073 BONE LENGTH STUDIES: CPT | Mod: 26,,, | Performed by: RADIOLOGY

## 2020-07-08 PROCEDURE — 73564 X-RAY EXAM KNEE 4 OR MORE: CPT | Mod: 26,RT,, | Performed by: RADIOLOGY

## 2020-07-08 PROCEDURE — 73564 X-RAY EXAM KNEE 4 OR MORE: CPT | Mod: TC,50

## 2020-07-08 PROCEDURE — 73560 X-RAY EXAM OF KNEE 1 OR 2: CPT | Mod: 26,59,RT, | Performed by: RADIOLOGY

## 2020-07-08 PROCEDURE — 99999 PR PBB SHADOW E&M-EST. PATIENT-LVL IV: ICD-10-PCS | Mod: PBBFAC,,, | Performed by: ORTHOPAEDIC SURGERY

## 2020-07-08 PROCEDURE — 3008F PR BODY MASS INDEX (BMI) DOCUMENTED: ICD-10-PCS | Mod: CPTII,S$GLB,, | Performed by: ORTHOPAEDIC SURGERY

## 2020-07-08 PROCEDURE — 99214 OFFICE O/P EST MOD 30 MIN: CPT | Mod: S$GLB,,, | Performed by: ORTHOPAEDIC SURGERY

## 2020-07-08 PROCEDURE — 73560 XR KNEE 1 OR 2 VIEW RIGHT: ICD-10-PCS | Mod: 26,59,RT, | Performed by: RADIOLOGY

## 2020-07-08 PROCEDURE — 99999 PR PBB SHADOW E&M-EST. PATIENT-LVL IV: CPT | Mod: PBBFAC,,, | Performed by: ORTHOPAEDIC SURGERY

## 2020-07-08 PROCEDURE — 77073 XR HIP TO ANKLE: ICD-10-PCS | Mod: 26,,, | Performed by: RADIOLOGY

## 2020-07-08 PROCEDURE — 73564 X-RAY EXAM KNEE 4 OR MORE: CPT | Mod: 26,LT,, | Performed by: RADIOLOGY

## 2020-07-08 PROCEDURE — 73560 X-RAY EXAM OF KNEE 1 OR 2: CPT | Mod: TC,RT

## 2020-07-08 PROCEDURE — 3008F BODY MASS INDEX DOCD: CPT | Mod: CPTII,S$GLB,, | Performed by: ORTHOPAEDIC SURGERY

## 2020-07-08 PROCEDURE — 99214 PR OFFICE/OUTPT VISIT, EST, LEVL IV, 30-39 MIN: ICD-10-PCS | Mod: S$GLB,,, | Performed by: ORTHOPAEDIC SURGERY

## 2020-07-08 PROCEDURE — 73564 PR  X-RAY KNEE 4+ VIEW: ICD-10-PCS | Mod: 26,LT,, | Performed by: RADIOLOGY

## 2020-07-08 NOTE — PATIENT INSTRUCTIONS
The knee is the most frequently injured joint in the body. Most injuries are due to the extreme stresses during twisting or turning activities or sports. The knee joint is made up of three bones, four major ligaments and two types of cartilage.    FEMUR (Thigh Bone)  The femoral condyles are the two rounded prominences at the end of the femur. The motion of the condyles include rocking, gliding and rotating. Any abnormal surface structure or cartilage damage can lead to cartilage breakdown and arthritis (loss of cartilage padding).    TIBIA (Shin Bone)  The Tibia meets the Femur at the knee in two areas on which the Femur rides. This area is called the Tibial Plateau.    PATELLA (Knee Cap)  The Patella is a bone that lies within the quadriceps tendon. It rides in the shallow groove over the front part if the Femur called the Trochlea. The Patella acts as a lever arm to help the quadriceps muscle extend the knee.    Articular Cartilage covers the ends of these bones at the knee joint. This glistening white substance has the consistency of firm rubber but is actually a mixture of collagen and special large sponge-like molecules all maintained by living cartilage cells (chondrocytes). With normal joint fluid for lubrication, the surface is more slippery than ice on ice and allows smooth and easy knee joint motion.      MENISCUS  The other type of knee cartilage is the Meniscal Cartilage (fibrocartilage). These C-shaped pads are found between the thigh bone and shin bone -- one on each side -- thus called the Medial Meniscus (inner thigh aspect) and Lateral Meniscus (outer aspect). The menisci are attached to the Tibial Plateaus, and serve to cushion and transfer joint force more evenly to the tibia. They accomplish this by distributing joint forces over a larger area of the joint -- transferring force from the curved femoral condylar margins to the flatter tibial plateaus. Damage to the meniscal cushion may result  "in increased stress on the articular cartilage of the tibia and femur and early arthritis.      The smooth, white shiny covering of the bones in the joint is known as Articular Cartilage, and is made of a material called "hyaline cartilage". Damage to this articular cartilage can occur from trauma (such as a fall or car accident), but more often, it is the result of repetitive injuries over a long period of time.    Articular cartilage injuries are common, occurring in 20- 70% of knee injuries. The function of articular cartilage is to optimize joint function by reducing friction and increasing shock absorption. Articular cartilage is similar to the "tread on a car tire".    Injuries to the articular cartilage have a low capacity for healing. Both superficial and full-thickness cartilage injuries may progress to the mechanical wear and breakdown of the cartilage matrix.  The breakdown of articular cartilage will eventually cause osteoarthritis, which is a very serious painful condition. With a full-thickness cartilage injury, continued activity may cause the articular cartilage injury to progress rapidly to traumatic arthritis. The larger the initial cartilage injury, the faster the potential progression of arthritis. Articular cartilage injury or wear leads to joint pain.      Common symptoms include pain when the joint is moved or loaded (like walking or running). Catching, locking, or creaking (crepitation) may occur with joint motion or weight bearing (like twisting or standing  from a seated position). Articular cartilage damage may be superficial (partial), deep (complete full-thickness), or osteochondral (bone and cartilage).    Superficial cartilage injuries extend into the upper 50% of the depth of the cartilage. These injuries typically do not heal, but may not progress to arthritis unless they are large and located in a weight-bearing area of the knee.  Deep or full-thickness lesions extend down to the " "bone, but not through it. These injuries have very little healing potential and tend to progress to osteoarthritis if located in a weight-bearing area.    Osteochondral (bone plus cartilage) injuries extend down through the subchondral bone (deep to the cartilage). These injuries may heal by allowing blood vessel ingrowth with fibrous cells to produce a fibrous (scar-like) tissue repair.      Treatment Options For Smaller Defects  Most studies suggest the repair tissue formed from bone marrow stimulation techniques is fibrocartilage (scar tissue -not hyaline cartilage), which is less resistant to wear with the forces in the knee joint and often deteriorates over time Bone marrow stimulation techniques can be successful in eliminating symptoms in many patients, especially young patients with small lesions.   Without early intervention, cartilage degeneration may proceed, and prevent a chance for successful pain- free function.    Arthroscopic Debridement  This is an arthroscopic procedure, often known as a "knee scope". The surgeon uses minimally invasive techniques with a small fiberoptic light source attached to a video camera to locate damaged cartilage and trim the loose edges away to smooth the surface. The surgeon may trim meniscus tears and remove loose cartilage that causes catching or locking symptoms and attempts to prevent any further flaking off that can irritate the knee joint lining and cause swelling.  Arthroscopic debridement is most effective for small lesions, less than 1 cm2 (3/8 inch). It is not as effective for larger lesions because it does not fill the lesion with any repair tissue, leaving the edges exposed to high forces in the knee and continued wear. Despite relieving some symptoms from cartilage tears or loss, arthroscopic cleaning does not prevent the progression of arthritis, but may relieve mechanical symptoms.    Bone Marrow Stimulation Techniques  Three different techniques are " "available to create bleeding and clot formation at the cartilage defect. Blood vessels and fibrous cells migrate to the area to form a fibrous scar-tissue repair tissue to fill the hole in the articular cartilage. Drilling creates multiple small holes through the subchondral bone to allow bleeding into the defect.   Microfracture or "picking" creates small fractures in the subchondral bone to encourage bleeding into the defect.      Abrasion arthroplasty is a superficial shaving of the subchondral bone surface to create bleeding into the defect. Bone marrow stimulation techniques are successful in eliminating symptoms in many patients, especially younger patients with smaller lesions well contained lesions.       For patients with more extensive cartilage damage there are several techniques available    Osteochondral Autograft  This technique is analogous to a hair-plug transfer. The surgeon removes a small section of the patient's own cartilage along with the underlying bone plug, hence the name osteochondral (bone and cartilage) graft.      Bone and cartilage cylinders are harvested from a minor weight bearing area of the knee joint and transplanted into prepared holes in the damage area. This process works much like a hair transplant. Unfortunately, a limited amount of normal osteochondral tissue is available for harvest. The typical size of defect treatable with this method is between 1 to 2 cm2.      Treatment Options For Larger Lesions    Autologous Chondrocyte Implantation (ACI) Carticel  For cartilage defects greater than 2 square centimeters or if there are multiple defects in the knee, one of the newer techniques for the cartilage regeneration, is ACI. ACI is FDA indicated for full-thickness cartilage or osteochondral lesions located in the knee.    ACI is performed in two stages. The first stage is performed when initially assessing the joint arthroscopically. A small amount of cartilage is harvested and " sent to a laboratory for cell culture and growth.    The patient's own cartilage cells (chondrocytes) are grown in culture increasing the number of cells by 10 fold.    Twelve million chondrocytes are then re-implanted into the cartilage defect and covered with a ceiling of native tissue (periosteum).    The cells then grow to fill the defect and resurface areas of cartilage loss with hyaline-like cartilage.    The long-term outcomes (of 14 years) are encouraging for treating medium and large cartilage lesions. ACI is the only procedure with a formal patient outcomes registry.    Osteochondral Allograft     For larger defects that involve both cartilage and bone loss, surgeons may custom fit the defect with a cadaver implant of donated cartilage and bone. The transplanted tissue is matched to the patient based on size of the knee, but tissue typing (similar to organ transplantation) is not necessary. Osteochondral transplantation may allow restoration of the joint surface. The long-term outcomes with fresh allograft (cadaver) tissue have been very encouraging.      RESTORING THE MENISCUS  Our goal is to maintain normal anatomy, if possible. In the case of meniscal tears, the first line of treatment is attempt at repair. Historically, in the days of open cartilage surgery, the entire meniscus was removed. Unfortunately, long term follow-up studies of these patients after removal of the meniscus have found that many go on to degenerative arthritis. Today, cartilage surgeons recognize the protective value of the meniscal cartilage and make every attempt to conserve this valuable tissue.    To maximally preserve function after a meniscus tear, surgeons may repair the meniscus using a variety of techniques. Options include using special sutures or absorbable implants to staple, raffi, or otherwise fix and secure the torn meniscal cushion.    Replacing The Meniscus  For patients who have had the meniscus removed, an  innovative solution called a meniscal transplant may be an option. The indications for transplant need to be assessed by your cartilage surgeon. Unlike some other forms of tissue transplantation, this procedure does not require patients to be on medications to prevent organ rejection. Intermediate term outcome studies are encouraging.        RESTORING KNEE ALIGNMENT    Osteotomy  When the bones of the knee do not align properly, joint forces are not evenly distributed and may overload one side of the knee causing pain and cartilage degeneration. This overload problem is made worse when there has been a traumatic cartilage injury or the meniscus has been removed.    An osteotomy is designed to shift the stress from the damaged part of the knee to a more normal area in the knee. An osteotomy requires the surgeon to cut the bone in order to remove a wedge of bone in order to adjust the angle of the knee. For individuals with medial compartment narrowing (the most common situation), there are three options for high tibial osteotomy (HTO).      One involves removing a wedge shape piece of bone from the lateral side of the tibia and then closing the remaining surfaces together and holding it with a plate and screws (Fig A).    The second technique involves making one cut partially across the top of the tibia and opening the bone to establish the correct alignment. This is held in place with a plate and screws and a bone graft. (Fig B)    The final technique is called medial hemicallotasis, which means stretching healing bone. This technique requires a single cut partially across the bone. The bone is then gradually opened on the medial side by an external fixation frame. This technique is attractive because it allows adjustments to be made in the angle of correction and the hardware is removed three months after the procedure (Fig C).      Each of these techniques require a period of non-weightbearing or partial  "weightbearing crutch ambulation. These techniques may be necessary at the same time or prior to other reconstructive procedures such as cartilage replacement or meniscus replacement surgery in order to remove excessive forces off the repair site.      OSTEOARTHRITIS    Partial (Unicompartmental) Joint Replacement  This procedure replaces only the damaged portion of the joint with metal and/or plastic, leaving the remaining portion of the joint intact. It is often known as a partial knee replacement. New techniques allow replacement of a portion of the knee joint through smaller incisions with fewer days of hospitalization required.    Total Joint Replacement  If there is extensive damage with bone-on-bone apposition, many of the above procedures are not indicated. In these cases of end-stage arthritis, the entire joint surface is replaced with artificial metal and plastic components, allowing most patients to return to pain-free activities.    Future Trends  Investigators are now examining the potential of using collagen or other biologic tissues to serve as a bridge or scaffold for the body's own healing/repair mechanism to use in reestablishing meniscal form and function. In the future, biological "healing glues" may become available to allow repair without sutures. Even with the newest techniques available, certain tears are not repairable and a portion of the meniscus is removed using the arthroscope (partial meniscectomy).    The term osteoarthritis indicates the degeneration and excessive wear of articular cartilage with gradual changes occurring in the underlying bone.    Initially the articular cartilage softens, the surface becomes uneven and the cartilage frays and develops cracks, which can extend down to bone.  In advanced osteoarthritis the cartilage is worn away to reveal the underlying bone and nerve endings causing pain.  The bone hardens, cysts begin to form, and new cartilage cells laid down around " the worn cartilage ossify and form bony projections (bone spurs).  Untreated articular cartilage defects can progress to osteoarthritis with both mechanical and enzymatic breakdown resulting in permanent dysfunction of the joint. Our goal is to diagnose and prevent or halt the progression of arthritis      Many patients suffer with end-stage arthritis that limits even the simplest activities of daily living, significantly altering the patient's quality of fife. For these patient's, current cartilage surgical options DO NOT apply. There are no curative therapies for end-stage arthritis. A wide range of methods may be used to relieve pain and restore function. Conventional treatment methods include exercise, physical therapy and medications, such as anti-inflammatories. Recently, new biological compounds have been shown to be effective and safe for treating arthritis. These compounds include lubricants (hyaluronic acid) and building blocks of cartilage (Chondroitin Sulfate and Glucosamine).    Medications  Oral medications such as non-steroidal anti-inflammatories (NSAID's) tend to have a beneficial effect on the degenerative conditions described above. Immediately following an injury, these medications help to decrease pain and swelling. On a more long term nature, these medications help to relieve the pain and swelling associated with arthritic joints. Newer specific anti-inflammatories medications have been developed to block the enzymes necessary for production of inflammation (cyclooxygenase-2 or PRYOR-2). These medications, such as Ceibrex or Bextra tend to have less adverse effects on the stomach and gastrointestinal tract than older, more traditional NSAID's. These prescription-strength NSAID medications are taken by mouth once or twice per day. Other non-prescription over-the-counter NSAID's are available.    Cartilage Supplements  Other oral medications which can be purchased without a prescription include  Glucosamine and Chondroitin Sulfate. These two compounds are normally found in the substance of articular cartilage. Several recent studies using Cosamin®DS have demonstrated a pain relieving effect with the combination of Glucosamine Hydrochloride, highly purified low molecular weight Chondroitin Sulfate and Manganese Ascorbate available only in this product. The National Institutes of Health ([facility]) has selected the exact same Glucosamine and Chondroitin Sulfate used in CosaminDS for a large multi-center clinical trial currently in progress.      Oral administration of these compounds does not have a cartilage building effect, but rather a cartilage sparing effect. Laboratory studies have confirmed a stimulatory action on cartilage cells as well as an inhibition of cartilage degeneration with CosaminDS, which can improve the health of existing cartilage. Few negative side-effects have been demonstrated in patients taking these compounds, and many patients with arthritis benefit from the addition of this supplement to their arthritis treatment regimen.    Cortisone (Steroid) Injections  Injection of steroids into joints have been performed for well over 100 years with good results. Typically, steroid injection is utilized in a patient with degenerative arthritic changes to treat acute inflammation.  Steroids are powerful anti-inflammatory substances. When injected into a joint, the steroid has primarily a local effect, not a whole-body or systemic effect. These medications tend to decrease pain and inflammation when used appropriately. If overused, local steroid injection can have a negative effect on the tissues, such as weakening of bone and tendons. These injections typically are not permanent in their beneficial effect.    Injectable Viscosupplementation  The space between the cartilage surfaces in the knee joint contains synovial fluid that acts as a lubricant for the joint. With the progression of arthritis,  "the synovial fluid becomes thinner and is ineffective as a shock absorber. As a result simple movements become painful. Hyaluronic acid injections supplement the existing synovial fluid and act as a shock absorber and lubricant for the knee.      Synvisc is a hyluronan based, naturally occurring lubricant with similar properties to synovial fluid found in healthy joints. Synvisc or Euflexxa are injected over a 3 week series to provide lubrication to the knee joint. For some patients, Synvisc One may be an option, this is a single-shot injection.    Braces  In some cases of arthritis, only a portion of the knee is degenerative and it may be advantageous to "unload" the affected area and force more weight towards the "good" part of the knee. Special braces called "unloaders" are used for this purpose. Typically the brace is worn for work or activity and tends to decrease the pain caused by single compartment arthritis.        Physical Therapy  Exercise is a vital component of the treatment of cartilage injury. If the knee becomes stiff after an injury or over the course of time, it may be difficult to regain the lost motion. In most cases, early range of motion exercises are instituted in order to prevent this problem. In some cases, a loss of strength in certain muscle groups can lead to increased stress on the already degenerative articular surfaces. If muscles are strong and working properly they will take up some of the stress and divert it away from the cartilage surfaces. As symptoms decrease exercise is increased, but always below the pain threshold. Muscle strength is never harmful to a joint. It is therefore common to have a doctor prescribe strengthening exercises as an integral part of the treatment program for arthritis.    TECHNIQUES  Biologic tissue engineering is continuing to bring exciting new approaches from the lab to the clinical arena. It may be possible to induce primitive cells from the bone " "marrow called pluripotential cells or mesenchymal stem cells to transform into hyaline articular cartilage with the use of a variety of growth factors or hormones. Genetic reprogram¬joseph and tissue engineering may eventually replace surgery - but not at this stage in the new millennium.  Other biological patches may act as a temporary home for chondrocytes or "prechondrocytes." This exciting field will offer many new surgical techniques, which will hopefully lead to opportunities to restore function with less invasive means.     "

## 2020-07-08 NOTE — LETTER
July 10, 2020      Anurag Wells MD  [address] [place] [address] [address]  [facility] [address]           [address]  [facility] [address]  Phone: [phone]          Patient: Yovani Malik   MR Number: [id number]   YOB: 1990   Date of Visit: 7/8/2020       Dear Dr. Anurag Wells:    Thank you for referring Yovani Malik to me for evaluation. Attached you will find relevant portions of my assessment and plan of care.    If you have questions, please do not hesitate to call me. I look forward to following Yovani Malik along with you.    Sincerely,    Barry Lopez MD    Enclosure  CC:  No Recipients    If you would like to receive this communication electronically, please contact [email or web address] or [phone] to request more information on [employer] Link access.    For providers and/or their staff who would like to refer a patient to [facility], please contact us through our one-stop-shop provider referral line, [facility], at [phone].    If you feel you have received this communication in error or would no longer like to receive these types of communications, please e-mail [email or web address]

## 2020-07-09 ENCOUNTER — TELEPHONE (OUTPATIENT)
Dept: SPORTS MEDICINE | Facility: CLINIC | Age: 30
End: 2020-07-09

## 2020-07-09 DIAGNOSIS — Z01.818 PRE-OP TESTING: Primary | ICD-10-CM

## 2020-07-17 RX ORDER — HYDROCODONE BITARTRATE AND ACETAMINOPHEN 5; 325 MG/1; MG/1
1 TABLET ORAL EVERY 12 HOURS PRN
Qty: 28 TABLET | Refills: 0 | Status: CANCELLED | OUTPATIENT
Start: 2020-07-17

## 2020-07-17 RX ORDER — HYDROCODONE BITARTRATE AND ACETAMINOPHEN 5; 325 MG/1; MG/1
1 TABLET ORAL EVERY 12 HOURS PRN
Qty: 28 TABLET | Refills: 0 | Status: SHIPPED | OUTPATIENT
Start: 2020-07-17 | End: 2020-07-31 | Stop reason: SDUPTHER

## 2020-07-21 ENCOUNTER — PATIENT OUTREACH (OUTPATIENT)
Dept: ADMINISTRATIVE | Facility: OTHER | Age: 30
End: 2020-07-21

## 2020-07-21 NOTE — PROGRESS NOTES
Requested updates within Care Everywhere.  Patient's chart was reviewed for overdue K[facility]ADRA topics.  Immunizations reconciled.

## 2020-07-22 ENCOUNTER — TELEPHONE (OUTPATIENT)
Dept: UROLOGY | Facility: CLINIC | Age: 30
End: 2020-07-22

## 2020-07-22 NOTE — TELEPHONE ENCOUNTER
----- Message from Jen Bell sent at 7/22/2020 12:23 PM CDT -----  Regarding: appt  Contact: pt  Appt     Patient called to reschedule his appt that is scheduled for tomorrow.   He is asking if it can be reschedule in October towards the end of the month   Due to Knee surgery.    Call back [phone]

## 2020-07-22 NOTE — TELEPHONE ENCOUNTER
----- Message from Jacquelin Arriola sent at 7/22/2020  3:06 PM CDT -----  Regarding: Returning call  Contact: Patient  Type:  Patient Returning Call    Who Called:  Patient  Who Left Message for Patient:  Aurelia  Does the patient know what this is regarding?:  yes  Best Call Back Number:  [phone]  Additional Information:

## 2020-07-31 RX ORDER — HYDROCODONE BITARTRATE AND ACETAMINOPHEN 5; 325 MG/1; MG/1
1 TABLET ORAL EVERY 12 HOURS PRN
Qty: 28 TABLET | Refills: 0 | Status: SHIPPED | OUTPATIENT
Start: 2020-07-31 | End: 2020-08-15 | Stop reason: SDUPTHER

## 2020-08-17 RX ORDER — HYDROCODONE BITARTRATE AND ACETAMINOPHEN 5; 325 MG/1; MG/1
1 TABLET ORAL EVERY 12 HOURS PRN
Qty: 28 TABLET | Refills: 0 | Status: SHIPPED | OUTPATIENT
Start: 2020-08-17 | End: 2020-08-29 | Stop reason: SDUPTHER

## 2020-08-19 ENCOUNTER — TELEPHONE (OUTPATIENT)
Dept: SPORTS MEDICINE | Facility: CLINIC | Age: 30
End: 2020-08-19

## 2020-08-19 DIAGNOSIS — M21.061 GENU VALGUM, ACQUIRED, RIGHT: Primary | ICD-10-CM

## 2020-08-19 DIAGNOSIS — M94.261 CHONDROMALACIA OF RIGHT KNEE: ICD-10-CM

## 2020-08-19 DIAGNOSIS — S83.281A ACUTE LATERAL MENISCUS TEAR OF RIGHT KNEE, INITIAL ENCOUNTER: ICD-10-CM

## 2020-08-19 NOTE — TELEPHONE ENCOUNTER
LVM in regard to surgery that I have scheduled for 9/15. Would like to follow up with the patient to confirm that this date still works for him.

## 2020-08-31 RX ORDER — HYDROCODONE BITARTRATE AND ACETAMINOPHEN 5; 325 MG/1; MG/1
1 TABLET ORAL EVERY 12 HOURS PRN
Qty: 28 TABLET | Refills: 0 | Status: ON HOLD | OUTPATIENT
Start: 2020-08-31 | End: 2020-09-15 | Stop reason: HOSPADM

## 2020-08-31 NOTE — TELEPHONE ENCOUNTER
Last fill 08/17/20 #28. DOS 09/13/19. Patient scheduled to have osetotomy with Dr. Lopez on 09/15/20. If agreeable, please review/sign/advise if Dr. Lopez should be filling. Sarah Tomas LPN

## 2020-09-08 NOTE — ANESTHESIA PAT ROS NOTE
09/08/2020  Yovani Malik is a 30 y.o., male.      Pre-op Assessment    I have reviewed the Patient Summary Reports.     I have reviewed the Nursing Notes. I have reviewed the NPO Status.      Review of Systems  Anesthesia Hx:  History of prior surgery of interest to airway management or planning: Denies Family Hx of Anesthesia complications.   Denies Personal Hx of Anesthesia complications.          Anesthesia Assessment: Preoperative EQUATION    Planned Procedure: Procedure(s) (LRB):  OSTEOTOMY, FEMUR (Right)  ARTHROSCOPY, KNEE, WITH MENISCECTOMY (Right)  CHONDROPLASTY, KNEE (Right)  SYNOVECTOMY, KNEE (Right)  Requested Anesthesia Type:Regional  Surgeon: Barry Lopez MD  Service: Orthopedics  Known or anticipated Date of Surgery:9/15/2020    Surgeon notes: reviewed   Past Surgical History:   Procedure Laterality Date    ARTHROSCOPY OF KNEE Right 9/13/2019    Procedure: ARTHROSCOPY, KNEE;  Surgeon: Anurag Wells MD;  Location: [facility];  Service: Orthopedics;  Laterality: Right;    ESOPHAGOGASTRODUODENOSCOPY N/A 10/3/2019    Procedure: EGD (ESOPHAGOGASTRODUODENOSCOPY);  Surgeon: Yovani Dominguez MD;  Location: [facility];  Service: Endoscopy;  Laterality: N/A;    ESOPHAGOGASTRODUODENOSCOPY N/A 1/23/2020    Procedure: EGD (ESOPHAGOGASTRODUODENOSCOPY);  Surgeon: Yovani Dominguez MD;  Location: [facility];  Service: Endoscopy;  Laterality: N/A;    KNEE ARTHROSCOPY      KNEE SURGERY Bilateral     x 2     REPAIR OF MENISCUS OF KNEE Right 9/13/2019    Procedure: REPAIR, MENISCUS, KNEE;  Surgeon: Anurag Wells MD;  Location: [facility];  Service: Orthopedics;  Laterality: Right;  lateral meniscal repair    TONSILLECTOMY         Electronic QUestionnaire Assessment completed via nurse interview with patient.        Triage considerations:     The patient has no apparent active cardiac  condition (No unstable coronary Syndrome such as severe unstable angina or recent [<1 month] myocardial infarction, decompensated CHF, severe valvular   disease or significant arrhythmia)    Previous anesthesia records:[facility]    Last PCP note: within 1 month , [facility]   Subspecialty notes: Ortho    Other important co-morbidities:   Past Medical History:   Diagnosis Date    Anxiety     Borderline diabetes     Hyperlipidemia     Hypertension          Tests already available:  Results have been reviewed.             Instructions given. (See in Nurse's note)    Optimization:  Anesthesia Preop Clinic Assessment  Indicated    Medical Opinion Indicated       Sub-specialist consult indicated:   TBD       Plan:    Testing:  See Orders.   Pre-anesthesia  visit       Visit focus: possible regional anesthesia and/or nerve block      Consultation:to be determined     Patient  has previously scheduled Medical Appointment:    Navigation: Tests Scheduled.              Consults scheduled.             Results will be tracked by Preop Clinic.                 Straight Line to surgery.               No tests, anesthesia preop clinic visit, or consult required.

## 2020-09-09 ENCOUNTER — PATIENT OUTREACH (OUTPATIENT)
Dept: ADMINISTRATIVE | Facility: OTHER | Age: 30
End: 2020-09-09

## 2020-09-11 ENCOUNTER — PATIENT MESSAGE (OUTPATIENT)
Dept: FAMILY MEDICINE | Facility: CLINIC | Age: 30
End: 2020-09-11

## 2020-09-11 ENCOUNTER — TELEPHONE (OUTPATIENT)
Dept: SPORTS MEDICINE | Facility: CLINIC | Age: 30
End: 2020-09-11

## 2020-09-11 ENCOUNTER — OFFICE VISIT (OUTPATIENT)
Dept: SPORTS MEDICINE | Facility: CLINIC | Age: 30
End: 2020-09-11
Payer: COMMERCIAL

## 2020-09-11 VITALS
DIASTOLIC BLOOD PRESSURE: 95 MMHG | SYSTOLIC BLOOD PRESSURE: 135 MMHG | HEIGHT: 66 IN | BODY MASS INDEX: 40.5 KG/M2 | HEART RATE: 87 BPM | WEIGHT: 252 LBS

## 2020-09-11 DIAGNOSIS — M21.061 GENU VALGUM, ACQUIRED, RIGHT: Primary | ICD-10-CM

## 2020-09-11 DIAGNOSIS — M94.261 CHONDROMALACIA OF RIGHT KNEE: ICD-10-CM

## 2020-09-11 DIAGNOSIS — M23.200 OTHER OLD TEAR OF LATERAL MENISCUS OF RIGHT KNEE: ICD-10-CM

## 2020-09-11 PROCEDURE — 99999 PR PBB SHADOW E&M-EST. PATIENT-LVL III: ICD-10-PCS | Mod: PBBFAC,,, | Performed by: PHYSICIAN ASSISTANT

## 2020-09-11 PROCEDURE — 99499 UNLISTED E&M SERVICE: CPT | Mod: S$GLB,,, | Performed by: PHYSICIAN ASSISTANT

## 2020-09-11 PROCEDURE — 99999 PR PBB SHADOW E&M-EST. PATIENT-LVL III: CPT | Mod: PBBFAC,,, | Performed by: PHYSICIAN ASSISTANT

## 2020-09-11 PROCEDURE — 99499 NO LOS: ICD-10-PCS | Mod: S$GLB,,, | Performed by: PHYSICIAN ASSISTANT

## 2020-09-11 RX ORDER — PROMETHAZINE HYDROCHLORIDE 25 MG/1
25 TABLET ORAL EVERY 6 HOURS PRN
Qty: 30 TABLET | Refills: 0 | Status: SHIPPED | OUTPATIENT
Start: 2020-09-11 | End: 2020-10-11

## 2020-09-11 RX ORDER — METHOCARBAMOL 500 MG/1
500 TABLET, FILM COATED ORAL 3 TIMES DAILY
Qty: 30 TABLET | Refills: 0 | Status: ON HOLD | OUTPATIENT
Start: 2020-09-11 | End: 2020-09-15 | Stop reason: HOSPADM

## 2020-09-11 RX ORDER — CELECOXIB 200 MG/1
200 CAPSULE ORAL 2 TIMES DAILY
Qty: 60 CAPSULE | Refills: 2 | Status: SHIPPED | OUTPATIENT
Start: 2020-09-11 | End: 2020-12-30 | Stop reason: SDUPTHER

## 2020-09-11 RX ORDER — PROMETHAZINE HYDROCHLORIDE 25 MG/1
25 TABLET ORAL EVERY 6 HOURS PRN
Qty: 30 TABLET | Refills: 0 | Status: ON HOLD | OUTPATIENT
Start: 2020-09-11 | End: 2020-09-15 | Stop reason: HOSPADM

## 2020-09-11 RX ORDER — SODIUM CHLORIDE 9 MG/ML
INJECTION, SOLUTION INTRAVENOUS CONTINUOUS
Status: CANCELLED | OUTPATIENT
Start: 2020-09-11

## 2020-09-11 RX ORDER — ROPIVACAINE/EPI/CLONIDINE/KET 2.46-0.005
SYRINGE (ML) INJECTION ONCE
Status: CANCELLED | OUTPATIENT
Start: 2020-09-11 | End: 2020-09-11

## 2020-09-11 RX ORDER — ASPIRIN 325 MG
325 TABLET, DELAYED RELEASE (ENTERIC COATED) ORAL DAILY
Qty: 42 TABLET | Refills: 0 | Status: SHIPPED | OUTPATIENT
Start: 2020-09-11 | End: 2021-01-21

## 2020-09-11 RX ORDER — OXYCODONE AND ACETAMINOPHEN 10; 325 MG/1; MG/1
1 TABLET ORAL EVERY 6 HOURS PRN
Qty: 28 TABLET | Refills: 0 | Status: ON HOLD | OUTPATIENT
Start: 2020-09-11 | End: 2020-09-15 | Stop reason: HOSPADM

## 2020-09-11 RX ORDER — OXYCODONE AND ACETAMINOPHEN 10; 325 MG/1; MG/1
1 TABLET ORAL EVERY 6 HOURS PRN
Qty: 28 TABLET | Refills: 0 | Status: SHIPPED | OUTPATIENT
Start: 2020-09-11 | End: 2020-09-18 | Stop reason: SDUPTHER

## 2020-09-11 RX ORDER — METHOCARBAMOL 500 MG/1
500 TABLET, FILM COATED ORAL 3 TIMES DAILY
Qty: 30 TABLET | Refills: 0 | Status: SHIPPED | OUTPATIENT
Start: 2020-09-11 | End: 2020-09-21

## 2020-09-11 RX ORDER — CELECOXIB 200 MG/1
200 CAPSULE ORAL 2 TIMES DAILY
Qty: 60 CAPSULE | Refills: 2 | Status: ON HOLD | OUTPATIENT
Start: 2020-09-11 | End: 2020-09-15 | Stop reason: HOSPADM

## 2020-09-11 RX ORDER — ASPIRIN 325 MG
325 TABLET, DELAYED RELEASE (ENTERIC COATED) ORAL DAILY
Qty: 42 TABLET | Refills: 0 | Status: ON HOLD | OUTPATIENT
Start: 2020-09-11 | End: 2020-09-15 | Stop reason: HOSPADM

## 2020-09-11 NOTE — TELEPHONE ENCOUNTER
Spoke to the patient in regard to message below and explained to him that he can use the antibacterial soap that is listed on his Pharmacy PreOp Checklist.       ----- Message from Varun Perez sent at 9/11/2020 10:37 AM CDT -----  Pt asking if you can send the soap you prescribed for him to send it to the pharmacy he states he lives a distance and cannot come back to get it please contact pt             Contact info [phone]

## 2020-09-11 NOTE — H&P (VIEW-ONLY)
Yovani Malik  is here for a completion of his perioperative paperwork. he  Is scheduled to undergo Stage 1  1. Right distal femoral opening wedge osteotomy  2. Right knee arthroscopic lateral meniscectomy  3. Right knee arthroscopic limited synovectomy  4. Right knee arthroscopic lysis of adhesions  on 9/15/20.  He is a healthy individual and does need clearance for this procedure.     Pending clearance by Dr. Donnelly, PCP.    Risks, indications and benefits of the surgical procedure were discussed with the patient. All questions with regard to surgery, rehab, expected return to functional activities, activities of daily living and recreational endeavors were answered to his satisfaction.    Patient was informed and understands the risks of surgery are greater for patients with a current condition or history of heart disease, obesity, clotting disorders, recurrent infections, steroid use, current or past smoking, and factors such as sedentary lifestyle and noncompliance with medications, therapy or follow-up. The degree of the increased risk is hard to estimate with any degree of precision.    Once no other questions were asked, a brief history and physical exam was then performed.    PAST MEDICAL HISTORY:   Past Medical History:   Diagnosis Date    Anxiety     Borderline diabetes     Hyperlipidemia     Hypertension      PAST SURGICAL HISTORY:   Past Surgical History:   Procedure Laterality Date    ARTHROSCOPY OF KNEE Right 9/13/2019    Procedure: ARTHROSCOPY, KNEE;  Surgeon: Anurag Wells MD;  Location: [facility] OR;  Service: Orthopedics;  Laterality: Right;    ESOPHAGOGASTRODUODENOSCOPY N/A 10/3/2019    Procedure: EGD (ESOPHAGOGASTRODUODENOSCOPY);  Surgeon: Yovani Dominguez MD;  Location: [facility] ENDO;  Service: Endoscopy;  Laterality: N/A;    ESOPHAGOGASTRODUODENOSCOPY N/A 1/23/2020    Procedure: EGD (ESOPHAGOGASTRODUODENOSCOPY);  Surgeon: Yovani Dominguez MD;  Location: [facility] ENDO;  Service:  Endoscopy;  Laterality: N/A;    KNEE ARTHROSCOPY      KNEE SURGERY Bilateral     x 2     REPAIR OF MENISCUS OF KNEE Right 9/13/2019    Procedure: REPAIR, MENISCUS, KNEE;  Surgeon: Anurag Wells MD;  Location: [facility] OR;  Service: Orthopedics;  Laterality: Right;  lateral meniscal repair    TONSILLECTOMY       FAMILY HISTORY:   Family History   Problem Relation Age of Onset    Diabetes Mother     Hypertension Mother     Hyperlipidemia Mother     Diabetes Father     Diabetes Maternal Grandmother     Cancer Maternal Grandmother         breast     SOCIAL HISTORY:   Social History     Socioeconomic History    Marital status:      Spouse name: Not on file    Number of children: Not on file    Years of education: Not on file    Highest education level: Not on file   Occupational History    Not on file   Social Needs    Financial resource strain: Not on file    Food insecurity     Worry: Not on file     Inability: Not on file    Transportation needs     Medical: Not on file     Non-medical: Not on file   Tobacco Use    Smoking status: Current Some Day Smoker     Packs/day: 0.50     Types: Cigarettes    Smokeless tobacco: Never Used    Tobacco comment: maybe 0.5 pack a week   Substance and Sexual Activity    Alcohol use: No    Drug use: No    Sexual activity: Yes     Partners: Female   Lifestyle    Physical activity     Days per week: Not on file     Minutes per session: Not on file    Stress: Not on file   Relationships    Social connections     Talks on phone: Not on file     Gets together: Not on file     Attends Jainism service: Not on file     Active member of club or organization: Not on file     Attends meetings of clubs or organizations: Not on file     Relationship status: Not on file   Other Topics Concern    Not on file   Social History Narrative    Not on file       MEDICATIONS:   Current Outpatient Medications:     ALPRAZolam (XANAX) 0.25 MG tablet, , Disp: , Rfl:  1    dextroamphetamine-amphetamine (ADDERALL XR) 30 MG 24 hr capsule, TK TWO CAPSULES PO D IN THE EARLY MORNING, Disp: , Rfl: 0    HYDROcodone-acetaminophen (NORCO) 5-325 mg per tablet, Take 1 tablet by mouth every 12 (twelve) hours as needed for Pain., Disp: 28 tablet, Rfl: 0  ALLERGIES: Review of patient's allergies indicates:  No Known Allergies    Review of Systems   Constitution: Negative. Negative for chills, fever and night sweats.   HENT: Negative for congestion and headaches.    Eyes: Negative for blurred vision, left vision loss and right vision loss.   Cardiovascular: Negative for chest pain and syncope.   Respiratory: Negative for cough and shortness of breath.    Endocrine: Negative for polydipsia, polyphagia and polyuria.   Hematologic/Lymphatic: Negative for bleeding problem. Does not bruise/bleed easily.   Skin: Negative for dry skin, itching and rash.   Musculoskeletal: Negative for falls and muscle weakness.   Gastrointestinal: Negative for abdominal pain and bowel incontinence.   Genitourinary: Negative for bladder incontinence and nocturia.   Neurological: Negative for disturbances in coordination, loss of balance and seizures.   Psychiatric/Behavioral: Negative for depression. The patient does not have insomnia.    Allergic/Immunologic: Negative for hives and persistent infections.     PHYSICAL EXAM:  GEN: A&Ox3, WD WN NAD  HEENT: WNL  CHEST: CTAB, no W/R/R  HEART: RRR, no M/R/G   ABD: Soft, NT ND, BS x4 QUADS  MS: Refer to previous note for detailed MS exam  NEURO: CN II-XII intact       The surgical consent was then reviewed with the patient, who agreed with all the contents of the consent form and it was signed. he was then given the [facility] [place] surgery packet to bring with him to [facility] [place] for the anesthesia portion of his perioperative paperwork.     PHYSICAL THERAPY:  He was also instructed regarding physical therapy and will begin POD # 1-3 at [facility].    POST OP CARE:instructions  were reviewed including care of the wound and dressing after surgery and when he can shower.     PAIN MANAGEMENT: Yovani Malik was also given a pain management regime, which includes the TENS unit given to him by Magali Richard along with the education required for its use. He was also instructed regarding the Polar ice unit that will be in place after surgery and his postoperative pain medications.     PAIN MEDICATION:  Percocet 10/325mg 1 po q 4-6 hours prn pain  Phenergan 25 mg one p.o. q.4-6 hours p.r.n. nausea and vomiting.  Celebrex 200 mg BID  ASA 325mg once a day x 6 weeks post op  Robaxin 500mg TID post op    Patient currently takes Norco 5mg BID, prescribed by Dr. Wells. He was told not to refill any narcotic prescriptions by Dr. Wells while we are prescribed post operative narcotics. He also understands not to take this medication while taking the percocet Rx that we are giving him.     POST op meds to be delivered bedside at [place]    Patient denies history of seizures.     Patient was instructed to buy and take:  Aspirin 325mg daily x 6 weeks for DVT prophylaxis starting on the evening after surgery.  Patient will also use bilateral TEDs on lower extremities, SCDs during surgery, and early ambulation post-op. If the patient was previously taking 81mg baby aspirin, they were told to not take it will using the above stated aspirin and to restart the 81mg aspirin after completion of the aspirin dose.       Patient was also told to buy over the counter Prilosec medication and take it once daily for GI protection as long as they are taking NSAIDs or Aspirin.    DVT prophylaxis was discussed with the patient today including risk factors for developing DVTs and history of DVTs. The patient was asked if any specific recommendations were given from the doctor/s that did pre-operative surgical clearance.      The patient was told that narcotic pain medications may make them drowsy and instructions were given  to not sign legal documents, drive or operate heavy machinery, cars, or equipment while under the influence of narcotic medications.     As there were no other questions to be asked, he was given my business card along with Barry Lopez MD business card if he has any questions or concerns prior to surgery or in the postop period.

## 2020-09-11 NOTE — H&P
Yovani Malik  is here for a completion of his perioperative paperwork. he  Is scheduled to undergo Stage 1  1. Right distal femoral opening wedge osteotomy  2. Right knee arthroscopic lateral meniscectomy  3. Right knee arthroscopic limited synovectomy  4. Right knee arthroscopic lysis of adhesions  on 9/15/20.  He is a healthy individual and does need clearance for this procedure.     Pending clearance by Dr. Donnelly, PCP.    Risks, indications and benefits of the surgical procedure were discussed with the patient. All questions with regard to surgery, rehab, expected return to functional activities, activities of daily living and recreational endeavors were answered to his satisfaction.    Patient was informed and understands the risks of surgery are greater for patients with a current condition or history of heart disease, obesity, clotting disorders, recurrent infections, steroid use, current or past smoking, and factors such as sedentary lifestyle and noncompliance with medications, therapy or follow-up. The degree of the increased risk is hard to estimate with any degree of precision.    Once no other questions were asked, a brief history and physical exam was then performed.    PAST MEDICAL HISTORY:   Past Medical History:   Diagnosis Date    Anxiety     Borderline diabetes     Hyperlipidemia     Hypertension      PAST SURGICAL HISTORY:   Past Surgical History:   Procedure Laterality Date    ARTHROSCOPY OF KNEE Right 9/13/2019    Procedure: ARTHROSCOPY, KNEE;  Surgeon: Anurag Wells MD;  Location: [facility] OR;  Service: Orthopedics;  Laterality: Right;    ESOPHAGOGASTRODUODENOSCOPY N/A 10/3/2019    Procedure: EGD (ESOPHAGOGASTRODUODENOSCOPY);  Surgeon: Yovani Dominguez MD;  Location: [facility] ENDO;  Service: Endoscopy;  Laterality: N/A;    ESOPHAGOGASTRODUODENOSCOPY N/A 1/23/2020    Procedure: EGD (ESOPHAGOGASTRODUODENOSCOPY);  Surgeon: Yovani Dominguez MD;  Location: [facility] ENDO;  Service:  Endoscopy;  Laterality: N/A;    KNEE ARTHROSCOPY      KNEE SURGERY Bilateral     x 2     REPAIR OF MENISCUS OF KNEE Right 9/13/2019    Procedure: REPAIR, MENISCUS, KNEE;  Surgeon: Anurag Wells MD;  [facility] OR;  Service: Orthopedics;  Laterality: Right;  lateral meniscal repair    TONSILLECTOMY       FAMILY HISTORY:   Family History   Problem Relation Age of Onset    Diabetes Mother     Hypertension Mother     Hyperlipidemia Mother     Diabetes Father     Diabetes Maternal Grandmother     Cancer Maternal Grandmother         breast     SOCIAL HISTORY:   Social History     Socioeconomic History    Marital status:      Spouse name: Not on file    Number of children: Not on file    Years of education: Not on file    Highest education level: Not on file   Occupational History    Not on file   Social Needs    Financial resource strain: Not on file    Food insecurity     Worry: Not on file     Inability: Not on file    Transportation needs     Medical: Not on file     Non-medical: Not on file   Tobacco Use    Smoking status: Current Some Day Smoker     Packs/day: 0.50     Types: Cigarettes    Smokeless tobacco: Never Used    Tobacco comment: maybe 0.5 pack a week   Substance and Sexual Activity    Alcohol use: No    Drug use: No    Sexual activity: Yes     Partners: Female   Lifestyle    Physical activity     Days per week: Not on file     Minutes per session: Not on file    Stress: Not on file   Relationships    Social connections     Talks on phone: Not on file     Gets together: Not on file     Attends [employer] service: Not on file     Active member of club or organization: Not on file     Attends meetings of clubs or organizations: Not on file     Relationship status: Not on file   Other Topics Concern    Not on file   Social History Narrative    Not on file       MEDICATIONS:   Current Outpatient Medications:     ALPRAZolam (XANAX) 0.25 MG tablet, , Disp: , Rfl:  1    dextroamphetamine-amphetamine (ADDERALL XR) 30 MG 24 hr capsule, TK TWO CAPSULES PO D IN THE EARLY MORNING, Disp: , Rfl: 0    HYDROcodone-acetaminophen (NORCO) 5-325 mg per tablet, Take 1 tablet by mouth every 12 (twelve) hours as needed for Pain., Disp: 28 tablet, Rfl: 0  ALLERGIES: Review of patient's allergies indicates:  No Known Allergies    Review of Systems   Constitution: Negative. Negative for chills, fever and night sweats.   HENT: Negative for congestion and headaches.    Eyes: Negative for blurred vision, left vision loss and right vision loss.   Cardiovascular: Negative for chest pain and syncope.   Respiratory: Negative for cough and shortness of breath.    Endocrine: Negative for polydipsia, polyphagia and polyuria.   Hematologic/Lymphatic: Negative for bleeding problem. Does not bruise/bleed easily.   Skin: Negative for dry skin, itching and rash.   Musculoskeletal: Negative for falls and muscle weakness.   Gastrointestinal: Negative for abdominal pain and bowel incontinence.   Genitourinary: Negative for bladder incontinence and nocturia.   Neurological: Negative for disturbances in coordination, loss of balance and seizures.   Psychiatric/Behavioral: Negative for depression. The patient does not have insomnia.    Allergic/Immunologic: Negative for hives and persistent infections.     PHYSICAL EXAM:  GEN: A&Ox3, WD WN NAD  HEENT: WNL  CHEST: CTAB, no W/R/R  HEART: RRR, no M/R/G   ABD: Soft, NT ND, BS x4 QUADS  MS: Refer to previous note for detailed MS exam  NEURO: CN II-XII intact       The surgical consent was then reviewed with the patient, who agreed with all the contents of the consent form and it was signed. he was then given the [facility] surgery packet to bring with him to [facility] for the anesthesia portion of his perioperative paperwork.     PHYSICAL THERAPY:  He was also instructed regarding physical therapy and will begin POD # 1-3 at [facility].    POST OP CARE:instructions  were reviewed including care of the wound and dressing after surgery and when he can shower.     PAIN MANAGEMENT: Yovani Malik was also given a pain management regime, which includes the TENS unit given to him by Magali Richard along with the education required for its use. He was also instructed regarding the Polar ice unit that will be in place after surgery and his postoperative pain medications.     PAIN MEDICATION:  Percocet 10/325mg 1 po q 4-6 hours prn pain  Phenergan 25 mg one p.o. q.4-6 hours p.r.n. nausea and vomiting.  Celebrex 200 mg BID  ASA 325mg once a day x 6 weeks post op  Robaxin 500mg TID post op    Patient currently takes Norco 5mg BID, prescribed by Dr. Wells. He was told not to refill any narcotic prescriptions by Dr. Wells while we are prescribed post operative narcotics. He also understands not to take this medication while taking the percocet Rx that we are giving him.     POST op meds to be delivered bedside at [place]    Patient denies history of seizures.     Patient was instructed to buy and take:  Aspirin 325mg daily x 6 weeks for DVT prophylaxis starting on the evening after surgery.  Patient will also use bilateral TEDs on lower extremities, SCDs during surgery, and early ambulation post-op. If the patient was previously taking 81mg baby aspirin, they were told to not take it will using the above stated aspirin and to restart the 81mg aspirin after completion of the aspirin dose.       Patient was also told to buy over the counter Prilosec medication and take it once daily for GI protection as long as they are taking NSAIDs or Aspirin.    DVT prophylaxis was discussed with the patient today including risk factors for developing DVTs and history of DVTs. The patient was asked if any specific recommendations were given from the doctor/s that did pre-operative surgical clearance.      The patient was told that narcotic pain medications may make them drowsy and instructions were given  to not sign legal documents, drive or operate heavy machinery, cars, or equipment while under the influence of narcotic medications.     As there were no other questions to be asked, he was given my business card along with Barry Lopez MD business card if he has any questions or concerns prior to surgery or in the postop period.

## 2020-09-12 ENCOUNTER — LAB VISIT (OUTPATIENT)
Dept: URGENT CARE | Facility: CLINIC | Age: 30
End: 2020-09-12
Payer: COMMERCIAL

## 2020-09-12 DIAGNOSIS — Z01.818 PRE-OP TESTING: ICD-10-CM

## 2020-09-12 PROCEDURE — U0003 INFECTIOUS AGENT DETECTION BY NUCLEIC ACID (DNA OR RNA); SEVERE ACUTE RESPIRATORY SYNDROME CORONAVIRUS 2 (SARS-COV-2) (CORONAVIRUS DISEASE [COVID-19]), AMPLIFIED PROBE TECHNIQUE, MAKING USE OF HIGH THROUGHPUT TECHNOLOGIES AS DESCRIBED BY CMS-2020-01-R: HCPCS

## 2020-09-13 LAB — SARS-COV-2 RNA RESP QL NAA+PROBE: NOT DETECTED

## 2020-09-14 ENCOUNTER — OFFICE VISIT (OUTPATIENT)
Dept: FAMILY MEDICINE | Facility: CLINIC | Age: 30
End: 2020-09-14
Payer: COMMERCIAL

## 2020-09-14 ENCOUNTER — TELEPHONE (OUTPATIENT)
Dept: SPORTS MEDICINE | Facility: CLINIC | Age: 30
End: 2020-09-14

## 2020-09-14 ENCOUNTER — TELEPHONE (OUTPATIENT)
Dept: FAMILY MEDICINE | Facility: CLINIC | Age: 30
End: 2020-09-14

## 2020-09-14 ENCOUNTER — HOSPITAL ENCOUNTER (OUTPATIENT)
Dept: RADIOLOGY | Facility: HOSPITAL | Age: 30
Discharge: HOME | End: 2020-09-14
Attending: PHYSICIAN ASSISTANT
Payer: COMMERCIAL

## 2020-09-14 ENCOUNTER — ANESTHESIA EVENT (OUTPATIENT)
Dept: SURGERY | Facility: HOSPITAL | Age: 30
End: 2020-09-14
Payer: COMMERCIAL

## 2020-09-14 VITALS
TEMPERATURE: 98 F | BODY MASS INDEX: 40.1 KG/M2 | DIASTOLIC BLOOD PRESSURE: 84 MMHG | SYSTOLIC BLOOD PRESSURE: 124 MMHG | OXYGEN SATURATION: 98 % | HEART RATE: 70 BPM | WEIGHT: 248.44 LBS

## 2020-09-14 DIAGNOSIS — Z01.818 PREOPERATIVE CLEARANCE: Primary | ICD-10-CM

## 2020-09-14 DIAGNOSIS — Z00.00 PREVENTATIVE HEALTH CARE: ICD-10-CM

## 2020-09-14 DIAGNOSIS — Z01.818 PREOPERATIVE CLEARANCE: ICD-10-CM

## 2020-09-14 DIAGNOSIS — Z01.818 PREOP EXAMINATION: ICD-10-CM

## 2020-09-14 PROBLEM — K21.00 GASTROESOPHAGEAL REFLUX DISEASE WITH ESOPHAGITIS: Status: RESOLVED | Noted: 2017-06-01 | Resolved: 2020-09-14

## 2020-09-14 PROBLEM — M25.661 STIFFNESS OF RIGHT KNEE: Status: RESOLVED | Noted: 2019-09-30 | Resolved: 2020-09-14

## 2020-09-14 PROBLEM — M23.206: Status: RESOLVED | Noted: 2019-09-04 | Resolved: 2020-09-14

## 2020-09-14 PROCEDURE — 3008F BODY MASS INDEX DOCD: CPT | Mod: CPTII,S$GLB,, | Performed by: PHYSICIAN ASSISTANT

## 2020-09-14 PROCEDURE — 93005 ELECTROCARDIOGRAM TRACING: CPT | Mod: S$GLB,,, | Performed by: PHYSICIAN ASSISTANT

## 2020-09-14 PROCEDURE — 71046 X-RAY EXAM CHEST 2 VIEWS: CPT | Mod: TC,FY,PO

## 2020-09-14 PROCEDURE — 71046 X-RAY EXAM CHEST 2 VIEWS: CPT | Mod: 26,,, | Performed by: RADIOLOGY

## 2020-09-14 PROCEDURE — 3008F PR BODY MASS INDEX (BMI) DOCUMENTED: ICD-10-PCS | Mod: CPTII,S$GLB,, | Performed by: PHYSICIAN ASSISTANT

## 2020-09-14 PROCEDURE — 99214 OFFICE O/P EST MOD 30 MIN: CPT | Mod: S$GLB,,, | Performed by: PHYSICIAN ASSISTANT

## 2020-09-14 PROCEDURE — 99999 PR PBB SHADOW E&M-EST. PATIENT-LVL IV: ICD-10-PCS | Mod: PBBFAC,,, | Performed by: PHYSICIAN ASSISTANT

## 2020-09-14 PROCEDURE — 71046 XR CHEST PA AND LATERAL: ICD-10-PCS | Mod: 26,,, | Performed by: RADIOLOGY

## 2020-09-14 PROCEDURE — 99999 PR PBB SHADOW E&M-EST. PATIENT-LVL IV: CPT | Mod: PBBFAC,,, | Performed by: PHYSICIAN ASSISTANT

## 2020-09-14 PROCEDURE — 93010 ELECTROCARDIOGRAM REPORT: CPT | Mod: S$GLB,,, | Performed by: INTERNAL MEDICINE

## 2020-09-14 PROCEDURE — 99214 PR OFFICE/OUTPT VISIT, EST, LEVL IV, 30-39 MIN: ICD-10-PCS | Mod: S$GLB,,, | Performed by: PHYSICIAN ASSISTANT

## 2020-09-14 PROCEDURE — 93010 EKG 12-LEAD: ICD-10-PCS | Mod: S$GLB,,, | Performed by: INTERNAL MEDICINE

## 2020-09-14 PROCEDURE — 93005 EKG 12-LEAD: ICD-10-PCS | Mod: S$GLB,,, | Performed by: PHYSICIAN ASSISTANT

## 2020-09-14 NOTE — TELEPHONE ENCOUNTER
----- Message from Viola Swift sent at 9/11/2020  3:55 PM CDT -----  Type:  Patient Returning Call    Who Called:  Patient  Who Left Message for Patient: Manny  Does the patient know what this is regarding?:  clearance for procedure  Best Call Back Number:  [phone]  Additional Information:

## 2020-09-14 NOTE — PROGRESS NOTES
Patient ID: Yovani Malik is a 30 y.o. male.    Chief Complaint: Pre-op Exam      Yovani Malik is in the office for preoperative clearance for right femural osteotomy with right knee arthroscopy with Dr. Barry Lopez in orthopedic surgery tomorrow.    HPI  Patient had right knee surgery last year and did not heal properly, so they are trying again tomorrow.  Pain 8/10.    Past Medical History:   Diagnosis Date    Anxiety     Borderline diabetes     Hyperlipidemia     Hypertension      STOP BANG Questionnaire  Patient diagnosed with Obstructive Sleep Apnea?: No  Has loud snoring: No  Disturbed sleep, daytime fatigue, daytime somnolence: Yes  Observed to have interrupted breathing during sleep: No  Takes medication for high blood pressure: No  Not taking BP medication but supposed to be: No  BMI (Calculated): 40.1  Has large neck size >40cm (15.7in., large male shirt size, large male collar size >16): Yes      Current Outpatient Medications:     ALPRAZolam (XANAX) 0.25 MG tablet, , Disp: , Rfl: 1    aspirin (ECOTRIN) 325 MG EC tablet, Take 1 tablet (325 mg total) by mouth once daily., Disp: 42 tablet, Rfl: 0    aspirin (ECOTRIN) 325 MG EC tablet, Take 1 tablet (325 mg total) by mouth once daily., Disp: 42 tablet, Rfl: 0    dextroamphetamine-amphetamine (ADDERALL XR) 30 MG 24 hr capsule, TK TWO CAPSULES PO D IN THE EARLY MORNING, Disp: , Rfl: 0    HYDROcodone-acetaminophen (NORCO) 5-325 mg per tablet, Take 1 tablet by mouth every 12 (twelve) hours as needed for Pain., Disp: 28 tablet, Rfl: 0    oxyCODONE-acetaminophen (PERCOCET)  mg per tablet, Take 1 tablet by mouth every 6 (six) hours as needed., Disp: 28 tablet, Rfl: 0    oxyCODONE-acetaminophen (PERCOCET)  mg per tablet, Take 1 tablet by mouth every 6 (six) hours as needed., Disp: 28 tablet, Rfl: 0    celecoxib (CELEBREX) 200 MG capsule, Take 1 capsule (200 mg total) by mouth 2 (two) times daily. (Patient not taking: Reported on 9/14/2020),  Disp: 60 capsule, Rfl: 2    celecoxib (CELEBREX) 200 MG capsule, Take 1 capsule (200 mg total) by mouth 2 (two) times daily. (Patient not taking: Reported on 9/14/2020), Disp: 60 capsule, Rfl: 2    methocarbamoL (ROBAXIN) 500 MG Tab, Take 1 tablet (500 mg total) by mouth 3 (three) times daily. for 10 days (Patient not taking: Reported on 9/14/2020), Disp: 30 tablet, Rfl: 0    methocarbamoL (ROBAXIN) 500 MG Tab, Take 1 tablet (500 mg total) by mouth 3 (three) times daily. for 10 days (Patient not taking: Reported on 9/14/2020), Disp: 30 tablet, Rfl: 0    promethazine (PHENERGAN) 25 MG tablet, Take 1 tablet (25 mg total) by mouth every 6 (six) hours as needed. (Patient not taking: Reported on 9/14/2020), Disp: 30 tablet, Rfl: 0    promethazine (PHENERGAN) 25 MG tablet, Take 1 tablet (25 mg total) by mouth every 6 (six) hours as needed. (Patient not taking: Reported on 9/14/2020), Disp: 30 tablet, Rfl: 0    The ASCVD Risk score (Jackman DC Jr., et al., 2013) failed to calculate for the following reasons:    The 2013 ASCVD risk score is only valid for ages 40 to 79     Wt Readings from Last 3 Encounters:   09/14/20 112.7 kg (248 lb 7.3 oz)   09/11/20 114.3 kg (252 lb)   07/08/20 114.8 kg (253 lb)     Temp Readings from Last 3 Encounters:   09/14/20 98.4 °F (36.9 °C) (Oral)   01/23/20 98.2 °F (36.8 °C) (Skin)   12/11/19 97.9 °F (36.6 °C) (Oral)     BP Readings from Last 3 Encounters:   09/14/20 124/84   09/11/20 (!) 135/95   07/08/20 (!) 125/91     Pulse Readings from Last 3 Encounters:   09/14/20 70   09/11/20 87   07/08/20 98     Resp Readings from Last 3 Encounters:   06/10/20 16   06/03/20 16   01/23/20 18     PF Readings from Last 3 Encounters:   No data found for PF     SpO2 Readings from Last 3 Encounters:   09/14/20 98%   01/23/20 100%   12/11/19 95%        No results found for: HGBA1C  Lab Results   Component Value Date    LDLCALC 146.2 11/09/2018    CREATININE 0.9 09/05/2019       Review of Systems    Constitutional: Negative for appetite change, chills and fever.   HENT: Negative for ear pain, sinus pressure and sinus pain.    Eyes: Negative for pain.   Respiratory: Negative for cough and shortness of breath.    Cardiovascular: Negative for chest pain.   Gastrointestinal: Negative for abdominal pain, blood in stool, constipation, diarrhea, nausea and vomiting.   Endocrine: Negative for polyuria.   Genitourinary: Negative for frequency and hematuria.   Musculoskeletal:        Right knee pain.   Skin: Negative for rash.   Neurological: Negative for dizziness, light-headedness and headaches.   Psychiatric/Behavioral: Negative for sleep disturbance and suicidal ideas.         Objective:      Physical Exam  Vitals signs reviewed.   Constitutional:       General: He is not in acute distress.     Appearance: Normal appearance. He is well-developed and well-groomed.   HENT:      Head: Normocephalic and atraumatic.      Right Ear: Hearing, tympanic membrane, ear canal and external ear normal.      Left Ear: Hearing, tympanic membrane, ear canal and external ear normal.      Nose: Nose normal.      Right Sinus: No maxillary sinus tenderness or frontal sinus tenderness.      Left Sinus: No maxillary sinus tenderness or frontal sinus tenderness.      Mouth/Throat:      Lips: Pink.      Mouth: Mucous membranes are moist.      Pharynx: Oropharynx is clear.   Eyes:      General: Lids are normal.      Conjunctiva/sclera: Conjunctivae normal.   Neck:      Musculoskeletal: Normal range of motion.      Trachea: Phonation normal.   Cardiovascular:      Rate and Rhythm: Normal rate and regular rhythm.      Heart sounds: Normal heart sounds. No murmur. No friction rub. No gallop.    Pulmonary:      Effort: Pulmonary effort is normal. No respiratory distress.      Breath sounds: Normal breath sounds. No decreased breath sounds, wheezing, rhonchi or rales.   Abdominal:      General: Bowel sounds are normal.      Palpations: Abdomen  is soft.      Tenderness: There is no abdominal tenderness.   Lymphadenopathy:      Head:      Right side of head: No submental, submandibular, tonsillar, preauricular, posterior auricular or occipital adenopathy.      Left side of head: No submental, submandibular, tonsillar, preauricular, posterior auricular or occipital adenopathy.      Cervical: No cervical adenopathy.   Skin:     General: Skin is warm and dry.      Findings: No rash.   Neurological:      General: No focal deficit present.      Mental Status: He is alert and oriented to person, place, and time.   Psychiatric:         Attention and Perception: Attention normal.         Mood and Affect: Mood normal.         Speech: Speech normal.         Behavior: Behavior normal. Behavior is cooperative.         Cognition and Memory: Cognition normal.         Judgment: Judgment normal.         Screening recommendations appropriate to age and health status were reviewed.    Preoperative clearance  -     CBC auto differential; Future; Expected date: 09/14/2020  -     Comprehensive metabolic panel; Future; Expected date: 09/14/2020  -     Protime-INR; Future; Expected date: 09/14/2020  -     APTT; Future; Expected date: 09/14/2020  -     X-Ray Chest PA And Lateral; Future; Expected date: 09/14/2020  -     IN OFFICE EKG 12-LEAD (to [place])    Preop examination  -     IN OFFICE EKG 12-LEAD (to [facility])    EKG was reviewed by Dr. Maciel and myself with no concerning findings.    RCRI risk factors include: no known RCRI risk factors. As such, per RCRI the risk of cardiac death, nonfatal myocardial infarction, or nonfatal cardiac arrest is 0.4% and the risk of myocardial infarction, pulmonary edema, ventricular fibrillation, primary cardiac arrest, or complete heart block is 0.5%.  Overall this patient can be considered low risk for this low risk procedure. No further cardiac testing is recommended at this time.     Patient denies any symptoms (as per HPI) concerning for  undiagnosed lung disease including GIANNA. Would not recommend obtaining chest X-ray, sleep study, or PFTs at this time. Discussed smoking with patient, and he states that he desires to quit after surgery. We discussed the benefits of early mobilization and deep breathing after surgery.      Screened patient for alcohol misuse, use of illicit drugs, and personal or family history of anesthetic complications or bleeding diathesis and no substantial concerns were identified.    All current medications were reviewed and at this time no changes to medications are recommended prior to surgery.    I recommend use of standard pre-op and post-op precautions for this patient. In my opinion, he is medically optimized for this procedure, and can proceed without further evaluation.

## 2020-09-14 NOTE — TELEPHONE ENCOUNTER
Spoke to patient to notify him of his arrival time. The requested arrival time is 6:15a at the [facility].

## 2020-09-15 ENCOUNTER — ANESTHESIA (OUTPATIENT)
Dept: SURGERY | Facility: HOSPITAL | Age: 30
End: 2020-09-15
Payer: COMMERCIAL

## 2020-09-15 ENCOUNTER — HOSPITAL ENCOUNTER (OUTPATIENT)
Facility: HOSPITAL | Age: 30
Discharge: HOME | End: 2020-09-15
Attending: ORTHOPAEDIC SURGERY | Admitting: ORTHOPAEDIC SURGERY
Payer: COMMERCIAL

## 2020-09-15 DIAGNOSIS — M94.261 CHONDROMALACIA OF RIGHT KNEE: ICD-10-CM

## 2020-09-15 DIAGNOSIS — M23.200 OTHER OLD TEAR OF LATERAL MENISCUS OF RIGHT KNEE: ICD-10-CM

## 2020-09-15 DIAGNOSIS — M21.061 GENU VALGUM, ACQUIRED, RIGHT: ICD-10-CM

## 2020-09-15 PROCEDURE — D9220A PRA ANESTHESIA: Mod: ANES,,, | Performed by: ANESTHESIOLOGY

## 2020-09-15 PROCEDURE — 71000015 HC POSTOP RECOV 1ST HR: Performed by: ORTHOPAEDIC SURGERY

## 2020-09-15 PROCEDURE — 37000009 HC ANESTHESIA EA ADD 15 MINS: Performed by: ORTHOPAEDIC SURGERY

## 2020-09-15 PROCEDURE — 76942 PR U/S GUIDANCE FOR NEEDLE GUIDANCE: ICD-10-PCS | Mod: 26,,, | Performed by: ANESTHESIOLOGY

## 2020-09-15 PROCEDURE — 37000008 HC ANESTHESIA 1ST 15 MINUTES: Performed by: ORTHOPAEDIC SURGERY

## 2020-09-15 PROCEDURE — 25000003 PHARM REV CODE 250: Performed by: STUDENT IN AN ORGANIZED HEALTH CARE EDUCATION/TRAINING PROGRAM

## 2020-09-15 PROCEDURE — C1751 CATH, INF, PER/CENT/MIDLINE: HCPCS | Performed by: STUDENT IN AN ORGANIZED HEALTH CARE EDUCATION/TRAINING PROGRAM

## 2020-09-15 PROCEDURE — S0028 INJECTION, FAMOTIDINE, 20 MG: HCPCS | Performed by: NURSE ANESTHETIST, CERTIFIED REGISTERED

## 2020-09-15 PROCEDURE — 76942 ECHO GUIDE FOR BIOPSY: CPT | Performed by: STUDENT IN AN ORGANIZED HEALTH CARE EDUCATION/TRAINING PROGRAM

## 2020-09-15 PROCEDURE — 63600175 PHARM REV CODE 636 W HCPCS: Performed by: ANESTHESIOLOGY

## 2020-09-15 PROCEDURE — 36000711: Performed by: ORTHOPAEDIC SURGERY

## 2020-09-15 PROCEDURE — 27800903 OPTIME MED/SURG SUP & DEVICES OTHER IMPLANTS: Performed by: ORTHOPAEDIC SURGERY

## 2020-09-15 PROCEDURE — 71000033 HC RECOVERY, INTIAL HOUR: Performed by: ORTHOPAEDIC SURGERY

## 2020-09-15 PROCEDURE — 99900035 HC TECH TIME PER 15 MIN (STAT)

## 2020-09-15 PROCEDURE — 76942 ECHO GUIDE FOR BIOPSY: CPT | Mod: 26,,, | Performed by: ANESTHESIOLOGY

## 2020-09-15 PROCEDURE — 25000003 PHARM REV CODE 250: Performed by: ANESTHESIOLOGY

## 2020-09-15 PROCEDURE — 63600175 PHARM REV CODE 636 W HCPCS: Performed by: STUDENT IN AN ORGANIZED HEALTH CARE EDUCATION/TRAINING PROGRAM

## 2020-09-15 PROCEDURE — 25000003 PHARM REV CODE 250: Performed by: NURSE ANESTHETIST, CERTIFIED REGISTERED

## 2020-09-15 PROCEDURE — 27450 INCISION OF THIGH: CPT | Mod: RT,,, | Performed by: ORTHOPAEDIC SURGERY

## 2020-09-15 PROCEDURE — 29881 ARTHRS KNE SRG MNISECTMY M/L: CPT | Mod: 51,RT,, | Performed by: ORTHOPAEDIC SURGERY

## 2020-09-15 PROCEDURE — 27201423 OPTIME MED/SURG SUP & DEVICES STERILE SUPPLY: Performed by: ORTHOPAEDIC SURGERY

## 2020-09-15 PROCEDURE — 63600175 PHARM REV CODE 636 W HCPCS: Performed by: PHYSICIAN ASSISTANT

## 2020-09-15 PROCEDURE — 27100025 HC TUBING, SET FLUID WARMER: Performed by: ANESTHESIOLOGY

## 2020-09-15 PROCEDURE — 94770 HC EXHALED C02 TEST: CPT

## 2020-09-15 PROCEDURE — 63600175 PHARM REV CODE 636 W HCPCS: Performed by: NURSE ANESTHETIST, CERTIFIED REGISTERED

## 2020-09-15 PROCEDURE — 27200688 HC TRAY, SPINAL-HYPER/ ISOBARIC: Performed by: ANESTHESIOLOGY

## 2020-09-15 PROCEDURE — D9220A PRA ANESTHESIA: Mod: CRNA,,, | Performed by: NURSE ANESTHETIST, CERTIFIED REGISTERED

## 2020-09-15 PROCEDURE — D9220A PRA ANESTHESIA: ICD-10-PCS | Mod: ANES,,, | Performed by: ANESTHESIOLOGY

## 2020-09-15 PROCEDURE — 64448 NJX AA&/STRD FEM NRV NFS IMG: CPT | Mod: 59,RT,, | Performed by: ANESTHESIOLOGY

## 2020-09-15 PROCEDURE — 29881 PR KNEE SCOPE SINGLE MENISECECTOMY: ICD-10-PCS | Mod: 51,RT,, | Performed by: ORTHOPAEDIC SURGERY

## 2020-09-15 PROCEDURE — 25000003 PHARM REV CODE 250: Performed by: PHYSICIAN ASSISTANT

## 2020-09-15 PROCEDURE — 94761 N-INVAS EAR/PLS OXIMETRY MLT: CPT

## 2020-09-15 PROCEDURE — C1713 ANCHOR/SCREW BN/BN,TIS/BN: HCPCS | Performed by: ORTHOPAEDIC SURGERY

## 2020-09-15 PROCEDURE — 36000710: Performed by: ORTHOPAEDIC SURGERY

## 2020-09-15 PROCEDURE — 64448 PR NERVE BLOCK INJ, ANES/STEROID, FEMORAL, CONT INFUSION, INCL IMAG GUIDANCE: ICD-10-PCS | Mod: 59,RT,, | Performed by: ANESTHESIOLOGY

## 2020-09-15 PROCEDURE — 27450 PR OSTEOTOMY FEMUR SHAFT,W FIXATN: ICD-10-PCS | Mod: RT,,, | Performed by: ORTHOPAEDIC SURGERY

## 2020-09-15 PROCEDURE — 64448 NJX AA&/STRD FEM NRV NFS IMG: CPT | Performed by: STUDENT IN AN ORGANIZED HEALTH CARE EDUCATION/TRAINING PROGRAM

## 2020-09-15 PROCEDURE — D9220A PRA ANESTHESIA: ICD-10-PCS | Mod: CRNA,,, | Performed by: NURSE ANESTHETIST, CERTIFIED REGISTERED

## 2020-09-15 PROCEDURE — 25000003 PHARM REV CODE 250: Performed by: ORTHOPAEDIC SURGERY

## 2020-09-15 PROCEDURE — 63600175 PHARM REV CODE 636 W HCPCS: Performed by: ORTHOPAEDIC SURGERY

## 2020-09-15 PROCEDURE — 71000039 HC RECOVERY, EACH ADD'L HOUR: Performed by: ORTHOPAEDIC SURGERY

## 2020-09-15 DEVICE — IMPLANTABLE DEVICE: Type: IMPLANTABLE DEVICE | Site: LEG | Status: FUNCTIONAL

## 2020-09-15 DEVICE — FIBER CORTICAL ENHANCE 2.5CC: Type: IMPLANTABLE DEVICE | Site: LEG | Status: FUNCTIONAL

## 2020-09-15 RX ORDER — DEXMEDETOMIDINE HYDROCHLORIDE 100 UG/ML
INJECTION, SOLUTION INTRAVENOUS
Status: DISCONTINUED | OUTPATIENT
Start: 2020-09-15 | End: 2020-09-15

## 2020-09-15 RX ORDER — DIPHENHYDRAMINE HYDROCHLORIDE 50 MG/ML
INJECTION INTRAMUSCULAR; INTRAVENOUS
Status: DISCONTINUED | OUTPATIENT
Start: 2020-09-15 | End: 2020-09-15

## 2020-09-15 RX ORDER — KETOROLAC TROMETHAMINE 30 MG/ML
15 INJECTION, SOLUTION INTRAMUSCULAR; INTRAVENOUS EVERY 8 HOURS PRN
Status: DISCONTINUED | OUTPATIENT
Start: 2020-09-15 | End: 2020-09-15 | Stop reason: HOSPADM

## 2020-09-15 RX ORDER — DEXAMETHASONE SODIUM PHOSPHATE 4 MG/ML
INJECTION, SOLUTION INTRA-ARTICULAR; INTRALESIONAL; INTRAMUSCULAR; INTRAVENOUS; SOFT TISSUE
Status: DISCONTINUED | OUTPATIENT
Start: 2020-09-15 | End: 2020-09-15

## 2020-09-15 RX ORDER — FAMOTIDINE 10 MG/ML
INJECTION INTRAVENOUS
Status: DISCONTINUED | OUTPATIENT
Start: 2020-09-15 | End: 2020-09-15

## 2020-09-15 RX ORDER — MIDAZOLAM HYDROCHLORIDE 1 MG/ML
INJECTION, SOLUTION INTRAMUSCULAR; INTRAVENOUS
Status: DISCONTINUED | OUTPATIENT
Start: 2020-09-15 | End: 2020-09-15

## 2020-09-15 RX ORDER — MIDAZOLAM HYDROCHLORIDE 1 MG/ML
0.5 INJECTION INTRAMUSCULAR; INTRAVENOUS
Status: DISCONTINUED | OUTPATIENT
Start: 2020-09-15 | End: 2021-11-18

## 2020-09-15 RX ORDER — ACETAMINOPHEN 500 MG
1000 TABLET ORAL
Status: COMPLETED | OUTPATIENT
Start: 2020-09-15 | End: 2020-09-15

## 2020-09-15 RX ORDER — EPINEPHRINE 1 MG/ML
INJECTION INTRAMUSCULAR; INTRAVENOUS; SUBCUTANEOUS
Status: DISCONTINUED | OUTPATIENT
Start: 2020-09-15 | End: 2020-09-15 | Stop reason: HOSPADM

## 2020-09-15 RX ORDER — LIDOCAINE HYDROCHLORIDE 20 MG/ML
INJECTION INTRAVENOUS
Status: DISCONTINUED | OUTPATIENT
Start: 2020-09-15 | End: 2020-09-15

## 2020-09-15 RX ORDER — HYDROMORPHONE HYDROCHLORIDE 1 MG/ML
0.2 INJECTION, SOLUTION INTRAMUSCULAR; INTRAVENOUS; SUBCUTANEOUS EVERY 5 MIN PRN
Status: COMPLETED | OUTPATIENT
Start: 2020-09-15 | End: 2020-09-15

## 2020-09-15 RX ORDER — KETAMINE HCL IN 0.9 % NACL 50 MG/5 ML
SYRINGE (ML) INTRAVENOUS
Status: DISCONTINUED | OUTPATIENT
Start: 2020-09-15 | End: 2020-09-15

## 2020-09-15 RX ORDER — ROPIVACAINE HYDROCHLORIDE 5 MG/ML
INJECTION, SOLUTION EPIDURAL; INFILTRATION; PERINEURAL
Status: DISCONTINUED | OUTPATIENT
Start: 2020-09-15 | End: 2020-09-15

## 2020-09-15 RX ORDER — ROPIVACAINE/EPI/CLONIDINE/KET 2.46-0.005
SYRINGE (ML) INJECTION ONCE
Status: DISCONTINUED | OUTPATIENT
Start: 2020-09-15 | End: 2020-09-15 | Stop reason: HOSPADM

## 2020-09-15 RX ORDER — PROPOFOL 10 MG/ML
VIAL (ML) INTRAVENOUS
Status: DISCONTINUED | OUTPATIENT
Start: 2020-09-15 | End: 2020-09-15

## 2020-09-15 RX ORDER — CEFAZOLIN SODIUM 1 G/3ML
2 INJECTION, POWDER, FOR SOLUTION INTRAMUSCULAR; INTRAVENOUS
Status: DISCONTINUED | OUTPATIENT
Start: 2020-09-15 | End: 2020-09-15 | Stop reason: HOSPADM

## 2020-09-15 RX ORDER — ONDANSETRON 2 MG/ML
4 INJECTION INTRAMUSCULAR; INTRAVENOUS ONCE AS NEEDED
Status: DISCONTINUED | OUTPATIENT
Start: 2020-09-15 | End: 2020-09-15 | Stop reason: HOSPADM

## 2020-09-15 RX ORDER — ROPIVACAINE/EPI/CLONIDINE/KET 2.46-0.005
SYRINGE (ML) INJECTION
Status: DISCONTINUED | OUTPATIENT
Start: 2020-09-15 | End: 2020-09-15 | Stop reason: HOSPADM

## 2020-09-15 RX ORDER — FENTANYL CITRATE 50 UG/ML
25 INJECTION, SOLUTION INTRAMUSCULAR; INTRAVENOUS EVERY 5 MIN PRN
Status: DISCONTINUED | OUTPATIENT
Start: 2020-09-15 | End: 2021-11-18

## 2020-09-15 RX ORDER — FENTANYL CITRATE 50 UG/ML
INJECTION, SOLUTION INTRAMUSCULAR; INTRAVENOUS
Status: DISCONTINUED | OUTPATIENT
Start: 2020-09-15 | End: 2020-09-15

## 2020-09-15 RX ORDER — SODIUM CHLORIDE 9 MG/ML
INJECTION, SOLUTION INTRAVENOUS CONTINUOUS
Status: DISCONTINUED | OUTPATIENT
Start: 2020-09-15 | End: 2020-09-15 | Stop reason: HOSPADM

## 2020-09-15 RX ORDER — CELECOXIB 200 MG/1
400 CAPSULE ORAL ONCE
Status: COMPLETED | OUTPATIENT
Start: 2020-09-15 | End: 2020-09-15

## 2020-09-15 RX ORDER — ONDANSETRON 2 MG/ML
INJECTION INTRAMUSCULAR; INTRAVENOUS
Status: DISCONTINUED | OUTPATIENT
Start: 2020-09-15 | End: 2020-09-15

## 2020-09-15 RX ORDER — PROPOFOL 10 MG/ML
VIAL (ML) INTRAVENOUS CONTINUOUS PRN
Status: DISCONTINUED | OUTPATIENT
Start: 2020-09-15 | End: 2020-09-15

## 2020-09-15 RX ORDER — OXYCODONE AND ACETAMINOPHEN 5; 325 MG/1; MG/1
1 TABLET ORAL
Status: DISCONTINUED | OUTPATIENT
Start: 2020-09-15 | End: 2020-09-15 | Stop reason: HOSPADM

## 2020-09-15 RX ORDER — LIDOCAINE HYDROCHLORIDE 20 MG/ML
INJECTION, SOLUTION EPIDURAL; INFILTRATION; INTRACAUDAL; PERINEURAL
Status: DISCONTINUED | OUTPATIENT
Start: 2020-09-15 | End: 2020-09-15

## 2020-09-15 RX ADMIN — DIPHENHYDRAMINE HYDROCHLORIDE 25 MG: 50 INJECTION INTRAMUSCULAR; INTRAVENOUS at 08:09

## 2020-09-15 RX ADMIN — ROPIVACAINE HYDROCHLORIDE: 2 INJECTION, SOLUTION EPIDURAL; INFILTRATION at 11:09

## 2020-09-15 RX ADMIN — HYDROMORPHONE HYDROCHLORIDE 0.2 MG: 1 INJECTION, SOLUTION INTRAMUSCULAR; INTRAVENOUS; SUBCUTANEOUS at 02:09

## 2020-09-15 RX ADMIN — ROPIVACAINE HYDROCHLORIDE 10 ML: 5 INJECTION, SOLUTION EPIDURAL; INFILTRATION; PERINEURAL at 07:09

## 2020-09-15 RX ADMIN — SODIUM CHLORIDE, SODIUM GLUCONATE, SODIUM ACETATE, POTASSIUM CHLORIDE, MAGNESIUM CHLORIDE, SODIUM PHOSPHATE, DIBASIC, AND POTASSIUM PHOSPHATE: .53; .5; .37; .037; .03; .012; .00082 INJECTION, SOLUTION INTRAVENOUS at 08:09

## 2020-09-15 RX ADMIN — FENTANYL CITRATE 25 MCG: 50 INJECTION, SOLUTION INTRAMUSCULAR; INTRAVENOUS at 08:09

## 2020-09-15 RX ADMIN — Medication 30 MG: at 08:09

## 2020-09-15 RX ADMIN — MIDAZOLAM HYDROCHLORIDE 2 MG: 1 INJECTION, SOLUTION INTRAMUSCULAR; INTRAVENOUS at 08:09

## 2020-09-15 RX ADMIN — MIDAZOLAM 2 MG: 1 INJECTION INTRAMUSCULAR; INTRAVENOUS at 07:09

## 2020-09-15 RX ADMIN — PROPOFOL 50 MCG/KG/MIN: 10 INJECTION, EMULSION INTRAVENOUS at 08:09

## 2020-09-15 RX ADMIN — ACETAMINOPHEN 1000 MG: 500 TABLET ORAL at 06:09

## 2020-09-15 RX ADMIN — PROPOFOL 30 MG: 10 INJECTION, EMULSION INTRAVENOUS at 08:09

## 2020-09-15 RX ADMIN — FENTANYL CITRATE 50 MCG: 50 INJECTION INTRAMUSCULAR; INTRAVENOUS at 07:09

## 2020-09-15 RX ADMIN — Medication 10 MG: at 08:09

## 2020-09-15 RX ADMIN — OXYCODONE HYDROCHLORIDE AND ACETAMINOPHEN 1 TABLET: 5; 325 TABLET ORAL at 01:09

## 2020-09-15 RX ADMIN — PROPOFOL 40 MG: 10 INJECTION, EMULSION INTRAVENOUS at 08:09

## 2020-09-15 RX ADMIN — DEXMEDETOMIDINE HYDROCHLORIDE 12 MCG: 100 INJECTION, SOLUTION, CONCENTRATE INTRAVENOUS at 08:09

## 2020-09-15 RX ADMIN — CEFAZOLIN 2 G: 330 INJECTION, POWDER, FOR SOLUTION INTRAMUSCULAR; INTRAVENOUS at 08:09

## 2020-09-15 RX ADMIN — MEPIVACAINE HYDROCHLORIDE 3 ML: 20 INJECTION, SOLUTION EPIDURAL; INFILTRATION at 08:09

## 2020-09-15 RX ADMIN — DEXAMETHASONE SODIUM PHOSPHATE 8 MG: 4 INJECTION, SOLUTION INTRAMUSCULAR; INTRAVENOUS at 08:09

## 2020-09-15 RX ADMIN — CELECOXIB 400 MG: 200 CAPSULE ORAL at 06:09

## 2020-09-15 RX ADMIN — LIDOCAINE HYDROCHLORIDE 100 MG: 20 INJECTION, SOLUTION INTRAVENOUS at 08:09

## 2020-09-15 RX ADMIN — ONDANSETRON 4 MG: 2 INJECTION, SOLUTION INTRAMUSCULAR; INTRAVENOUS at 10:09

## 2020-09-15 RX ADMIN — MEPIVACAINE HYDROCHLORIDE 6 ML/HR: 15 INJECTION, SOLUTION EPIDURAL; INFILTRATION at 10:09

## 2020-09-15 RX ADMIN — SODIUM CHLORIDE: 0.9 INJECTION, SOLUTION INTRAVENOUS at 07:09

## 2020-09-15 RX ADMIN — LIDOCAINE HYDROCHLORIDE 5 ML: 20 INJECTION, SOLUTION EPIDURAL; INFILTRATION; INTRACAUDAL at 10:09

## 2020-09-15 RX ADMIN — FAMOTIDINE 20 MG: 10 INJECTION, SOLUTION INTRAVENOUS at 08:09

## 2020-09-15 NOTE — BRIEF OP NOTE
[facility]  Brief Operative Note    Surgery Date: 9/15/2020     Surgeon(s) and Role:     * Barry Lopez MD - Primary    Assisting Surgeon: None    Pre-op Diagnosis:  Genu valgum, acquired, right [M21.061]  Chondromalacia of right knee [M94.261]  Acute lateral meniscus tear of right knee, initial encounter [S83.281A]    Post-op Diagnosis:  Post-Op Diagnosis Codes:     * Genu valgum, acquired, right [M21.061]     * Chondromalacia of right knee [M94.261]     * Acute lateral meniscus tear of right knee, initial encounter [S83.281A]    Procedure(s) (LRB):  OSTEOTOMY, FEMUR (Right)  ARTHROSCOPY, KNEE, WITH MENISCECTOMY (Right)  CHONDROPLASTY, KNEE (Right)  SYNOVECTOMY, KNEE (Right)    Anesthesia: Regional    Description of the findings of the procedure(s): See dictation    Estimated Blood Loss: * No values recorded between 9/15/2020  9:09 AM and 9/15/2020 10:45 AM *         Specimens:   Specimen (12h ago, onward)    None            Discharge Note    OUTCOME: Patient tolerated treatment/procedure well without complication and is now ready for discharge.    DISPOSITION: Home or Self Care    FINAL DIAGNOSIS:  Genu valgum, acquired, right    FOLLOWUP: In clinic    DISCHARGE INSTRUCTIONS:    Discharge Procedure Orders   Diet Adult Regular     Ice to affected area     Keep surgical extremity elevated     Weight bearing restrictions (specify):   Order Comments: Toe-touch to 25% weight bearing

## 2020-09-15 NOTE — PLAN OF CARE
Pt log rolled, tolerated well. Spouse remains at bedside. No distress noted. Will continue to monitor.

## 2020-09-15 NOTE — PLAN OF CARE
0534-Patient escorted to preop room and instructed to get undressed.      0620-Preop assessment completed. Waiting on anesthesia consent and H&P update. Wife at bedside and will hold on to belongings. Patient has crutches in car. All safety measures in place. Call bell within reach.

## 2020-09-15 NOTE — ANESTHESIA PROCEDURE NOTES
Adductor Canal Catheter    Patient location during procedure: pre-op   Block not for primary anesthetic.  Reason for block: at surgeon's request and post-op pain management   Post-op Pain Location: right knee pain  Start time: 9/15/2020 7:45 AM  Timeout: 9/15/2020 7:45 AM   End time: 9/15/2020 7:55 AM    Staffing  Authorizing Provider: Justa Blackman MD  Performing Provider: Walter De Luna MD    Preanesthetic Checklist  Completed: patient identified, site marked, surgical consent, pre-op evaluation, timeout performed, IV checked, risks and benefits discussed and monitors and equipment checked  Peripheral Block  Patient position: supine  Prep: ChloraPrep and site prepped and draped  Patient monitoring: heart rate, cardiac monitor, continuous pulse ox, continuous capnometry and frequent blood pressure checks  Block type: adductor canal  Laterality: right  Injection technique: continuous  Needle  Needle type: Tuohy   Needle gauge: 17 G  Needle length: 3.5 in  Needle localization: anatomical landmarks and ultrasound guidance  Catheter type: spring wound  Catheter size: 19 G  Test dose: lidocaine 1.5% with Epi 1-to-200,000 and negative   -ultrasound image captured on disc.  Assessment  Injection assessment: negative aspiration, negative parasthesia and local visualized surrounding nerve  Paresthesia pain: none  Heart rate change: no  Slow fractionated injection: yes  Additional Notes  VSS.  [facility] RN monitoring vitals throughout procedure.  Patient tolerated procedure well.  20cc of 0.25% Ropi with epi without additives administered.

## 2020-09-15 NOTE — PLAN OF CARE
Awaiting spinal epidural to resolve, pt log rolled. Spouse at bedside. Pt sitting up in bed eating snacks and drinking. Pt has no complaints at this time. VSS. No distress noted. Will continue to monitor.

## 2020-09-15 NOTE — PLAN OF CARE
0534-Patient escorted to preop room and instructed to get undressed.      0620-Preop assessment completed. Waiting on anesthesia consent. Wife at bedside and will hold on to belongings. Patient has crutches in car. All safety measures in place. Call bell within reach.

## 2020-09-15 NOTE — INTERVAL H&P NOTE
The patient has been examined and the H&P has been reviewed:    I concur with the findings and no changes have occurred since H&P was written.    Surgery risks, benefits and alternative options discussed and understood by patient/family.          Active Hospital Problems    Diagnosis  POA    Genu valgum, acquired, right [M21.061]  Yes      Resolved Hospital Problems   No resolved problems to display.

## 2020-09-15 NOTE — PROGRESS NOTES
On-Q teaching done with patient and patient's wife at bedside. Verbalizes understanding. Wife agrees to stay with patient for the next 72 hours while medication is infusing. All questions answered. On-Q contract signed, home care instruction pamphlet and extra home care supplies provided to patient upon discharge. Encouraged to contact anesthesia with any questions/concerns.

## 2020-09-15 NOTE — ANESTHESIA PROCEDURE NOTES
CSE    Patient location during procedure: OR  Start time: 9/15/2020 8:34 AM  Timeout: 9/15/2020 8:34 AM  End time: 9/15/2020 8:45 AM    Staffing  Authorizing Provider: Justa Blackman MD  Performing Provider: Justa Blackman MD    Preanesthetic Checklist  Completed: patient identified, site marked, surgical consent, pre-op evaluation, timeout performed, IV checked, risks and benefits discussed and monitors and equipment checked  CSE  Patient position: sitting  Prep: ChloraPrep  Patient monitoring: heart rate, continuous pulse ox and frequent blood pressure checks  Approach: midline  Spinal Needle  Needle type: Zabrina   Needle gauge: 25 G  Needle length: 3.5 in  Epidural Needle  Injection technique: ALEXANDER saline  Needle type: Tuohy   Needle gauge: 17 G  Needle length: 3.5 in  Needle insertion depth: 7.5 cm  Location: L3-4  Needle localization: anatomical landmarks  Catheter  Catheter type: springwTaktio  Catheter size: 18 G  Catheter at skin depth: 12 cm  Assessment  Sensory level: T6   Dermatomal levels determined by alcohol swab  Intrathecal Medications:  administered: primary anesthetic mcg of

## 2020-09-15 NOTE — OP NOTE
Operative Note       Surgery Date: 9/15/2020     Surgeon(s) and Role:     * Barry Lopez MD - Primary    Pre-op Diagnosis:  Genu valgum, acquired, right [M21.061]  Chondromalacia of right knee [M94.261]  Acute lateral meniscus tear of right knee, initial encounter [S83.281A]    Post-op Diagnosis: Post-Op Diagnosis Codes:     * Genu valgum, acquired, right [M21.061]     * Chondromalacia of right knee [M94.261]     * Acute lateral meniscus tear of right knee, initial encounter [S83.281A]    Procedure(s) (LRB):  OSTEOTOMY, FEMUR (Right)  ARTHROSCOPY, KNEE, WITH MENISCECTOMY (Right)  SYNOVECTOMY, KNEE (Right)    Anesthesia: Regional    Procedure in Detail/Findings:  DATE OF PROCEDURE: 9/15/2020    ATTENDING SURGEON: Surgeon(s) and Role:     * Barry Lopez MD - Primary    ASSISTANTS:  Juvenal Olmedo MD - Fellow  SMA Ruth - Assistant    PREOPERATIVE DIAGNOSIS:  Right  Chondromalacia, patella M22.40, Chondromalacia, (excludes patella) M94.29, Synovitis M65.9 and Genu Valgum Acquired, Right    POSTOPERATIVE DIAGNOSIS:   Right  Synovitis M65.9, Tear, Lateral meniscus, acute S83.289A and Genu Valgum, Acquired Right    PROCEDURES(S) PERFORMED:   1. Right  Osteotomy, Distal Femoral 26185  2.  Right  Arthroscopy, knee, synovectomy, limited 35338  3.  Right  Arthroscopy, with meniscectomy (medial OR lateral) 25868, Lateral      ANESTHESIA: Local, Regional w/ catheter  and Regional w/o Catheter  Epidural and Spinal , Adductor with catheter, ANUSHA 50 cc    FLUIDS IN THE CASE: 1500 ml    ESTIMATED BLOOD LOSS: Minimal    URINE OUTPUT: 0 ml    COMPLICATIONS: none    INDICATIONS FOR OPERATIVE PROCEDURE:   Yovani Malik is a 30 y.o.  male with history of right knee pain and pathology. The patient's history and physical examination findings consistent with the procedure performed. He noted significant problems in the area of concern with problems on activities of daily living and aggressive use of the right leg. As a  result of these problems and problems with overall activity level, the patient was deemed to be an appropriate candidate for operative intervention. Nonoperative versus operative options were discussed. The risks and benefits were discussed with the patient. The patient acknowledged understanding and wished to proceed with operative intervention. Informed consent was obtained prior to the procedure. Details of the surgical procedure were explained, including incisions and probable rehabilitation course. The patient understands the likely length of convalescence after surgery; and we have explained the risks, benefits, and alternatives of surgery. Reasonable expectations and potential complications were discussed and acknowledged, including but not limited to infection, bleeding, blood clots, (DVT and/or PE), nerve injury, retear, instability, continued pain and stiffness. It was also explained that there was a chance of failure which may require further management. The patient agreed and understood and wished to proceed.       FINDINGS:     ARTICULAR CARTILAGE LESION(S):  Medial Femoral Condyle: ICRS Grade 0      Size: none  Medial tibial plateau: ICRS Grade 0      Size: none        Lateral Femoral Condyle: ICRS Grade 2      Size: 2.0 x 2.0 cm  Lateral tibial plateau: ICRS Grade 2      Size: 2.0 x 2.0 cm        Patellar surface: ICRS Grade 3      Size: 2.0 x 2.0 cm  Trochlear groove: ICRS Grade 0      Size: none      EXAMINATION UNDER ANESTHESIA:   Extension -10 degrees  Flexion 140 degrees  Lachman Maneuver:  Negative  Anterior Drawer: Negative  Pivot Shift: Negative  Posterior Drawer:  Negative  Varus stability @ 30 degrees: 0  Valgus stability @ 30 degrees: 0  Patellar glide:1 quadrant lateral, 2 quadrant medial      IMPLANTS UTILIZED:     Arthrex Puddu Femoral Plate, Locking Titanium  5 mm    GRAFT SOURCE:  MTF medium cortical fibers      IMPLANTS UTILIZED: Arthrex 5 mm locked titanium plate, Arthrex 6.5 x 60 mm    screw,  Arthrex 6.5 x 60 mm screw, Arthrex 6.5 x 70 mm screw, Arthrex 4.5 x 38 mm screw, Arthrex 4.5 x 42 mm screw, Arthrex 4.5 x 58 mm  Screw, Arthrex 4.5 x 48 mm screw    GRAFT SOURCE: [employer] demineralized cortical   fibers (medium)      DESCRIPTION OF PROCEDURE: The patient was brought into the Operating Room and placed in supine position. Upon application of Regional w/ catheter  adductor block in the preoperative holding area, the patient underwent Regional w/o Catheter  Epidural and Spinal to stabilize the airway. The patient was given the appropriate dose of antibiotics based on body weight. Timeout was utilized to verify the side as the operative side. Both upper extremities were placed in comfortable position. Examination under anesthesia was performed. The nonoperative leg was carefully padded along the heel and peroneal nerve regions and maintained flat on the table. The operative leg was then stabilized with a lateral post for intra-operative positioning as well as a popliteal post placed at the mid-calf level.  A bump was placed under the hip on the operative side. The operative leg was prepped and draped in a sterile fashion with ChloraPrep material.    We injected 0.5% ropivacaine mixture into the anterolateral and anteromedial aspect of the knee with application of 15 mL per portal site. A #11 blade was used to make the arthroscopic portals. Blunt trocar and sheath were inserted into the intercondylar notch and then subsequently into the suprapatellar pouch. This patellofemoral joint was visualized, demonstrating normal lateral patellar tilt, normal patellar subluxation. The patellar tracked midline with flexion and extension. Arthroscopic pictures were obtained. There was articular cartilage damage was present in the patellofemoral compartment as noted in the Findings section of this operative report. The lesion if present was treated with arthroscopic chondroplasty  technique removing all irregular edges and flaps of articular cartilage at the lesion.    Attention was turned to the intercondylar notch where the anterior cruciate ligament (ACL) and posterior cruciate ligament (PCL) structures were visualized. Visualization demonstrated an intact ACL and an intact PCL. Probe analysis revealed no signs of occult pathology within the ligamentous structures.     Attention was then turned to the lateral compartment. The patient demonstrated a complex type tear of the central body region of the lateral meniscus. Arthroscopic instrumentation was used to carefully remove the tear and removing 80 % of the central body and posterior body region of the lateral meniscus. The remaining meniscus structure laterally was found to be intact. articular cartilage damage was present in the lateral compartment as noted in the Findings section of this operative report. The lesion if present was treated witharthroscopic chondroplasty technique removing all irregular edges and flaps of articular cartilage at the lesion.    Attention was then turned to the medial compartment. The patient demonstrated an intact medial meniscus with probe analysis demonstrating no occult tears or pathology Arthroscopic instrumentation was used to  veify no tear and pictures obtained. There was no articular cartilage damage in the medial compartment The lesion if present was treated with observation.    Arthroscopic limited synovectomy was performed in the anterior medial and lateral regions of the knee.    Conversion to the open procedure for DFO treatment.     Final arthroscopic pictures were obtained. Fluid was extravasated from the joint. A 4-0 nylon sutures   were used to close the arthroscopic portals. The patient was brought into the Operating Room, placed   in supine position. Upon application of femoral block in the   preoperative hold area, the patient underwent general sedation and placement of   LMA to  stabilize the airway.     The incision was created laterally and extended down through skin down the fat and fascia   measured approximately 10 cm in length. A subvastus approach was   performed to the lateral aspect of the knee. Patella and muscle were subluxed   medially. Distal femur was exposed. Bovie cautery was used to carefully   demarcate all areas along the distal femoral lateral region and we did dissect   along the posterolateral aspect of the femur, dissecting the neurovascular   structures off the posterior aspect of the femur to protect these neurovascular   structures during the osteotomy. We then placed a guidepin from the Arthrex   Puddu plate set with the distal guidepin applied to the epicondylar axis and   subsequent breakaway pins were placed in convergent fashion from proximal to   distal and lateral to medial in the area of concern. This was used to demarcate  the appropriate area for alignment and osteotomy. We used direct visualization  as well as fluoroscopic evaluation to make sure we did not change the flexion   and extension of the femoral region. Guidepin were then applied. An   oscillating saw was then used to create the osteotomy with the guidepins in   place as well as cutting guide in place. A protective sleeve was placed   directly over the neurovascular structures to retract these posteriorly during   the osteotomy with the knee in flexion to protect neurovascular structures. An   oscillating saw was used to create the lateral osteotomy taking care to stay   clear of the patellofemoral joint. The wedge osteotomes were used to carefully   open the osteotomy, and with opening of the osteotomy, we checked for alignment   checking the mechanical axis. We assessed from hip down to ankle level   demonstrating that a 5 mm correction would normalize the mechanical axis   ending at the central portion of the knee. As a result, we opened the titanium   locking plate sterilely applied into  the osteotomy, which had been opened and   then fixated with the above stated screw sizes proximally and distally. C-arm   was used to verify appropriate location of screws as well as appropriate   location and placement of screws with great fixation achieved. Irrigation was   performed along the area of concern. We then impacted the demineralized   cortical fibers from [employer] into the osteotomy   site. We added additional autologous bone graft later on to fill the osteotomy.  Pictures were obtained. Attention was then turned to the meniscus transplant.  Knee was taken into hyperflexion with a figure-of-four position with the patella  subluxed medially.     Copious irrigation was then performed. We then took the knee into flexion of 30 degrees   and closed the subvastus approach with closing the fascial layer laterally with a series of #1   Vicryl placed in figure-of-eight fashion. The synovial layer and parapatellar tendon region along   the patellar tendon site were closed with a series of #1 Vicryl sutures placed in figure-of-eight fashion as well.   Gentle irrigation performed further. We then injected the Exparel mixture into the area of   concern for postoperative pain control. We then injected Amniox fluid into the   area of concern for stable healing postoperatively using a spinal needle   following closure of the capsule. Subcutaneous tissues were closed with 3-0   Vicryl sutures placed in inverted fashion. Skin was closed with running 3-0   Monocryl placed in subcuticular fashion along with application of Dermabond   ointment and tape. We applied Xeroform gauze, ABD pads, cast padding, long-leg   DAVID hose stocking and cooling unit. Hinged knee immobilizer was placed with the  patient's knee locked in -10 degrees hyperextension and allowed to flex at rest  120 degrees. The patient was allowed to recover from the anesthetic. LMA was   removed. The patient was then taken to  Recovery Room in stable condition. At   the completion case, all instrument and sponge counts were correct.        NOTE: I was present and scrubbed for the key portions of the procedure.    PHYSICAL THERAPY:  The patient should begin physical therapy on postoperative   day # 3 and will be advanced to outpatient therapy as soon as   Possible following discharge.    Weight bearing:toe touch weight bearing Toe-touch WB on the right leg  Range of Motion:Full normal motion symmetric to opposite side    At 4 weeks' time, the patient should begin advancing from to 50% weightbearing at 4-5 weeks' time. The patient should begin advancing from 100% weightbearing at 6 weeks' time.     The patient can begin full weightbearing status at 6 weeks time.    Range of motion should be limited to -10 degrees hyperextension and flexion 120 degrees now.     Additional exercises to be performed are:   to begin quad sets with a heel roll to obtain hyperextension, straight leg raise and heel slides with the heel supported in a closed-chain fashion    Immediate specific exercises should include:   Gait program should include the above stated weightbearing; in addition: active extension with a heel-to-toe gait working on maintaining extension    Immobilizer if present should be locked @ -10 degrees with gait and allowed to flex to 120 degrees at rest.     Hinged knee immobilizer should be locked in -10 degrees hyperextension with gait  for  6 weeks.    Discharge summary:  The patient was discharged to home in [place]  Follow-up as scheduled preoperatively.    Medication(s): Refer to Discharge Medication List         Resume preoperative diet as tolerated    Activity per outpatient discharge instruction sheet          Estimated Blood Loss: * No values recorded between 9/15/2020  9:09 AM and 9/15/2020 11:29 AM *           Specimens (From admission, onward)    None        Implants:   Implant Name Type Inv. Item Serial No.  Lot No. LRB  No. Used Action   FEMORAL OSTEOTOMY PLATE 5MM TI - [id number]  FEMORAL OSTEOTOMY PLATE 5MM TI  [employer] [id number] Right 1 Implanted   SCREW CORTICAL 4.5X48MM - [id number]  SCREW CORTICAL 4.5X48MM  [employer] [id number] Right 1 Implanted   [id number]  [employer] M [id number]  Right 1 Implanted   SCREW, CANCELLOUS    [employer] [id number] Right 1 Implanted   SCREW, CANCELLOUS    [employer] [id number] Right 1 Implanted   SCREW CORTICAL 4.5X42MM - [id number]  SCREW CORTICAL 4.5X42MM  [employer] [id number] Right 1 Implanted   SCREW CORTICAL 4.5MM X 38MM - [id number]  SCREW CORTICAL 4.5MM X 38MM  [employer] [id number] Right 1 Implanted   SCREW CANCELLOUS 6.5X60MM - [id number]  SCREW CANCELLOUS 6.5X60MM  [employer] [id number] Right 1 Implanted   SCREW CANCELLOUS 6.5X60MM - [id number]  SCREW CANCELLOUS 6.5X60MM  [employer] [id number] Right 1 Implanted              Disposition: PACU - hemodynamically stable.           Condition: Good    Attestation:  I was present and scrubbed for the key portions of the procedure.           Discharge Note    Admit Date: 9/15/2020    Attending Physician: Barry Lopez MD     Discharge Physician: Barry Lopez MD    Final Diagnosis: Post-Op Diagnosis Codes:     * Genu valgum, acquired, right [M21.061]     * Chondromalacia of right knee [M94.261]     * Acute lateral meniscus tear of right knee, initial encounter [S83.281A]    Disposition: Home or Self Care    Patient Instructions:   Current Discharge Medication List      CONTINUE these medications which have NOT CHANGED    Details   ALPRAZolam (XANAX) 0.25 MG tablet Refills: 1      dextroamphetamine-amphetamine (ADDERALL XR) 30 MG 24 hr capsule TK TWO CAPSULES PO D IN THE EARLY MORNING  Refills: 0      aspirin (ECOTRIN) 325 MG EC tablet Take 1 tablet (325 mg total) by mouth once daily.  Qty: 42 tablet, Refills: 0    Comments: POST OP MEDS TO BE DELIVERED BEDSIDE AT [facility]  Associated Diagnoses: Genu valgum, acquired, right; Chondromalacia of  right knee; Other old tear of lateral meniscus of right knee      celecoxib (CELEBREX) 200 MG capsule Take 1 capsule (200 mg total) by mouth 2 (two) times daily.  Qty: 60 capsule, Refills: 2    Comments: POST OP MEDS TO BE DELIVERED BEDSIDE AT [place]  Associated Diagnoses: Genu valgum, acquired, right; Chondromalacia of right knee; Other old tear of lateral meniscus of right knee      methocarbamoL (ROBAXIN) 500 MG Tab Take 1 tablet (500 mg total) by mouth 3 (three) times daily. for 10 days  Qty: 30 tablet, Refills: 0    Comments: POST OP MEDS TO BE DELIVERED BEDSIDE AT [place]  Associated Diagnoses: Genu valgum, acquired, right; Chondromalacia of right knee; Other old tear of lateral meniscus of right knee      oxyCODONE-acetaminophen (PERCOCET)  mg per tablet Take 1 tablet by mouth every 6 (six) hours as needed.  Qty: 28 tablet, Refills: 0    Comments: Quantity prescribed more than 7 day supply? No    POST OP MEDS TO BE DELIVERED BEDSIDE AT [place]  Associated Diagnoses: Genu valgum, acquired, right; Chondromalacia of right knee; Other old tear of lateral meniscus of right knee      promethazine (PHENERGAN) 25 MG tablet Take 1 tablet (25 mg total) by mouth every 6 (six) hours as needed.  Qty: 30 tablet, Refills: 0    Comments: POST OP MEDS TO BE DELIVERED BEDSIDE AT [place]  Associated Diagnoses: Genu valgum, acquired, right; Chondromalacia of right knee; Other old tear of lateral meniscus of right knee         STOP taking these medications       HYDROcodone-acetaminophen (NORCO) 5-325 mg per tablet Comments:   Reason for Stopping:               Discharge Procedure Orders (must include Diet, Follow-up, Activity)   Discharge Procedure Orders (must include Diet, Follow-up, Activity)   Diet Adult Regular     Ice to affected area     Keep surgical extremity elevated     Weight bearing restrictions (specify):   Order Comments: Toe-touch to 25% weight bearing        Discharge Date: No discharge date for patient  encounter.

## 2020-09-15 NOTE — ANESTHESIA PREPROCEDURE EVALUATION
[facility]  Anesthesia Pre-Operative Evaluation       Patient Name: Yovani Malik  YOB: 1990  MRN: [id number]  [facility]: [id number]      Code Status: Full Code   Date of Procedure: 9/15/2020  Anesthesia: Regional Procedure: Procedure(s) (LRB):  OSTEOTOMY, FEMUR (Right)  ARTHROSCOPY, KNEE, WITH MENISCECTOMY (Right)  CHONDROPLASTY, KNEE (Right)  SYNOVECTOMY, KNEE (Right)  Pre-Operative Diagnosis: Genu valgum, acquired, right [M21.061]  Chondromalacia of right knee [M94.261]  Acute lateral meniscus tear of right knee, initial encounter [S82.557B]  Proceduralist: Surgeon(s) and Role:     * Barry Lopez MD - Primary        SUBJECTIVE:   Yovani Malik is a 30 y.o. male who  has a past medical history of Anxiety, Borderline diabetes, Hyperlipidemia, and Hypertension..   Revised cardiac risk index (RCRI) score is 1.    he has a current medication list which includes the following long-term medication(s): alprazolam, dextroamphetamine-amphetamine, aspirin, and aspirin.     ALLERGIES:   Review of patient's allergies indicates:  No Known Allergies  LDA:      Lines/Drains/Airways     None                Anesthesia Evaluation      Airway   Mallampati: III  TM distance: Normal, at least 6 cm  Neck ROM: Normal ROM  Dental    (+) In tact    Pulmonary    Cardiovascular   Exercise tolerance: good  (+) hypertension,     Neuro/Psych    (+) psychiatric history    GI/Hepatic/Renal    (+) GERD,     Endo/Other    Abdominal                   MEDICATIONS:     Antibiotics (From admission, onward)    Start     Stop Route Frequency Ordered    09/15/20 0523  ceFAZolin injection 2 g      -- IV On Call Procedure 09/15/20 0524        VTE Risk Mitigation (From admission, onward)         Ordered     Place sequential compression device  Until discontinued      09/15/20 0524     Place DAVID hose  Until discontinued      09/15/20 0524     IP VTE LOW RISK PATIENT  Once      09/15/20 0524     Place sequential compression device   Until discontinued      09/15/20 0524     Place DAVID hose  Until discontinued      09/15/20 0524              Current Facility-Administered Medications   Medication Dose Route Frequency Provider Last Rate Last Dose    0.9%  NaCl infusion   Intravenous Continuous Angelica Brady PA-C        ceFAZolin injection 2 g  2 g Intravenous On Call Procedure Angelica Brady PA-C        fentaNYL injection 25 mcg  25 mcg Intravenous Q5 Min PRN Michi Damon MD        midazolam (VERSED) 1 mg/mL injection 0.5 mg  0.5 mg Intravenous PRN Michi Damon MD        ropivacaine 0.2% ON-Q C-BLOC 400 ML (SELECT A FLOW)   Perineural Continuous Michi Damon MD        ropivacaine-epi-clonid-ketorol (ANUSHA) 2.46-0.005- 0.0008-0.3mg/mL solution Syrg   Intra-articular Once Angelica Brady PA-C              History:     Active Hospital Problems    Diagnosis  POA    Genu valgum, acquired, right [M21.061]  Yes      Resolved Hospital Problems   No resolved problems to display.     Surgical History:    has a past surgical history that includes Knee surgery (Bilateral); Knee arthroscopy; Arthroscopy of knee (Right, 9/13/2019); Repair of meniscus of knee (Right, 9/13/2019); Esophagogastroduodenoscopy (N/A, 10/3/2019); Tonsillectomy; and Esophagogastroduodenoscopy (N/A, 1/23/2020).   Social History:    reports being sexually active and has had partner(s) who are Female.  reports that he has been smoking cigarettes. He has been smoking about 0.50 packs per day. He has never used smokeless tobacco. He reports that he does not drink alcohol or use drugs.     OBJECTIVE:     Vital Signs (Most Recent):  Pulse: (!) 59 (09/15/20 0559)  Resp: 18 (09/15/20 0559)  BP: 137/87 (09/15/20 0559)  SpO2: 100 % (09/15/20 0559) Vital Signs Range (Last 24H):  Temp:  [36.9 °C (98.4 °F)]   Pulse:  [59-70]   Resp:  [18]   BP: (124-137)/(84-87)   SpO2:  [98 %-100 %]        Body mass index is 40.03 kg/m².   Wt Readings from  Last 4 Encounters:   09/15/20 112.5 kg (248 lb)   09/14/20 112.7 kg (248 lb 7.3 oz)   09/11/20 114.3 kg (252 lb)   07/08/20 114.8 kg (253 lb)     Significant Labs:  Lab Results   Component Value Date    WBC 8.23 09/14/2020    HGB 15.5 09/14/2020    HCT 46.9 09/14/2020     09/14/2020     09/14/2020    K 4.7 09/14/2020     09/14/2020    CREATININE 0.9 09/14/2020    BUN 13 09/14/2020    CO2 28 09/14/2020    GLU 94 09/14/2020    CALCIUM 9.5 09/14/2020    ALKPHOS 55 09/14/2020    ALT 40 09/14/2020    AST 32 09/14/2020    ALBUMIN 4.2 09/14/2020    INR 0.9 09/14/2020    APTT 30.8 09/14/2020     Recent Results (from the past 72 hour(s))   COVID-19 Routine Screening    Collection Time: 09/12/20  9:47 AM   Result Value Ref Range    SARS-CoV2 (COVID-19) Qualitative PCR Not Detected Not Detected   CBC auto differential    Collection Time: 09/14/20 10:42 AM   Result Value Ref Range    WBC 8.23 3.90 - 12.70 K/uL    RBC 5.59 4.60 - 6.20 M/uL    Hemoglobin 15.5 14.0 - 18.0 g/dL    Hematocrit 46.9 40.0 - 54.0 %    Mean Corpuscular Volume 84 82 - 98 fL    Mean Corpuscular Hemoglobin 27.7 27.0 - 31.0 pg    Mean Corpuscular Hemoglobin Conc 33.0 32.0 - 36.0 g/dL    RDW 13.3 11.5 - 14.5 %    Platelets 298 150 - 350 K/uL    MPV 10.4 9.2 - 12.9 fL    Immature Granulocytes 0.2 0.0 - 0.5 %    Gran # (ANC) 4.7 1.8 - 7.7 K/uL    Immature Grans (Abs) 0.02 0.00 - 0.04 K/uL    Lymph # 2.6 1.0 - 4.8 K/uL    Mono # 0.7 0.3 - 1.0 K/uL    Eos # 0.2 0.0 - 0.5 K/uL    Baso # 0.06 0.00 - 0.20 K/uL    nRBC 0 0 /100 WBC    Gran% 56.5 38.0 - 73.0 %    Lymph% 32.0 18.0 - 48.0 %    Mono% 7.9 4.0 - 15.0 %    Eosinophil% 2.7 0.0 - 8.0 %    Basophil% 0.7 0.0 - 1.9 %    Differential Method Automated    Comprehensive metabolic panel    Collection Time: 09/14/20 10:42 AM   Result Value Ref Range    Sodium 142 136 - 145 mmol/L    Potassium 4.7 3.5 - 5.1 mmol/L    Chloride 103 95 - 110 mmol/L    CO2 28 23 - 29 mmol/L    Glucose 94 70 - 110 mg/dL     BUN, Bld 13 6 - 20 mg/dL    Creatinine 0.9 0.5 - 1.4 mg/dL    Calcium 9.5 8.7 - 10.5 mg/dL    Total Protein 7.4 6.0 - 8.4 g/dL    Albumin 4.2 3.5 - 5.2 g/dL    Total Bilirubin 0.9 0.1 - 1.0 mg/dL    Alkaline Phosphatase 55 55 - 135 U/L    AST 32 10 - 40 U/L    ALT 40 10 - 44 U/L    Anion Gap 11 8 - 16 mmol/L    eGFR if African American >60 >60 mL/min/1.73 m^2    eGFR if non African American >60 >60 mL/min/1.73 m^2   Protime-INR    Collection Time: 09/14/20 10:42 AM   Result Value Ref Range    Prothrombin Time 10.1 9.0 - 12.5 sec    INR 0.9 0.8 - 1.2   APTT    Collection Time: 09/14/20 10:42 AM   Result Value Ref Range    aPTT 30.8 21.0 - 32.0 sec   Lipid Panel    Collection Time: 09/14/20 10:42 AM   Result Value Ref Range    Cholesterol 242 (H) 120 - 199 mg/dL    Triglycerides 141 30 - 150 mg/dL    HDL 47 40 - 75 mg/dL    LDL Cholesterol 166.8 (H) 63.0 - 159.0 mg/dL    Hdl/Cholesterol Ratio 19.4 (L) 20.0 - 50.0 %    Total Cholesterol/HDL Ratio 5.1 (H) 2.0 - 5.0    Non-HDL Cholesterol 195 mg/dL   Vitamin D    Collection Time: 09/14/20 10:42 AM   Result Value Ref Range    Vit D, 25-Hydroxy 27 (L) 30 - 96 ng/mL     EKG:   Results for orders placed or performed in visit on 09/14/20   IN OFFICE EKG 12-LEAD (to [place])    Collection Time: 09/14/20  8:55 AM    Narrative    Test Reason : Z01.818,Z01.818,    Vent. Rate : 064 BPM     Atrial Rate : 064 BPM     P-R Int : 118 ms          QRS Dur : 088 ms      QT Int : 404 ms       P-R-T Axes : 008 -29 -07 degrees     QTc Int : 416 ms    Normal sinus rhythm  Minimal voltage criteria for LVH, may be normal variant  Borderline Abnormal ECG  No previous ECGs available    Referred By: LESLYE HARE           Confirmed By:      ASSESSMENT/PLAN:     Anesthesia Evaluation    I have reviewed the Patient Summary Reports.    I have reviewed the Nursing Notes. I have reviewed the NPO Status.   I have reviewed the Medications.     Review of Systems  Anesthesia Hx:  No problems with previous  Anesthesia  History of prior surgery of interest to airway management or planning: Denies Family Hx of Anesthesia complications.   Denies Personal Hx of Anesthesia complications.   Hematology/Oncology:  Hematology Normal   Oncology Normal     EENT/Dental:EENT/Dental Normal   Cardiovascular:   Exercise tolerance: good Hypertension    Pulmonary:  Pulmonary Normal    Renal/:  Renal/ Normal     Hepatic/GI:   GERD    Musculoskeletal:  Musculoskeletal Normal    Neurological:  Neurology Normal    Endocrine:  Endocrine Normal    Dermatological:  Skin Normal    Psych:   Psychiatric History          Physical Exam  General:  Well nourished    Airway/Jaw/Neck:  Airway Findings: Mouth Opening: Normal Tongue: Normal  General Airway Assessment: Adult  Mallampati: III  Improves to II with phonation.  TM Distance: Normal, at least 6 cm  Jaw/Neck Findings:  Neck ROM: Normal ROM     Eyes/Ears/Nose:  EYES/EARS/NOSE FINDINGS: Normal   Dental:  Dental Findings: In tact   Chest/Lungs:  Chest/Lungs Findings: Clear to auscultation     Heart/Vascular:  Heart Findings: Rhythm: Regular Rhythm  Sounds: Normal  Heart murmur: negative Vascular Findings: Normal    Abdomen:  Abdomen Findings: Normal    Musculoskeletal:  Musculoskeletal Findings: Normal   Skin:  Skin Findings: Normal    Mental Status:  Mental Status Findings:  Cooperative, Alert and Oriented         Anesthesia Assessment: Preoperative EQUATION    Planned Procedure: Procedure(s) (LRB):  OSTEOTOMY, FEMUR (Right)  ARTHROSCOPY, KNEE, WITH MENISCECTOMY (Right)  CHONDROPLASTY, KNEE (Right)  SYNOVECTOMY, KNEE (Right)  Requested Anesthesia Type:Regional  Surgeon: Barry Lopez MD  Service: Orthopedics  Known or anticipated Date of Surgery:9/15/2020    Surgeon notes: reviewed   Past Surgical History:   Procedure Laterality Date    ARTHROSCOPY OF KNEE Right 9/13/2019    Procedure: ARTHROSCOPY, KNEE;  Surgeon: Anurag Wells MD;  Location: [facility] OR;  Service: Orthopedics;   Laterality: Right;    ESOPHAGOGASTRODUODENOSCOPY N/A 10/3/2019    Procedure: EGD (ESOPHAGOGASTRODUODENOSCOPY);  Surgeon: Yovani Dominguez MD;  Location: [facility] ENDO;  Service: Endoscopy;  Laterality: N/A;    ESOPHAGOGASTRODUODENOSCOPY N/A 1/23/2020    Procedure: EGD (ESOPHAGOGASTRODUODENOSCOPY);  Surgeon: Yovani Dominguez MD;  Location: [facility] ENDO;  Service: Endoscopy;  Laterality: N/A;    KNEE ARTHROSCOPY      KNEE SURGERY Bilateral     x 2     REPAIR OF MENISCUS OF KNEE Right 9/13/2019    Procedure: REPAIR, MENISCUS, KNEE;  Surgeon: Anurag Wells MD;  Location: [facility] OR;  Service: Orthopedics;  Laterality: Right;  lateral meniscal repair    TONSILLECTOMY         Electronic QUestionnaire Assessment completed via nurse interview with patient.        Triage considerations:     The patient has no apparent active cardiac condition (No unstable coronary Syndrome such as severe unstable angina or recent [<1 month] myocardial infarction, decompensated CHF, severe valvular   disease or significant arrhythmia)    Previous anesthesia records:[facility]    [facility]   Subspecialty notes: Ortho    Other important co-morbidities:   Past Medical History:   Diagnosis Date    Anxiety     Borderline diabetes     Hyperlipidemia     Hypertension          Tests already available:  Results have been reviewed.             Instructions given. (See in Nurse's note)    Optimization:  Anesthesia Preop Clinic Assessment  Indicated    Medical Opinion Indicated       Sub-specialist consult indicated:   TBD       Plan:    Testing:  See Orders.   Pre-anesthesia  visit       Visit focus: possible regional anesthesia and/or nerve block      Consultation:to be determined     Patient  has previously scheduled Medical Appointment:    Navigation: Tests Scheduled.              Consults scheduled.             Results will be tracked by Preop Clinic.                 Straight Line to surgery.                No tests, anesthesia preop clinic visit, or consult required.      Anesthesia Plan  Type of Anesthesia, risks & benefits discussed:  Anesthesia Type:  general, MAC, regional  Patient's Preference:   Intra-op Monitoring Plan: standard ASA monitors  Intra-op Monitoring Plan Comments:   Post Op Pain Control Plan: per primary service following discharge from PACU and multimodal analgesia  Post Op Pain Control Plan Comments:   Induction:   IV  Beta Blocker:  Patient is not currently on a Beta-Blocker (No further documentation required).       Informed Consent: Patient understands risks and agrees with Anesthesia plan.  Questions answered. Anesthesia consent signed with patient.  ASA Score: 2     Day of Surgery Review of History & Physical:     H&P completed by Anesthesiologist.   Anesthesia Plan Notes: Chart reviewed, patient interviewed and examined.  The anesthetic plan was explained.  Risks, benefits, and alternatives were discussed. Questions were answered and the consent was signed.        ADAN Blackman M.D.         Ready For Surgery From Anesthesia Perspective.

## 2020-09-15 NOTE — DISCHARGE INSTRUCTIONS
[address], [place]                                                                                          [phone]                   Postoperative Instructions for Knee Surgery                 Your Surgery Included:   Open               Arthroscopic   [] Ligament Repair       [x] Diagnostic           [] ACL     [] PCL     [] MCL     [] PLLC      [x] Synovectomy / Plica Removal [] Meniscal Cartilage Repair / Transplantation      [] Lysis of Adhesions / Manipulation [] Articular Cartilage Repair      [] Interval Release           [] Microfracture       [] OATS   [] ACI      [x] Meniscectomy           [] Osteochondral Allograft      [] Meniscal Cartilage Repair  [] Patellar Realignment       [x] Debridement / Chondroplasty         [] Lateral Release   [] Ligament Repair      [] Articular Cartilage Repair          [] Extensor Mechanism             []   Microfracture  []  OATS         []  Cartilage Biopsy [] Tendon Repair          [] Ligament Reconstruction          [] Patella                  [] Quadriceps             []   ACL    []   PCL  [x] Distal Femur Osteotomy       [] PRP Arthrocentesis  [] Joint Replacement         [] Amniox Arthrocentesis           [] Unicompartmental   [] Patellofemoral                  Call our office ([phone]) immediately or message through [facility] if you experience any of the following:       Excessive bleeding or pus like drainage at the incision site       Uncontrollable pain not relieved by pain medication       Excessive swelling or redness at the incision site       Fever above 101.5 degrees not controlled with Tylenol or Motrin       Shortness of Breath or severe calf pain       Any foul odor or blistering from the surgery site    FOR EMERGENCIES: [facility] is the best way to contact us. If on the weekend, page the  at [phone] who will direct your call appropriately.    1.   Pain Management: A cold therapy cuff,  pain medications, local injections, TENs unit, and in some cases, regional anesthesia injections are used to manage your post-operative pain. The decision to use each of these options is based on their risks and benefits.     Medications: You were given one or more of the following medication prescriptions during your preoperative appointment. Follow the instructions on the bottles.     Narcotic Medication (usually Percocet, Roxicodone, or Norco): Begin taking the medication before your knee starts to hurt. Some patients do not like to take any medication, but if you wait until your pain is severe before taking, you will be very uncomfortable for several hours waiting for the narcotic to work. Always take with food.     Nausea / Vomiting: For this issue, we prescribe Phenergan or Zofran, use this medication as directed as needed for nausea.     Cold Therapy: You may have been sent home with a [employer]® cold therapy unit and wrap for your knee. Fill with ice and water to the indicated fill line. You can use 20-30 minutes on then off, several times a day. This will help relieve pain and control swelling. Do not sleep with on.     Regional Anesthesia Injections (Blocks): You may have been given a regional nerve block either before or after surgery. This may make your entire leg numb for 24-36 hours.                            * Proceed with caution when bearing weight on your leg.     2.   Diet: Eat a bland diet for the first day after surgery. Progress your diet as tolerated. Constipation may occur with Narcotic usage. We recommend Colace 100mg twice a day while taking narcotics.    3.   Activity: Limit your activity during the first 48 hours, keep your leg elevated with pillows under your heel. After the first 48 hours at home, increase your activity level based on your symptoms.    4.   Dressing: (b) The soft, bulky dressing will be removed on the 3rd day after surgery. The Aquacel (tan, long adhesive bandage)  "will remain on until the 1st post operative appointment. Place waterproof bandages at this time. Keep wounds as dry as possible for first 2 weeks. It is normal for some blood to be seen on the dressings. It is also normal for you to see apparent bruising on the skin around your incisions. If you are concerned by the drainage or the appearance of your wound site, you can send a picture via [facility].    5.   Shower: (b) You may shower on the 3rd day after surgery. The Aquacel bandage is to be covered with saran wrap before showering. Do not get bandage wet. You may see a small incision with a suture. Place waterproof bandages  prior to shower. It is recommended to use Saran wrap before showering. Do not submerge limb in any water for 4 weeks or incisions completely healed.    6.   Your procedure did not require a post-operative brace.    7.   Your procedure did not require a Continuous Passive Motion (CPM) device.    8.   Weight Bearing: You may have been sent home with crutches. If instructed (see below), use these crutches at all times unless at complete rest.      [x] Partial weight bearing for  6 weeks    [x] Toe touch to 25% Body Weight       9.  Knee Exercises: Begin these exercises the first day after surgery in order to help you regain your motion and strength. You may do the following marked exercises:     [x] Quad Sets - Begin activating your quadriceps muscle by driving your          knee downward into full knee extension while seated on a table or bed   with a towel rolled and propped under your heel     [x] Straight Leg Raise (SLR) - While lester your quadriceps muscle, lift     your fully extended leg to the level of your non-operative knee (as shown)     [x] Heel Slides - With the knee straight, slide your heel slowly toward your   buttocks, hold at the endpoint for 10-15 seconds, then slowly straighten     [x] Ankle pumps - With your knee straight, move your ankle in a "pumping"    fashion to " activate your calf and leg muscles      10.  Physical Therapy: Physical therapy is an essential component to your recovery from surgery. Your physical therapy will start in 3-5 days.    FIRST POSTOPERATIVE VISIT: As scheduled.   [employer] CUBE COLD THERAPY SYSTEM    The [employer] Care Cube Cold Therapy System is simple and reliable. It is easy to use, compact design makes it great for home use. With the addition of ice and water, you will enjoy 6-8 hours of effortless cold therapy. Proper use requires an insulation barrier between the pad and the patient's skin.  Instructions on how to use the [employer] Care Cube Cold Therapy System Below:

## 2020-09-15 NOTE — PLAN OF CARE
VSS. Pt able to tolerate oral liquids. Pt reports tolerable pain level for D/C. Polar care and dressing intact. Thighe TEDs intact. Spouse received home meds per bedside delivery. Polar care power adapter placed with pts personal belongings. Pt instructed not to wear DAVID hose without wearing shoes/ socks due to increased risk of falls, verbalized understanding. Spouse states he already has crutches for home use. No distress noted. Pt states he is ready for D/C. D/C instructions reviewed with pt and spouse, verbalized understanding. All questions addressed and answered. Pt voided prior to D/C.

## 2020-09-15 NOTE — ANESTHESIA POSTPROCEDURE EVALUATION
Anesthesia Post Evaluation    Patient: Yovani Malik    Procedure(s) Performed: Procedure(s) (LRB):  OSTEOTOMY, FEMUR (Right)  ARTHROSCOPY, KNEE, WITH MENISCECTOMY (Right)  SYNOVECTOMY, KNEE (Right)    Final Anesthesia Type: general    Patient location during evaluation: PACU  Patient participation: Yes- Able to Participate  Level of consciousness: awake and alert  Post-procedure vital signs: reviewed and stable  Pain management: adequate  Airway patency: patent    PONV status at discharge: No PONV  Anesthetic complications: no      Cardiovascular status: blood pressure returned to baseline  Respiratory status: unassisted  Hydration status: euvolemic  Follow-up not needed.          Vitals Value Taken Time   /65 09/15/20 1147   Temp 37 °C (98.6 °F) 09/15/20 1134   Pulse 65 09/15/20 1157   Resp 21 09/15/20 1157   SpO2 100 % 09/15/20 1157   Vitals shown include unvalidated device data.      No case tracking events are documented in the log.      Pain/Isaak Score: Pain Rating Prior to Med Admin: 0 (9/15/2020 11:35 AM)  Isaak Score: 5 (9/15/2020 11:30 AM)

## 2020-09-15 NOTE — TRANSFER OF CARE
"Anesthesia Transfer of Care Note    Patient: Yovani Malik    Procedure(s) Performed: Procedure(s) (LRB):  OSTEOTOMY, FEMUR (Right)  ARTHROSCOPY, KNEE, WITH MENISCECTOMY (Right)  SYNOVECTOMY, KNEE (Right)    Patient location: PACU    Anesthesia Type: CSE and general    Transport from OR: Transported from OR on 6-10 L/min O2 by face mask with adequate spontaneous ventilation    Post pain: adequate analgesia    Post assessment: no apparent anesthetic complications    Post vital signs: stable    Level of consciousness: sedated    Nausea/Vomiting: no nausea/vomiting    Complications: none    Transfer of care protocol was followed      Last vitals:   Visit Vitals  /80 (BP Location: Left arm, Patient Position: Lying)   Pulse 60   Temp 36.6 °C (97.8 °F) (Oral)   Resp 17   Ht 5' 6" (1.676 m)   Wt 112.5 kg (248 lb)   SpO2 100%   BMI 40.03 kg/m²     "

## 2020-09-16 NOTE — PROGRESS NOTES
9/16/2020 1223    Patient's wife, Pema Malik, called at home. Reports patient's pain well controlled with On-Q. Patient denies signs of local anesthetic toxicity. PNC dressing remains clean, dry, and intact. All questions answered. Encouraged to call if any issues arise.

## 2020-09-17 VITALS
TEMPERATURE: 98 F | DIASTOLIC BLOOD PRESSURE: 72 MMHG | RESPIRATION RATE: 20 BRPM | HEART RATE: 94 BPM | SYSTOLIC BLOOD PRESSURE: 120 MMHG | WEIGHT: 248 LBS | BODY MASS INDEX: 39.86 KG/M2 | OXYGEN SATURATION: 95 % | HEIGHT: 66 IN

## 2020-09-17 RX ORDER — TRAMADOL HYDROCHLORIDE 50 MG/1
50 TABLET ORAL EVERY 4 HOURS PRN
Qty: 28 TABLET | Refills: 0 | Status: SHIPPED | OUTPATIENT
Start: 2020-09-17 | End: 2020-09-24

## 2020-09-17 NOTE — TELEPHONE ENCOUNTER
Spoke to the patient' wife. She explains his pain catheter is empty and it was supposed to last through tomorrow. She states he is currently taking 2 Percocet  Q4hr -Q5hr. Explained that we can send in Tramadol for breakthrough pain. Wife agreed to this plan. Confirmed with her that the patient does not have allergies to the medication. Instructed them on the pain management schedule provided in Pre op    ----- Message from Jodee Steven MA sent at 9/17/2020  3:16 PM CDT -----  Contact: [phone] (wife) Pema  Post op pt with a pain scale of 9    [phone] (wife) Pema

## 2020-09-17 NOTE — PROGRESS NOTES
Spoke to wife who states that patient's pain is not controlled.  Suggested she call the clinic for additional recommendations.  Number provided.

## 2020-09-17 NOTE — PROGRESS NOTES
9/17/2020 1325    Called and spoke with patient's wife. Reported patient's On-Q pump now completely empty and patient experiencing a significant amount of pain to right leg/knee. Patient plans to place call to ortho clinic for further recommendations on pain control. Instructed to remove PNC at this time and reminded to check for blue tip to end of catheter. Verbalizes understanding. Denies any other concerns at this time. Encouraged to contact anesthesia for any further concerns/questions related to nerve block.

## 2020-09-18 ENCOUNTER — CLINICAL SUPPORT (OUTPATIENT)
Dept: REHABILITATION | Facility: HOSPITAL | Age: 30
End: 2020-09-18
Payer: COMMERCIAL

## 2020-09-18 DIAGNOSIS — R29.898 WEAKNESS OF RIGHT LOWER EXTREMITY: ICD-10-CM

## 2020-09-18 DIAGNOSIS — M23.200 OTHER OLD TEAR OF LATERAL MENISCUS OF RIGHT KNEE: ICD-10-CM

## 2020-09-18 DIAGNOSIS — M21.061 GENU VALGUM, ACQUIRED, RIGHT: ICD-10-CM

## 2020-09-18 DIAGNOSIS — M21.061 GENU VALGUM, RIGHT: Primary | ICD-10-CM

## 2020-09-18 DIAGNOSIS — M94.261 CHONDROMALACIA OF RIGHT KNEE: ICD-10-CM

## 2020-09-18 DIAGNOSIS — M25.661 DECREASED RANGE OF MOTION (ROM) OF RIGHT KNEE: ICD-10-CM

## 2020-09-18 DIAGNOSIS — R26.9 GAIT ABNORMALITY: ICD-10-CM

## 2020-09-18 PROCEDURE — 97162 PT EVAL MOD COMPLEX 30 MIN: CPT | Mod: PO | Performed by: PHYSICAL THERAPIST

## 2020-09-18 RX ORDER — OXYCODONE AND ACETAMINOPHEN 10; 325 MG/1; MG/1
1 TABLET ORAL EVERY 6 HOURS PRN
Qty: 28 TABLET | Refills: 0 | Status: SHIPPED | OUTPATIENT
Start: 2020-09-18 | End: 2020-11-20

## 2020-09-18 RX ORDER — OXYCODONE AND ACETAMINOPHEN 10; 325 MG/1; MG/1
1 TABLET ORAL EVERY 6 HOURS PRN
Qty: 28 TABLET | Refills: 0 | Status: SHIPPED | OUTPATIENT
Start: 2020-09-18 | End: 2020-09-25 | Stop reason: SDUPTHER

## 2020-09-18 NOTE — PLAN OF CARE
[facility]  Physical Therapy Initial Evaluation    Name: Yovani Malik  [facility] Number: [id number]    Therapy Diagnosis:   Encounter Diagnoses   Name Primary?    Genu valgum, acquired, right     Chondromalacia of right knee     Other old tear of lateral meniscus of right knee     Decreased range of motion (ROM) of right knee     Weakness of right lower extremity     Gait abnormality      Physician: Angelica Brady PA-C    Physician Orders: PT Eval and Treat     The patient should begin physical therapy on postoperative   day # 3 and will be advanced to outpatient therapy as soon as   Possible following discharge.     Weight bearing:toe touch weight bearing Toe-touch WB on the right leg  Range of Motion:Full normal motion symmetric to opposite side     At 4 weeks' time, the patient should begin advancing from to 50% weightbearing at 4-5 weeks' time. The patient should begin advancing from 100% weightbearing at 6 weeks' time.      The patient can begin full weightbearing status at 6 weeks time.     Range of motion should be limited to -10 degrees hyperextension and flexion 120 degrees now.      Additional exercises to be performed are:   to begin quad sets with a heel roll to obtain hyperextension, straight leg raise and heel slides with the heel supported in a closed-chain fashion     Immediate specific exercises should include:   Gait program should include the above stated weightbearing; in addition: active extension with a heel-to-toe gait working on maintaining extension     Immobilizer if present should be locked @ -10 degrees with gait and allowed to flex to 120 degrees at rest.      Hinged knee immobilizer should be locked in -10 degrees hyperextension with gait  for  6 weeks.    Medical Diagnosis from Referral: M21.061 (ICD-10-CM) - Genu valgum, acquired, right M94.261 (ICD-10-CM) - Chondromalacia of right knee M23.200 (ICD-10-CM) - Other old tear of lateral meniscus of  right knee   Evaluation Date: 9/18/2020  Authorization Period Expiration: 12/31/2020   Plan of Care Expiration: 12/08/2020  Visit # / Visits authorized: 1/ 20    Time In: 1000  Time Out: 1045  Total Billable Time: 40 minutes    Precautions: Standard and blood thinners, DOS 9/15/2020, TTWB until 4-5 weeks, 50% WB then, 100% WB at 6 weeks.    Subjective   Date of onset: 9/15/2020  History of current condition - Yovani reports: s/p   Stage 1  1. Right distal femoral opening wedge osteotomy   2. Right knee arthroscopic lateral meniscectomy  3. Right knee arthroscopic limited synovectomy  4. Right knee arthroscopic lysis of adhesions  on 9/15/20.   Pt was experiencing significant R lat knee pain prior to sx. He is a  which req often standing. He had an arthroscopic sx prior years ago and then a meniscus repair to R knee end of last yr. He is now walking TTWB with B axillary crutches s/p the above surgery. He is  and lives with wife and 4 yr old daughter. He is not able to work. He continues having report of significant R knee pain. He has trouble with donning socks req assistance of spouse.     Medical History:   Past Medical History:   Diagnosis Date    Anxiety     Borderline diabetes     Hyperlipidemia     Hypertension        Surgical History:   Yovani Malik  has a past surgical history that includes Knee surgery (Bilateral); Knee arthroscopy; Arthroscopy of knee (Right, 9/13/2019); Repair of meniscus of knee (Right, 9/13/2019); Esophagogastroduodenoscopy (N/A, 10/3/2019); Tonsillectomy; Esophagogastroduodenoscopy (N/A, 1/23/2020); Femur Osteotomy (Right, 9/15/2020); Knee arthroscopy w/ meniscectomy (Right, 9/15/2020); and Synovectomy of knee (Right, 9/15/2020).    Medications:   Yovani has a current medication list which includes the following prescription(s): alprazolam, aspirin, celecoxib, dextroamphetamine-amphetamine, methocarbamol, oxycodone-acetaminophen, oxycodone-acetaminophen,  "promethazine, and tramadol, and the following Facility-Administered Medications: fentanyl, midazolam, and ropivacaine.    Allergies:   Review of patient's allergies indicates:  No Known Allergies     Imaging, none: since surgical repair    Prior Therapy: PT last yr after second sx  Social History:  lives with their spouse  Occupation:   Prior Level of Function: Prior to sx, walking with a limp and freq rest breaks  Current Level of Function: now walking with B axillary crutches and TTWB, ROM -10 to 120 deg    Pain:  Current 8/10, worst 9/10, best 5/10   Location: right knee   Description: Burning  Aggravating Factors: Standing and Walking  Easing Factors: pain medication and ice    Pts goals: "To get to where I can walk."    Objective     Observation: As above.  See above for current gait. Patient also walking fast with crutch and not TTWB, more NWB prior to PT education and encouragement.    Hip Range of Motion:  tba next visit due to time.    R knee: -5 to 35 deg AROM  L knee: +3 to 123 deg AROM    Lower Extremity Strength  Right LE  Left LE    Knee extension: 5/5 Knee extension: 2/5 as seen with QS, s/p   Knee flexion: 5/5 Knee flexion: 3/5, partial ROM against gravity   Hip flexion: 5/5 Hip flexion: Na s/p               Hip extension:  5/5 Hip extension: Na s/p   Hip abduction: 5/5 Hip abduction: Na s/p   Hip adduction: 5/5 Hip adduction Na s/p     Joint Mobility: edema end feel all motions patella    Palpation: pos ant knee near incisions.    Sensation: na    Edema (circumferential 10 cm above patella, joint line, and 10 cm below patella):   R: 51 cm (quad atrophy), 44 cm, 40 cm (calf atrophy)  L: 54 cm, 42.5, 42.2 cm    CMS Impairment/Limitation/Restriction for FOTO na Survey    Therapist reviewed FOTO scores for Yovani Malik on 9/18/2020.   FOTO documents entered into EPIC - see Media section.    Limitation Score: na%         TREATMENT   Treatment Time In: 1035  Treatment Time Out: " 1040  Total Treatment time separate from Evaluation: 05 minutes    Yovani received therapeutic exercises to develop strength, ROM and flexibility for 05 minutes including:  Review of HEP sent home with pt s/p.    Home Exercises and Patient Education Provided    Education provided:   - HEP  - Pt/family was provided educational information, including: role of PT, role of pt/caregiver, goals for PT, POC, scheduling, and attendance policy.- pt verbalized understanding.  - POC and protocol, including WB status and crutch screening for proper fit    Written Home Exercises Provided: TO BE GIVEN AT FIRST TX APPT.. Cont post op ex.  Exercises were reviewed and Yovani was able to demonstrate them prior to the end of the session.  Yovani demonstrated good  understanding of the education provided.     See EMR under Patient Instructions for exercises provided future visit.    Assessment   Yovani is a 30 y.o. male referred to outpatient Physical Therapy with a medical diagnosis of M21.061 (ICD-10-CM) - Genu valgum, acquired, right M94.261 (ICD-10-CM) - Chondromalacia of right knee M23.200 (ICD-10-CM) - Other old tear of lateral meniscus of right knee. Pt presents with impaired R knee A/PROM, R LE strength, impaired gait and mobility, R quad and calf atrophy, R knee edema at joint line, and impaired ADLs such as not working and difficulty performing needs for his young child.    Pt prognosis is Good.   Pt will benefit from skilled outpatient Physical Therapy to address the deficits stated above and in the chart below, provide pt/family education, and to maximize pt's level of independence.     Plan of care discussed with patient: Yes  Pt's spiritual, cultural and educational needs considered and patient is agreeable to the plan of care and goals as stated below:     Anticipated Barriers for therapy: chronicity, multiple sx, obese    Medical Necessity is demonstrated by the following  History  Co-morbidities and personal factors  that may impact the plan of care Co-morbidities:   see below    Past Medical History:   Diagnosis Date    Anxiety     Borderline diabetes     Hyperlipidemia     Hypertension      Personal Factors:   lifestyle     high   Examination  Body Structures and Functions, activity limitations and participation restrictions that may impact the plan of care Body Regions:   lower extremities    Body Systems:    gross symmetry  ROM  strength  balance  gait  transfers  motor control  motor learning    Participation Restrictions:   Impaired community ambulation, impaired care for child     Activity limitations:   Learning and applying knowledge  no deficits    General Tasks and Commands  no deficits    Communication  no deficits    Mobility  lifting and carrying objects  walking  driving (bike, car, motorcycle)    Self care  toileting  dressing    Domestic Life  shopping  cooking  doing house work (cleaning house, washing dishes, laundry)  assisting others    Interactions/Relationships  no deficits    Life Areas  no deficits    Community and Social Life  community life         high   Clinical Presentation evolving clinical presentation with changing clinical characteristics moderate   Decision Making/ Complexity Score: moderate     Goals:  Short Term Goals: 4 weeks   - R knee AROM ext to 0 deg to impact gait.  - R knee AROM flex to 90 deg towards impacting squatting and stair navigation.  - Improve R knee strength grossly to 4-/5 to impact gt.  - Pt able to ambulate 50% Wb per MD protocol.    Short Term Goals: 8 weeks   - Full WB R LE without AD or hinge brace for normal gait.  - Improve R knee AROM hyper ext to +3 deg for symmetry.  - Improve R knee AROM flex to 115 deg for improving symmetry.  - Improve R knee strength grossly to 4/5 to impact gt and  ADLs.    Short Term Goals: 12 weeks   - Normal gait without limp or pain.  - Improve R knee AROM flex to 123 deg for symmetry.  - Improve R hip and knee strength  grossly to 4+/5 to impact gt and  ADLs.  - Pt able to RTW with </= 3/10 R knee pain for prolonged standing activities.    Long Term Goals: 16 weeks   - Pt to demo 5/5 R hip and knee strength for discharge from skilled PT.  - Pt will not be limited in HH chores, ambulation, static or prolonged standing and squatting activities for home, work, or .    Plan   Plan of care Certification: 9/18/2020 to 12/08/2020.    Outpatient Physical Therapy 2-3 times weekly for 12-16 weeks to include the following interventions: Aquatic Therapy, Electrical Stimulation IFC, Gait Training, Manual Therapy, Moist Heat/ Ice, Neuromuscular Re-ed, Patient Education, Self Care, Therapeutic Activites and Therapeutic Exercise.     Niru French, PT

## 2020-09-18 NOTE — TELEPHONE ENCOUNTER
----- Message from Jodee Steven MA sent at 9/18/2020 11:41 AM CDT -----  Contact: pt's# [phone]                                Prescription Refill Request  Name of medication and dose    oxyCODONE-acetaminophen (PERCOCET)  mg per tablet    Pharmacy  [id number] [place] [address] AT [place] [address] & [facility] [phone] (Phone)  [phone] (Fax)        Are you completely out of your medication  no, pt has enough to last till tomorrow     Additional Information: pt's# [phone]

## 2020-09-21 PROBLEM — R26.9 GAIT ABNORMALITY: Status: ACTIVE | Noted: 2020-09-21

## 2020-09-21 PROBLEM — M25.661 DECREASED RANGE OF MOTION (ROM) OF RIGHT KNEE: Status: ACTIVE | Noted: 2020-09-21

## 2020-09-21 PROBLEM — R29.898 WEAKNESS OF RIGHT LOWER EXTREMITY: Status: ACTIVE | Noted: 2020-09-21

## 2020-09-23 ENCOUNTER — CLINICAL SUPPORT (OUTPATIENT)
Dept: REHABILITATION | Facility: HOSPITAL | Age: 30
End: 2020-09-23
Payer: COMMERCIAL

## 2020-09-23 DIAGNOSIS — R26.9 GAIT ABNORMALITY: ICD-10-CM

## 2020-09-23 DIAGNOSIS — R29.898 WEAKNESS OF RIGHT LOWER EXTREMITY: ICD-10-CM

## 2020-09-23 DIAGNOSIS — M25.661 DECREASED RANGE OF MOTION (ROM) OF RIGHT KNEE: Primary | ICD-10-CM

## 2020-09-23 PROCEDURE — 97032 APPL MODALITY 1+ESTIM EA 15: CPT | Mod: PO | Performed by: PHYSICAL THERAPIST

## 2020-09-23 PROCEDURE — 97110 THERAPEUTIC EXERCISES: CPT | Mod: PO | Performed by: PHYSICAL THERAPIST

## 2020-09-23 NOTE — PROGRESS NOTES
Physical Therapy Daily Treatment Note     Name: Yovani Malik  [facility] Number: [id number]    Therapy Diagnosis:   Encounter Diagnoses   Name Primary?    Decreased range of motion (ROM) of right knee Yes    Weakness of right lower extremity     Gait abnormality      Physician: Angelica Brady PA-C    Visit Date: 9/23/2020  Physician Orders: PT Eval and Treat      The patient should begin physical therapy on postoperative   day # 3 and will be advanced to outpatient therapy as soon as   Possible following discharge.     Weight bearing:toe touch weight bearing Toe-touch WB on the right leg  Range of Motion:Full normal motion symmetric to opposite side     At 4 weeks' time, the patient should begin advancing from to 50% weightbearing at 4-5 weeks' time. The patient should begin advancing from 100% weightbearing at 6 weeks' time.      The patient can begin full weightbearing status at 6 weeks time.     Range of motion should be limited to -10 degrees hyperextension and flexion 120 degrees now.      Additional exercises to be performed are:   to begin quad sets with a heel roll to obtain hyperextension, straight leg raise and heel slides with the heel supported in a closed-chain fashion     Immediate specific exercises should include:   Gait program should include the above stated weightbearing; in addition: active extension with a heel-to-toe gait working on maintaining extension     Immobilizer if present should be locked @ -10 degrees with gait and allowed to flex to 120 degrees at rest.      Hinged knee immobilizer should be locked in -10 degrees hyperextension with gait  for  6 weeks.    Medical Diagnosis from Referral: M21.061 (ICD-10-CM) - Genu valgum, acquired, right M94.261 (ICD-10-CM) - Chondromalacia of right knee M23.200 (ICD-10-CM) - Other old tear of lateral meniscus of right knee   Evaluation Date: 9/18/2020  Authorization Period Expiration: 12/31/2020   Plan of Care Expiration: 12/08/2020  Visit #  "/ Visits authorized: 2/ 20    Time In: 1010  Time Out: 1055  Total Billable Time: 38 minutes    Precautions: Standard and blood thinners, DOS 9/15/2020, TTWB until 4-5 weeks, 50% WB then, 100% WB at 6 weeks.    Subjective     Pt reports: R knee pain is present. He is sponge bathing until bandages come off which he states he was ordered to leave on until MD mcclelland/annie 9/28.  He was compliant with home exercise program.  Response to previous treatment: no adverse effect  Functional change: progressing ext and flex noted.    Pain: 5/10 pre tx  Location: right knee      Objective     9/23/2020: R knee AAROM +3 to 70 deg PROM, 58 deg AAROM flex.     Yovani received therapeutic exercises to develop strength, ROM, flexibility and core stabilization for 25 minutes including:    Long sit QS with towel roll at ankle 20x10"  SLR 3x10  Heel slides to tolerance 20x3"  Ball squeeze x45  Review HEP.    In the future: HS sets, modified bridge    Yovani received the following manual therapy techniques: Joint mobilizations were applied to the: R knee for 05 minutes, including:  Patellar mobs gd I for edema and pain.    Yovani received cold pack for 10 minutes to to decrease circulation, pain, and swelling.    Yovani received the following supervised modalities after being cleared for contradictions: IFC Electrical Stimulation:  Yovani received IFC Electrical Stimulation for pain control applied to the R knee. Pt received stimulation at na % scan at a frequency of  Hz for 10 minutes. Yovani tolderated treatment well without any adverse effects.       Home Exercises Provided and Patient Education Provided     Education provided:   - HEP  - avoid "sharp" pains    Written Home Exercises Provided: yes.  Exercises were reviewed and Yovani was able to demonstrate them prior to the end of the session.  Yovani demonstrated good  understanding of the education provided.     See EMR under Patient Instructions for exercises provided " 9/23/2020.    Assessment     Yovani tolerated first tx session well. He is walking R LE TTWB with B axillary crutches and following precautions. Improved R knee PROM is noted. Improved pain and edema is noted post session. Additional HEP given. R knee AAROM +3 to 70 deg PROM, 58 deg AAROM flex. Cont per protocol.  Yovani is progressing well towards his goals.   Pt prognosis is Good.     Pt will continue to benefit from skilled outpatient physical therapy to address the deficits listed in the problem list box on initial evaluation, provide pt/family education and to maximize pt's level of independence in the home and community environment.     Pt's spiritual, cultural and educational needs considered and pt agreeable to plan of care and goals.    Anticipated barriers to physical therapy: chronicity, multiple sx, obese    Goals:   Short Term Goals: 4 weeks, progressing   - R knee AROM ext to 0 deg to impact gait.  - R knee AROM flex to 90 deg towards impacting squatting and stair navigation.  - Improve R knee strength grossly to 4-/5 to impact gt.  - Pt able to ambulate 50% Wb per MD protocol.  - Improve FOTO impairment score to 70% for improved walking.     Short Term Goals: 8 weeks   - Full WB R LE without AD or hinge brace for normal gait.  - Improve R knee AROM hyper ext to +3 deg for symmetry.  - Improve R knee AROM flex to 115 deg for improving symmetry.  - Improve R knee strength grossly to 4/5 to impact gt and  ADLs.  - Improve FOTO impairment score to 65% for improved walking, and abilities to help his child.     Short Term Goals: 12 weeks   - Normal gait without limp or pain.  - Improve R knee AROM flex to 123 deg for symmetry.  - Improve R hip and knee strength grossly to 4+/5 to impact gt and  ADLs.  - Pt able to RTW with </= 3/10 R knee pain for prolonged standing activities.  - Improve FOTO impairment score to 55% for improved RTW, HH chores and mod activities.     Long Term Goals: 16  weeks   - Pt to demo 5/5 R hip and knee strength for discharge from skilled PT.  - Pt will not be limited in HH chores, ambulation, static or prolonged standing and squatting activities for home, work, or .  - Improve FOTO impairment score to 48% for improved QOL.     Plan     Plan of care Certification: 9/18/2020 to 12/08/2020.     Outpatient Physical Therapy 2-3 times weekly for 12-16 weeks to include the following interventions: Aquatic Therapy, Electrical Stimulation IFC, Gait Training, Manual Therapy, Moist Heat/ Ice, Neuromuscular Re-ed, Patient Education, Self Care, Therapeutic Activites and Therapeutic Exercise.    Cont ROM and strengthening for improved QOL and independence.    Niru French, PT

## 2020-09-25 ENCOUNTER — CLINICAL SUPPORT (OUTPATIENT)
Dept: REHABILITATION | Facility: HOSPITAL | Age: 30
End: 2020-09-25
Payer: COMMERCIAL

## 2020-09-25 DIAGNOSIS — M25.661 DECREASED RANGE OF MOTION (ROM) OF RIGHT KNEE: ICD-10-CM

## 2020-09-25 DIAGNOSIS — R26.9 GAIT ABNORMALITY: ICD-10-CM

## 2020-09-25 DIAGNOSIS — M21.061 GENU VALGUM, ACQUIRED, RIGHT: ICD-10-CM

## 2020-09-25 DIAGNOSIS — M23.200 OTHER OLD TEAR OF LATERAL MENISCUS OF RIGHT KNEE: ICD-10-CM

## 2020-09-25 DIAGNOSIS — M94.261 CHONDROMALACIA OF RIGHT KNEE: ICD-10-CM

## 2020-09-25 DIAGNOSIS — R29.898 WEAKNESS OF RIGHT LOWER EXTREMITY: ICD-10-CM

## 2020-09-25 PROCEDURE — 97110 THERAPEUTIC EXERCISES: CPT | Mod: PO,CQ

## 2020-09-25 PROCEDURE — 97014 ELECTRIC STIMULATION THERAPY: CPT | Mod: PO,CQ

## 2020-09-25 RX ORDER — OXYCODONE AND ACETAMINOPHEN 10; 325 MG/1; MG/1
1 TABLET ORAL EVERY 8 HOURS PRN
Qty: 21 TABLET | Refills: 0 | Status: SHIPPED | OUTPATIENT
Start: 2020-09-25 | End: 2020-10-09 | Stop reason: SDUPTHER

## 2020-09-25 RX ORDER — OXYCODONE AND ACETAMINOPHEN 10; 325 MG/1; MG/1
1 TABLET ORAL EVERY 8 HOURS PRN
Qty: 21 TABLET | Refills: 0 | Status: SHIPPED | OUTPATIENT
Start: 2020-09-25 | End: 2020-10-02 | Stop reason: SDUPTHER

## 2020-09-25 NOTE — PROGRESS NOTES
Physical Therapy Daily Treatment Note     Name: Yovani Malik  [facility] Number: [id number]    Therapy Diagnosis:   Encounter Diagnoses   Name Primary?    Decreased range of motion (ROM) of right knee     Weakness of right lower extremity     Gait abnormality      Physician: Angelica Brady PA-C    Visit Date: 9/25/2020  Physician Orders: PT Eval and Treat      The patient should begin physical therapy on postoperative   day # 3 and will be advanced to outpatient therapy as soon as   Possible following discharge.     Weight bearing: toe touch weight bearing Toe-touch WB on the right leg  Range of Motion:Full normal motion symmetric to opposite side     At 4 weeks' time, the patient should begin advancing from to 50% weightbearing at 4-5 weeks' time. The patient should begin advancing from 100% weightbearing at 6 weeks' time.      The patient can begin full weightbearing status at 6 weeks time.     Range of motion should be limited to -10 degrees hyperextension and flexion 120 degrees now.      Additional exercises to be performed are:   to begin quad sets with a heel roll to obtain hyperextension, straight leg raise and heel slides with the heel supported in a closed-chain fashion     Immediate specific exercises should include:   Gait program should include the above stated weightbearing; in addition: active extension with a heel-to-toe gait working on maintaining extension     Immobilizer if present should be locked @ -10 degrees with gait and allowed to flex to 120 degrees at rest.      Hinged knee immobilizer should be locked in -10 degrees hyperextension with gait  for  6 weeks.    Medical Diagnosis from Referral: M21.061 (ICD-10-CM) - Genu valgum, acquired, right M94.261 (ICD-10-CM) - Chondromalacia of right knee M23.200 (ICD-10-CM) - Other old tear of lateral meniscus of right knee   Evaluation Date: 9/18/2020  Authorization Period Expiration: 12/31/2020   Plan of Care Expiration: 12/08/2020  Visit # /  "Visits authorized: 3/ 20    Time In: 1125 AM  Time Out: 1215  Total Billable Time: 50 minutes    Precautions: Standard and blood thinners, DOS 9/15/2020, TTWB until 4-5 weeks, 50% WB then, 100% WB at 6 weeks.    Subjective     Pt reports: he did well after last session. Patient states his right knee is more uncomfortable today than painful. He was compliant with home exercise program.  Response to previous treatment: no adverse effect  Functional change: progressing ext and flex noted.    Pain: 5/10 pre tx  Location: right knee      Objective     9/23/2020: R knee AAROM +3 to 75 deg PROM    Yovani received therapeutic exercises to develop strength, ROM, flexibility and core stabilization for 25 minutes including:    Long sit QS with towel roll at ankle 20x10" (in conjunction with e-stim)  SLR 3x10 (in conjunction with e-stim)  Heel slides to tolerance 20x3"  Ball squeeze x45  HS set over bolster 20x 3"  SL hip abduction 2x10  PROM off edge of table (knee flexion) x 2 minutes    In the future: modified bridge    Yovani received the following manual therapy techniques: Joint mobilizations were applied to the: R knee for 05 minutes, including:  Patellar mobs gd I for edema and pain.    Yovani received cold pack for 10 minutes to to decrease circulation, pain, and swelling.    Yovani received the following supervised modalities after being cleared for contradictions: NMES Electrical Stimulation: Yovani received NMES Electrical Stimulation for quad activation applied to the R knee. On/Off time: 10/20 x 10 minutes. Yovani tolderated treatment well without any adverse effects.       Home Exercises Provided and Patient Education Provided     Education provided:   - HEP  - avoid "sharp" pains    Written Home Exercises Provided: yes.  Exercises were reviewed and Yovani was able to demonstrate them prior to the end of the session.  Yovani demonstrated good  understanding of the education provided.     See EMR under Patient " Instructions for exercises provided 9/23/2020.    Assessment     Performed NMES to improve quad activation with no lag noted during straight leg raise. Patient able to progress to sidelying hip abduction without lateral knee pain with training effect easily achieved pain in gluteals. Patient initially restricted into passive knee flexion but this improved sgnificantly with ROM over edge of mat and eventually rested even with contralateral limb.   Yovani is progressing well towards his goals.   Pt prognosis is Good.     Pt will continue to benefit from skilled outpatient physical therapy to address the deficits listed in the problem list box on initial evaluation, provide pt/family education and to maximize pt's level of independence in the home and community environment.     Pt's spiritual, cultural and educational needs considered and pt agreeable to plan of care and goals.    Anticipated barriers to physical therapy: chronicity, multiple sx, obese    Goals:   Short Term Goals: 4 weeks, progressing   - R knee AROM ext to 0 deg to impact gait.  - R knee AROM flex to 90 deg towards impacting squatting and stair navigation.  - Improve R knee strength grossly to 4-/5 to impact gt.  - Pt able to ambulate 50% Wb per MD protocol.  - Improve FOTO impairment score to 70% for improved walking.     Short Term Goals: 8 weeks   - Full WB R LE without AD or hinge brace for normal gait.  - Improve R knee AROM hyper ext to +3 deg for symmetry.  - Improve R knee AROM flex to 115 deg for improving symmetry.  - Improve R knee strength grossly to 4/5 to impact gt and  ADLs.  - Improve FOTO impairment score to 65% for improved walking, and abilities to help his child.     Short Term Goals: 12 weeks   - Normal gait without limp or pain.  - Improve R knee AROM flex to 123 deg for symmetry.  - Improve R hip and knee strength grossly to 4+/5 to impact gt and  ADLs.  - Pt able to RTW with </= 3/10 R knee pain for  prolonged standing activities.  - Improve FOTO impairment score to 55% for improved RTW, HH chores and mod activities.     Long Term Goals: 16 weeks   - Pt to demo 5/5 R hip and knee strength for discharge from skilled PT.  - Pt will not be limited in HH chores, ambulation, static or prolonged standing and squatting activities for home, work, or .  - Improve FOTO impairment score to 48% for improved QOL.     Plan     Plan of care Certification: 9/18/2020 to 12/08/2020.     Outpatient Physical Therapy 2-3 times weekly for 12-16 weeks to include the following interventions: Aquatic Therapy, Electrical Stimulation IFC, Gait Training, Manual Therapy, Moist Heat/ Ice, Neuromuscular Re-ed, Patient Education, Self Care, Therapeutic Activites and Therapeutic Exercise.    Cont ROM and strengthening for improved QOL and independence.    Christina Avilez, PTA

## 2020-09-28 ENCOUNTER — HOSPITAL ENCOUNTER (OUTPATIENT)
Dept: RADIOLOGY | Facility: HOSPITAL | Age: 30
Discharge: HOME | End: 2020-09-28
Attending: PHYSICIAN ASSISTANT
Payer: COMMERCIAL

## 2020-09-28 ENCOUNTER — OFFICE VISIT (OUTPATIENT)
Dept: SPORTS MEDICINE | Facility: CLINIC | Age: 30
End: 2020-09-28
Payer: COMMERCIAL

## 2020-09-28 VITALS
SYSTOLIC BLOOD PRESSURE: 124 MMHG | BODY MASS INDEX: 39.86 KG/M2 | WEIGHT: 248 LBS | HEIGHT: 66 IN | HEART RATE: 96 BPM | DIASTOLIC BLOOD PRESSURE: 83 MMHG

## 2020-09-28 DIAGNOSIS — M23.91 KNEE INTERNAL DERANGEMENT, RIGHT: ICD-10-CM

## 2020-09-28 DIAGNOSIS — M25.561 RIGHT KNEE PAIN, UNSPECIFIED CHRONICITY: Primary | ICD-10-CM

## 2020-09-28 DIAGNOSIS — M25.561 RIGHT KNEE PAIN, UNSPECIFIED CHRONICITY: ICD-10-CM

## 2020-09-28 PROCEDURE — 99999 PR PBB SHADOW E&M-EST. PATIENT-LVL III: CPT | Mod: PBBFAC,,, | Performed by: PHYSICIAN ASSISTANT

## 2020-09-28 PROCEDURE — 99999 PR PBB SHADOW E&M-EST. PATIENT-LVL III: ICD-10-PCS | Mod: PBBFAC,,, | Performed by: PHYSICIAN ASSISTANT

## 2020-09-28 PROCEDURE — 99024 POSTOP FOLLOW-UP VISIT: CPT | Mod: S$GLB,,, | Performed by: PHYSICIAN ASSISTANT

## 2020-09-28 PROCEDURE — 73560 X-RAY EXAM OF KNEE 1 OR 2: CPT | Mod: 26,RT,, | Performed by: RADIOLOGY

## 2020-09-28 PROCEDURE — 73560 XR KNEE 1 OR 2 VIEW RIGHT: ICD-10-PCS | Mod: 26,RT,, | Performed by: RADIOLOGY

## 2020-09-28 PROCEDURE — 73560 X-RAY EXAM OF KNEE 1 OR 2: CPT | Mod: TC,RT

## 2020-09-28 PROCEDURE — 99024 PR POST-OP FOLLOW-UP VISIT: ICD-10-PCS | Mod: S$GLB,,, | Performed by: PHYSICIAN ASSISTANT

## 2020-09-28 NOTE — PROGRESS NOTES
Subjective:          Chief Complaint: Yovani Malik is a 30 y.o. male who had concerns including Post-op Evaluation and Pain of the Right Knee.    HPI   Patient presents to clinic for 2 week post op evaluation of right knee.  Pain today is 8/10.  Denies nausea, vomiting, fever, chills, chest pain, shortness of breath.  He has been taking Percocet 10 mg every 8 hours as needed for pain.  He is ambulating today toe-touch weight-bearing with crutches and T scope brace is set to 120°.  He is completing formal PT at [facility] location.       Surgery Date: 9/15/2020   Surgeon(s) and Role:     * Barry Lopez MD - Primary     Pre-op Diagnosis:  Genu valgum, acquired, right [M21.061]  Chondromalacia of right knee [M94.261]  Acute lateral meniscus tear of right knee, initial encounter [S83.281A]     Post-op Diagnosis: Post-Op Diagnosis Codes:     * Genu valgum, acquired, right [M21.061]     * Chondromalacia of right knee [M94.261]     * Acute lateral meniscus tear of right knee, initial encounter [S83.281A]     Procedure(s) (LRB):  OSTEOTOMY, FEMUR (Right)  ARTHROSCOPY, KNEE, WITH MENISCECTOMY (Right)  SYNOVECTOMY, KNEE (Right)    Review of Systems   Constitution: Negative. Negative for chills, fever, weight gain and weight loss.   HENT: Negative for congestion and sore throat.    Eyes: Negative for blurred vision and double vision.   Cardiovascular: Negative for chest pain, leg swelling and palpitations.   Respiratory: Negative for cough and shortness of breath.    Hematologic/Lymphatic: Does not bruise/bleed easily.   Skin: Negative for itching, poor wound healing and rash.   Musculoskeletal: Positive for joint pain, joint swelling and stiffness. Negative for back pain, muscle weakness and myalgias.   Gastrointestinal: Negative for abdominal pain, constipation, diarrhea, nausea and vomiting.   Genitourinary: Negative.  Negative for frequency and hematuria.   Neurological: Negative for dizziness, headaches,  numbness, paresthesias and sensory change.   Psychiatric/Behavioral: Negative for altered mental status and depression. The patient is not nervous/anxious.    Allergic/Immunologic: Negative for hives.                   Objective:        General: Yovani is well-developed, well-nourished, appears stated age, in no acute distress, alert and oriented to time, place and person.     General    Vitals reviewed.  Constitutional: He is oriented to person, place, and time. He appears well-developed and well-nourished. No distress.   Neck: Normal range of motion.   Cardiovascular: Normal rate and regular rhythm.    Pulmonary/Chest: Effort normal. No respiratory distress.   Neurological: He is alert and oriented to person, place, and time.   Psychiatric: He has a normal mood and affect. His behavior is normal. Thought content normal.     General Musculoskeletal Exam   Gait: abnormal and antalgic       Right Knee Exam     Inspection   Erythema: absent  Scars: present  Swelling: present  Effusion: present  Deformity: absent  Bruising: absent    Tenderness   Right knee tenderness location: global tenderness.    Range of Motion   Extension:  0 normal   Flexion:  70 abnormal     Other   Sensation: normal    Comments:  Portal sutures removed.  Suture tags trimmed.  No signs of infection.  No purulent drainage.  NVI.        Radiographs:  Right knee:  FINDINGS:  Postsurgical changes with plate and screw of the distal femur.  Hardware and alignment are satisfactory.  Joint spaces are satisfactorily maintained.  No soft tissue abnormality.         Assessment:       Encounter Diagnoses   Name Primary?    Right knee pain, unspecified chronicity Yes    Knee internal derangement, right           Plan:       1.  New DAVID hose applied.  2. Removed portal sutures.  Suture Tags extremity.  No signs of infection.  3. May shower now without covering incisions.  4. Continue  mg once a day.  5. Continue PT per protocol.  6. RTC in 4 weeks  with Dr. Barry Lopez for 6 week post op appt.  7.PHYSICAL THERAPY:  The patient should begin physical therapy on postoperative   day # 3 and will be advanced to outpatient therapy as soon as   Possible following discharge.     Weight bearing:toe touch weight bearing Toe-touch WB on the right leg  Range of Motion:Full normal motion symmetric to opposite side     At 4 weeks' time, the patient should begin advancing from to 50% weightbearing at 4-5 weeks' time. The patient should begin advancing from 100% weightbearing at 6 weeks' time.      The patient can begin full weightbearing status at 6 weeks time.     Range of motion should be limited to -10 degrees hyperextension and flexion 120 degrees now.                         Patient questionnaires may have been collected.

## 2020-10-02 ENCOUNTER — CLINICAL SUPPORT (OUTPATIENT)
Dept: REHABILITATION | Facility: HOSPITAL | Age: 30
End: 2020-10-02
Payer: COMMERCIAL

## 2020-10-02 DIAGNOSIS — M25.661 DECREASED RANGE OF MOTION (ROM) OF RIGHT KNEE: ICD-10-CM

## 2020-10-02 DIAGNOSIS — R29.898 WEAKNESS OF RIGHT LOWER EXTREMITY: ICD-10-CM

## 2020-10-02 DIAGNOSIS — R26.9 GAIT ABNORMALITY: ICD-10-CM

## 2020-10-02 DIAGNOSIS — M21.061 GENU VALGUM, ACQUIRED, RIGHT: ICD-10-CM

## 2020-10-02 DIAGNOSIS — M23.200 OTHER OLD TEAR OF LATERAL MENISCUS OF RIGHT KNEE: ICD-10-CM

## 2020-10-02 DIAGNOSIS — M94.261 CHONDROMALACIA OF RIGHT KNEE: ICD-10-CM

## 2020-10-02 PROCEDURE — 97110 THERAPEUTIC EXERCISES: CPT | Mod: PO,CQ

## 2020-10-02 PROCEDURE — 97014 ELECTRIC STIMULATION THERAPY: CPT | Mod: PO,CQ

## 2020-10-02 RX ORDER — OXYCODONE AND ACETAMINOPHEN 10; 325 MG/1; MG/1
1 TABLET ORAL EVERY 12 HOURS PRN
Qty: 14 TABLET | Refills: 0 | Status: SHIPPED | OUTPATIENT
Start: 2020-10-02 | End: 2020-10-05 | Stop reason: SDUPTHER

## 2020-10-02 NOTE — PROGRESS NOTES
Physical Therapy Daily Treatment Note     Name: Yovani Malik  [facility] Number: [id number]    Therapy Diagnosis:   Encounter Diagnoses   Name Primary?    Decreased range of motion (ROM) of right knee     Weakness of right lower extremity     Gait abnormality      Physician: Angelica Brady PA-C    Visit Date: 10/2/2020  Physician Orders: PT Eval and Treat      The patient should begin physical therapy on postoperative   day # 3 and will be advanced to outpatient therapy as soon as   Possible following discharge.     Weight bearing: toe touch weight bearing Toe-touch WB on the right leg  Range of Motion: Full normal motion symmetric to opposite side     At 4 weeks' time, the patient should begin advancing from to 50% weightbearing at 4-5 weeks' time. The patient should begin advancing from 100% weightbearing at 6 weeks' time.      The patient can begin full weightbearing status at 6 weeks time.     Range of motion should be limited to -10 degrees hyperextension and flexion 120 degrees now.      Additional exercises to be performed are:   to begin quad sets with a heel roll to obtain hyperextension, straight leg raise and heel slides with the heel supported in a closed-chain fashion     Immediate specific exercises should include:   Gait program should include the above stated weightbearing; in addition: active extension with a heel-to-toe gait working on maintaining extension     Immobilizer if present should be locked @ -10 degrees with gait and allowed to flex to 120 degrees at rest.      Hinged knee immobilizer should be locked in -10 degrees hyperextension with gait  for  6 weeks.    Medical Diagnosis from Referral: M21.061 (ICD-10-CM) - Genu valgum, acquired, right M94.261 (ICD-10-CM) - Chondromalacia of right knee M23.200 (ICD-10-CM) - Other old tear of lateral meniscus of right knee   Evaluation Date: 9/18/2020  Authorization Period Expiration: 12/31/2020   Plan of Care Expiration: 12/08/2020  Visit # /  "Visits authorized: 4/ 20    Time In: 1130 AM  Time Out: 1215   Total Billable Time: 50 minutes    Precautions: Standard and blood thinners, DOS 9/15/2020, TTWB until 4-5 weeks, 50% WB then, 100% WB at 6 weeks.    Subjective     Pt reports: he did well after last session. Patient states his right knee is more uncomfortable today than painful. He was compliant with home exercise program.  Response to previous treatment: no adverse effect  Functional change: progressing ext and flex noted.    Pain: 6-7/10 pre tx  Location: right knee      Objective     9/23/2020: R knee AAROM +3 to 100 deg PROM    Yovani received therapeutic exercises to develop strength, ROM, flexibility and core stabilization for 25 minutes including:  Long sitting gastroc stretch with strap (heel elevated) 30 sec x 4   Long sit QS with towel roll at ankle 20x10" (in conjunction with NMES)  SLR 2x10 (in conjunction with NMES)  Heel slides to tolerance 20x3"  Ball squeeze x 45  HS set over bolster 20x 3"  SL hip abduction 2x10  PROM off edge of table (knee flexion) x 3 minutes (PTA providing intermittent overpressure)    In the future: modified bridge    Yovani received the following manual therapy techniques: Joint mobilizations were applied to the: R knee for 05 minutes, including:  Patellar mobs gd I for edema and pain.    Yovani received cold pack for 00 minutes to to decrease circulation, pain, and swelling.    Yovani received the following supervised modalities after being cleared for contradictions: NMES Electrical Stimulation: Yovani received NMES Electrical Stimulation for quad activation applied to the R knee. On/Off time: 10/20 x 10 minutes. Yovani tolderated treatment well without any adverse effects.       Home Exercises Provided and Patient Education Provided     Education provided:   - HEP  - avoid "sharp" pains    Written Home Exercises Provided: Patient instructed to cont prior HEP.  Exercises were reviewed and Yovani was able to " demonstrate them prior to the end of the session.  Yovani demonstrated good  understanding of the education provided.     See EMR under Patient Instructions for exercises provided 9/23/2020.    Assessment     Quad activation improved with NMES and he is able to maintain knee extension with SLR. Unassisted quad activation remains shaky. Patient initially struggled with knee flexion but this improved with passive overpressure and repetition as he eventually achieved 100 degrees of motion. Patient presented to clinic weightbearing more than toe touch this date. Reviewed current weightbearing precautions with patient.    Yovani is progressing well towards his goals.   Pt prognosis is Good.     Pt will continue to benefit from skilled outpatient physical therapy to address the deficits listed in the problem list box on initial evaluation, provide pt/family education and to maximize pt's level of independence in the home and community environment.     Pt's spiritual, cultural and educational needs considered and pt agreeable to plan of care and goals.    Anticipated barriers to physical therapy: chronicity, multiple sx, obese    Goals:   Short Term Goals: 4 weeks, progressing   - R knee AROM ext to 0 deg to impact gait.  - R knee AROM flex to 90 deg towards impacting squatting and stair navigation.  - Improve R knee strength grossly to 4-/5 to impact gt.  - Pt able to ambulate 50% Wb per MD protocol.  - Improve FOTO impairment score to 70% for improved walking.     Short Term Goals: 8 weeks   - Full WB R LE without AD or hinge brace for normal gait.  - Improve R knee AROM hyper ext to +3 deg for symmetry.  - Improve R knee AROM flex to 115 deg for improving symmetry.  - Improve R knee strength grossly to 4/5 to impact gt and  ADLs.  - Improve FOTO impairment score to 65% for improved walking, and abilities to help his child.     Short Term Goals: 12 weeks   - Normal gait without limp or pain.  - Improve R knee  AROM flex to 123 deg for symmetry.  - Improve R hip and knee strength grossly to 4+/5 to impact gt and  ADLs.  - Pt able to RTW with </= 3/10 R knee pain for prolonged standing activities.  - Improve FOTO impairment score to 55% for improved RTW, HH chores and mod activities.     Long Term Goals: 16 weeks   - Pt to demo 5/5 R hip and knee strength for discharge from skilled PT.  - Pt will not be limited in HH chores, ambulation, static or prolonged standing and squatting activities for home, work, or .  - Improve FOTO impairment score to 48% for improved QOL.     Plan     Plan of care Certification: 9/18/2020 to 12/08/2020.     Outpatient Physical Therapy 2-3 times weekly for 12-16 weeks to include the following interventions: Aquatic Therapy, Electrical Stimulation IFC, Gait Training, Manual Therapy, Moist Heat/ Ice, Neuromuscular Re-ed, Patient Education, Self Care, Therapeutic Activites and Therapeutic Exercise.    Cont ROM and strengthening for improved QOL and independence.    Christina Avilez, PTA

## 2020-10-02 NOTE — TELEPHONE ENCOUNTER
Called pt to inform him his prescription for Percocet 10 mg will be refilled BID. Requested pt to call and request breakthrough medication if necessary.

## 2020-10-05 DIAGNOSIS — M21.061 GENU VALGUM, ACQUIRED, RIGHT: ICD-10-CM

## 2020-10-05 DIAGNOSIS — M94.261 CHONDROMALACIA OF RIGHT KNEE: ICD-10-CM

## 2020-10-05 DIAGNOSIS — M23.200 OTHER OLD TEAR OF LATERAL MENISCUS OF RIGHT KNEE: ICD-10-CM

## 2020-10-05 RX ORDER — OXYCODONE AND ACETAMINOPHEN 10; 325 MG/1; MG/1
1 TABLET ORAL EVERY 12 HOURS PRN
Qty: 14 TABLET | Refills: 0 | Status: SHIPPED | OUTPATIENT
Start: 2020-10-05 | End: 2020-11-20

## 2020-10-07 ENCOUNTER — CLINICAL SUPPORT (OUTPATIENT)
Dept: REHABILITATION | Facility: HOSPITAL | Age: 30
End: 2020-10-07
Payer: COMMERCIAL

## 2020-10-07 DIAGNOSIS — R26.9 GAIT ABNORMALITY: ICD-10-CM

## 2020-10-07 DIAGNOSIS — R29.898 WEAKNESS OF RIGHT LOWER EXTREMITY: ICD-10-CM

## 2020-10-07 DIAGNOSIS — M25.661 DECREASED RANGE OF MOTION (ROM) OF RIGHT KNEE: ICD-10-CM

## 2020-10-07 PROCEDURE — 97110 THERAPEUTIC EXERCISES: CPT | Mod: PO,CQ

## 2020-10-07 PROCEDURE — 97014 ELECTRIC STIMULATION THERAPY: CPT | Mod: PO,CQ

## 2020-10-07 NOTE — PROGRESS NOTES
Physical Therapy Daily Treatment Note     Name: Yovani Malik  [facility] Number: [id number]    Therapy Diagnosis:   Encounter Diagnoses   Name Primary?    Decreased range of motion (ROM) of right knee     Weakness of right lower extremity     Gait abnormality      Physician: Angelica Brady PA-C    Visit Date: 10/7/2020  Physician Orders: PT Eval and Treat      The patient should begin physical therapy on postoperative day # 3 and will be advanced to outpatient therapy as soon as   Possible following discharge.     Weight bearing: toe touch weight bearing on the right leg  Range of Motion: Full normal motion symmetric to opposite side     At 4 weeks' time, the patient should begin advancing from TTWB to 50% weightbearing at 4-5 weeks' time. The patient should begin advancing from 100% weightbearing at 6 weeks' time.      The patient can begin full weightbearing status at 6 weeks time.     Range of motion should be limited to -10 degrees hyperextension and flexion 120 degrees now.      Additional exercises to be performed are:   to begin quad sets with a heel roll to obtain hyperextension, straight leg raise and heel slides with the heel supported in a closed-chain fashion     Immediate specific exercises should include:   Gait program should include the above stated weightbearing; in addition: active extension with a heel-to-toe gait working on maintaining extension     Immobilizer if present should be locked @ -10 degrees with gait and allowed to flex to 120 degrees at rest.      Hinged knee immobilizer should be locked in -10 degrees hyperextension with gait for  6 weeks.    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    DOS: 09/15/2020, POD: 22    Subjective     Pt reports: he had R knee soreness following last treatment session but he expected this. Patient states his R knee is slowly feeling better and better each day. He was compliant with home exercise program.  Response to previous treatment: no adverse effect  Functional change: progressing ext and flex noted.    Pain: 5/10 pre tx  Location: right knee      Objective     10/7/2020: R knee AAROM +3 to 106 deg PROM    Yovani received therapeutic exercises to develop strength, ROM, flexibility and core stabilization for 40 minutes including:  Long sitting gastroc stretch with strap (heel elevated) 30 sec x 4   Long sit QS with towel roll at ankle 20x10" (in conjunction with NMES)  SLR 2x10 (in conjunction with NMES)  SL hip abduction 3x10  Prone quad set x 15, 5 sec hold   Heel slides with board and sheet x 3 minutes, 5-10 sec hold   Ball squeeze x 45  HS set over bolster 20x 3"  PROM off edge of table (knee flexion) x 3 minutes (PTA providing overpressure into knee flexion)    In the future: modified bridge    Yovani received the following manual therapy techniques: Joint mobilizations were applied to the: R knee for 05 minutes, including:  Patellar mobs in all directions    Yovani received cold pack for 00 minutes to to decrease circulation, pain, and swelling.    Yovani received the following supervised modalities after being cleared for contradictions: NMES Electrical Stimulation: Yovani received NMES Electrical Stimulation for quad activation applied to the R knee. On/Off time: 10/20 x 10 minutes. Yovani tolderated treatment well without any adverse effects.       Home Exercises Provided and Patient Education Provided     Education provided:   - HEP  - avoid "sharp" " pains    Written Home Exercises Provided: Patient instructed to cont prior HEP.  Exercises were reviewed and Yovani was able to demonstrate them prior to the end of the session.  Yovani demonstrated good  understanding of the education provided.     See EMR under Patient Instructions for exercises provided 9/23/2020.    Assessment     Firm end feel noted at 105 degrees of passive knee flexion today but this did seem to improve with passive repetition and slight overpressure. Patient challenged by prone quad set but muscle recruitment does continue to improve. Patellar mobility improving in all directions.     Yovani is progressing well towards his goals.   Pt prognosis is Good.     Pt will continue to benefit from skilled outpatient physical therapy to address the deficits listed in the problem list box on initial evaluation, provide pt/family education and to maximize pt's level of independence in the home and community environment.     Pt's spiritual, cultural and educational needs considered and pt agreeable to plan of care and goals.    Anticipated barriers to physical therapy: chronicity, multiple sx, obese    Goals:   Short Term Goals: 4 weeks, progressing   - R knee AROM ext to 0 deg to impact gait.  - R knee AROM flex to 90 deg towards impacting squatting and stair navigation.  - Improve R knee strength grossly to 4-/5 to impact gt.  - Pt able to ambulate 50% Wb per MD protocol.  - Improve FOTO impairment score to 70% for improved walking.     Short Term Goals: 8 weeks   - Full WB R LE without AD or hinge brace for normal gait.  - Improve R knee AROM hyper ext to +3 deg for symmetry.  - Improve R knee AROM flex to 115 deg for improving symmetry.  - Improve R knee strength grossly to 4/5 to impact gt and  ADLs.  - Improve FOTO impairment score to 65% for improved walking, and abilities to help his child.     Short Term Goals: 12 weeks   - Normal gait without limp or pain.  - Improve R knee AROM flex  to 123 deg for symmetry.  - Improve R hip and knee strength grossly to 4+/5 to impact gt and  ADLs.  - Pt able to RTW with </= 3/10 R knee pain for prolonged standing activities.  - Improve FOTO impairment score to 55% for improved RTW, HH chores and mod activities.     Long Term Goals: 16 weeks   - Pt to demo 5/5 R hip and knee strength for discharge from skilled PT.  - Pt will not be limited in HH chores, ambulation, static or prolonged standing and squatting activities for home, work, or .  - Improve FOTO impairment score to 48% for improved QOL.     Plan     Plan of care Certification: 9/18/2020 to 12/08/2020.     Outpatient Physical Therapy 2-3 times weekly for 12-16 weeks to include the following interventions: Aquatic Therapy, Electrical Stimulation IFC, Gait Training, Manual Therapy, Moist Heat/ Ice, Neuromuscular Re-ed, Patient Education, Self Care, Therapeutic Activites and Therapeutic Exercise.    Cont ROM and strengthening for improved QOL and independence.    Christina Avilez, PTA

## 2020-10-08 ENCOUNTER — PATIENT MESSAGE (OUTPATIENT)
Dept: SPORTS MEDICINE | Facility: CLINIC | Age: 30
End: 2020-10-08

## 2020-10-09 ENCOUNTER — CLINICAL SUPPORT (OUTPATIENT)
Dept: REHABILITATION | Facility: HOSPITAL | Age: 30
End: 2020-10-09
Payer: COMMERCIAL

## 2020-10-09 DIAGNOSIS — R29.898 WEAKNESS OF RIGHT LOWER EXTREMITY: ICD-10-CM

## 2020-10-09 DIAGNOSIS — M25.661 DECREASED RANGE OF MOTION (ROM) OF RIGHT KNEE: Primary | ICD-10-CM

## 2020-10-09 DIAGNOSIS — R26.9 GAIT ABNORMALITY: ICD-10-CM

## 2020-10-09 PROCEDURE — 97110 THERAPEUTIC EXERCISES: CPT | Mod: PO | Performed by: PHYSICAL THERAPIST

## 2020-10-09 NOTE — PROGRESS NOTES
Physical Therapy Daily Treatment Note     Name: Yovani Malik  [facility] Number: [id number]    Therapy Diagnosis:   Encounter Diagnoses   Name Primary?    Decreased range of motion (ROM) of right knee Yes    Weakness of right lower extremity     Gait abnormality      Physician: Angelica Brady PA-C    Visit Date: 10/9/2020  Physician Orders: PT Eval and Treat      The patient should begin physical therapy on postoperative day # 3 and will be advanced to outpatient therapy as soon as   Possible following discharge.     Weight bearing: toe touch weight bearing on the right leg  Range of Motion: Full normal motion symmetric to opposite side     At 4 weeks' time, the patient should begin advancing from TTWB to 50% weightbearing at 4-5 weeks' time. The patient should begin advancing from 100% weightbearing at 6 weeks' time.      The patient can begin full weightbearing status at 6 weeks time.     Range of motion should be limited to -10 degrees hyperextension and flexion 120 degrees now.      Additional exercises to be performed are:   to begin quad sets with a heel roll to obtain hyperextension, straight leg raise and heel slides with the heel supported in a closed-chain fashion     Immediate specific exercises should include:   Gait program should include the above stated weightbearing; in addition: active extension with a heel-to-toe gait working on maintaining extension     Immobilizer if present should be locked @ -10 degrees with gait and allowed to flex to 120 degrees at rest.      Hinged knee immobilizer should be locked in -10 degrees hyperextension with gait for  6 weeks.    Medical Diagnosis from Referral: M21.061 (ICD-10-CM) - Genu valgum, acquired, right M94.261 (ICD-10-CM) - Chondromalacia of right knee M23.200 (ICD-10-CM) - Other old tear of lateral meniscus of right knee   Evaluation Date: 9/18/2020  Authorization Period Expiration: 12/31/2020   Plan of Care Expiration: 12/08/2020  Visit # / Visits  "authorized: 6/ 20    Time In: 1000 AM  Time Out: 1045 AM  Total Billable Time: 40 minutes    Precautions: Standard and blood thinners, DOS 9/15/2020, TTWB until 4-5 weeks, 50% WB then, 100% WB at 6 weeks.    DOS: 09/15/2020, POD: 22    Subjective     Pt reports: no new complaints. Quad soreness continues. S/p 4 weeks on Monday. MD f/u the next week. He was compliant with home exercise program.  Response to previous treatment: no adverse effect  Functional change: progressing ext and flex noted.    Pain: 7/10 pre tx and 5/10 post tx  Location: right knee      Objective     10/7/2020: R knee AAROM +3 to 106 deg PROM  10/9/2020: R knee AROM flex 110 deg    Yovani received therapeutic exercises to develop strength, ROM, flexibility and core stabilization for 45 minutes including:  Long sitting gastroc stretch with strap (heel elevated) 30 sec x 4   Long sit QS with towel roll at ankle 20x10" (in conjunction with NMES)-np  SLR 2x10  SL hip abduction 3x10  Prone quad set x 15, 5 sec hold   Heel slides with board and sheet x 3 minutes, 5-10 sec hold   Ball squeeze x 50  HS set over bolster 20x 3"  PROM off edge of table (knee flexion) x 3 minutes (PT providing overpressure into knee flexion) with PT ant glide    In the future: modified bridge    Yovani received the following manual therapy techniques: Joint mobilizations were applied to the: R knee for 00 minutes, including:  Patellar mobs in all directions    Yovani received cold pack for 00 minutes to to decrease circulation, pain, and swelling.    Yovani received the following supervised modalities after being cleared for contradictions: NMES Electrical Stimulation: Yovani received NMES Electrical Stimulation for quad activation applied to the R knee. On/Off time: 10/20 x 00 minutes. Yovani tolderated treatment well without any adverse effects.       Home Exercises Provided and Patient Education Provided     Education provided:   - HEP  - avoid "sharp" pains    Written " Home Exercises Provided: Patient instructed to cont prior HEP.  Exercises were reviewed and Yovani was able to demonstrate them prior to the end of the session.  Yovani demonstrated good  understanding of the education provided.     See EMR under Patient Instructions for exercises provided 9/23/2020.    Assessment     Yovani was able to achieve 110 deg R knee flex AROM on his way to 120 deg per protocol. He continues TTWB. Begin progressing to 50% next week as tolerated. Encouraged continue use of the hinge brace but locked in sleep with education given as he stated he would like to discontinue night use of brace. Complete FOTO update next session. Cont POC.    Yovani is progressing well towards his goals.   Pt prognosis is Good.     Pt will continue to benefit from skilled outpatient physical therapy to address the deficits listed in the problem list box on initial evaluation, provide pt/family education and to maximize pt's level of independence in the home and community environment.     Pt's spiritual, cultural and educational needs considered and pt agreeable to plan of care and goals.    Anticipated barriers to physical therapy: chronicity, multiple sx, obese    Goals:   Short Term Goals: 4 weeks, progressing   - R knee AROM ext to 0 deg to impact gait.  - R knee AROM flex to 90 deg towards impacting squatting and stair navigation.  - Improve R knee strength grossly to 4-/5 to impact gt.  - Pt able to ambulate 50% Wb per MD protocol.  - Improve FOTO impairment score to 70% for improved walking.     Short Term Goals: 8 weeks   - Full WB R LE without AD or hinge brace for normal gait.  - Improve R knee AROM hyper ext to +3 deg for symmetry.  - Improve R knee AROM flex to 115 deg for improving symmetry.  - Improve R knee strength grossly to 4/5 to impact gt and  ADLs.  - Improve FOTO impairment score to 65% for improved walking, and abilities to help his child.     Short Term Goals: 12 weeks   - Normal  gait without limp or pain.  - Improve R knee AROM flex to 123 deg for symmetry.  - Improve R hip and knee strength grossly to 4+/5 to impact gt and  ADLs.  - Pt able to RTW with </= 3/10 R knee pain for prolonged standing activities.  - Improve FOTO impairment score to 55% for improved RTW, HH chores and mod activities.     Long Term Goals: 16 weeks   - Pt to demo 5/5 R hip and knee strength for discharge from skilled PT.  - Pt will not be limited in HH chores, ambulation, static or prolonged standing and squatting activities for home, work, or .  - Improve FOTO impairment score to 48% for improved QOL.     Plan     Plan of care Certification: 9/18/2020 to 12/08/2020.     Outpatient Physical Therapy 2-3 times weekly for 12-16 weeks to include the following interventions: Aquatic Therapy, Electrical Stimulation IFC, Gait Training, Manual Therapy, Moist Heat/ Ice, Neuromuscular Re-ed, Patient Education, Self Care, Therapeutic Activites and Therapeutic Exercise.    Cont ROM and strengthening for improved QOL and independence.    Niru French, PT

## 2020-10-14 ENCOUNTER — CLINICAL SUPPORT (OUTPATIENT)
Dept: REHABILITATION | Facility: HOSPITAL | Age: 30
End: 2020-10-14
Payer: COMMERCIAL

## 2020-10-14 DIAGNOSIS — R29.898 WEAKNESS OF RIGHT LOWER EXTREMITY: ICD-10-CM

## 2020-10-14 DIAGNOSIS — R26.9 GAIT ABNORMALITY: ICD-10-CM

## 2020-10-14 DIAGNOSIS — M25.661 DECREASED RANGE OF MOTION (ROM) OF RIGHT KNEE: ICD-10-CM

## 2020-10-14 PROCEDURE — 97110 THERAPEUTIC EXERCISES: CPT | Mod: PO,CQ

## 2020-10-14 NOTE — PROGRESS NOTES
Physical Therapy Daily Treatment Note     Name: Yovani Malik  [facility] Number: [id number]    Therapy Diagnosis:   Encounter Diagnoses   Name Primary?    Decreased range of motion (ROM) of right knee     Weakness of right lower extremity     Gait abnormality      Physician: Angelica Brady PA-C    Visit Date: 10/14/2020  Physician Orders: PT Eval and Treat      The patient should begin physical therapy on postoperative day # 3 and will be advanced to outpatient therapy as soon as   Possible following discharge.     Weight bearing: toe touch weight bearing on the right leg  Range of Motion: Full normal motion symmetric to opposite side     At 4 weeks' time, the patient should begin advancing from TTWB to 50% weightbearing at 4-5 weeks' time. The patient should begin advancing from 100% weightbearing at 6 weeks' time.      The patient can begin full weightbearing status at 6 weeks time.     Range of motion should be limited to -10 degrees hyperextension and flexion 120 degrees now.      Additional exercises to be performed are:   to begin quad sets with a heel roll to obtain hyperextension, straight leg raise and heel slides with the heel supported in a closed-chain fashion     Immediate specific exercises should include:   Gait program should include the above stated weightbearing; in addition: active extension with a heel-to-toe gait working on maintaining extension     Immobilizer if present should be locked @ -10 degrees with gait and allowed to flex to 120 degrees at rest.      Hinged knee immobilizer should be locked in -10 degrees hyperextension with gait for  6 weeks.    Medical Diagnosis from Referral: M21.061 (ICD-10-CM) - Genu valgum, acquired, right M94.261 (ICD-10-CM) - Chondromalacia of right knee M23.200 (ICD-10-CM) - Other old tear of lateral meniscus of right knee   Evaluation Date: 9/18/2020  Authorization Period Expiration: 12/31/2020   Plan of Care Expiration: 12/08/2020  Visit # / Visits  "authorized: 7/ 20    Time In: 1030 AM  Time Out: 1110 AM  Total Billable Time: 40 minutes    Precautions: Standard and blood thinners, DOS 9/15/2020, TTWB until 4-5 weeks, 50% WB then, 100% WB at 6 weeks.    DOS: 09/15/2020, POD: 29    Subjective     Pt reports: he is doing well today and he feels his pain is consistently decreasing. He was compliant with home exercise program.  Response to previous treatment: no adverse effect  Functional change: progressing ext and flex noted.    Pain: 4/10 pre tx and 5/10 post tx  Location: right knee      Objective     10/7/2020: R knee AAROM +3 to 106 deg PROM  10/9/2020: R knee AROM flex 110 deg  10/14/2020: R knee AROM flex 112 deg    Yovani received therapeutic exercises to develop strength, ROM, flexibility and core stabilization for 40 minutes including:  Long sitting gastroc stretch with strap (heel elevated) 30 sec x 4   Long sit QS with towel roll at ankle 2x10, 3 sec hold   SLR 3x10  SL hip abduction 3x10  Prone quad set x 20, 5 sec hold   Prone quad stretch (PTA assist) 30 sec x 4   Heel slides with board and sheet x 3 minutes, 5-10 sec hold   Ball squeeze x 50  HS set over bolster 20x 3"    Standing on LLE: performing hip abduction x 15 and march x 15, hamstring curl x 15 (on RLE)  Gait training and reviewed 50% weightbearing (~125#) status now that he is 4 weeks post op x 5 minutes     Yovani received the following manual therapy techniques: Joint mobilizations were applied to the: R knee for 00 minutes, including:  Patellar mobs in all directions    Yovani received cold pack for 00 minutes to to decrease circulation, pain, and swelling.    Home Exercises Provided and Patient Education Provided     Education provided:   - HEP  - avoid "sharp" pains    Written Home Exercises Provided: Patient instructed to cont prior HEP.  Exercises were reviewed and Yovani was able to demonstrate them prior to the end of the session.  Yovani demonstrated good  understanding of the " education provided.     See EMR under Patient Instructions for exercises provided 9/23/2020.    Assessment     Yovani able to progress to 50% weightbearing status today without provocation of R knee pain. Patient demonstrates consistently improving active knee flexion ROM with appropriate discomfort noted at end range. Patient demonstrates improving quad activation and he was able to progress therapeutic exercise without adverse effect.      Yovani is progressing well towards his goals.   Pt prognosis is Good.     Pt will continue to benefit from skilled outpatient physical therapy to address the deficits listed in the problem list box on initial evaluation, provide pt/family education and to maximize pt's level of independence in the home and community environment.     Pt's spiritual, cultural and educational needs considered and pt agreeable to plan of care and goals.    Anticipated barriers to physical therapy: chronicity, multiple sx, obese    Goals:   Short Term Goals: 4 weeks, progressing   - R knee AROM ext to 0 deg to impact gait.  - R knee AROM flex to 90 deg towards impacting squatting and stair navigation.  - Improve R knee strength grossly to 4-/5 to impact gt.  - Pt able to ambulate 50% Wb per MD protocol.  - Improve FOTO impairment score to 70% for improved walking.     Short Term Goals: 8 weeks   - Full WB R LE without AD or hinge brace for normal gait.  - Improve R knee AROM hyper ext to +3 deg for symmetry.  - Improve R knee AROM flex to 115 deg for improving symmetry.  - Improve R knee strength grossly to 4/5 to impact gt and  ADLs.  - Improve FOTO impairment score to 65% for improved walking, and abilities to help his child.     Short Term Goals: 12 weeks   - Normal gait without limp or pain.  - Improve R knee AROM flex to 123 deg for symmetry.  - Improve R hip and knee strength grossly to 4+/5 to impact gt and  ADLs.  - Pt able to RTW with </= 3/10 R knee pain for prolonged  standing activities.  - Improve FOTO impairment score to 55% for improved RTW, HH chores and mod activities.     Long Term Goals: 16 weeks   - Pt to demo 5/5 R hip and knee strength for discharge from skilled PT.  - Pt will not be limited in HH chores, ambulation, static or prolonged standing and squatting activities for home, work, or .  - Improve FOTO impairment score to 48% for improved QOL.     Plan     Plan of care Certification: 9/18/2020 to 12/08/2020.     Outpatient Physical Therapy 2-3 times weekly for 12-16 weeks to include the following interventions: Aquatic Therapy, Electrical Stimulation IFC, Gait Training, Manual Therapy, Moist Heat/ Ice, Neuromuscular Re-ed, Patient Education, Self Care, Therapeutic Activites and Therapeutic Exercise.    Cont ROM and strengthening for improved QOL and independence.    Christina Avilez, PTA

## 2020-10-16 ENCOUNTER — CLINICAL SUPPORT (OUTPATIENT)
Dept: REHABILITATION | Facility: HOSPITAL | Age: 30
End: 2020-10-16
Payer: COMMERCIAL

## 2020-10-16 DIAGNOSIS — R26.9 GAIT ABNORMALITY: ICD-10-CM

## 2020-10-16 DIAGNOSIS — R29.898 WEAKNESS OF RIGHT LOWER EXTREMITY: ICD-10-CM

## 2020-10-16 DIAGNOSIS — M25.661 DECREASED RANGE OF MOTION (ROM) OF RIGHT KNEE: Primary | ICD-10-CM

## 2020-10-16 PROCEDURE — 97110 THERAPEUTIC EXERCISES: CPT | Mod: PO | Performed by: PHYSICAL THERAPIST

## 2020-10-16 NOTE — PROGRESS NOTES
Physical Therapy Daily Treatment Note     Name: Yovani Malik  [facility] Number: [id number]    Therapy Diagnosis:   Encounter Diagnoses   Name Primary?    Decreased range of motion (ROM) of right knee Yes    Weakness of right lower extremity     Gait abnormality      Physician: Angelica Brady PA-C    Visit Date: 10/16/2020  Physician Orders: PT Eval and Treat      The patient should begin physical therapy on postoperative day # 3 and will be advanced to outpatient therapy as soon as   Possible following discharge.     Weight bearing: toe touch weight bearing on the right leg  Range of Motion: Full normal motion symmetric to opposite side     At 4 weeks' time, the patient should begin advancing from TTWB to 50% weightbearing at 4-5 weeks' time. The patient should begin advancing from 100% weightbearing at 6 weeks' time.      The patient can begin full weightbearing status at 6 weeks time.     Range of motion should be limited to -10 degrees hyperextension and flexion 120 degrees now.      Additional exercises to be performed are:   to begin quad sets with a heel roll to obtain hyperextension, straight leg raise and heel slides with the heel supported in a closed-chain fashion     Immediate specific exercises should include:   Gait program should include the above stated weightbearing; in addition: active extension with a heel-to-toe gait working on maintaining extension     Immobilizer if present should be locked @ -10 degrees with gait and allowed to flex to 120 degrees at rest.      Hinged knee immobilizer should be locked in -10 degrees hyperextension with gait for  6 weeks.    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    DOS: 09/15/2020, POD: 31    Subjective     Pt reports: he is doing well today and he feels his pain is consistently decreasing. He was compliant with home exercise program.  Response to previous treatment: no adverse effect  Functional change: progressing ext and flex noted.    Pain: 4/10 pre tx and 5/10 post tx  Location: right knee      Objective     10/7/2020: R knee AAROM +3 to 106 deg PROM  10/9/2020: R knee AROM flex 110 deg  10/14/2020: R knee AROM flex 112 deg  10/16/2020: R knee AROM +1 to 114 deg    Yovani received therapeutic exercises to develop strength, ROM, flexibility and core stabilization for 45 minutes including:  Long sitting gastroc stretch with strap (heel elevated) 30 sec x 4   Long sit QS with towel roll at ankle 3x10, 3 sec hold   SLR 4x10  SL hip abduction 4x10  Prone quad set x 20, 5 sec hold   Prone quad stretch (PT assist) 30 sec x 4   Heel slides with board and sheet x 3 minutes, 5-10 sec hold   Ball squeeze x 50  HS set over bolster 20x 3"  PROM flex x10  STM to lat HS and VMO 5 mins    Standing on LLE: performing hip abduction x 15 and march x 15, hamstring curl x 15 (on RLE)  Gait training and reviewed 50% weightbearing (~125#) status now that he is 4 weeks post op x 0 minutes     Yovani received the following manual therapy techniques: Joint mobilizations were applied to the: R knee for 00 minutes, including:  Patellar mobs in all directions    Yovani received cold pack for 00 minutes to to decrease circulation, pain, and swelling.    Home Exercises Provided and Patient Education Provided     Education provided:   - HEP  - avoid "sharp" pains    Written Home Exercises Provided: Patient instructed to cont prior HEP.  Exercises were reviewed and Yovani " was able to demonstrate them prior to the end of the session.  Yovani demonstrated good  understanding of the education provided.     See EMR under Patient Instructions for exercises provided 9/23/2020.    Assessment     Yovani is now 50% weightbearing status today with a bit more pn today, but he had stated his spouse accidentally kicked him in the R knee while adjusting the covers. Patient demonstrates consistently improving active knee flexion ROM with appropriate discomfort noted at end range. Patient demonstrates improving quad activation and he was able to progress therapeutic exercise without adverse effect.  Juddering noted with sustained QS in prone as a sign of quad weakness in R knee. He has met 4/5 post op 4 weeks STGs.    Yovani is progressing well towards his goals.   Pt prognosis is Good.     Pt will continue to benefit from skilled outpatient physical therapy to address the deficits listed in the problem list box on initial evaluation, provide pt/family education and to maximize pt's level of independence in the home and community environment.     Pt's spiritual, cultural and educational needs considered and pt agreeable to plan of care and goals.    Anticipated barriers to physical therapy: chronicity, multiple sx, obese    Goals:   Short Term Goals: 4 weeks, progressing   - R knee AROM ext to 0 deg to impact gait.  MET 10/16/2020, +1 DEG.  - R knee AROM flex to 90 deg towards impacting squatting and stair navigation.  MET 10/16/2020, 114 DEG.  - Improve R knee strength grossly to 4-/5 to impact gt.  MET 10/16/2020, GROSSLY 4-/5 STRENGTH.  - Pt able to ambulate 50% Wb per MD protocol.  MET 10/16/2020, 50% WB with min inc pn.  - Improve FOTO impairment score to 70% for improved walking.  Progressing, not met, 79% at eval and still today. He is not yet WBAT and this may be reason for his perception.     Short Term Goals: 8 weeks   - Full WB R LE without AD or hinge brace for normal gait.  - Improve R  knee AROM hyper ext to +3 deg for symmetry.  - Improve R knee AROM flex to 115 deg for improving symmetry.  - Improve R knee strength grossly to 4/5 to impact gt and  ADLs.  - Improve FOTO impairment score to 65% for improved walking, and abilities to help his child.     Short Term Goals: 12 weeks   - Normal gait without limp or pain.  - Improve R knee AROM flex to 123 deg for symmetry.  - Improve R hip and knee strength grossly to 4+/5 to impact gt and  ADLs.  - Pt able to RTW with </= 3/10 R knee pain for prolonged standing activities.  - Improve FOTO impairment score to 55% for improved RTW, HH chores and mod activities.     Long Term Goals: 16 weeks   - Pt to demo 5/5 R hip and knee strength for discharge from skilled PT.  - Pt will not be limited in HH chores, ambulation, static or prolonged standing and squatting activities for home, work, or .  - Improve FOTO impairment score to 48% for improved QOL.     Plan     Plan of care Certification: 9/18/2020 to 12/08/2020.     Outpatient Physical Therapy 2-3 times weekly for 12-16 weeks to include the following interventions: Aquatic Therapy, Electrical Stimulation IFC, Gait Training, Manual Therapy, Moist Heat/ Ice, Neuromuscular Re-ed, Patient Education, Self Care, Therapeutic Activites and Therapeutic Exercise.    Cont ROM and strengthening for improved QOL and independence.    Niru French, PT

## 2020-10-21 ENCOUNTER — CLINICAL SUPPORT (OUTPATIENT)
Dept: REHABILITATION | Facility: HOSPITAL | Age: 30
End: 2020-10-21
Payer: COMMERCIAL

## 2020-10-21 DIAGNOSIS — R26.9 GAIT ABNORMALITY: ICD-10-CM

## 2020-10-21 DIAGNOSIS — M25.661 DECREASED RANGE OF MOTION (ROM) OF RIGHT KNEE: Primary | ICD-10-CM

## 2020-10-21 DIAGNOSIS — R29.898 WEAKNESS OF RIGHT LOWER EXTREMITY: ICD-10-CM

## 2020-10-21 PROCEDURE — 97110 THERAPEUTIC EXERCISES: CPT | Mod: PO | Performed by: PHYSICAL THERAPIST

## 2020-10-21 PROCEDURE — 97140 MANUAL THERAPY 1/> REGIONS: CPT | Mod: PO | Performed by: PHYSICAL THERAPIST

## 2020-10-21 NOTE — PROGRESS NOTES
Physical Therapy Daily Treatment Note     Name: Yovani Malik  [facility] Number: [id number]    Therapy Diagnosis:   Encounter Diagnoses   Name Primary?    Decreased range of motion (ROM) of right knee Yes    Weakness of right lower extremity     Gait abnormality      Physician: Angelica Brady PA-C    Visit Date: 10/21/2020  Physician Orders: PT Eval and Treat      The patient should begin physical therapy on postoperative day # 3 and will be advanced to outpatient therapy as soon as   Possible following discharge.     Weight bearing: toe touch weight bearing on the right leg  Range of Motion: Full normal motion symmetric to opposite side     At 4 weeks' time, the patient should begin advancing from TTWB to 50% weightbearing at 4-5 weeks' time. The patient should begin advancing from 100% weightbearing at 6 weeks' time.      The patient can begin full weightbearing status at 6 weeks time.     Range of motion should be limited to -10 degrees hyperextension and flexion 120 degrees now.      Additional exercises to be performed are:   to begin quad sets with a heel roll to obtain hyperextension, straight leg raise and heel slides with the heel supported in a closed-chain fashion     Immediate specific exercises should include:   Gait program should include the above stated weightbearing; in addition: active extension with a heel-to-toe gait working on maintaining extension     Immobilizer if present should be locked @ -10 degrees with gait and allowed to flex to 120 degrees at rest.      Hinged knee immobilizer should be locked in -10 degrees hyperextension with gait for  6 weeks.    Medical Diagnosis from Referral: M21.061 (ICD-10-CM) - Genu valgum, acquired, right M94.261 (ICD-10-CM) - Chondromalacia of right knee M23.200 (ICD-10-CM) - Other old tear of lateral meniscus of right knee   Evaluation Date: 9/18/2020  Authorization Period Expiration: 12/31/2020   Plan of Care Expiration: 12/08/2020  Reassessment:  "10/16/2020  Visit # / Visits authorized: 9/ 20    Time In: 1000 AM  Time Out: 1045 AM  Total Billable Time: 42 minutes    Precautions: Standard and blood thinners, DOS 9/15/2020, TTWB until 4-5 weeks, 50% WB then, 100% WB at 6 weeks.    DOS: 09/15/2020, POD: 36    Subjective     Pt reports: he is doing well today and he feels his pain is consistently decreasing. He looks forward to getting updated xray images. He feels a slipping. His description sounds like a tracking patellar problem. He was compliant with home exercise program.  Response to previous treatment: no adverse effect  Functional change: progressing ext and flex noted.    Pain: 5/10 pre tx and 5/10 post tx  Location: right knee      Objective     10/7/2020: R knee AAROM +3 to 106 deg PROM  10/9/2020: R knee AROM flex 110 deg  10/14/2020: R knee AROM flex 112 deg  10/16/2020: R knee AROM +1 to 114 deg    Yovani received therapeutic exercises to develop strength, ROM, flexibility and core stabilization for 37 minutes including:  Long sitting gastroc stretch with strap (heel elevated) 30 sec x 4   Long sit QS with towel roll at ankle 3x10, 3 sec hold   SLR 4x10  SL hip abduction 4x10  Prone quad set x 20, 5 sec hold   Prone quad stretch (PT assist) 30 sec x 4   Heel slides with board and sheet x 3 minutes, 5-10 sec hold   Ball squeeze x 50  HS set over bolster 20x 3"  PROM flex x10-np  STM to lat HS and VMO 5 mins-np    Standing on LLE: performing hip abduction x 15 and march x 15, hamstring curl x 15 (on RLE)  Gait training and reviewed 50% weightbearing (~125#) status now that he is 4 weeks post op x 0 minutes     Yovani received the following manual therapy techniques: Joint mobilizations were applied to the: R knee for 08 minutes, including:  Scar massage    Yovani received cold pack for 00 minutes to to decrease circulation, pain, and swelling.    Home Exercises Provided and Patient Education Provided     Education provided:   - HEP  - avoid "sharp" " "pains    Written Home Exercises Provided: Patient instructed to cont prior HEP.  Exercises were reviewed and Yovani was able to demonstrate them prior to the end of the session.  Yovani demonstrated good  understanding of the education provided.     See EMR under Patient Instructions for exercises provided 9/23/2020.    Assessment     Yovani is still 50% weightbearing status. Patient demonstrates consistently improving active knee flexion ROM with appropriate discomfort noted at end range. Patient demonstrates improving quad activation and he was able to progress therapeutic exercise without adverse effect. Juddering noted with sustained QS in prone as a sign of quad weakness in R knee. 5 audible "snaps" heard with scar massage today. Scar was flattened after treatment.    Yovani is progressing well towards his goals.   Pt prognosis is Good.     Pt will continue to benefit from skilled outpatient physical therapy to address the deficits listed in the problem list box on initial evaluation, provide pt/family education and to maximize pt's level of independence in the home and community environment.     Pt's spiritual, cultural and educational needs considered and pt agreeable to plan of care and goals.    Anticipated barriers to physical therapy: chronicity, multiple sx, obese    Goals:   Short Term Goals: 4 weeks, progressing   - R knee AROM ext to 0 deg to impact gait.  MET 10/16/2020, +1 DEG.  - R knee AROM flex to 90 deg towards impacting squatting and stair navigation.  MET 10/16/2020, 114 DEG.  - Improve R knee strength grossly to 4-/5 to impact gt.  MET 10/16/2020, GROSSLY 4-/5 STRENGTH.  - Pt able to ambulate 50% Wb per MD protocol.  MET 10/16/2020, 50% WB with min inc pn.  - Improve FOTO impairment score to 70% for improved walking.  Progressing, not met, 79% at eval and still today. He is not yet WBAT and this may be reason for his perception.     Short Term Goals: 8 weeks   - Full WB R LE without AD or " hinge brace for normal gait.  - Improve R knee AROM hyper ext to +3 deg for symmetry.  - Improve R knee AROM flex to 115 deg for improving symmetry.  - Improve R knee strength grossly to 4/5 to impact gt and  ADLs.  - Improve FOTO impairment score to 65% for improved walking, and abilities to help his child.     Short Term Goals: 12 weeks   - Normal gait without limp or pain.  - Improve R knee AROM flex to 123 deg for symmetry.  - Improve R hip and knee strength grossly to 4+/5 to impact gt and  ADLs.  - Pt able to RTW with </= 3/10 R knee pain for prolonged standing activities.  - Improve FOTO impairment score to 55% for improved RTW, HH chores and mod activities.     Long Term Goals: 16 weeks   - Pt to demo 5/5 R hip and knee strength for discharge from skilled PT.  - Pt will not be limited in HH chores, ambulation, static or prolonged standing and squatting activities for home, work, or .  - Improve FOTO impairment score to 48% for improved QOL.     Plan     Plan of care Certification: 9/18/2020 to 12/08/2020.     Outpatient Physical Therapy 2-3 times weekly for 12-16 weeks to include the following interventions: Aquatic Therapy, Electrical Stimulation IFC, Gait Training, Manual Therapy, Moist Heat/ Ice, Neuromuscular Re-ed, Patient Education, Self Care, Therapeutic Activites and Therapeutic Exercise.    Cont ROM and strengthening for improved QOL and independence.    Niru French, PT

## 2020-10-23 ENCOUNTER — PATIENT MESSAGE (OUTPATIENT)
Dept: SPORTS MEDICINE | Facility: CLINIC | Age: 30
End: 2020-10-23

## 2020-10-23 ENCOUNTER — CLINICAL SUPPORT (OUTPATIENT)
Dept: REHABILITATION | Facility: HOSPITAL | Age: 30
End: 2020-10-23
Payer: COMMERCIAL

## 2020-10-23 DIAGNOSIS — R29.898 WEAKNESS OF RIGHT LOWER EXTREMITY: ICD-10-CM

## 2020-10-23 DIAGNOSIS — R26.9 GAIT ABNORMALITY: ICD-10-CM

## 2020-10-23 DIAGNOSIS — M25.661 DECREASED RANGE OF MOTION (ROM) OF RIGHT KNEE: ICD-10-CM

## 2020-10-23 PROCEDURE — 97110 THERAPEUTIC EXERCISES: CPT | Mod: PO,CQ

## 2020-10-23 PROCEDURE — 97140 MANUAL THERAPY 1/> REGIONS: CPT | Mod: PO,CQ

## 2020-10-23 NOTE — PROGRESS NOTES
Physical Therapy Daily Treatment Note     Name: Yovani Malik  [facility] Number: [id number]    Therapy Diagnosis:   Encounter Diagnoses   Name Primary?    Decreased range of motion (ROM) of right knee     Weakness of right lower extremity     Gait abnormality      Physician: Angelica Brady PA-C    Visit Date: 10/23/2020  Physician Orders: PT Eval and Treat      The patient should begin physical therapy on postoperative day # 3 and will be advanced to outpatient therapy as soon as   Possible following discharge.     Weight bearing: toe touch weight bearing on the right leg  Range of Motion: Full normal motion symmetric to opposite side     At 4 weeks' time, the patient should begin advancing from TTWB to 50% weightbearing at 4-5 weeks' time. The patient should begin advancing from 100% weightbearing at 6 weeks' time.      The patient can begin full weightbearing status at 6 weeks time.     Range of motion should be limited to -10 degrees hyperextension and flexion 120 degrees now.      Additional exercises to be performed are:   to begin quad sets with a heel roll to obtain hyperextension, straight leg raise and heel slides with the heel supported in a closed-chain fashion     Immediate specific exercises should include:   Gait program should include the above stated weightbearing; in addition: active extension with a heel-to-toe gait working on maintaining extension     Immobilizer if present should be locked @ -10 degrees with gait and allowed to flex to 120 degrees at rest.      Hinged knee immobilizer should be locked in -10 degrees hyperextension with gait for  6 weeks.    Medical Diagnosis from Referral: M21.061 (ICD-10-CM) - Genu valgum, acquired, right M94.261 (ICD-10-CM) - Chondromalacia of right knee M23.200 (ICD-10-CM) - Other old tear of lateral meniscus of right knee   Evaluation Date: 9/18/2020  Authorization Period Expiration: 12/31/2020   Plan of Care Expiration: 12/08/2020  Reassessment:  "10/16/2020  Visit # / Visits authorized: 10/ 20    Time In: 1002 AM  Time Out: 1045 AM  Total Billable Time: 43 minutes    Precautions: Standard and blood thinners, DOS 9/15/2020, TTWB until 4-5 weeks, 50% WB then, 100% WB at 6 weeks.    DOS: 09/15/2020, POD: 38    Subjective     Pt reports: he had trouble sleeping last night so he is just feeling off this morning. Patient stats his knee is sore today. Patient states he follows up with MD for 6 week check up on Monday. He was compliant with home exercise program.  Response to previous treatment: no adverse effect  Functional change: progressing ext and flex noted.    Pain: 5/10 pre tx and 5/10 post tx  Location: right knee      Objective     10/16/2020: R knee AROM +1 to 116 deg    Yovani received therapeutic exercises to develop strength, ROM, flexibility and core stabilization for 37 minutes including:  Long sitting gastroc stretch with strap (heel elevated) 30 sec x 4   Long sit QS with towel roll at ankle 3x10, 3 sec hold   SLR 4x10, 1#  SL hip abduction 4x10, 1#  Prone quad set x 20, 5 sec hold   Prone quad stretch (PTA assist) 30 sec x 4   Heel slides with board and sheet x 3 minutes, 5-10 sec hold   Ball squeeze x 50  HS set over bolster 20x 3" (not performed)   STM to lat HS and VMO 5 mins-np    Standing on LLE: performing hip abduction x 20, march x 20, hamstring curl x 20 (on RLE)    Yovani received the following manual therapy techniques: Joint mobilizations were applied to the: R knee for 08 minutes, including:  Scar massage    Yovani received cold pack for 00 minutes to to decrease circulation, pain, and swelling.    Home Exercises Provided and Patient Education Provided     Education provided:   - HEP  - avoid "sharp" pains    Written Home Exercises Provided: Patient instructed to cont prior HEP.  Exercises were reviewed and Yovani was able to demonstrate them prior to the end of the session.  Yovani demonstrated good  understanding of the education " provided.     See EMR under Patient Instructions for exercises provided 9/23/2020.    Assessment     Improvements in scar mobility achieved again today with manual therapy techniques. Patient demonstrates consistently improving AROM knee flexion and is nearly equal to contralateral LE. Patient able to progress to lightly weighted SLR with appropriate quad fatigue achieved. Patient to follow up with surgeon on Monday with weightbearing and exercise progression planned following this 6 week follow up.     Yovani is progressing well towards his goals.   Pt prognosis is Good.     Pt will continue to benefit from skilled outpatient physical therapy to address the deficits listed in the problem list box on initial evaluation, provide pt/family education and to maximize pt's level of independence in the home and community environment.     Pt's spiritual, cultural and educational needs considered and pt agreeable to plan of care and goals.    Anticipated barriers to physical therapy: chronicity, multiple sx, obese    Goals:   Short Term Goals: 4 weeks, progressing   - R knee AROM ext to 0 deg to impact gait.  MET 10/16/2020, +1 DEG.  - R knee AROM flex to 90 deg towards impacting squatting and stair navigation.  MET 10/16/2020, 114 DEG.  - Improve R knee strength grossly to 4-/5 to impact gt.  MET 10/16/2020, GROSSLY 4-/5 STRENGTH.  - Pt able to ambulate 50% Wb per MD protocol.  MET 10/16/2020, 50% WB with min inc pn.  - Improve FOTO impairment score to 70% for improved walking.  Progressing, not met, 79% at eval and still today. He is not yet WBAT and this may be reason for his perception.     Short Term Goals: 8 weeks   - Full WB R LE without AD or hinge brace for normal gait.  - Improve R knee AROM hyper ext to +3 deg for symmetry.  - Improve R knee AROM flex to 115 deg for improving symmetry.  - Improve R knee strength grossly to 4/5 to impact gt and  ADLs.  - Improve FOTO impairment score to 65% for improved  walking, and abilities to help his child.     Short Term Goals: 12 weeks   - Normal gait without limp or pain.  - Improve R knee AROM flex to 123 deg for symmetry.  - Improve R hip and knee strength grossly to 4+/5 to impact gt and  ADLs.  - Pt able to RTW with </= 3/10 R knee pain for prolonged standing activities.  - Improve FOTO impairment score to 55% for improved RTW, HH chores and mod activities.     Long Term Goals: 16 weeks   - Pt to demo 5/5 R hip and knee strength for discharge from skilled PT.  - Pt will not be limited in HH chores, ambulation, static or prolonged standing and squatting activities for home, work, or .  - Improve FOTO impairment score to 48% for improved QOL.     Plan     Plan of care Certification: 9/18/2020 to 12/08/2020.     Outpatient Physical Therapy 2-3 times weekly for 12-16 weeks to include the following interventions: Aquatic Therapy, Electrical Stimulation IFC, Gait Training, Manual Therapy, Moist Heat/ Ice, Neuromuscular Re-ed, Patient Education, Self Care, Therapeutic Activites and Therapeutic Exercise.    Cont ROM and strengthening for improved QOL and independence.    Christina Avilez, PTA

## 2020-10-26 ENCOUNTER — PATIENT MESSAGE (OUTPATIENT)
Dept: SPORTS MEDICINE | Facility: CLINIC | Age: 30
End: 2020-10-26

## 2020-10-26 ENCOUNTER — OFFICE VISIT (OUTPATIENT)
Dept: SPORTS MEDICINE | Facility: CLINIC | Age: 30
End: 2020-10-26
Payer: COMMERCIAL

## 2020-10-26 ENCOUNTER — HOSPITAL ENCOUNTER (OUTPATIENT)
Dept: RADIOLOGY | Facility: HOSPITAL | Age: 30
Discharge: HOME | End: 2020-10-26
Attending: ORTHOPAEDIC SURGERY
Payer: COMMERCIAL

## 2020-10-26 VITALS
HEIGHT: 66 IN | WEIGHT: 255 LBS | HEART RATE: 79 BPM | DIASTOLIC BLOOD PRESSURE: 93 MMHG | BODY MASS INDEX: 40.98 KG/M2 | SYSTOLIC BLOOD PRESSURE: 140 MMHG

## 2020-10-26 DIAGNOSIS — R29.898 WEAKNESS OF RIGHT LOWER EXTREMITY: Primary | ICD-10-CM

## 2020-10-26 DIAGNOSIS — M25.661 DECREASED RANGE OF MOTION (ROM) OF RIGHT KNEE: ICD-10-CM

## 2020-10-26 DIAGNOSIS — M25.561 CHRONIC PAIN OF RIGHT KNEE: ICD-10-CM

## 2020-10-26 DIAGNOSIS — M21.061 GENU VALGUM, ACQUIRED, RIGHT: ICD-10-CM

## 2020-10-26 DIAGNOSIS — R26.9 GAIT ABNORMALITY: ICD-10-CM

## 2020-10-26 DIAGNOSIS — G89.29 CHRONIC PAIN OF RIGHT KNEE: ICD-10-CM

## 2020-10-26 DIAGNOSIS — M23.200 OLD COMPLEX TEAR OF LATERAL MENISCUS OF RIGHT KNEE: ICD-10-CM

## 2020-10-26 PROCEDURE — 99999 PR PBB SHADOW E&M-EST. PATIENT-LVL III: CPT | Mod: PBBFAC,,, | Performed by: ORTHOPAEDIC SURGERY

## 2020-10-26 PROCEDURE — 73560 X-RAY EXAM OF KNEE 1 OR 2: CPT | Mod: 26,RT,, | Performed by: RADIOLOGY

## 2020-10-26 PROCEDURE — 99024 POSTOP FOLLOW-UP VISIT: CPT | Mod: S$GLB,,, | Performed by: ORTHOPAEDIC SURGERY

## 2020-10-26 PROCEDURE — 99999 PR PBB SHADOW E&M-EST. PATIENT-LVL III: ICD-10-PCS | Mod: PBBFAC,,, | Performed by: ORTHOPAEDIC SURGERY

## 2020-10-26 PROCEDURE — 73560 X-RAY EXAM OF KNEE 1 OR 2: CPT | Mod: TC,RT

## 2020-10-26 PROCEDURE — 73560 XR KNEE 1 OR 2 VIEW RIGHT: ICD-10-PCS | Mod: 26,RT,, | Performed by: RADIOLOGY

## 2020-10-26 PROCEDURE — 99024 PR POST-OP FOLLOW-UP VISIT: ICD-10-PCS | Mod: S$GLB,,, | Performed by: ORTHOPAEDIC SURGERY

## 2020-10-26 RX ORDER — METHOCARBAMOL 500 MG/1
500 TABLET, FILM COATED ORAL 3 TIMES DAILY
Qty: 90 TABLET | Refills: 3 | Status: SHIPPED | OUTPATIENT
Start: 2020-10-26 | End: 2020-11-25

## 2020-10-26 RX ORDER — IBUPROFEN AND FAMOTIDINE 800; 26.6 MG/1; MG/1
1 TABLET, COATED ORAL 3 TIMES DAILY
Qty: 90 TABLET | Refills: 1 | Status: SHIPPED | OUTPATIENT
Start: 2020-10-26 | End: 2021-01-21

## 2020-10-26 RX ORDER — IBUPROFEN AND FAMOTIDINE 26.6; 8 MG/1; MG/1
800 TABLET ORAL 3 TIMES DAILY
Qty: 90 TABLET | Refills: 3 | Status: SHIPPED | OUTPATIENT
Start: 2020-10-26 | End: 2020-10-27 | Stop reason: SDUPTHER

## 2020-10-26 RX ORDER — OXYCODONE AND ACETAMINOPHEN 10; 325 MG/1; MG/1
1 TABLET ORAL EVERY 12 HOURS PRN
Qty: 14 TABLET | Refills: 0 | Status: SHIPPED | OUTPATIENT
Start: 2020-10-26 | End: 2020-11-20

## 2020-10-26 NOTE — LETTER
Patient: Yovani Malik   YOB: 1990   Clinic Number: [id number]   Today's Date: October 26, 2020        Certificate to Return to Work     Yovani Heard was seen by Barry Lopez MD on 10/26/2020.    Follow up in about 6 weeks (around 12/7/2020), or 6 weeks with Dr. Barry Lopez Patient will fill out IKDC, SF-12 and KOOS b/l knee xr, for 6 weeks with Dr. Barry Lopez Patient will fill out IKDC, SF-12 and KOOS b/l knee xr. Yovani Heard will be seen by Dr. Barry Lopez.    Yovani Heard can NOT return to work at this time; hold out for additional 6 weeks    Specific restrictions:   Discontinue ASA    Discontinue celebrex BID and start Duexis 800 mg TID    Hold out of work at this time with planned release light duty in 6 weeks       If you have any questions or concerns, please feel free to contact the office at [phone].    Thank you.    Barry Lopez MD        Signature: __________________________________________________

## 2020-10-26 NOTE — PROGRESS NOTES
Subjective:          Chief Complaint: Yovani Malik is a 30 y.o. male who had concerns including Pain of the Right Knee.    Patient presents to clinic for 6 week post op evaluation of right knee.  Pain today is 7/10.  Denies nausea, vomiting, fever, chills, chest pain, shortness of breath.  He has been taking Percocet 10 mg every 8-12 hours as needed for pain.  He is ambulating today 50% weight-bearing with crutches and T scope brace is set to 120°.  He is completing formal PT at [facility] location.       Surgery Date: 9/15/2020   Surgeon(s) and Role:     * Barry Lopez MD - Primary     Pre-op Diagnosis:  Genu valgum, acquired, right (M21.061)  Chondromalacia of right knee (M94.261)  Acute lateral meniscus tear of right knee, initial encounter (S83.281A)     Post-op Diagnosis: Post-Op Diagnosis Codes:     * Genu valgum, acquired, right (M21.061)     * Chondromalacia of right knee (M94.261)     * Acute lateral meniscus tear of right knee, initial encounter (S83.281A)     Procedure(s) (LRB):  OSTEOTOMY, FEMUR (Right)  ARTHROSCOPY, KNEE, WITH MENISCECTOMY (Right)  SYNOVECTOMY, KNEE (Right)             Review of Systems   Constitution: Negative. Negative for chills, fever, night sweats, weight gain and weight loss.   HENT: Negative for congestion, hearing loss and sore throat.    Eyes: Negative for blurred vision, double vision and visual disturbance.   Cardiovascular: Negative for chest pain, leg swelling and palpitations.   Respiratory: Negative for cough and shortness of breath.    Endocrine: Negative for polyuria.   Hematologic/Lymphatic: Negative for bleeding problem. Does not bruise/bleed easily.   Skin: Negative for itching, poor wound healing and rash.   Musculoskeletal: Positive for joint pain, joint swelling and stiffness. Negative for back pain, muscle cramps, muscle weakness and myalgias.   Gastrointestinal: Negative for abdominal pain, constipation, diarrhea, melena, nausea and vomiting.    Genitourinary: Negative.  Negative for frequency and hematuria.   Neurological: Negative for dizziness, headaches, loss of balance, numbness, paresthesias and sensory change.   Psychiatric/Behavioral: Negative for altered mental status and depression. The patient is not nervous/anxious.    Allergic/Immunologic: Negative for hives.       Pain Related Questions  Over the past 3 days, what was your average pain during activity? (I.e. running, jogging, walking, climbing stairs, getting dressed, ect.): 9  Over the past 3 days, what was your highest pain level?: 9  Over the past 3 days, what was your lowest pain level? : 5    Other  How many nights a week are you awakened by your affected body part?: 3  Was the patient's HEIGHT measured or patient reported?: Patient Reported  Was the patient's WEIGHT measured or patient reported?: Measured      Objective:        General: Yovani is well-developed, well-nourished, appears stated age, in no acute distress, alert and oriented to time, place and person.     General    Vitals reviewed.  Constitutional: He is oriented to person, place, and time. He appears well-developed and well-nourished. No distress.   HENT:   Mouth/Throat: No oropharyngeal exudate.   Eyes: Right eye exhibits no discharge. Left eye exhibits no discharge.   Neck: Normal range of motion.   Cardiovascular: Normal rate and regular rhythm.    Pulmonary/Chest: Effort normal and breath sounds normal. No respiratory distress.   Neurological: He is alert and oriented to person, place, and time. He has normal reflexes. No cranial nerve deficit. Coordination normal.   Psychiatric: He has a normal mood and affect. His behavior is normal. Judgment and thought content normal.     General Musculoskeletal Exam   Gait: abnormal and antalgic       Right Knee Exam     Inspection   Erythema: absent  Scars: present  Swelling: present  Effusion: absent  Deformity: absent  Bruising: absent    Tenderness   Right knee tenderness  location: global tenderness.    Range of Motion   Extension:  0 normal   Flexion: abnormal Right knee flexion: 115.     Tests   Meniscus   Kwadwo:  Medial - negative Lateral - negative  Ligament Examination Lachman: normal (-1 to 2mm) PCL-Posterior Drawer: normal (0 to 2mm)     MCL - Valgus: normal (0 to 2mm)  LCL - Varus: normalPivot Shift: normal (Equal)Reverse Pivot Shift: normal (Equal)Dial Test at 30 degrees: normal (< 5 degrees)Dial Test at 90 degrees: normal (< 5 degrees)  Posterior Sag Test: negative  Posterolateral Corner: unstable (>15 degrees difference)  Patella   Patellar apprehension: negative  Passive Patellar Tilt: neutral  Patellar Tracking: normal  Patellar Glide (quadrants): Lateral - 1   Medial - 2  Q-Angle at 90 degrees: normal  Patellar Grind: negative  J-Sign: none    Other   Meniscal Cyst: absent  Popliteal (Baker's) Cyst: absent  Sensation: normal    Comments:  Portal sutures removed.  Suture tags trimmed.  No signs of infection.  No purulent drainage.  NVI.    Left Knee Exam     Inspection   Erythema: absent  Scars: absent  Swelling: absent  Effusion: absent  Deformity: absent  Bruising: absent    Tenderness   The patient is experiencing no tenderness.     Range of Motion   Extension: 0   Flexion: 140     Tests   Meniscus   Kwadwo:  Medial - negative Lateral - negative  Stability Lachman: normal (-1 to 2mm) PCL-Posterior Drawer: normal (0 to 2mm)  MCL - Valgus: normal (0 to 2mm)  LCL - Varus: normal (0 to 2mm)Pivot Shift: normal (Equal)Reverse Pivot Shift: normal (Equal)Dial Test at 30 degrees: normal (< 5 degrees)Dial Test at 90 degrees: normal (< 5 degrees)  Posterior Sag Test: negative  Posterolateral Corner: unstable (>15 degrees difference)  Patella   Patellar apprehension: negative  Passive Patellar Tilt: neutral  Patellar Tracking: normal  Patellar Glide (Quadrants): Lateral - 1 Medial - 2  Q-Angle at 90 degrees: normal  Patellar Grind: negative  J-Sign: J sign absent    Other    Meniscal Cyst: absent  Popliteal (Baker's) Cyst: absent  Sensation: normal    Right Hip Exam     Tests   Marisa: negative  Left Hip Exam     Tests   Marisa: negative          Muscle Strength   Right Lower Extremity   Hip Abduction: 5/5   Quadriceps:  4/5   Hamstrin/5   Left Lower Extremity   Hip Abduction: 5/5   Quadriceps:  5/5   Hamstrin/5     Reflexes     Left Side  Achilles:  2+  Quadriceps:  2+    Right Side   Achilles:  2+  Quadriceps:  2+    Vascular Exam     Right Pulses  Dorsalis Pedis:      2+  Posterior Tibial:      2+        Left Pulses  Dorsalis Pedis:      2+  Posterior Tibial:      2+              Radiographs:  Right knee:  FINDINGS:  Postsurgical changes with plate and screw of the distal femur.  Hardware and alignment are satisfactory.  Joint spaces are satisfactorily maintained.  No soft tissue abnormality.         Assessment:       Encounter Diagnoses   Name Primary?    Weakness of right lower extremity Yes    Genu valgum, acquired, right     Old complex tear of lateral meniscus of right knee     Chronic pain of right knee     Decreased range of motion (ROM) of right knee     Gait abnormality           Plan:       1.  IKDC, SF-12 and KOOS was filled out today in clinic.     RTC in 6 weeks with Dr. Barry Lopez Patient will fill out IKDC, SF-12 and KOOS b/l knee xr    2. Physical Therapy Orders- [facility]   Weight bearing:   Advancing from to 50% weightbearing to FULL 100% weightbearing now with immobilizer and crutches with gait  Wean out of immobilizer as tolerated and advance from 2 to 1 crutches (one crutch opposite side)     Range of Motion:Full normal motion symmetric to opposite side  Range of motion should be limited to -10 degrees hyperextension and flexion 120 degrees now.     Patellar mobilization program  Modalities to increase flexibility in tissues   Dry needling    4. Discontinue ASA    5. Discontinue celebrex BID and start Duexis 800 mg TID    6. Hold out of work at  this time with planned release light duty in 6 weeks                             Patient questionnaires may have been collected.

## 2020-10-26 NOTE — PATIENT INSTRUCTIONS
Physical Therapy [facility]   Weight bearing:   Advancing from to 50% weightbearing to FULL 100% weightbearing now with immobilizer and crutches with gait  Wean out of immobilizer as tolerated and advance from 2 to 1 crutches (one crutch opposite side)     Range of Motion:Full normal motion symmetric to opposite side  Range of motion should be limited to -10 degrees hyperextension and flexion 120 degrees now.     Patellar mobilization program  Modalities to increase flexibility in tissues   Dry needling

## 2020-10-27 ENCOUNTER — PATIENT MESSAGE (OUTPATIENT)
Dept: SPORTS MEDICINE | Facility: CLINIC | Age: 30
End: 2020-10-27

## 2020-10-27 DIAGNOSIS — M23.200 OLD COMPLEX TEAR OF LATERAL MENISCUS OF RIGHT KNEE: Primary | ICD-10-CM

## 2020-10-27 RX ORDER — IBUPROFEN AND FAMOTIDINE 26.6; 8 MG/1; MG/1
1 TABLET ORAL 3 TIMES DAILY
Qty: 90 TABLET | Refills: 3 | Status: SHIPPED | OUTPATIENT
Start: 2020-10-27 | End: 2021-01-21

## 2020-10-28 ENCOUNTER — CLINICAL SUPPORT (OUTPATIENT)
Dept: REHABILITATION | Facility: HOSPITAL | Age: 30
End: 2020-10-28
Payer: COMMERCIAL

## 2020-10-28 DIAGNOSIS — R26.9 GAIT ABNORMALITY: ICD-10-CM

## 2020-10-28 DIAGNOSIS — R29.898 WEAKNESS OF RIGHT LOWER EXTREMITY: ICD-10-CM

## 2020-10-28 DIAGNOSIS — M25.661 DECREASED RANGE OF MOTION (ROM) OF RIGHT KNEE: Primary | ICD-10-CM

## 2020-10-28 PROCEDURE — 97110 THERAPEUTIC EXERCISES: CPT | Mod: PO | Performed by: PHYSICAL THERAPIST

## 2020-10-28 PROCEDURE — 97116 GAIT TRAINING THERAPY: CPT | Mod: PO | Performed by: PHYSICAL THERAPIST

## 2020-10-28 RX ORDER — IBUPROFEN 800 MG/1
800 TABLET ORAL 3 TIMES DAILY
Qty: 90 TABLET | Refills: 1 | Status: SHIPPED | OUTPATIENT
Start: 2020-10-28 | End: 2021-11-18

## 2020-10-28 NOTE — PROGRESS NOTES
Physical Therapy Daily Treatment Note     Name: Yovani Malik  [facility] Number: [id number]    Therapy Diagnosis:   Encounter Diagnoses   Name Primary?    Decreased range of motion (ROM) of right knee Yes    Weakness of right lower extremity     Gait abnormality      Physician: Angelica Brady PA-C    Visit Date: 10/28/2020  Physician Orders: PT Eval and Treat      The patient should begin physical therapy on postoperative day # 3 and will be advanced to outpatient therapy as soon as   Possible following discharge.     Weight bearing: toe touch weight bearing on the right leg  Range of Motion: Full normal motion symmetric to opposite side     At 4 weeks' time, the patient should begin advancing from TTWB to 50% weightbearing at 4-5 weeks' time. The patient should begin advancing from 100% weightbearing at 6 weeks' time.      The patient can begin full weightbearing status at 6 weeks time.     Range of motion should be limited to -10 degrees hyperextension and flexion 120 degrees now.      Additional exercises to be performed are:   to begin quad sets with a heel roll to obtain hyperextension, straight leg raise and heel slides with the heel supported in a closed-chain fashion     Immediate specific exercises should include:   Gait program should include the above stated weightbearing; in addition: active extension with a heel-to-toe gait working on maintaining extension     Immobilizer if present should be locked @ -10 degrees with gait and allowed to flex to 120 degrees at rest.      Hinged knee immobilizer should be locked in -10 degrees hyperextension with gait for  6 weeks.    Medical Diagnosis from Referral: M21.061 (ICD-10-CM) - Genu valgum, acquired, right M94.261 (ICD-10-CM) - Chondromalacia of right knee M23.200 (ICD-10-CM) - Other old tear of lateral meniscus of right knee   Evaluation Date: 9/18/2020  Authorization Period Expiration: 12/31/2020   Plan of Care Expiration: 12/08/2020  Reassessment:  "10/16/2020  Visit # / Visits authorized: 11/ 20    Time In: 1002 AM  Time Out: 1050 AM  Total Billable Time: 45 minutes    Precautions: Standard and blood thinners, DOS 9/15/2020, TTWB until 4-5 weeks, 50% WB then, 100% WB at 6 weeks.    DOS: 09/15/2020, POD: 43 (10/28/2020)    Subjective     Pt reports: some pain today and continued inflammation. 6 weeks post op. He was compliant with home exercise program.  Response to previous treatment: no adverse effect  Functional change: progressing ext and flex noted.    Pain: 4/10 pre tx and 5/10 post tx  Location: right knee      Objective     10/16/2020: R knee AROM +1 to 116 deg    Yovani received therapeutic exercises to develop strength, ROM, flexibility and core stabilization for 40 minutes including:  Long sitting gastroc stretch with strap (heel elevated) 30 sec x 4   Long sit QS with towel roll at ankle 3x10, 3 sec hold   SLR 4x10, 2#  SL hip abduction 4x10, 2#  Prone hip ext 2# 4x10  Bridge 2x10  Standing hip abd green band 3x10 each    DL abdominal press 3x10, 3" holds  Prone plank 3x15"  Side plank on elbow 3x10"  Quadruped alt UE 2x10  Quadruped LE 2x10 (R LE ext ONLY, pt unable to hold wt on R knee for LLE ext)  WS in quadruped ->R knee x20, for preparation to progress to L LE ext in quadruped  Supermans x10    Standing on LLE: performing hip abduction x 20, march x 20, hamstring curl x 20 (on RLE)-np    In the future: side stepping, marching bridge    Yovani received the following manual therapy techniques: Joint mobilizations were applied to the: R knee for 00 minutes, including:  Scar massage    Gait training 8 mins with B crutches and without brace.    Yovani received cold pack for 00 minutes to to decrease circulation, pain, and swelling.    Home Exercises Provided and Patient Education Provided     Education provided:   - HEP  - cont both HEPs, make modifications as instructed    Written Home Exercises Provided: Patient instructed to cont prior " HEP.  Exercises were reviewed and Yovani was able to demonstrate them prior to the end of the session.  Yovani demonstrated good  understanding of the education provided.     See EMR under Patient Instructions for exercises provided 9/23/2020.    Assessment     Advance program now that pt is released to full WB and to wean brace then 2->1 crutch. He was provided a HEP focused at LE strength and core strength by MD office. We covered the ex from MD office which pt has not began. We will progress these and others as tolerated. Some modifications made due to pt size and physical deconditioning. Pt instructed to practice in home without brace over the weekend. He should continue to use brace primarily. Plan to wean brace by next W-F and have initiated weaning to 1 crutch. Plan to be off crutches in 2 weeks max given pt tolerance.    Yovani is progressing well towards his goals.   Pt prognosis is Good.     Pt will continue to benefit from skilled outpatient physical therapy to address the deficits listed in the problem list box on initial evaluation, provide pt/family education and to maximize pt's level of independence in the home and community environment.     Pt's spiritual, cultural and educational needs considered and pt agreeable to plan of care and goals.    Anticipated barriers to physical therapy: chronicity, multiple sx, obese    Goals:   Short Term Goals: 4 weeks, progressing   - R knee AROM ext to 0 deg to impact gait.  MET 10/16/2020, +1 DEG.  - R knee AROM flex to 90 deg towards impacting squatting and stair navigation.  MET 10/16/2020, 114 DEG.  - Improve R knee strength grossly to 4-/5 to impact gt.  MET 10/16/2020, GROSSLY 4-/5 STRENGTH.  - Pt able to ambulate 50% Wb per MD protocol.  MET 10/16/2020, 50% WB with min inc pn.  - Improve FOTO impairment score to 70% for improved walking.  Progressing, not met, 79% at eval and still today. He is not yet WBAT and this may be reason for his  perception.     Short Term Goals: 8 weeks   - Full WB R LE without AD or hinge brace for normal gait.  - Improve R knee AROM hyper ext to +3 deg for symmetry.  - Improve R knee AROM flex to 115 deg for improving symmetry.  - Improve R knee strength grossly to 4/5 to impact gt and  ADLs.  - Improve FOTO impairment score to 65% for improved walking, and abilities to help his child.     Short Term Goals: 12 weeks   - Normal gait without limp or pain.  - Improve R knee AROM flex to 123 deg for symmetry.  - Improve R hip and knee strength grossly to 4+/5 to impact gt and  ADLs.  - Pt able to RTW with </= 3/10 R knee pain for prolonged standing activities.  - Improve FOTO impairment score to 55% for improved RTW, HH chores and mod activities.     Long Term Goals: 16 weeks   - Pt to demo 5/5 R hip and knee strength for discharge from skilled PT.  - Pt will not be limited in HH chores, ambulation, static or prolonged standing and squatting activities for home, work, or .  - Improve FOTO impairment score to 48% for improved QOL.     Plan     Plan of care Certification: 9/18/2020 to 12/08/2020.     Outpatient Physical Therapy 2-3 times weekly for 12-16 weeks to include the following interventions: Aquatic Therapy, Electrical Stimulation IFC, Gait Training, Manual Therapy, Moist Heat/ Ice, Neuromuscular Re-ed, Patient Education, Self Care, Therapeutic Activites and Therapeutic Exercise.    Cont ROM and strengthening for improved QOL and independence.    Niru French, PT

## 2020-11-04 ENCOUNTER — CLINICAL SUPPORT (OUTPATIENT)
Dept: REHABILITATION | Facility: HOSPITAL | Age: 30
End: 2020-11-04
Payer: COMMERCIAL

## 2020-11-04 DIAGNOSIS — R26.9 GAIT ABNORMALITY: ICD-10-CM

## 2020-11-04 DIAGNOSIS — R29.898 WEAKNESS OF RIGHT LOWER EXTREMITY: ICD-10-CM

## 2020-11-04 DIAGNOSIS — M25.661 DECREASED RANGE OF MOTION (ROM) OF RIGHT KNEE: ICD-10-CM

## 2020-11-04 PROCEDURE — 97110 THERAPEUTIC EXERCISES: CPT | Mod: PO,CQ

## 2020-11-04 PROCEDURE — 97116 GAIT TRAINING THERAPY: CPT | Mod: PO,CQ

## 2020-11-04 NOTE — PROGRESS NOTES
Physical Therapy Daily Treatment Note     Name: Yovani Malik  [facility] Number: [id number]    Therapy Diagnosis:   Encounter Diagnoses   Name Primary?    Decreased range of motion (ROM) of right knee     Weakness of right lower extremity     Gait abnormality      Physician: Angelica Brady PA-C    Visit Date: 11/4/2020  Physician Orders: PT Eval and Treat      The patient should begin physical therapy on postoperative day # 3 and will be advanced to outpatient therapy as soon as   Possible following discharge.     Weight bearing: toe touch weight bearing on the right leg  Range of Motion: Full normal motion symmetric to opposite side     At 4 weeks' time, the patient should begin advancing from TTWB to 50% weightbearing at 4-5 weeks' time. The patient should begin advancing from 100% weightbearing at 6 weeks' time.      The patient can begin full weightbearing status at 6 weeks time.     Range of motion should be limited to -10 degrees hyperextension and flexion 120 degrees now.      Additional exercises to be performed are:   to begin quad sets with a heel roll to obtain hyperextension, straight leg raise and heel slides with the heel supported in a closed-chain fashion     Immediate specific exercises should include:   Gait program should include the above stated weightbearing; in addition: active extension with a heel-to-toe gait working on maintaining extension     Immobilizer if present should be locked @ -10 degrees with gait and allowed to flex to 120 degrees at rest.      Hinged knee immobilizer should be locked in -10 degrees hyperextension with gait for  6 weeks.    Medical Diagnosis from Referral: M21.061 (ICD-10-CM) - Genu valgum, acquired, right M94.261 (ICD-10-CM) - Chondromalacia of right knee M23.200 (ICD-10-CM) - Other old tear of lateral meniscus of right knee   Evaluation Date: 9/18/2020  Authorization Period Expiration: 12/31/2020   Plan of Care Expiration: 12/08/2020  Reassessment:  10/16/2020  Visit # / Visits authorized: 12/ 20    Time In: 1034 AM  Time Out: 1115 AM  Total Billable Time: 41 minutes    Precautions: Standard and blood thinners, DOS 9/15/2020, TTWB until 4-5 weeks, 50% WB then, 100% WB at 6 weeks.    DOS: 09/15/2020, POD: 50 (10/28/2020)    Subjective     Pt reports: his knee is feeling better each day. Patient states he is anxious to get rid of brace and crutches but he is nervous about how is knee will feel. He was compliant with home exercise program.  Response to previous treatment: no adverse effect  Functional change: progressing ext and flex noted.    Pain: 3/10  Location: right knee      Objective     10/16/2020: R knee AROM +1 to 118 deg    Yovani received therapeutic exercises to develop strength, ROM, flexibility and core stabilization for 30 minutes including:  Upright bike x 5 minutes, seat 10 (for ROM)  Long sitting gastroc stretch with strap (heel elevated) 30 sec x 4   Quad set + SLR 4x10, 2#  SL hip abduction 4x10, 2#  Prone hip ext 2# 4x10  Bridge 2x10  Seated hamstring curls (Green TB) 2x10  Standing hip abd green band 3x10 each  Standing TKE (green TB) 2x10    Yovani participated in gait training activities to restore normal ambulation mechanics and improve tolerance to weightbearing for 10 minutes:  Lateral weight shifts x 2 minutes (no brace)  A/P weight shift x 1 minutes (ea LE leading) (no brace)  Ambulation with 1 crutch and no brace x 3 minutes  Side stepping in parallel bars x entire length x 2      In future visit: [facility]    Home Exercises Provided and Patient Education Provided     Education provided:   - HEP  - cont both HEPs, make modifications as instructed    Written Home Exercises Provided: Patient instructed to cont prior HEP.  Exercises were reviewed and Yovani was able to demonstrate them prior to the end of the session.  Yovani demonstrated good  understanding of the education provided.     See EMR under Patient Instructions for  exercises provided 9/23/2020.    Assessment     Yovani presents to clinic with B axillary crutches and brace fully unlocked during ambulation. Progressed patient to 1 axillary crutch without brace during ambulation as he is now 7 weeks post op and restoring normal gait mechanics and quad control is paramount. Patient apprehensive with weightbearing and gait training activities today but he did not complain of knee pain during these exercises. AROM knee flexion equal to contralateral limb.     Yovani is progressing well towards his goals.   Pt prognosis is Good.     Pt will continue to benefit from skilled outpatient physical therapy to address the deficits listed in the problem list box on initial evaluation, provide pt/family education and to maximize pt's level of independence in the home and community environment.     Pt's spiritual, cultural and educational needs considered and pt agreeable to plan of care and goals.    Anticipated barriers to physical therapy: chronicity, multiple sx, obese    Goals:   Short Term Goals: 4 weeks, progressing   - R knee AROM ext to 0 deg to impact gait.  MET 10/16/2020, +1 DEG.  - R knee AROM flex to 90 deg towards impacting squatting and stair navigation.  MET 10/16/2020, 114 DEG.  - Improve R knee strength grossly to 4-/5 to impact gt.  MET 10/16/2020, GROSSLY 4-/5 STRENGTH.  - Pt able to ambulate 50% Wb per MD protocol.  MET 10/16/2020, 50% WB with min inc pn.  - Improve FOTO impairment score to 70% for improved walking.  Progressing, not met, 79% at eval and still today. He is not yet WBAT and this may be reason for his perception.     Short Term Goals: 8 weeks   - Full WB R LE without AD or hinge brace for normal gait.  - Improve R knee AROM hyper ext to +3 deg for symmetry.  - Improve R knee AROM flex to 115 deg for improving symmetry.  - Improve R knee strength grossly to 4/5 to impact gt and  ADLs.  - Improve FOTO impairment score to 65% for improved walking,  and abilities to help his child.     Short Term Goals: 12 weeks   - Normal gait without limp or pain.  - Improve R knee AROM flex to 123 deg for symmetry.  - Improve R hip and knee strength grossly to 4+/5 to impact gt and  ADLs.  - Pt able to RTW with </= 3/10 R knee pain for prolonged standing activities.  - Improve FOTO impairment score to 55% for improved RTW, HH chores and mod activities.     Long Term Goals: 16 weeks   - Pt to demo 5/5 R hip and knee strength for discharge from skilled PT.  - Pt will not be limited in HH chores, ambulation, static or prolonged standing and squatting activities for home, work, or .  - Improve FOTO impairment score to 48% for improved QOL.     Plan     Plan of care Certification: 9/18/2020 to 12/08/2020.     Outpatient Physical Therapy 2-3 times weekly for 12-16 weeks to include the following interventions: Aquatic Therapy, Electrical Stimulation IFC, Gait Training, Manual Therapy, Moist Heat/ Ice, Neuromuscular Re-ed, Patient Education, Self Care, Therapeutic Activites and Therapeutic Exercise.    Cont ROM and strengthening for improved QOL and independence.    Christina Avilez, PTA

## 2020-11-06 ENCOUNTER — PATIENT MESSAGE (OUTPATIENT)
Dept: SPORTS MEDICINE | Facility: CLINIC | Age: 30
End: 2020-11-06

## 2020-11-06 ENCOUNTER — CLINICAL SUPPORT (OUTPATIENT)
Dept: REHABILITATION | Facility: HOSPITAL | Age: 30
End: 2020-11-06
Payer: COMMERCIAL

## 2020-11-06 DIAGNOSIS — M25.661 DECREASED RANGE OF MOTION (ROM) OF RIGHT KNEE: Primary | ICD-10-CM

## 2020-11-06 DIAGNOSIS — R26.9 GAIT ABNORMALITY: ICD-10-CM

## 2020-11-06 DIAGNOSIS — R29.898 WEAKNESS OF RIGHT LOWER EXTREMITY: ICD-10-CM

## 2020-11-06 PROCEDURE — 97110 THERAPEUTIC EXERCISES: CPT | Mod: PO | Performed by: PHYSICAL THERAPIST

## 2020-11-06 PROCEDURE — 97116 GAIT TRAINING THERAPY: CPT | Mod: PO | Performed by: PHYSICAL THERAPIST

## 2020-11-06 NOTE — PROGRESS NOTES
Physical Therapy Daily Treatment Note     Name: Yovani Malik  [facility] Number: [id number]    Therapy Diagnosis:   Encounter Diagnoses   Name Primary?    Decreased range of motion (ROM) of right knee Yes    Weakness of right lower extremity     Gait abnormality      Physician: Angelica Brady PA-C    Visit Date: 11/6/2020  Physician Orders: PT Eval and Treat      The patient should begin physical therapy on postoperative day # 3 and will be advanced to outpatient therapy as soon as   Possible following discharge.     Weight bearing: toe touch weight bearing on the right leg  Range of Motion: Full normal motion symmetric to opposite side     At 4 weeks' time, the patient should begin advancing from TTWB to 50% weightbearing at 4-5 weeks' time. The patient should begin advancing from 100% weightbearing at 6 weeks' time.      The patient can begin full weightbearing status at 6 weeks time.     Range of motion should be limited to -10 degrees hyperextension and flexion 120 degrees now.      Additional exercises to be performed are:   to begin quad sets with a heel roll to obtain hyperextension, straight leg raise and heel slides with the heel supported in a closed-chain fashion     Immediate specific exercises should include:   Gait program should include the above stated weightbearing; in addition: active extension with a heel-to-toe gait working on maintaining extension     Immobilizer if present should be locked @ -10 degrees with gait and allowed to flex to 120 degrees at rest.      Hinged knee immobilizer should be locked in -10 degrees hyperextension with gait for  6 weeks.    Medical Diagnosis from Referral: M21.061 (ICD-10-CM) - Genu valgum, acquired, right M94.261 (ICD-10-CM) - Chondromalacia of right knee M23.200 (ICD-10-CM) - Other old tear of lateral meniscus of right knee   Evaluation Date: 9/18/2020  Authorization Period Expiration: 12/31/2020   Plan of Care Expiration: 12/08/2020  Reassessment:  10/16/2020  Visit # / Visits authorized: 14/ 20    Time In: 1015 AM (late arrival)  Time Out: 1045 AM  Total Billable Time: 30 minutes    Precautions: Standard and blood thinners, DOS 9/15/2020, 100% WB at 6 weeks.    DOS: 09/15/2020, POD: 50 (10/28/2020)    Subjective     Pt reports: his knee is feeling better each day. Patient states he is anxious to get rid of brace and crutches but he is nervous about how is knee will feel. He asks about coming out of brace at night. Deferred to MD to answer this. He was compliant with home exercise program.  Response to previous treatment: no adverse effect  Functional change: progressing ext and flex noted.    Pain: 3/10  Location: right knee      Objective     10/16/2020: R knee AROM +1 to 118 deg    Yovani received therapeutic exercises to develop strength, ROM, flexibility and core stabilization for 20 minutes including:  Upright bike x 5 minutes, seat 10 (for ROM)  Seated hamstring curls (Green TB) 2x10  Standing hip abd green band 3x10 each  Standing TKE (green TB) 2x10    Late arrival- not performed:  Long sitting gastroc stretch with strap (heel elevated) 30 sec x 4   Quad set + SLR 4x10, 2#  SL hip abduction 4x10, 2#  Prone hip ext 2# 4x10  Bridge 2x10    Yovani participated in gait training activities to restore normal ambulation mechanics and improve tolerance to weightbearing for 10 minutes:  Lateral weight shifts x 2 minutes (no brace)  A/P weight shift x 1 minutes (ea LE leading) (no brace)  Ambulation with 1 crutch and no brace x 3 minutes  Side stepping in parallel bars x entire length x 2      [facility] future visit: [facility]    Home Exercises Provided and Patient Education Provided     Education provided:   - HEP  - cont both HEPs, make modifications as instructed    Written Home Exercises Provided: Patient instructed to cont prior HEP.  Exercises were reviewed and Yovani was able to demonstrate them prior to the end of the session.  Yovani demonstrated good   understanding of the education provided.     See EMR under Patient Instructions for exercises provided 9/23/2020.    Assessment     Yovani presents to clinic with (U) crutch and without brace during ambulation. He continues with R hip in abd and decreased WB R sided. Crutch adjusted which decreased this. But he continues needing cues for proper WS to R LE and stance. Patient apprehensive with weightbearing and gait training activities today but he did not complain of knee pain during these exercises.     Yovani is progressing well towards his goals.   Pt prognosis is Good.     Pt will continue to benefit from skilled outpatient physical therapy to address the deficits listed in the problem list box on initial evaluation, provide pt/family education and to maximize pt's level of independence in the home and community environment.     Pt's spiritual, cultural and educational needs considered and pt agreeable to plan of care and goals.    Anticipated barriers to physical therapy: chronicity, multiple sx, obese    Goals:   Short Term Goals: 4 weeks, progressing   - R knee AROM ext to 0 deg to impact gait.  MET 10/16/2020, +1 DEG.  - R knee AROM flex to 90 deg towards impacting squatting and stair navigation.  MET 10/16/2020, 114 DEG.  - Improve R knee strength grossly to 4-/5 to impact gt.  MET 10/16/2020, GROSSLY 4-/5 STRENGTH.  - Pt able to ambulate 50% Wb per MD protocol.  MET 10/16/2020, 50% WB with min inc pn.  - Improve FOTO impairment score to 70% for improved walking.  Progressing, not met, 79% at eval and still today. He is not yet WBAT and this may be reason for his perception.     Short Term Goals: 8 weeks   - Full WB R LE without AD or hinge brace for normal gait.  - Improve R knee AROM hyper ext to +3 deg for symmetry.  - Improve R knee AROM flex to 115 deg for improving symmetry.  - Improve R knee strength grossly to 4/5 to impact gt and  ADLs.  - Improve FOTO impairment score to 65% for improved  walking, and abilities to help his child.     Short Term Goals: 12 weeks   - Normal gait without limp or pain.  - Improve R knee AROM flex to 123 deg for symmetry.  - Improve R hip and knee strength grossly to 4+/5 to impact gt and  ADLs.  - Pt able to RTW with </= 3/10 R knee pain for prolonged standing activities.  - Improve FOTO impairment score to 55% for improved RTW, HH chores and mod activities.     Long Term Goals: 16 weeks   - Pt to demo 5/5 R hip and knee strength for discharge from skilled PT.  - Pt will not be limited in HH chores, ambulation, static or prolonged standing and squatting activities for home, work, or .  - Improve FOTO impairment score to 48% for improved QOL.     Plan     Plan of care Certification: 9/18/2020 to 12/08/2020.     Outpatient Physical Therapy 2-3 times weekly for 12-16 weeks to include the following interventions: Aquatic Therapy, Electrical Stimulation IFC, Gait Training, Manual Therapy, Moist Heat/ Ice, Neuromuscular Re-ed, Patient Education, Self Care, Therapeutic Activites and Therapeutic Exercise.    Cont ROM and strengthening for improved QOL and independence.    Niru French, PT

## 2020-11-07 DIAGNOSIS — Z98.890 S/P RIGHT KNEE SURGERY: Primary | ICD-10-CM

## 2020-11-07 RX ORDER — OXYCODONE AND ACETAMINOPHEN 5; 325 MG/1; MG/1
1 TABLET ORAL EVERY 12 HOURS PRN
Qty: 14 TABLET | Refills: 0 | Status: SHIPPED | OUTPATIENT
Start: 2020-11-07 | End: 2020-11-13 | Stop reason: SDUPTHER

## 2020-11-11 ENCOUNTER — CLINICAL SUPPORT (OUTPATIENT)
Dept: REHABILITATION | Facility: HOSPITAL | Age: 30
End: 2020-11-11
Payer: COMMERCIAL

## 2020-11-11 DIAGNOSIS — R26.9 GAIT ABNORMALITY: ICD-10-CM

## 2020-11-11 DIAGNOSIS — R29.898 WEAKNESS OF RIGHT LOWER EXTREMITY: ICD-10-CM

## 2020-11-11 DIAGNOSIS — M25.661 DECREASED RANGE OF MOTION (ROM) OF RIGHT KNEE: Primary | ICD-10-CM

## 2020-11-11 PROCEDURE — 97116 GAIT TRAINING THERAPY: CPT | Mod: PO | Performed by: PHYSICAL THERAPIST

## 2020-11-11 PROCEDURE — 97110 THERAPEUTIC EXERCISES: CPT | Mod: PO | Performed by: PHYSICAL THERAPIST

## 2020-11-13 ENCOUNTER — CLINICAL SUPPORT (OUTPATIENT)
Dept: REHABILITATION | Facility: HOSPITAL | Age: 30
End: 2020-11-13
Payer: COMMERCIAL

## 2020-11-13 ENCOUNTER — PATIENT MESSAGE (OUTPATIENT)
Dept: SPORTS MEDICINE | Facility: CLINIC | Age: 30
End: 2020-11-13

## 2020-11-13 DIAGNOSIS — Z98.890 S/P RIGHT KNEE SURGERY: ICD-10-CM

## 2020-11-13 DIAGNOSIS — R29.898 WEAKNESS OF RIGHT LOWER EXTREMITY: ICD-10-CM

## 2020-11-13 DIAGNOSIS — R26.9 GAIT ABNORMALITY: ICD-10-CM

## 2020-11-13 DIAGNOSIS — M25.661 DECREASED RANGE OF MOTION (ROM) OF RIGHT KNEE: Primary | ICD-10-CM

## 2020-11-13 PROCEDURE — 97112 NEUROMUSCULAR REEDUCATION: CPT | Mod: PO | Performed by: PHYSICAL THERAPIST

## 2020-11-13 PROCEDURE — 97110 THERAPEUTIC EXERCISES: CPT | Mod: PO | Performed by: PHYSICAL THERAPIST

## 2020-11-13 RX ORDER — OXYCODONE AND ACETAMINOPHEN 5; 325 MG/1; MG/1
1 TABLET ORAL EVERY 12 HOURS PRN
Qty: 14 TABLET | Refills: 0 | Status: SHIPPED | OUTPATIENT
Start: 2020-11-13 | End: 2020-11-19 | Stop reason: SDUPTHER

## 2020-11-13 NOTE — PROGRESS NOTES
Physical Therapy Daily Treatment Note     Name: Yovani Malik  [facility] Number: [id number]    Therapy Diagnosis:   Encounter Diagnoses   Name Primary?    Decreased range of motion (ROM) of right knee Yes    Weakness of right lower extremity     Gait abnormality      Physician: Angelica Brady PA-C    Visit Date: 11/13/2020  Physician Orders: PT Eval and Treat      The patient should begin physical therapy on postoperative day # 3 and will be advanced to outpatient therapy as soon as   Possible following discharge.     Weight bearing: toe touch weight bearing on the right leg  Range of Motion: Full normal motion symmetric to opposite side     At 4 weeks' time, the patient should begin advancing from TTWB to 50% weightbearing at 4-5 weeks' time. The patient should begin advancing from 100% weightbearing at 6 weeks' time.      The patient can begin full weightbearing status at 6 weeks time.     Range of motion should be limited to -10 degrees hyperextension and flexion 120 degrees now.      Additional exercises to be performed are:   to begin quad sets with a heel roll to obtain hyperextension, straight leg raise and heel slides with the heel supported in a closed-chain fashion     Immediate specific exercises should include:   Gait program should include the above stated weightbearing; in addition: active extension with a heel-to-toe gait working on maintaining extension     Immobilizer if present should be locked @ -10 degrees with gait and allowed to flex to 120 degrees at rest.      Hinged knee immobilizer should be locked in -10 degrees hyperextension with gait for  6 weeks.    Medical Diagnosis from Referral: M21.061 (ICD-10-CM) - Genu valgum, acquired, right M94.261 (ICD-10-CM) - Chondromalacia of right knee M23.200 (ICD-10-CM) - Other old tear of lateral meniscus of right knee   Evaluation Date: 9/18/2020  Authorization Period Expiration: 12/31/2020   Plan of Care Expiration: 12/08/2020  Reassessment:  "10/16/2020  Visit # / Visits authorized: 16/ 20    Time In: 1011 AM (late arrival)  Time Out: 1050 AM  Total Billable Time: 38 minutes    Precautions: Standard and blood thinners, DOS 9/15/2020, 100% WB at 6 weeks.    DOS: 09/15/2020, POD: 66 (11/11/2020)    Subjective     Pt reports: his knee is feeling better each day. Continue to defer pt sleeping without brace to MD. He was compliant with home exercise program.  Response to previous treatment: no adverse effect  Functional change: progressing ext and flex noted.    Pain: 2/10 "pressure"  Location: right knee      Objective     10/16/2020: R knee AROM +1 to 118 deg    Yovani received therapeutic exercises to develop strength, ROM, flexibility and core stabilization for 31 minutes including:  Upright bike x 5 minutes, seat 10 (for flexibility)  Standing hip abd 2# B 4x10  Standing hip ext 2# B 4x10  Standing HSC 2# B 4x10  Standing TKE (green TB) 2x10  Standing mini squat x20  Marching bridge x20-np    Balance with NBOS EC 3x30"  R SLS 2x10" (L SLS 07")    Yovani participated in gait training activities to restore normal ambulation mechanics and improve tolerance to weightbearing for 08 minutes:  Lateral weight shifts x 2 minutes (no brace)  A/P weight shift x 1 minutes (ea LE leading) (no brace)  Ambulation with 1 crutch and no brace x 148'  Side stepping in parallel bars x entire length x 2 laps     Home Exercises Provided and Patient Education Provided     Education provided:   - HEP  - cont both HEPs    Written Home Exercises Provided: Patient instructed to cont prior HEP.  Exercises were reviewed and Yovani was able to demonstrate them prior to the end of the session.  Yovani demonstrated good  understanding of the education provided.     See EMR under Patient Instructions for exercises provided 9/23/2020.    Assessment     Yovani presents to clinic with (U) crutch and without brace during ambulation. He continues with R hip in abd and decreased WB R sided. " But he continues needing cues for proper WS to R LE and stance. Patient apprehensive with weightbearing and gait training activities today but he did not complain of knee pain during these exercises. Pt encouraged to cont practice walking as trained in clinic. He exits with the same antalgic gt we just worked on. He continues requiring VCs and encouragement to walk with proper WS. He is lacking R hip ext late stance to swing phase of gt.    Yovani is progressing well towards his goals.   Pt prognosis is Good.     Pt will continue to benefit from skilled outpatient physical therapy to address the deficits listed in the problem list box on initial evaluation, provide pt/family education and to maximize pt's level of independence in the home and community environment.     Pt's spiritual, cultural and educational needs considered and pt agreeable to plan of care and goals.    Anticipated barriers to physical therapy: chronicity, multiple sx, obese    Goals:   Short Term Goals: 4 weeks, progressing   - R knee AROM ext to 0 deg to impact gait.  MET 10/16/2020, +1 DEG.  - R knee AROM flex to 90 deg towards impacting squatting and stair navigation.  MET 10/16/2020, 114 DEG.  - Improve R knee strength grossly to 4-/5 to impact gt.  MET 10/16/2020, GROSSLY 4-/5 STRENGTH.  - Pt able to ambulate 50% Wb per MD protocol.  MET 10/16/2020, 50% WB with min inc pn.  - Improve FOTO impairment score to 70% for improved walking.  Progressing, not met, 79% at eval and still today. He is not yet WBAT and this may be reason for his perception.     Short Term Goals: 8 weeks   - Full WB R LE without AD or hinge brace for normal gait.  Progressing 11/11/2020, WBAT 1 crutch.  - Improve R knee AROM hyper ext to +3 deg for symmetry.  - Improve R knee AROM flex to 115 deg for improving symmetry.  + DEG 11/11/2020  - Improve R knee strength grossly to 4/5 to impact gt and  ADLs.  - Improve FOTO impairment score to 65% for improved  walking, and abilities to help his child.  MET 47% 11/11/2020.     Short Term Goals: 12 weeks   - Normal gait without limp or pain.  - Improve R knee AROM flex to 123 deg for symmetry.  - Improve R hip and knee strength grossly to 4+/5 to impact gt and  ADLs.  - Pt able to RTW with </= 3/10 R knee pain for prolonged standing activities.  - Improve FOTO impairment score to 55% for improved RTW, HH chores and mod activities.  MET 47% 11/11/2020.     Long Term Goals: 16 weeks   - Pt to demo 5/5 R hip and knee strength for discharge from skilled PT.  - Pt will not be limited in HH chores, ambulation, static or prolonged standing and squatting activities for home, work, or .  - Improve FOTO impairment score to 48% for improved QOL.     Plan     Plan of care Certification: 9/18/2020 to 12/08/2020.     Outpatient Physical Therapy 2-3 times weekly for 12-16 weeks to include the following interventions: Aquatic Therapy, Electrical Stimulation IFC, Gait Training, Manual Therapy, Moist Heat/ Ice, Neuromuscular Re-ed, Patient Education, Self Care, Therapeutic Activites and Therapeutic Exercise.    Cont ROM and strengthening for improved QOL and independence.    Niru French, PT

## 2020-11-18 ENCOUNTER — CLINICAL SUPPORT (OUTPATIENT)
Dept: REHABILITATION | Facility: HOSPITAL | Age: 30
End: 2020-11-18
Payer: COMMERCIAL

## 2020-11-18 ENCOUNTER — PATIENT MESSAGE (OUTPATIENT)
Dept: SPORTS MEDICINE | Facility: CLINIC | Age: 30
End: 2020-11-18

## 2020-11-18 DIAGNOSIS — R29.898 WEAKNESS OF RIGHT LOWER EXTREMITY: ICD-10-CM

## 2020-11-18 DIAGNOSIS — R26.9 GAIT ABNORMALITY: ICD-10-CM

## 2020-11-18 DIAGNOSIS — M25.661 DECREASED RANGE OF MOTION (ROM) OF RIGHT KNEE: ICD-10-CM

## 2020-11-18 PROCEDURE — 97140 MANUAL THERAPY 1/> REGIONS: CPT | Mod: PO,CQ

## 2020-11-18 PROCEDURE — 97110 THERAPEUTIC EXERCISES: CPT | Mod: PO,CQ

## 2020-11-18 NOTE — PROGRESS NOTES
Physical Therapy Daily Treatment Note     Name: Yovani Malik  [facility] Number: [id number]    Therapy Diagnosis:   Encounter Diagnoses   Name Primary?    Decreased range of motion (ROM) of right knee     Weakness of right lower extremity     Gait abnormality      Physician: Angelica Brady PA-C    Visit Date: 11/18/2020  Physician Orders: PT Eval and Treat      The patient should begin physical therapy on postoperative day # 3 and will be advanced to outpatient therapy as soon as   Possible following discharge.     Weight bearing: toe touch weight bearing on the right leg  Range of Motion: Full normal motion symmetric to opposite side     At 4 weeks' time, the patient should begin advancing from TTWB to 50% weightbearing at 4-5 weeks' time. The patient should begin advancing from 100% weightbearing at 6 weeks' time.      The patient can begin full weightbearing status at 6 weeks time.     Range of motion should be limited to -10 degrees hyperextension and flexion 120 degrees now.      Additional exercises to be performed are:   to begin quad sets with a heel roll to obtain hyperextension, straight leg raise and heel slides with the heel supported in a closed-chain fashion     Immediate specific exercises should include:   Gait program should include the above stated weightbearing; in addition: active extension with a heel-to-toe gait working on maintaining extension     Immobilizer if present should be locked @ -10 degrees with gait and allowed to flex to 120 degrees at rest.      Hinged knee immobilizer should be locked in -10 degrees hyperextension with gait for  6 weeks.    Medical Diagnosis from Referral: M21.061 (ICD-10-CM) - Genu valgum, acquired, right M94.261 (ICD-10-CM) - Chondromalacia of right knee M23.200 (ICD-10-CM) - Other old tear of lateral meniscus of right knee   Evaluation Date: 9/18/2020  Authorization Period Expiration: 12/31/2020   Plan of Care Expiration: 12/08/2020  Reassessment:  "10/16/2020  Visit # / Visits authorized: 17/ 20    Time In: 10:02 AM  Time Out: 10:47 AM  Total Billable Time: 45 minutes    Precautions: Standard and blood thinners, DOS 9/15/2020, 100% WB at 6 weeks.    DOS: 09/15/2020, POD: 71 (11/18/2020)    Subjective     Pt reports: knee is feeling good but there has been a burning sensation when standing or walking around which he manages with ice. Patient states knee is feeling better each day. He was compliant with home exercise program.  Response to previous treatment: no adverse effect  Functional change: ambulating household distances without AD    Pain: 2/10 "pressure"  Location: right knee      Objective     10/16/2020: R knee AROM +1 to 118 deg    Yovani received therapeutic exercises to develop strength, ROM, flexibility and core stabilization for 31 minutes including:  Upright bike x 5 minutes, seat 10 (for flexibility)  Standing hip abd 2# B 4x10  Standing hip ext 2# B 4x10  Standing HSC 2# B 4x10  Standing TKE (green TB) 3x10  Standing mini squat 2x20  Marching bridge x20  Step ups (4 inch) x 10 (BLE)    Balance with NBOS EC 3x30"  Balance tandem stance 3x30 (B)  R SLS 3x10" (L SLS 3x10")    Yovani participated in gait training activities to restore normal ambulation mechanics and improve tolerance to weightbearing for 08 minutes:  Lateral weight shifts x 2 minutes (no brace)  A/P weight shift x 1 minutes (ea LE leading) (no brace)  Ambulation with no crutch and no brace x 148'  Side stepping in parallel bars x entire length x 2 laps   Lateral steps green TB pole to pole x 3     Home Exercises Provided and Patient Education Provided     Education provided:   - HEP  - cont both HEPs    Written Home Exercises Provided: Patient instructed to cont prior HEP.  Exercises were reviewed and Yovani was able to demonstrate them prior to the end of the session.  Yovani demonstrated good  understanding of the education provided.     See EMR under Patient Instructions for " exercises provided 9/23/2020.    Assessment     Yovani continues to present to clinic with 1 axillary crutch despite current full weightbearing status. Patient educated on the importance of ambulation without AD to progress strength and restore normal gait mechanics. Patient demonstrates decreased stance time on RLE despite verbal cues for equal stance time. Patient able to progress to resisted lateral walks outside of parallel bars without loss of balance. Patient able to perform front step up without pain but lack of active TKE noted.     Yovani is progressing well towards his goals.   Pt prognosis is Good.     Pt will continue to benefit from skilled outpatient physical therapy to address the deficits listed in the problem list box on initial evaluation, provide pt/family education and to maximize pt's level of independence in the home and community environment.     Pt's spiritual, cultural and educational needs considered and pt agreeable to plan of care and goals.    Anticipated barriers to physical therapy: chronicity, multiple sx, obese    Goals:   Short Term Goals: 4 weeks, progressing   - R knee AROM ext to 0 deg to impact gait.  MET 10/16/2020, +1 DEG.  - R knee AROM flex to 90 deg towards impacting squatting and stair navigation.  MET 10/16/2020, 114 DEG.  - Improve R knee strength grossly to 4-/5 to impact gt.  MET 10/16/2020, GROSSLY 4-/5 STRENGTH.  - Pt able to ambulate 50% Wb per MD protocol.  MET 10/16/2020, 50% WB with min inc pn.  - Improve FOTO impairment score to 70% for improved walking.  Progressing, not met, 79% at eval and still today. He is not yet WBAT and this may be reason for his perception.     Short Term Goals: 8 weeks   - Full WB R LE without AD or hinge brace for normal gait.  Progressing 11/11/2020, WBAT 1 crutch.  - Improve R knee AROM hyper ext to +3 deg for symmetry.  - Improve R knee AROM flex to 115 deg for improving symmetry.  + DEG 11/11/2020  - Improve R knee strength  grossly to 4/5 to impact gt and  ADLs.  - Improve FOTO impairment score to 65% for improved walking, and abilities to help his child.  MET 47% 11/11/2020.     Short Term Goals: 12 weeks   - Normal gait without limp or pain.  - Improve R knee AROM flex to 123 deg for symmetry.  - Improve R hip and knee strength grossly to 4+/5 to impact gt and  ADLs.  - Pt able to RTW with </= 3/10 R knee pain for prolonged standing activities.  - Improve FOTO impairment score to 55% for improved RTW, HH chores and mod activities.  MET 47% 11/11/2020.     Long Term Goals: 16 weeks   - Pt to demo 5/5 R hip and knee strength for discharge from skilled PT.  - Pt will not be limited in HH chores, ambulation, static or prolonged standing and squatting activities for home, work, or .  - Improve FOTO impairment score to 48% for improved QOL.     Plan     Plan of care Certification: 9/18/2020 to 12/08/2020.     Outpatient Physical Therapy 2-3 times weekly for 12-16 weeks to include the following interventions: Aquatic Therapy, Electrical Stimulation IFC, Gait Training, Manual Therapy, Moist Heat/ Ice, Neuromuscular Re-ed, Patient Education, Self Care, Therapeutic Activites and Therapeutic Exercise.    Cont ROM and strengthening for improved QOL and independence.    Christina Avilez, PTA

## 2020-11-19 DIAGNOSIS — Z98.890 S/P RIGHT KNEE SURGERY: ICD-10-CM

## 2020-11-20 ENCOUNTER — CLINICAL SUPPORT (OUTPATIENT)
Dept: REHABILITATION | Facility: HOSPITAL | Age: 30
End: 2020-11-20
Payer: COMMERCIAL

## 2020-11-20 DIAGNOSIS — M25.661 DECREASED RANGE OF MOTION (ROM) OF RIGHT KNEE: Primary | ICD-10-CM

## 2020-11-20 DIAGNOSIS — R26.9 GAIT ABNORMALITY: ICD-10-CM

## 2020-11-20 DIAGNOSIS — R29.898 WEAKNESS OF RIGHT LOWER EXTREMITY: ICD-10-CM

## 2020-11-20 PROCEDURE — 97110 THERAPEUTIC EXERCISES: CPT | Mod: PO | Performed by: PHYSICAL THERAPIST

## 2020-11-20 PROCEDURE — 97116 GAIT TRAINING THERAPY: CPT | Mod: PO | Performed by: PHYSICAL THERAPIST

## 2020-11-20 PROCEDURE — 97140 MANUAL THERAPY 1/> REGIONS: CPT | Mod: PO | Performed by: PHYSICAL THERAPIST

## 2020-11-20 RX ORDER — OXYCODONE AND ACETAMINOPHEN 5; 325 MG/1; MG/1
1 TABLET ORAL EVERY 12 HOURS PRN
Qty: 14 TABLET | Refills: 0 | Status: SHIPPED | OUTPATIENT
Start: 2020-11-20 | End: 2020-11-27 | Stop reason: SDUPTHER

## 2020-11-20 NOTE — PROGRESS NOTES
Physical Therapy Daily Treatment Note     Name: Yovani Malik  [facility] Number: [id number]    Therapy Diagnosis:   Encounter Diagnoses   Name Primary?    Decreased range of motion (ROM) of right knee Yes    Weakness of right lower extremity     Gait abnormality      Physician: Angelica Brady PA-C    Visit Date: 11/20/2020  Physician Orders: PT Eval and Treat      The patient should begin physical therapy on postoperative day # 3 and will be advanced to outpatient therapy as soon as   Possible following discharge.     Weight bearing: toe touch weight bearing on the right leg  Range of Motion: Full normal motion symmetric to opposite side     At 4 weeks' time, the patient should begin advancing from TTWB to 50% weightbearing at 4-5 weeks' time. The patient should begin advancing from 100% weightbearing at 6 weeks' time.      The patient can begin full weightbearing status at 6 weeks time.     Range of motion should be limited to -10 degrees hyperextension and flexion 120 degrees now.      Additional exercises to be performed are:   to begin quad sets with a heel roll to obtain hyperextension, straight leg raise and heel slides with the heel supported in a closed-chain fashion     Immediate specific exercises should include:   Gait program should include the above stated weightbearing; in addition: active extension with a heel-to-toe gait working on maintaining extension     Immobilizer if present should be locked @ -10 degrees with gait and allowed to flex to 120 degrees at rest.      Hinged knee immobilizer should be locked in -10 degrees hyperextension with gait for  6 weeks.    Medical Diagnosis from Referral: M21.061 (ICD-10-CM) - Genu valgum, acquired, right M94.261 (ICD-10-CM) - Chondromalacia of right knee M23.200 (ICD-10-CM) - Other old tear of lateral meniscus of right knee   Evaluation Date: 9/18/2020  Authorization Period Expiration: 12/31/2020   Plan of Care Expiration: 12/08/2020  Reassessment:  "10/16/2020, 11/20/2020  Visit # / Visits authorized: 17/ 20    Time In: 10:00 AM  Time Out: 10:45 AM  Total Billable Time: 45 minutes    Precautions: Standard and blood thinners, DOS 9/15/2020, 100% WB at 6 weeks.    DOS: 09/15/2020, POD: 71 (11/18/2020)    Subjective     Pt reports: no new complaints. Walking without AD. He was compliant with home exercise program.  Response to previous treatment: no adverse effect  Functional change: ambulating household distances without AD    Pain: 2/10 "pressure", stiff  Location: right knee      Objective     Yovani received therapeutic exercises to develop strength, ROM, flexibility and core stabilization for 15 minutes including:  Upright bike x 5 minutes, seat 10 (for flexibility)  Standing hip Matrix 55# 4 way B 2x10  Step ups (4 inch) x 10 (BLE)-np  Leg press 40# DL 2x10, 20# SL 2x10      Not performed:  Balance with NBOS EC 3x30"  Balance tandem stance 3x30 (B)  R SLS 3x10" (L SLS 3x10")    Yovani participated in gait training activities to restore normal ambulation mechanics and improve tolerance to weightbearing for 08 minutes:  Lateral weight shifts x 2 minutes (no brace)  A/P weight shift x 1 minutes R,  Hold count of 5.  Ambulation with no crutch and no brace x 175', TCs to gluts, VCs to heel strike and knee ext and flex as appropriate  Side stepping in parallel bars x entire length x 2 laps   Lateral steps green TB pole to pole x 3     Manual therapy 8 mins VMO DTM R.    Home Exercises Provided and Patient Education Provided     Education provided:   - HEP  - cont both HEPs    Written Home Exercises Provided: Patient instructed to cont prior HEP.  Exercises were reviewed and Yovani was able to demonstrate them prior to the end of the session.  Yovani demonstrated good  understanding of the education provided.     See EMR under Patient Instructions for exercises provided 9/23/2020.    Assessment     Yovani continues to present to clinic without axillary crutch and " current full weightbearing status. Patient demonstrates decreased stance time on RLE despite verbal cues for equal stance time. Patient able to perform front step up without pain but lack of active TKE noted. Added hold for efficient motor recruitment and training effect.    Yovani is progressing well towards his goals.   Pt prognosis is Good.     Pt will continue to benefit from skilled outpatient physical therapy to address the deficits listed in the problem list box on initial evaluation, provide pt/family education and to maximize pt's level of independence in the home and community environment.     Pt's spiritual, cultural and educational needs considered and pt agreeable to plan of care and goals.    Anticipated barriers to physical therapy: chronicity, multiple sx, obese    Goals:   Short Term Goals: 4 weeks, progressing   - R knee AROM ext to 0 deg to impact gait.  MET 10/16/2020, +1 DEG.  - R knee AROM flex to 90 deg towards impacting squatting and stair navigation.  MET 10/16/2020, 114 DEG.  - Improve R knee strength grossly to 4-/5 to impact gt.  MET 10/16/2020, GROSSLY 4-/5 STRENGTH.  - Pt able to ambulate 50% Wb per MD protocol.  MET 10/16/2020, 50% WB with min inc pn.  - Improve FOTO impairment score to 70% for improved walking.  Progressing, not met, 79% at [facility] and still today. He is not yet WBAT and this may be reason for his perception.     Short Term Goals: 8 weeks   - Full WB R LE without AD or hinge brace for normal gait.  Progressing 11/11/2020, WBAT 1 crutch.  MET 11/20/2020, FWB WITHOUT AD, STILL LACKING HEEL STRIKE R AND R KNEE FLEX.  - Improve R knee AROM hyper ext to +3 deg for symmetry.  MET 11/20/2020, +4 DEG.  - Improve R knee AROM flex to 115 deg for improving symmetry.  + DEG 11/11/2020  - Improve R knee strength grossly to 4/5 to impact gt and  ADLs.  MET 11/20/2020, 4/5 R KNEE EXT, 4+/5 KNEE FLEX.  - Improve FOTO impairment score to 65% for improved walking, and  abilities to help his child.  MET 47% 11/11/2020.     Short Term Goals: 12 weeks   - Normal gait without limp or pain.  - Improve R knee AROM flex to 123 deg for symmetry.  - Improve R hip and knee strength grossly to 4+/5 to impact gt and  ADLs.  - Pt able to RTW with </= 3/10 R knee pain for prolonged standing activities.  - Improve FOTO impairment score to 55% for improved RTW, HH chores and mod activities.  MET 47% 11/11/2020.     Long Term Goals: 16 weeks   - Pt to demo 5/5 R hip and knee strength for discharge from skilled PT.  - Pt will not be limited in HH chores, ambulation, static or prolonged standing and squatting activities for home, work, or .  - Improve FOTO impairment score to 48% for improved QOL.     Plan     Plan of care Certification: 9/18/2020 to 12/08/2020.     Outpatient Physical Therapy 2-3 times weekly for 12-16 weeks to include the following interventions: Aquatic Therapy, Electrical Stimulation IFC, Gait Training, Manual Therapy, Moist Heat/ Ice, Neuromuscular Re-ed, Patient Education, Self Care, Therapeutic Activites and Therapeutic Exercise.    Cont ROM and strengthening for improved QOL and independence.    Niru French, PT

## 2020-11-23 ENCOUNTER — OFFICE VISIT (OUTPATIENT)
Dept: SPORTS MEDICINE | Facility: CLINIC | Age: 30
End: 2020-11-23
Payer: COMMERCIAL

## 2020-11-23 ENCOUNTER — HOSPITAL ENCOUNTER (OUTPATIENT)
Dept: RADIOLOGY | Facility: HOSPITAL | Age: 30
Discharge: HOME | End: 2020-11-23
Attending: ORTHOPAEDIC SURGERY
Payer: COMMERCIAL

## 2020-11-23 VITALS
BODY MASS INDEX: 41.46 KG/M2 | DIASTOLIC BLOOD PRESSURE: 103 MMHG | HEART RATE: 85 BPM | HEIGHT: 66 IN | WEIGHT: 258 LBS | SYSTOLIC BLOOD PRESSURE: 144 MMHG

## 2020-11-23 DIAGNOSIS — G89.29 CHRONIC PAIN OF RIGHT KNEE: ICD-10-CM

## 2020-11-23 DIAGNOSIS — Z98.890 S/P RIGHT KNEE SURGERY: ICD-10-CM

## 2020-11-23 DIAGNOSIS — M25.661 DECREASED RANGE OF MOTION (ROM) OF RIGHT KNEE: ICD-10-CM

## 2020-11-23 DIAGNOSIS — Z98.890 S/P RIGHT KNEE SURGERY: Primary | ICD-10-CM

## 2020-11-23 DIAGNOSIS — R29.898 WEAKNESS OF RIGHT LOWER EXTREMITY: ICD-10-CM

## 2020-11-23 DIAGNOSIS — M25.561 CHRONIC PAIN OF RIGHT KNEE: ICD-10-CM

## 2020-11-23 PROCEDURE — 73564 X-RAY EXAM KNEE 4 OR MORE: CPT | Mod: 26,RT,, | Performed by: RADIOLOGY

## 2020-11-23 PROCEDURE — 99024 PR POST-OP FOLLOW-UP VISIT: ICD-10-PCS | Mod: S$GLB,,, | Performed by: ORTHOPAEDIC SURGERY

## 2020-11-23 PROCEDURE — 99999 PR PBB SHADOW E&M-EST. PATIENT-LVL IV: ICD-10-PCS | Mod: PBBFAC,,, | Performed by: ORTHOPAEDIC SURGERY

## 2020-11-23 PROCEDURE — 73564 XR KNEE ORTHO BILAT WITH FLEXION: ICD-10-PCS | Mod: 26,RT,, | Performed by: RADIOLOGY

## 2020-11-23 PROCEDURE — 1125F AMNT PAIN NOTED PAIN PRSNT: CPT | Mod: S$GLB,,, | Performed by: ORTHOPAEDIC SURGERY

## 2020-11-23 PROCEDURE — 99024 POSTOP FOLLOW-UP VISIT: CPT | Mod: S$GLB,,, | Performed by: ORTHOPAEDIC SURGERY

## 2020-11-23 PROCEDURE — 1125F PR PAIN SEVERITY QUANTIFIED, PAIN PRESENT: ICD-10-PCS | Mod: S$GLB,,, | Performed by: ORTHOPAEDIC SURGERY

## 2020-11-23 PROCEDURE — 73564 X-RAY EXAM KNEE 4 OR MORE: CPT | Mod: 26,LT,, | Performed by: RADIOLOGY

## 2020-11-23 PROCEDURE — 3008F PR BODY MASS INDEX (BMI) DOCUMENTED: ICD-10-PCS | Mod: CPTII,S$GLB,, | Performed by: ORTHOPAEDIC SURGERY

## 2020-11-23 PROCEDURE — 3008F BODY MASS INDEX DOCD: CPT | Mod: CPTII,S$GLB,, | Performed by: ORTHOPAEDIC SURGERY

## 2020-11-23 PROCEDURE — 73564 X-RAY EXAM KNEE 4 OR MORE: CPT | Mod: TC,50

## 2020-11-23 PROCEDURE — 99999 PR PBB SHADOW E&M-EST. PATIENT-LVL IV: CPT | Mod: PBBFAC,,, | Performed by: ORTHOPAEDIC SURGERY

## 2020-11-23 NOTE — LETTER
Patient: Yovani Malik   YOB: 1990   Clinic Number: [id number]   Today's Date: November 23, 2020        Certificate to Return to Work     Yovani Heard was seen by Barry Lopez MD on 11/23/2020.     Yovani Heard will be seen by Dr. Barry Lopez in 4 weeks for reassessment. He will be held out of work until that time.      If you have any questions or concerns, please feel free to contact the office at [phone].    Thank you.    Barry Lopez MD        Signature: ___

## 2020-11-23 NOTE — PROGRESS NOTES
Subjective:          Chief Complaint: Yovani Malik is a 30 y.o. male who had no chief complaint listed for this encounter.    Patient presents to clinic for 8 week post op evaluation of right knee.  Pain today is 7/10.  Denies nausea, vomiting, fever, chills, chest pain, shortness of breath.  He has been taking Percocet 10 mg every 12 hours as needed for pain.  He is taking Celebrex 200mg BID. He is ambulating today full weight-bearing with crutches without T scope brace.  He is completing formal PT at [facility] location.       Surgery Date: 9/15/2020   Surgeon(s) and Role:     * Barry Lopez MD - Primary     Pre-op Diagnosis:  Genu valgum, acquired, right (M21.061)  Chondromalacia of right knee (M94.261)  Acute lateral meniscus tear of right knee, initial encounter (S83.281A)     Post-op Diagnosis: Post-Op Diagnosis Codes:     * Genu valgum, acquired, right (M21.061)     * Chondromalacia of right knee (M94.261)     * Acute lateral meniscus tear of right knee, initial encounter (S83.281A)     Procedure(s) (LRB):  OSTEOTOMY, FEMUR (Right)  ARTHROSCOPY, KNEE, WITH MENISCECTOMY (Right)  SYNOVECTOMY, KNEE (Right)             Review of Systems   Constitution: Negative. Negative for chills, fever, night sweats, weight gain and weight loss.   HENT: Negative for congestion, hearing loss and sore throat.    Eyes: Negative for blurred vision, double vision and visual disturbance.   Cardiovascular: Negative for chest pain, leg swelling and palpitations.   Respiratory: Negative for cough and shortness of breath.    Endocrine: Negative for polyuria.   Hematologic/Lymphatic: Negative for bleeding problem. Does not bruise/bleed easily.   Skin: Negative for itching, poor wound healing and rash.   Musculoskeletal: Positive for joint pain, joint swelling and stiffness. Negative for back pain, muscle cramps, muscle weakness and myalgias.   Gastrointestinal: Negative for abdominal pain, constipation, diarrhea, melena,  nausea and vomiting.   Genitourinary: Negative.  Negative for frequency and hematuria.   Neurological: Negative for dizziness, headaches, loss of balance, numbness, paresthesias and sensory change.   Psychiatric/Behavioral: Negative for altered mental status and depression. The patient is not nervous/anxious.    Allergic/Immunologic: Negative for hives.                   Objective:        General: Yovani is well-developed, well-nourished, appears stated age, in no acute distress, alert and oriented to time, place and person.     General    Vitals reviewed.  Constitutional: He is oriented to person, place, and time. He appears well-developed and well-nourished. No distress.   HENT:   Mouth/Throat: No oropharyngeal exudate.   Eyes: Right eye exhibits no discharge. Left eye exhibits no discharge.   Neck: Normal range of motion.   Cardiovascular: Normal rate and regular rhythm.    Pulmonary/Chest: Effort normal and breath sounds normal. No respiratory distress.   Neurological: He is alert and oriented to person, place, and time. He has normal reflexes. No cranial nerve deficit. Coordination normal.   Psychiatric: He has a normal mood and affect. His behavior is normal. Judgment and thought content normal.     General Musculoskeletal Exam   Gait: abnormal and antalgic       Right Knee Exam     Inspection   Erythema: absent  Scars: present  Swelling: present  Effusion: absent  Deformity: absent  Bruising: absent    Tenderness   Right knee tenderness location: global tenderness.    Range of Motion   Extension:  0 normal   Flexion: abnormal Right knee flexion: 125.     Tests   Meniscus   Kwadwo:  Medial - negative Lateral - negative  Ligament Examination Lachman: normal (-1 to 2mm) PCL-Posterior Drawer: normal (0 to 2mm)     MCL - Valgus: normal (0 to 2mm)  LCL - Varus: normalPivot Shift: normal (Equal)Reverse Pivot Shift: normal (Equal)Dial Test at 30 degrees: normal (< 5 degrees)Dial Test at 90 degrees: normal (< 5  degrees)  Posterior Sag Test: negative  Posterolateral Corner: unstable (>15 degrees difference)  Patella   Patellar apprehension: negative  Passive Patellar Tilt: neutral  Patellar Tracking: normal  Patellar Glide (quadrants): Lateral - 1   Medial - 2  Q-Angle at 90 degrees: normal  Patellar Grind: negative  J-Sign: none    Other   Meniscal Cyst: absent  Popliteal (Baker's) Cyst: absent  Sensation: normal    Comments:  Portal sutures removed.  Suture tags trimmed.  No signs of infection.  No purulent drainage.  NVI.    Left Knee Exam     Inspection   Erythema: absent  Scars: absent  Swelling: absent  Effusion: absent  Deformity: absent  Bruising: absent    Tenderness   The patient is experiencing no tenderness.     Range of Motion   Extension: 0   Left knee flexion: 125.     Tests   Meniscus   Kwadwo:  Medial - negative Lateral - negative  Stability Lachman: normal (-1 to 2mm) PCL-Posterior Drawer: normal (0 to 2mm)  MCL - Valgus: normal (0 to 2mm)  LCL - Varus: normal (0 to 2mm)Pivot Shift: normal (Equal)Reverse Pivot Shift: normal (Equal)Dial Test at 30 degrees: normal (< 5 degrees)Dial Test at 90 degrees: normal (< 5 degrees)  Posterior Sag Test: negative  Posterolateral Corner: unstable (>15 degrees difference)  Patella   Patellar apprehension: negative  Passive Patellar Tilt: neutral  Patellar Tracking: normal  Patellar Glide (Quadrants): Lateral - 1 Medial - 2  Q-Angle at 90 degrees: normal  Patellar Grind: negative  J-Sign: J sign absent    Other   Meniscal Cyst: absent  Popliteal (Baker's) Cyst: absent  Sensation: normal    Right Hip Exam     Tests   Marisa: negative  Left Hip Exam     Tests   Marisa: negative          Muscle Strength   Right Lower Extremity   Hip Abduction: 5/5   Quadriceps:  4/5   Hamstrin/5   Left Lower Extremity   Hip Abduction: 5/5   Quadriceps:  5/5   Hamstrin/5     Reflexes     Left Side  Achilles:  2+  Quadriceps:  2+    Right Side   Achilles:  2+  Quadriceps:   2+    Vascular Exam     Right Pulses  Dorsalis Pedis:      2+  Posterior Tibial:      2+        Left Pulses  Dorsalis Pedis:      2+  Posterior Tibial:      2+              Radiographs:  Right knee:  FINDINGS:  Postsurgical changes with plate and screw of the distal femur.  Hardware and alignment are satisfactory.  Joint spaces are satisfactorily maintained.  No soft tissue abnormality.         Assessment:       Encounter Diagnoses   Name Primary?    S/P right knee surgery Yes    Weakness of right lower extremity     Decreased range of motion (ROM) of right knee     Chronic pain of right knee           Plan:       1.  IKDC, SF-12 and KOOS was filled out today in clinic.     RTC in 4 weeks with Dr. Barry Lopez Patient will fill out IKDC, SF-12 and KOOS b/l knee xr    2. Physical Therapy Orders- [facility]   Patellar mobilization program  Modalities to increase flexibility in tissues   Dry needling    3.  Continue Celebrex BID    4. Hold out of work for additional 4 weeks, will reassess at next visit    5. Continue  brace                             Patient questionnaires may have been collected.

## 2020-11-24 ENCOUNTER — DOCUMENTATION ONLY (OUTPATIENT)
Dept: REHABILITATION | Facility: HOSPITAL | Age: 30
End: 2020-11-24

## 2020-11-24 NOTE — PROGRESS NOTES
Reviewed this case with Gerardo Haley, PT. He has approved to initiate DN given Dr. Lopez recommendation, length of time out from sx, and dx. Face to face with PTAs, Corenlia Edouard, ROXANA and Christina Avilez PTA.    Niru French, PT, DPT, MTC

## 2020-11-24 NOTE — PROGRESS NOTES
Reviewed this case with Gerardo Haley, PT. He has approved to initiate DN given Dr. Lopez recommendation, length of time out from sx, and dx. Face to face with PTAs, Cornelia Edouard PTA and Christina Avilez PTA.    Niru French, PT, DPT, MTC    Face to face meeting completed with Niru French PT regarding current status and progress of Yovani.    Christina Avilez, PTA

## 2020-11-25 ENCOUNTER — CLINICAL SUPPORT (OUTPATIENT)
Dept: REHABILITATION | Facility: HOSPITAL | Age: 30
End: 2020-11-25
Payer: COMMERCIAL

## 2020-11-25 ENCOUNTER — PATIENT MESSAGE (OUTPATIENT)
Dept: UROLOGY | Facility: CLINIC | Age: 30
End: 2020-11-25

## 2020-11-25 ENCOUNTER — TELEPHONE (OUTPATIENT)
Dept: UROLOGY | Facility: CLINIC | Age: 30
End: 2020-11-25

## 2020-11-25 DIAGNOSIS — G89.29 CHRONIC PAIN OF RIGHT KNEE: ICD-10-CM

## 2020-11-25 DIAGNOSIS — M25.561 CHRONIC PAIN OF RIGHT KNEE: ICD-10-CM

## 2020-11-25 PROCEDURE — 97110 THERAPEUTIC EXERCISES: CPT | Mod: PO | Performed by: PHYSICAL THERAPIST

## 2020-11-25 PROCEDURE — 97140 MANUAL THERAPY 1/> REGIONS: CPT | Mod: PO | Performed by: PHYSICAL THERAPIST

## 2020-11-25 PROCEDURE — 97116 GAIT TRAINING THERAPY: CPT | Mod: PO | Performed by: PHYSICAL THERAPIST

## 2020-11-25 NOTE — PROGRESS NOTES
Physical Therapy Daily Treatment Note     Name: Yovani Malik  [facility] Number: [id number]    Therapy Diagnosis:   Encounter Diagnosis   Name Primary?    Chronic pain of right knee      Physician: Angelica Brady PA-C    Visit Date: 11/25/2020  Physician Orders: PT Eval and Treat      The patient should begin physical therapy on postoperative day # 3 and will be advanced to outpatient therapy as soon as   Possible following discharge.     Weight bearing: toe touch weight bearing on the right leg  Range of Motion: Full normal motion symmetric to opposite side     At 4 weeks' time, the patient should begin advancing from TTWB to 50% weightbearing at 4-5 weeks' time. The patient should begin advancing from 100% weightbearing at 6 weeks' time.      The patient can begin full weightbearing status at 6 weeks time.     Range of motion should be limited to -10 degrees hyperextension and flexion 120 degrees now.      Additional exercises to be performed are:   to begin quad sets with a heel roll to obtain hyperextension, straight leg raise and heel slides with the heel supported in a closed-chain fashion     Immediate specific exercises should include:   Gait program should include the above stated weightbearing; in addition: active extension with a heel-to-toe gait working on maintaining extension     Immobilizer if present should be locked @ -10 degrees with gait and allowed to flex to 120 degrees at rest.      Hinged knee immobilizer should be locked in -10 degrees hyperextension with gait for  6 weeks.    Medical Diagnosis from Referral: M21.061 (ICD-10-CM) - Genu valgum, acquired, right M94.261 (ICD-10-CM) - Chondromalacia of right knee M23.200 (ICD-10-CM) - Other old tear of lateral meniscus of right knee   Evaluation Date: 9/18/2020  Authorization Period Expiration: 12/31/2020   Plan of Care Expiration: 12/08/2020  Reassessment: 10/16/2020, 11/20/2020  Visit # / Visits authorized: 17/ 20    Time In: 10:00 AM  Time  "Out: 10:45 AM  Total Billable Time: 38 minutes    Precautions: Standard and blood thinners, DOS 9/15/2020, 100% WB at 6 weeks.    DOS: 09/15/2020, POD: 78 (11/25/2020)    Subjective     Pt reports: no new complaints. Walking without AD. In chart review, his pain was 7/10 when last seen by MD. He was compliant with home exercise program.  Response to previous treatment: no adverse effect  Functional change: ambulating household distances without AD    Pain: 2/10 "pressure", stiff  Location: right knee      Objective     Yovani received therapeutic exercises to develop strength, ROM, flexibility and core stabilization for 15 minutes including:  TM warm up see below    Leg press 40# DL x40, 20# SL 2x10  R SLS 5x10" (L SLS reached 30")    Not performed:  Standing hip Matrix 55# 4 way B 2x10  Step ups (4 inch) x 10 (BLE)-np  Balance with NBOS EC 3x30"  Balance tandem stance 3x30 (B)      Yovani participated in gait training activities to restore normal ambulation mechanics and improve tolerance to weightbearing for 15 minutes:  Lateral weight shifts x 2 minutes (no brace), hold count of 5  A/P weight shift x 2 minutes R,  Hold count of 5.  Ambulation with no crutch and no brace x 175', TCs to gluts, VCs to heel strike and knee ext and flex as appropriate-np  Side stepping in parallel bars x entire length x 5 laps   Lateral steps green TB pole to pole x 3 -np  TM 1.3 mph, using UEs (10%) to offload and balance as needed due to the limp he has otherwise. 5 mins    Manual therapy 8 mins VMO DTM R.    Home Exercises Provided and Patient Education Provided     Education provided:   - HEP  - cont both HEPs    Written Home Exercises Provided: Patient instructed to cont prior HEP.  Exercises were reviewed and Yovani was able to demonstrate them prior to the end of the session.  Yovani demonstrated good  understanding of the education provided.     See EMR under Patient Instructions for exercises provided 9/23/2020.    Assessment " "    Yovani continues to present to clinic, current full weightbearing status. Patient demonstrates decreased stance time on RLE despite verbal cues for equal stance time. He continues walking with R hip abducted. R SLS 10" L SLS 30". His pain is medial R knee about the R VMO. I have explained his walking technique as stressful to the same area. We have scheduled him 1x/week with DN. Added hold for efficient motor recruitment and training effect.    Yovani is progressing well towards his goals.   Pt prognosis is Good.     Pt will continue to benefit from skilled outpatient physical therapy to address the deficits listed in the problem list box on initial evaluation, provide pt/family education and to maximize pt's level of independence in the home and community environment.     Pt's spiritual, cultural and educational needs considered and pt agreeable to plan of care and goals.    Anticipated barriers to physical therapy: chronicity, multiple sx, obese    Goals:   Short Term Goals: 4 weeks, progressing   - R knee AROM ext to 0 deg to impact gait.  MET 10/16/2020, +1 DEG.  - R knee AROM flex to 90 deg towards impacting squatting and stair navigation.  MET 10/16/2020, 114 DEG.  - Improve R knee strength grossly to 4-/5 to impact gt.  MET 10/16/2020, GROSSLY 4-/5 STRENGTH.  - Pt able to ambulate 50% Wb per MD protocol.  MET 10/16/2020, 50% WB with min inc pn.  - Improve FOTO impairment score to 70% for improved walking.  Progressing, not met, 79% at eval and still today. He is not yet WBAT and this may be reason for his perception.     Short Term Goals: 8 weeks   - Full WB R LE without AD or hinge brace for normal gait.  Progressing 11/11/2020, WBAT 1 crutch.  MET 11/20/2020, FWB WITHOUT AD, STILL LACKING HEEL STRIKE R AND R KNEE FLEX.  - Improve R knee AROM hyper ext to +3 deg for symmetry.  MET 11/20/2020, +4 DEG.  - Improve R knee AROM flex to 115 deg for improving symmetry.  + DEG 11/11/2020  - Improve R knee " strength grossly to 4/5 to impact gt and  ADLs.  MET 11/20/2020, 4/5 R KNEE EXT, 4+/5 KNEE FLEX.  - Improve FOTO impairment score to 65% for improved walking, and abilities to help his child.  MET 47% 11/11/2020.     Short Term Goals: 12 weeks   - Normal gait without limp or pain.  - Improve R knee AROM flex to 123 deg for symmetry.  - Improve R hip and knee strength grossly to 4+/5 to impact gt and  ADLs.  - Pt able to RTW with </= 3/10 R knee pain for prolonged standing activities.  - Improve FOTO impairment score to 55% for improved RTW, HH chores and mod activities.  MET 47% 11/11/2020.     Long Term Goals: 16 weeks   - Pt to demo 5/5 R hip and knee strength for discharge from skilled PT.  - Pt will not be limited in HH chores, ambulation, static or prolonged standing and squatting activities for home, work, or .  - Improve FOTO impairment score to 48% for improved QOL.     Plan     Plan of care Certification: 9/18/2020 to 12/08/2020.     Outpatient Physical Therapy 2-3 times weekly for 12-16 weeks to include the following interventions: Aquatic Therapy, Electrical Stimulation IFC, Gait Training, Manual Therapy, Moist Heat/ Ice, Neuromuscular Re-ed, Patient Education, Self Care, Therapeutic Activites and Therapeutic Exercise.    Cont ROM and strengthening for improved QOL and independence.    Niru French, PT

## 2020-11-25 NOTE — TELEPHONE ENCOUNTER
This can be done in the office.  Schedule this has a regular office visit.  Not a procedure visit on my procedure day.  I will need lidocaine forceps and scalpel and silver nitrate.

## 2020-11-25 NOTE — TELEPHONE ENCOUNTER
Pt wants his wart removed. Do you need to do this as a procedure in the ASC? Do you want to do this in the office? Please advise. Pt doesn't want to get the vasectomy at this time.

## 2020-11-27 DIAGNOSIS — Z98.890 S/P RIGHT KNEE SURGERY: ICD-10-CM

## 2020-11-27 RX ORDER — OXYCODONE AND ACETAMINOPHEN 5; 325 MG/1; MG/1
1 TABLET ORAL EVERY 12 HOURS PRN
Qty: 14 TABLET | Refills: 0 | Status: SHIPPED | OUTPATIENT
Start: 2020-11-27 | End: 2020-12-03 | Stop reason: SDUPTHER

## 2020-11-30 ENCOUNTER — PATIENT MESSAGE (OUTPATIENT)
Dept: UROLOGY | Facility: CLINIC | Age: 30
End: 2020-11-30

## 2020-12-02 ENCOUNTER — CLINICAL SUPPORT (OUTPATIENT)
Dept: REHABILITATION | Facility: HOSPITAL | Age: 30
End: 2020-12-02
Payer: COMMERCIAL

## 2020-12-02 DIAGNOSIS — R29.898 WEAKNESS OF RIGHT LOWER EXTREMITY: ICD-10-CM

## 2020-12-02 DIAGNOSIS — R26.9 GAIT ABNORMALITY: ICD-10-CM

## 2020-12-02 DIAGNOSIS — M25.661 DECREASED RANGE OF MOTION (ROM) OF RIGHT KNEE: Primary | ICD-10-CM

## 2020-12-02 PROCEDURE — 97112 NEUROMUSCULAR REEDUCATION: CPT | Mod: PO | Performed by: PHYSICAL THERAPIST

## 2020-12-02 PROCEDURE — 97110 THERAPEUTIC EXERCISES: CPT | Mod: PO | Performed by: PHYSICAL THERAPIST

## 2020-12-02 PROCEDURE — 97116 GAIT TRAINING THERAPY: CPT | Mod: PO | Performed by: PHYSICAL THERAPIST

## 2020-12-02 NOTE — PROGRESS NOTES
Physical Therapy Daily Treatment Note     Name: Yovani Malik  [facility] Number: [id number]    Therapy Diagnosis:   Encounter Diagnoses   Name Primary?    Decreased range of motion (ROM) of right knee Yes    Weakness of right lower extremity     Gait abnormality      Physician: Barry Lopez MD    Visit Date: 12/2/2020  Physician Orders: PT Eval and Treat      The patient should begin physical therapy on postoperative day # 3 and will be advanced to outpatient therapy as soon as   Possible following discharge.     Weight bearing: toe touch weight bearing on the right leg  Range of Motion: Full normal motion symmetric to opposite side     At 4 weeks' time, the patient should begin advancing from TTWB to 50% weightbearing at 4-5 weeks' time. The patient should begin advancing from 100% weightbearing at 6 weeks' time.      The patient can begin full weightbearing status at 6 weeks time.     Range of motion should be limited to -10 degrees hyperextension and flexion 120 degrees now.      Additional exercises to be performed are:   to begin quad sets with a heel roll to obtain hyperextension, straight leg raise and heel slides with the heel supported in a closed-chain fashion     Immediate specific exercises should include:   Gait program should include the above stated weightbearing; in addition: active extension with a heel-to-toe gait working on maintaining extension     Immobilizer if present should be locked @ -10 degrees with gait and allowed to flex to 120 degrees at rest.      Hinged knee immobilizer should be locked in -10 degrees hyperextension with gait for  6 weeks.    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    DOS: 09/15/2020, POD: 85 (12/2/2020)    Subjective     Pt reports: no new complaints. Walking without AD. He reports "stiffness" in R knee andstates that he is concerned with L, contralateral knee pain as he may have worn that knee out . He was compliant with home exercise program.  Response to previous treatment: no adverse effect  Functional change: ambulating household distances without AD    Pain: 2/10 "pressure", stiff  Location: right knee      Objective     Yovani received therapeutic exercises to develop strength, ROM, flexibility and core stabilization for 17 minutes including:  TM 1.3 mph, using UEs (10%) to offload and balance as needed due to the limp he has otherwise. 5 mins    Leg press 40# DL x40, 20# SL 2x10-np  Standing hip Matrix 55# 4 way B 2x10  Passive R hip and knee stretching, sartorius, 3 mins    Yovani participated in gait training activities to restore normal ambulation mechanics and improve tolerance to weightbearing for 15 minutes:  Lateral weight shifts x 2 minutes (no brace), hold count of 5  A/P weight shift x 1 minutes R,  Hold count of 5. With 1/2 blue foam.  Side stepping in parallel bars x entire length x 5 laps, green band  Stair training and practice x6 laps, req cues for reciprocal stepping in descending, used 1 HR  Step ups 6" x20    Yovani participated in neuromuscular activities to restore bal and proprioception for 08 minutes:  Narrow, modified Tandem walk 1 lap 14'  Tandem stance 3x30"  NBOA EC on floor x30"-mastered  Alternate toe tap to 6" step x30  R SLS 5x10-13" (L SLS reached 30" in the past)    Manual therapy 5 mins VMO DTM R.    Home Exercises Provided and Patient Education Provided     Education provided:   - HEP  - cont both HEPs    Written Home " "Exercises Provided: Patient instructed to cont prior HEP.  Exercises were reviewed and Yovani was able to demonstrate them prior to the end of the session.  Yovani demonstrated good  understanding of the education provided.     See EMR under Patient Instructions for exercises provided 9/23/2020.    Assessment     Yovani continues to present to clinic, current full weightbearing status. Patient demonstrates decreased stance time on RLE despite verbal cues for equal stance time. He continues walking with R hip abducted. R SLS 10-13" L SLS 30". He stated he has been practicing R SLS. His pain is medial R knee about the R VMO. I have explained his walking technique as stressful to the same area. We have scheduled him 1x/week with DN. He has stepped off the treadmill quickly and without holding onto the handrail the last 2 sessions. PT continues encouraging pt to be careful and avoid pivoting on the leg, especially while he continues to strengthening the leg. PT explained the effects of closed chain pivoting to the meniscus.    Yovani is progressing well towards his goals.   Pt prognosis is Good.     Pt will continue to benefit from skilled outpatient physical therapy to address the deficits listed in the problem list box on initial evaluation, provide pt/family education and to maximize pt's level of independence in the home and community environment.     Pt's spiritual, cultural and educational needs considered and pt agreeable to plan of care and goals.    Anticipated barriers to physical therapy: chronicity, multiple sx, obese    Goals:   Short Term Goals: 4 weeks, -COMPLETED   - R knee AROM ext to 0 deg to impact gait.  MET 10/16/2020, +1 DEG.  - R knee AROM flex to 90 deg towards impacting squatting and stair navigation.  MET 10/16/2020, 114 DEG.  - Improve R knee strength grossly to 4-/5 to impact gt.  MET 10/16/2020, GROSSLY 4-/5 STRENGTH.  - Pt able to ambulate 50% Wb per MD protocol.  MET 10/16/2020, 50% WB " with min inc pn.  - Improve FOTO impairment score to 70% for improved walking.  Progressing, not met, 79% at eval and still today. He is not yet WBAT and this may be reason for his perception.  MET 11/11/2020.     Short Term Goals: 8 weeks -COMPLETED  - Full WB R LE without AD or hinge brace for normal gait.  Progressing 11/11/2020, WBAT 1 crutch.  MET 11/20/2020, FWB WITHOUT AD, STILL LACKING HEEL STRIKE R AND R KNEE FLEX.  - Improve R knee AROM hyper ext to +3 deg for symmetry.  MET 11/20/2020, +4 DEG.  - Improve R knee AROM flex to 115 deg for improving symmetry.  + DEG 11/11/2020  - Improve R knee strength grossly to 4/5 to impact gt and  ADLs.  MET 11/20/2020, 4/5 R KNEE EXT, 4+/5 KNEE FLEX.  - Improve FOTO impairment score to 65% for improved walking, and abilities to help his child.  MET 47% 11/11/2020.     Short Term Goals: 12 weeks   - Normal gait without limp or pain.  - Improve R knee AROM flex to 123 deg for symmetry.  - Improve R hip and knee strength grossly to 4+/5 to impact gt and  ADLs.  - Pt able to RTW with </= 3/10 R knee pain for prolonged standing activities.  - Improve FOTO impairment score to 55% for improved RTW, HH chores and mod activities.  MET 47% 11/11/2020.     Long Term Goals: 16 weeks   - Pt to demo 5/5 R hip and knee strength for discharge from skilled PT.  - Pt will not be limited in HH chores, ambulation, static or prolonged standing and squatting activities for home, work, or .  - Improve FOTO impairment score to 48% for improved QOL.     Plan     Plan of care Certification: 9/18/2020 to 12/08/2020.     Outpatient Physical Therapy 2-3 times weekly for 12-16 weeks to include the following interventions: Aquatic Therapy, Electrical Stimulation IFC, Gait Training, Manual Therapy, Moist Heat/ Ice, Neuromuscular Re-ed, Patient Education, Self Care, Therapeutic Activites and Therapeutic Exercise.    Cont ROM and strengthening for improved QOL and  independence.    Niru French, PT

## 2020-12-03 DIAGNOSIS — Z98.890 S/P RIGHT KNEE SURGERY: ICD-10-CM

## 2020-12-04 RX ORDER — OXYCODONE AND ACETAMINOPHEN 5; 325 MG/1; MG/1
1 TABLET ORAL EVERY 12 HOURS PRN
Qty: 14 TABLET | Refills: 0 | Status: SHIPPED | OUTPATIENT
Start: 2020-12-04 | End: 2020-12-11 | Stop reason: SDUPTHER

## 2020-12-09 ENCOUNTER — CLINICAL SUPPORT (OUTPATIENT)
Dept: REHABILITATION | Facility: HOSPITAL | Age: 30
End: 2020-12-09
Payer: COMMERCIAL

## 2020-12-09 DIAGNOSIS — R26.9 GAIT ABNORMALITY: ICD-10-CM

## 2020-12-09 DIAGNOSIS — R29.898 WEAKNESS OF RIGHT LOWER EXTREMITY: ICD-10-CM

## 2020-12-09 DIAGNOSIS — M25.661 DECREASED RANGE OF MOTION (ROM) OF RIGHT KNEE: Primary | ICD-10-CM

## 2020-12-09 PROCEDURE — 97032 APPL MODALITY 1+ESTIM EA 15: CPT | Mod: PO | Performed by: PHYSICAL THERAPIST

## 2020-12-09 PROCEDURE — 97110 THERAPEUTIC EXERCISES: CPT | Mod: PO | Performed by: PHYSICAL THERAPIST

## 2020-12-09 NOTE — PROGRESS NOTES
"  Physical Therapy Daily Treatment Note     Name: Yovani Malik  Clinic Number: [id number]    Therapy Diagnosis:   Encounter Diagnoses   Name Primary?    Decreased range of motion (ROM) of right knee Yes    Weakness of right lower extremity     Gait abnormality      Physician: Barry Lopez MD    Visit Date: 12/10/2020  Physician Orders: PT Eval and Treat      Medical Diagnosis from Referral: M21.061 (ICD-10-CM) - Genu valgum, acquired, right M94.261 (ICD-10-CM) - Chondromalacia of right knee M23.200 (ICD-10-CM) - Other old tear of lateral meniscus of right knee   Evaluation Date: 9/18/2020  Authorization Period Expiration: 12/31/2020   Plan of Care Expiration: 12/08/2020  Reassessment: 10/16/2020, 11/20/2020  Visit # / Visits authorized: 18/ 20, 4/20    Time In: 1433  Time Out: 1510  Total Billable Time: 35 minutes    Precautions: Standard and blood thinners, DOS 9/15/2020, 100% WB at 6 weeks.    DOS: 09/15/2020, POD: 85 (12/2/2020)    Subjective     Pt reports: he is feeling knee tightness / stiffness in the knee     . He was compliant with home exercise program.  Response to previous treatment: no adverse effect  Functional change: ambulating household distances without AD    Pain: 5/10 "pressure", stiff  Location: right knee      Objective     Yovani received therapeutic exercises to develop strength, ROM, flexibility and core stabilization for 10 minutes including:  Passive knee ROM x 2 min  Seated LAQ x 10  Passive quad stretch 3 x 45 sec    Yovani received the following manual therapy techniques: Soft tissue Mobilization were applied to the: right quad  for 25 minutes, including:  Soft tissue mobilization right quad      Soft Tissue Palpation Assessment to determine the necessity for Functional Dry Needling  .   Patient provided written and verbal consent to FDN.   FDN performed to rectus femoris and vastus lateralis with electrical stimulation    Home Exercises Provided and Patient Education Provided "     Education provided:   - HEP    Written Home Exercises Provided: Patient instructed to cont prior HEP.  Exercises were reviewed and Yovani was able to demonstrate them prior to the end of the session.  Yovani demonstrated good  understanding of the education provided.     See EMR under Patient Instructions for exercises provided 9/23/2020.    Assessment     Yovani did well with his treatment today with reported decrease in his tightness/stiffness after treatment. He was instructed to continue to work on HEP previously given. He may have some mild soreness.    Patient demonstrated appropriate response to FDN.   Patient with improved overall tissue mobility with decreased tissue tension and trigger points upon palpation of treated muscle groups after Functional Dry Needling.  Good rhythmical contractions observed with estim to treated muscle groups    Yvoani is progressing well towards his goals.   Pt prognosis is Good.     Pt will continue to benefit from skilled outpatient physical therapy to address the deficits listed in the problem list box on initial evaluation, provide pt/family education and to maximize pt's level of independence in the home and community environment.     Pt's spiritual, cultural and educational needs considered and pt agreeable to plan of care and goals.    Anticipated barriers to physical therapy: chronicity, multiple sx, obese    Goals:   Short Term Goals: 4 weeks, -COMPLETED   - R knee AROM ext to 0 deg to impact gait.  MET 10/16/2020, +1 DEG.  - R knee AROM flex to 90 deg towards impacting squatting and stair navigation.  MET 10/16/2020, 114 DEG.  - Improve R knee strength grossly to 4-/5 to impact gt.  MET 10/16/2020, GROSSLY 4-/5 STRENGTH.  - Pt able to ambulate 50% Wb per MD protocol.  MET 10/16/2020, 50% WB with min inc pn.  - Improve FOTO impairment score to 70% for improved walking.  Progressing, not met, 79% at eval and still today. He is not yet WBAT and this may be reason for  his perception.  MET 11/11/2020.     Short Term Goals: 8 weeks -COMPLETED  - Full WB R LE without AD or hinge brace for normal gait.  Progressing 11/11/2020, WBAT 1 crutch.  MET 11/20/2020, FWB WITHOUT AD, STILL LACKING HEEL STRIKE R AND R KNEE FLEX.  - Improve R knee AROM hyper ext to +3 deg for symmetry.  MET 11/20/2020, +4 DEG.  - Improve R knee AROM flex to 115 deg for improving symmetry.  + DEG 11/11/2020  - Improve R knee strength grossly to 4/5 to impact gt and  ADLs.  MET 11/20/2020, 4/5 R KNEE EXT, 4+/5 KNEE FLEX.  - Improve FOTO impairment score to 65% for improved walking, and abilities to help his child.  MET 47% 11/11/2020.     Short Term Goals: 12 weeks   - Normal gait without limp or pain.  - Improve R knee AROM flex to 123 deg for symmetry.  - Improve R hip and knee strength grossly to 4+/5 to impact gt and  ADLs.  - Pt able to RTW with </= 3/10 R knee pain for prolonged standing activities.  - Improve FOTO impairment score to 55% for improved RTW, HH chores and mod activities.  MET 47% 11/11/2020.     Long Term Goals: 16 weeks   - Pt to demo 5/5 R hip and knee strength for discharge from skilled PT.  - Pt will not be limited in HH chores, ambulation, static or prolonged standing and squatting activities for home, work, or .  - Improve FOTO impairment score to 48% for improved QOL.     Plan     Plan of care Certification: 9/18/2020 to 12/08/2020.     Outpatient Physical Therapy 2-3 times weekly for 12-16 weeks to include the following interventions: Aquatic Therapy, Electrical Stimulation IFC, Gait Training, Manual Therapy, Moist Heat/ Ice, Neuromuscular Re-ed, Patient Education, Self Care, Therapeutic Activites and Therapeutic Exercise.    Cont ROM and strengthening for improved QOL and independence.    Trevin Haley, PT

## 2020-12-09 NOTE — PROGRESS NOTES
Physical Therapy Daily Treatment Note     Name: Yovani Malik  [facility] Number: [id number]    Therapy Diagnosis:   Encounter Diagnoses   Name Primary?    Decreased range of motion (ROM) of right knee Yes    Weakness of right lower extremity     Gait abnormality      Physician: Barry Lopez MD    Visit Date: 12/9/2020  Physician Orders: PT Eval and Treat      The patient should begin physical therapy on postoperative day # 3 and will be advanced to outpatient therapy as soon as   Possible following discharge.     Weight bearing: toe touch weight bearing on the right leg  Range of Motion: Full normal motion symmetric to opposite side     At 4 weeks' time, the patient should begin advancing from TTWB to 50% weightbearing at 4-5 weeks' time. The patient should begin advancing from 100% weightbearing at 6 weeks' time.      The patient can begin full weightbearing status at 6 weeks time.     Range of motion should be limited to -10 degrees hyperextension and flexion 120 degrees now.      Additional exercises to be performed are:   to begin quad sets with a heel roll to obtain hyperextension, straight leg raise and heel slides with the heel supported in a closed-chain fashion     Immediate specific exercises should include:   Gait program should include the above stated weightbearing; in addition: active extension with a heel-to-toe gait working on maintaining extension     Immobilizer if present should be locked @ -10 degrees with gait and allowed to flex to 120 degrees at rest.      Hinged knee immobilizer should be locked in -10 degrees hyperextension with gait for  6 weeks.    Medical Diagnosis from Referral: M21.061 (ICD-10-CM) - Genu valgum, acquired, right M94.261 (ICD-10-CM) - Chondromalacia of right knee M23.200 (ICD-10-CM) - Other old tear of lateral meniscus of right knee   Evaluation Date: 9/18/2020  Authorization Period Expiration: 12/31/2020   Plan of Care Expiration: 12/08/2020  Reassessment:  "10/16/2020, 11/20/2020  Visit # / Visits authorized: 18/ 20, 3/20    Time In: 1000 AM  Time Out: 1045 AM  Total Billable Time: 40 minutes    Precautions: Standard and blood thinners, DOS 9/15/2020, 100% WB at 6 weeks.    DOS: 09/15/2020, POD: 92 (12/09/2020)    Subjective     Pt reports: no new complaints. He reports "stiffness" in R knee. He has been performing home program about 1x/day. He still has "catching" and "giving out" of the R knee. He was compliant with home exercise program.  Response to previous treatment: no adverse effect  Functional change: ambulating household distances without AD    Pain: 2/10 "pressure", stiff  Location: right knee      Objective     Yovani received therapeutic exercises to develop strength, ROM, flexibility and core stabilization for 25 minutes including:  TM 1.3 mph, using UEs (10%) to offload and balance as needed due to the limp he has otherwise. 7 mins    GSS on incline x1 min each  HS stretch supine with strap 2x1'  Prone quad stretch with strap 2x1'  Leg press 40# DL x40, 20# SL 2x10    HEP education 8 mins    Standing hip Matrix 55# 4 way B 2x10-np  Passive R hip and knee stretching, sartorius, 3 mins-np    Yovani participated in gait training activities to restore normal ambulation mechanics and improve tolerance to weightbearing for 00 minutes:  Lateral weight shifts x 2 minutes (no brace), hold count of 5  A/P weight shift x 1 minutes R,  Hold count of 5. With 1/2 blue foam.  Side stepping in parallel bars x entire length x 5 laps, green band  Stair training and practice x6 laps, req cues for reciprocal stepping in descending, used 1 HR  Step ups 6" x20    Yovani participated in neuromuscular activities to restore bal and proprioception for 08 minutes:  Narrow, modified Tandem walk 1 lap 14'  Tandem stance 3x30"  NBOA EC on floor x30"-mastered  Alternate toe tap to 6" step x30  R SLS 5x10-20" (L SLS reached 30" in the past)    Manual therapy 10 mins ITB and VL rolling " "with stick    Home Exercises Provided and Patient Education Provided     Education provided:   - HEP  - cont both HEPs    Written Home Exercises Provided: Patient instructed to cont prior HEP.  Exercises were reviewed and Yovani was able to demonstrate them prior to the end of the session.  Yovani demonstrated good  understanding of the education provided.     See EMR under Patient Instructions for exercises provided 9/23/2020.    Assessment     Yovani continues to present to clinic, current full weightbearing status. Patient demonstrates decreased stance time on RLE despite verbal cues for equal stance time. He continues walking with R hip abducted. R SLS 10-20" L SLS 30". He stated he has been practicing R SLS. His pain is medial R lat knee today. He will begin receiving DN tx tomorrow. He had swelling in the R VL today. Manual therapy aimed at decreasing the stiffness there. Flexibility was focus today. Encouraged pt to inc HEP from 1x/day to 2x/day strengthening and even flexibility to 3x/day. Cont to ice and elevate.    Yovani is progressing well towards his goals.   Pt prognosis is Good.     Pt will continue to benefit from skilled outpatient physical therapy to address the deficits listed in the problem list box on initial evaluation, provide pt/family education and to maximize pt's level of independence in the home and community environment.     Pt's spiritual, cultural and educational needs considered and pt agreeable to plan of care and goals.    Anticipated barriers to physical therapy: chronicity, multiple sx, obese    Goals:   Short Term Goals: 4 weeks, -COMPLETED   - R knee AROM ext to 0 deg to impact gait.  MET 10/16/2020, +1 DEG.  - R knee AROM flex to 90 deg towards impacting squatting and stair navigation.  MET 10/16/2020, 114 DEG.  - Improve R knee strength grossly to 4-/5 to impact gt.  MET 10/16/2020, GROSSLY 4-/5 STRENGTH.  - Pt able to ambulate 50% Wb per MD protocol.  MET 10/16/2020, 50% WB " with min inc pn.  - Improve FOTO impairment score to 70% for improved walking.  Progressing, not met, 79% at eval and still today. He is not yet WBAT and this may be reason for his perception.  MET 11/11/2020.     Short Term Goals: 8 weeks -COMPLETED  - Full WB R LE without AD or hinge brace for normal gait.  Progressing 11/11/2020, WBAT 1 crutch.  MET 11/20/2020, FWB WITHOUT AD, STILL LACKING HEEL STRIKE R AND R KNEE FLEX.  - Improve R knee AROM hyper ext to +3 deg for symmetry.  MET 11/20/2020, +4 DEG.  - Improve R knee AROM flex to 115 deg for improving symmetry.  + DEG 11/11/2020  - Improve R knee strength grossly to 4/5 to impact gt and  ADLs.  MET 11/20/2020, 4/5 R KNEE EXT, 4+/5 KNEE FLEX.  - Improve FOTO impairment score to 65% for improved walking, and abilities to help his child.  MET 47% 11/11/2020.     Short Term Goals: 12 weeks   - Normal gait without limp or pain.  - Improve R knee AROM flex to 123 deg for symmetry.  - Improve R hip and knee strength grossly to 4+/5 to impact gt and  ADLs.  - Pt able to RTW with </= 3/10 R knee pain for prolonged standing activities.  - Improve FOTO impairment score to 55% for improved RTW, HH chores and mod activities.  MET 47% 11/11/2020.     Long Term Goals: 16 weeks   - Pt to demo 5/5 R hip and knee strength for discharge from skilled PT.  - Pt will not be limited in HH chores, ambulation, static or prolonged standing and squatting activities for home, work, or .  - Improve FOTO impairment score to 48% for improved QOL.     Plan     Plan of care Certification: 9/18/2020 to 12/08/2020.     Outpatient Physical Therapy 2-3 times weekly for 12-16 weeks to include the following interventions: Aquatic Therapy, Electrical Stimulation IFC, Gait Training, Manual Therapy, Moist Heat/ Ice, Neuromuscular Re-ed, Patient Education, Self Care, Therapeutic Activites and Therapeutic Exercise.    Cont ROM and strengthening for improved QOL and  independence.    Niru French, PT

## 2020-12-09 NOTE — PROGRESS NOTES
"  Physical Therapy Daily Treatment Note     Name: Yovani Malik  Clinic Number: [id number]    Therapy Diagnosis:   Encounter Diagnoses   Name Primary?    Decreased range of motion (ROM) of right knee Yes    Weakness of right lower extremity     Gait abnormality      Physician: Barry Lopez MD    Visit Date: 12/14/2020  Physician Orders: PT Eval and Treat      Medical Diagnosis from Referral: M21.061 (ICD-10-CM) - Genu valgum, acquired, right M94.261 (ICD-10-CM) - Chondromalacia of right knee M23.200 (ICD-10-CM) - Other old tear of lateral meniscus of right knee   Evaluation Date: 9/18/2020  Authorization Period Expiration: 12/31/2020   Plan of Care Expiration: 12/08/2020  Reassessment: 10/16/2020, 11/20/2020  Visit # / Visits authorized: 18/ 20, 5/20    Time In: 0948  Time Out: 1025  Total Billable Time: 35 minutes    Precautions: Standard and blood thinners, DOS 9/15/2020, 100% WB at 6 weeks.    DOS: 09/15/2020, POD: 85 (12/2/2020)    Subjective     Pt reports: he felt better after last visit but felt some of the tightness return in the last day or two. He still having about the same pain.      . He was compliant with home exercise program.  Response to previous treatment: no adverse effect  Functional change: ambulating household distances without AD    Pain: 2/10 "pressure", stiff  Location: right knee      Objective     Yovani received therapeutic exercises to develop strength, ROM, flexibility and core stabilization for 10 minutes including:  Passive knee ROM x 2 min  Seated LAQ x 10  Passive quad stretch 3 x 45 sec    Yovani received the following manual therapy techniques: Soft tissue Mobilization were applied to the: right quad  for 25 minutes, including:  Soft tissue mobilization right quad      Soft Tissue Palpation Assessment to determine the necessity for Functional Dry Needling  .   Patient provided written and verbal consent to FDN.   FDN performed to rectus femoris , vastus lateralis, vastus " medialis with electrical stimulation    Home Exercises Provided and Patient Education Provided     Education provided:   - HEP    Written Home Exercises Provided: Patient instructed to cont prior HEP.  Exercises were reviewed and Yovani was able to demonstrate them prior to the end of the session.  Yovani demonstrated good  understanding of the education provided.     See EMR under Patient Instructions for exercises provided 9/23/2020.    Assessment     Yovani had some benefits after initial treatment but were short lived. I did increase his time with electrical stimulation with dry needling today and am hopeful that and manual therapy will provide additional benefits. I will see how he is doing next week.     Patient demonstrated appropriate response to FDN.   Patient with improved overall tissue mobility with decreased tissue tension and trigger points upon palpation of treated muscle groups after Functional Dry Needling.  Good rhythmical contractions observed with estim to treated muscle groups    Yovani is progressing well towards his goals.   Pt prognosis is Good.     Pt will continue to benefit from skilled outpatient physical therapy to address the deficits listed in the problem list box on initial evaluation, provide pt/family education and to maximize pt's level of independence in the home and community environment.     Pt's spiritual, cultural and educational needs considered and pt agreeable to plan of care and goals.    Anticipated barriers to physical therapy: chronicity, multiple sx, obese    Goals:   Short Term Goals: 4 weeks, -COMPLETED   - R knee AROM ext to 0 deg to impact gait.  MET 10/16/2020, +1 DEG.  - R knee AROM flex to 90 deg towards impacting squatting and stair navigation.  MET 10/16/2020, 114 DEG.  - Improve R knee strength grossly to 4-/5 to impact gt.  MET 10/16/2020, GROSSLY 4-/5 STRENGTH.  - Pt able to ambulate 50% Wb per MD protocol.  MET 10/16/2020, 50% WB with min inc pn.  -  Improve FOTO impairment score to 70% for improved walking.  Progressing, not met, 79% at eval and still today. He is not yet WBAT and this may be reason for his perception.  MET 11/11/2020.     Short Term Goals: 8 weeks -COMPLETED  - Full WB R LE without AD or hinge brace for normal gait.  Progressing 11/11/2020, WBAT 1 crutch.  MET 11/20/2020, FWB WITHOUT AD, STILL LACKING HEEL STRIKE R AND R KNEE FLEX.  - Improve R knee AROM hyper ext to +3 deg for symmetry.  MET 11/20/2020, +4 DEG.  - Improve R knee AROM flex to 115 deg for improving symmetry.  + DEG 11/11/2020  - Improve R knee strength grossly to 4/5 to impact gt and  ADLs.  MET 11/20/2020, 4/5 R KNEE EXT, 4+/5 KNEE FLEX.  - Improve FOTO impairment score to 65% for improved walking, and abilities to help his child.  MET 47% 11/11/2020.     Short Term Goals: 12 weeks   - Normal gait without limp or pain.  - Improve R knee AROM flex to 123 deg for symmetry.  - Improve R hip and knee strength grossly to 4+/5 to impact gt and  ADLs.  - Pt able to RTW with </= 3/10 R knee pain for prolonged standing activities.  - Improve FOTO impairment score to 55% for improved RTW, HH chores and mod activities.  MET 47% 11/11/2020.     Long Term Goals: 16 weeks   - Pt to demo 5/5 R hip and knee strength for discharge from skilled PT.  - Pt will not be limited in HH chores, ambulation, static or prolonged standing and squatting activities for home, work, or .  - Improve FOTO impairment score to 48% for improved QOL.     Plan     Plan of care Certification: 9/18/2020 to 12/08/2020.     Outpatient Physical Therapy 2-3 times weekly for 12-16 weeks to include the following interventions: Aquatic Therapy, Electrical Stimulation IFC, Gait Training, Manual Therapy, Moist Heat/ Ice, Neuromuscular Re-ed, Patient Education, Self Care, Therapeutic Activites and Therapeutic Exercise.    Cont ROM and strengthening for improved QOL and  independence.    Trevin Haley, PT

## 2020-12-10 ENCOUNTER — CLINICAL SUPPORT (OUTPATIENT)
Dept: REHABILITATION | Facility: HOSPITAL | Age: 30
End: 2020-12-10
Payer: COMMERCIAL

## 2020-12-10 DIAGNOSIS — M25.661 DECREASED RANGE OF MOTION (ROM) OF RIGHT KNEE: Primary | ICD-10-CM

## 2020-12-10 DIAGNOSIS — R26.9 GAIT ABNORMALITY: ICD-10-CM

## 2020-12-10 DIAGNOSIS — R29.898 WEAKNESS OF RIGHT LOWER EXTREMITY: ICD-10-CM

## 2020-12-10 PROCEDURE — 97140 MANUAL THERAPY 1/> REGIONS: CPT | Mod: PO

## 2020-12-10 PROCEDURE — 97110 THERAPEUTIC EXERCISES: CPT | Mod: PO

## 2020-12-14 ENCOUNTER — CLINICAL SUPPORT (OUTPATIENT)
Dept: REHABILITATION | Facility: HOSPITAL | Age: 30
End: 2020-12-14
Payer: COMMERCIAL

## 2020-12-14 DIAGNOSIS — R26.9 GAIT ABNORMALITY: ICD-10-CM

## 2020-12-14 DIAGNOSIS — M25.661 DECREASED RANGE OF MOTION (ROM) OF RIGHT KNEE: Primary | ICD-10-CM

## 2020-12-14 DIAGNOSIS — R29.898 WEAKNESS OF RIGHT LOWER EXTREMITY: ICD-10-CM

## 2020-12-14 PROCEDURE — 97140 MANUAL THERAPY 1/> REGIONS: CPT | Mod: PO

## 2020-12-14 PROCEDURE — 97110 THERAPEUTIC EXERCISES: CPT | Mod: PO

## 2020-12-17 ENCOUNTER — PATIENT OUTREACH (OUTPATIENT)
Dept: ADMINISTRATIVE | Facility: OTHER | Age: 30
End: 2020-12-17

## 2020-12-17 ENCOUNTER — CLINICAL SUPPORT (OUTPATIENT)
Dept: REHABILITATION | Facility: HOSPITAL | Age: 30
End: 2020-12-17
Payer: COMMERCIAL

## 2020-12-17 DIAGNOSIS — R26.9 GAIT ABNORMALITY: ICD-10-CM

## 2020-12-17 DIAGNOSIS — M25.661 DECREASED RANGE OF MOTION (ROM) OF RIGHT KNEE: Primary | ICD-10-CM

## 2020-12-17 DIAGNOSIS — R29.898 WEAKNESS OF RIGHT LOWER EXTREMITY: ICD-10-CM

## 2020-12-17 PROCEDURE — 97110 THERAPEUTIC EXERCISES: CPT | Mod: PO | Performed by: PHYSICAL THERAPIST

## 2020-12-17 PROCEDURE — 97140 MANUAL THERAPY 1/> REGIONS: CPT | Mod: PO | Performed by: PHYSICAL THERAPIST

## 2020-12-17 NOTE — PROGRESS NOTES
"  Physical Therapy Daily Treatment Note     Name: Yovani Malik  Clinic Number: [id number]    Therapy Diagnosis:   Encounter Diagnoses   Name Primary?    Decreased range of motion (ROM) of right knee Yes    Weakness of right lower extremity     Gait abnormality      Physician: Barry Lopez MD    Visit Date: 12/17/2020  Physician Orders: PT Eval and Treat      Medical Diagnosis from Referral: M21.061 (ICD-10-CM) - Genu valgum, acquired, right M94.261 (ICD-10-CM) - Chondromalacia of right knee M23.200 (ICD-10-CM) - Other old tear of lateral meniscus of right knee   Evaluation Date: 9/18/2020  Authorization Period Expiration: 12/31/2020   Plan of Care Expiration: 12/08/2020  Reassessment: 10/16/2020, 11/20/2020  Visit # / Visits authorized: 18/ 20, 6/20    Time In: 1015  Time Out: 1100  Total Billable Time: 38 minutes    Precautions: Standard and blood thinners, DOS 9/15/2020, 100% WB at 6 weeks.    DOS: 09/15/2020, POD: 100 (12/17/2020)    Subjective     Pt reports: he felt better longer after last DN session. His walk is normal today with contralateral side off some. He has h/o L knee pain.     He was compliant with home exercise program.  Response to previous treatment: no adverse effect  Functional change: ambulating household distances without AD    Pain: 2/10 "pressure", stiff  Location: right knee  VMO    Objective     Yovani received therapeutic exercises to develop strength, ROM, flexibility, and core stabilization for 30 minutes including:  Stationary bike 5 mins (subjective taken)  Passive knee ROM x 3 min  Seated LAQ x 50, 5#, significant juddering noted.  Passive quad stretch 3 x 45 sec  Matrix 4 way 55# 3x10 (B)    Yovani received the following manual therapy techniques: Soft tissue Mobilization were applied to the: right quad  for 15 minutes, including:  Soft tissue mobilization right quad    Home Exercises Provided and Patient Education Provided     Education provided:   - HEP    Written Home " Exercises Provided: Patient instructed to cont prior HEP.  Exercises were reviewed and Yovani was able to demonstrate them prior to the end of the session.  Yovani demonstrated good  understanding of the education provided.     See EMR under Patient Instructions for exercises provided 9/23/2020.    Assessment     Yovani is doing well with DN, manual therapy, and active ex. His walking has improved. He may benefit from another 4 weeks of PT to focus on strengthening upon RTW which is expected to be released next week.  Yovani is progressing well towards his goals.   Pt prognosis is Good.     Pt will continue to benefit from skilled outpatient physical therapy to address the deficits listed in the problem list box on initial evaluation, provide pt/family education and to maximize pt's level of independence in the home and community environment.     Pt's spiritual, cultural and educational needs considered and pt agreeable to plan of care and goals.    Anticipated barriers to physical therapy: chronicity, multiple sx, obese    Goals:   Short Term Goals: 4 weeks, -COMPLETED   - R knee AROM ext to 0 deg to impact gait.  MET 10/16/2020, +1 DEG.  - R knee AROM flex to 90 deg towards impacting squatting and stair navigation.  MET 10/16/2020, 114 DEG.  - Improve R knee strength grossly to 4-/5 to impact gt.  MET 10/16/2020, GROSSLY 4-/5 STRENGTH.  - Pt able to ambulate 50% Wb per MD protocol.  MET 10/16/2020, 50% WB with min inc pn.  - Improve FOTO impairment score to 70% for improved walking.  Progressing, not met, 79% at eval and still today. He is not yet WBAT and this may be reason for his perception.  MET 11/11/2020.     Short Term Goals: 8 weeks -COMPLETED  - Full WB R LE without AD or hinge brace for normal gait.  Progressing 11/11/2020, WBAT 1 crutch.  MET 11/20/2020, FWB WITHOUT AD, STILL LACKING HEEL STRIKE R AND R KNEE FLEX.  - Improve R knee AROM hyper ext to +3 deg for symmetry.  MET 11/20/2020, +4 DEG.  -  Improve R knee AROM flex to 115 deg for improving symmetry.  + DEG 11/11/2020  - Improve R knee strength grossly to 4/5 to impact gt and  ADLs.  MET 11/20/2020, 4/5 R KNEE EXT, 4+/5 KNEE FLEX.  - Improve FOTO impairment score to 65% for improved walking, and abilities to help his child.  MET 47% 11/11/2020.     Short Term Goals: 12 weeks   - Normal gait without limp or pain.  MET 12/17/2020.  - Improve R knee AROM flex to 123 deg for symmetry.  - Improve R hip and knee strength grossly to 4+/5 to impact gt and  ADLs.  - Pt able to RTW with </= 3/10 R knee pain for prolonged standing activities.  - Improve FOTO impairment score to 55% for improved RTW, HH chores and mod activities.  MET 47% 11/11/2020.     Long Term Goals: 16 weeks   - Pt to demo 5/5 R hip and knee strength for discharge from skilled PT.  - Pt will not be limited in HH chores, ambulation, static or prolonged standing and squatting activities for home, work, or .  - Improve FOTO impairment score to 48% for improved QOL.     Plan     Plan of care Certification: 9/18/2020 to 12/08/2020.     Outpatient Physical Therapy 2-3 times weekly for 12-16 weeks to include the following interventions: Aquatic Therapy, Electrical Stimulation IFC, Gait Training, Manual Therapy, Moist Heat/ Ice, Neuromuscular Re-ed, Patient Education, Self Care, Therapeutic Activites and Therapeutic Exercise.    Cont ROM and strengthening for improved QOL and independence.    Niru French, PT

## 2020-12-21 NOTE — PROGRESS NOTES
"  Physical Therapy Daily Treatment Note     Name: Yovani Malik  [facility] Number: [id number]    Therapy Diagnosis:   Encounter Diagnoses   Name Primary?    Decreased range of motion (ROM) of right knee Yes    Weakness of right lower extremity     Gait abnormality      Physician: Barry Lopez MD    Visit Date: 12/22/2020  Physician Orders: PT Eval and Treat      Medical Diagnosis from Referral: M21.061 (ICD-10-CM) - Genu valgum, acquired, right M94.261 (ICD-10-CM) - Chondromalacia of right knee M23.200 (ICD-10-CM) - Other old tear of lateral meniscus of right knee   Evaluation Date: 9/18/2020  Authorization Period Expiration: 12/31/2020   Plan of Care Expiration: 12/08/2020  Reassessment: 10/16/2020, 11/20/2020  Visit # / Visits authorized:  7/20    Time In: 0935  Time Out: 1005  Total Billable Time: 35 minutes    Precautions: Standard and blood thinners, DOS 9/15/2020, 100% WB at 6 weeks.    DOS: 09/15/2020, POD: 85 (12/2/2020)    Subjective     Pt reports: he had some extended decrease of pain and stiffness after last treatment.      . He was compliant with home exercise program.  Response to previous treatment: felt a little better with decreased stiffness  Functional change: ambulating household distances without AD    Pain: 3/10 "pressure", stiff  Location: right knee      Objective     Yovani received therapeutic exercises to develop strength, ROM, flexibility and core stabilization for 10 minutes including:  Passive knee ROM x 2 min  Seated LAQ x 10  Passive quad stretch 3 x 45 sec    Yovani received the following manual therapy techniques: Soft tissue Mobilization were applied to the: right quad  for 25 minutes, including:  Soft tissue mobilization right quad      Soft Tissue Palpation Assessment to determine the necessity for Functional Dry Needling  .   Patient provided written and verbal consent to FDN.   FDN performed to rectus femoris , vastus lateralis, vastus medialis with electrical " stimulation    Home Exercises Provided and Patient Education Provided     Education provided:   - HEP    Written Home Exercises Provided: Patient instructed to cont prior HEP.  Exercises were reviewed and Yovani was able to demonstrate them prior to the end of the session.  Yovani demonstrated good  understanding of the education provided.     See EMR under Patient Instructions for exercises provided 9/23/2020.    Assessment     Yovani is having additional benefit with each treatment and is progressing some but still with altered gait pattern with minimal improvement with that. He should have continued benefit and encouraged to continue with HEP as issued by primary PT.    Patient demonstrated appropriate response to FDN.   Patient with improved overall tissue mobility with decreased tissue tension and trigger points upon palpation of treated muscle groups after Functional Dry Needling.  Good rhythmical contractions observed with estim to treated muscle groups    Yovani is progressing well towards his goals.   Pt prognosis is Good.     Pt will continue to benefit from skilled outpatient physical therapy to address the deficits listed in the problem list box on initial evaluation, provide pt/family education and to maximize pt's level of independence in the home and community environment.     Pt's spiritual, cultural and educational needs considered and pt agreeable to plan of care and goals.    Anticipated barriers to physical therapy: chronicity, multiple sx, obese    Goals:   Short Term Goals: 4 weeks, -COMPLETED   - R knee AROM ext to 0 deg to impact gait.  MET 10/16/2020, +1 DEG.  - R knee AROM flex to 90 deg towards impacting squatting and stair navigation.  MET 10/16/2020, 114 DEG.  - Improve R knee strength grossly to 4-/5 to impact gt.  MET 10/16/2020, GROSSLY 4-/5 STRENGTH.  - Pt able to ambulate 50% Wb per MD protocol.  MET 10/16/2020, 50% WB with min inc pn.  - Improve FOTO impairment score to 70% for  improved walking.  Progressing, not met, 79% at eval and still today. He is not yet WBAT and this may be reason for his perception.  MET 11/11/2020.     Short Term Goals: 8 weeks -COMPLETED  - Full WB R LE without AD or hinge brace for normal gait.  Progressing 11/11/2020, WBAT 1 crutch.  MET 11/20/2020, FWB WITHOUT AD, STILL LACKING HEEL STRIKE R AND R KNEE FLEX.  - Improve R knee AROM hyper ext to +3 deg for symmetry.  MET 11/20/2020, +4 DEG.  - Improve R knee AROM flex to 115 deg for improving symmetry.  + DEG 11/11/2020  - Improve R knee strength grossly to 4/5 to impact gt and  ADLs.  MET 11/20/2020, 4/5 R KNEE EXT, 4+/5 KNEE FLEX.  - Improve FOTO impairment score to 65% for improved walking, and abilities to help his child.  MET 47% 11/11/2020.     Short Term Goals: 12 weeks   - Normal gait without limp or pain.  - Improve R knee AROM flex to 123 deg for symmetry.  - Improve R hip and knee strength grossly to 4+/5 to impact gt and  ADLs.  - Pt able to RTW with </= 3/10 R knee pain for prolonged standing activities.  - Improve FOTO impairment score to 55% for improved RTW, HH chores and mod activities.  MET 47% 11/11/2020.     Long Term Goals: 16 weeks   - Pt to demo 5/5 R hip and knee strength for discharge from skilled PT.  - Pt will not be limited in HH chores, ambulation, static or prolonged standing and squatting activities for home, work, or .  - Improve FOTO impairment score to 48% for improved QOL.     Plan     Plan of care Certification: 9/18/2020 to 12/08/2020.     Outpatient Physical Therapy 2-3 times weekly for 12-16 weeks to include the following interventions: Aquatic Therapy, Electrical Stimulation IFC, Gait Training, Manual Therapy, Moist Heat/ Ice, Neuromuscular Re-ed, Patient Education, Self Care, Therapeutic Activites and Therapeutic Exercise.    Cont ROM and strengthening for improved QOL and independence.    Trevin Haley, PT

## 2020-12-22 ENCOUNTER — CLINICAL SUPPORT (OUTPATIENT)
Dept: REHABILITATION | Facility: HOSPITAL | Age: 30
End: 2020-12-22
Payer: COMMERCIAL

## 2020-12-22 DIAGNOSIS — R29.898 WEAKNESS OF RIGHT LOWER EXTREMITY: ICD-10-CM

## 2020-12-22 DIAGNOSIS — R26.9 GAIT ABNORMALITY: ICD-10-CM

## 2020-12-22 DIAGNOSIS — M25.661 DECREASED RANGE OF MOTION (ROM) OF RIGHT KNEE: Primary | ICD-10-CM

## 2020-12-22 PROCEDURE — 97140 MANUAL THERAPY 1/> REGIONS: CPT | Mod: PO

## 2020-12-22 PROCEDURE — 97110 THERAPEUTIC EXERCISES: CPT | Mod: PO

## 2020-12-23 ENCOUNTER — OFFICE VISIT (OUTPATIENT)
Dept: FAMILY MEDICINE | Facility: CLINIC | Age: 30
End: 2020-12-23
Payer: COMMERCIAL

## 2020-12-23 DIAGNOSIS — J40 BRONCHITIS: Primary | ICD-10-CM

## 2020-12-23 DIAGNOSIS — Z98.890 S/P RIGHT KNEE SURGERY: ICD-10-CM

## 2020-12-23 PROCEDURE — 99213 OFFICE O/P EST LOW 20 MIN: CPT | Mod: 95,,, | Performed by: NURSE PRACTITIONER

## 2020-12-23 PROCEDURE — 99213 PR OFFICE/OUTPT VISIT, EST, LEVL III, 20-29 MIN: ICD-10-PCS | Mod: 95,,, | Performed by: NURSE PRACTITIONER

## 2020-12-23 RX ORDER — METHYLPREDNISOLONE 4 MG/1
TABLET ORAL
Qty: 1 PACKAGE | Refills: 0 | Status: SHIPPED | OUTPATIENT
Start: 2020-12-23 | End: 2021-01-13

## 2020-12-23 RX ORDER — ALBUTEROL SULFATE 90 UG/1
2 AEROSOL, METERED RESPIRATORY (INHALATION) EVERY 6 HOURS PRN
Qty: 8 G | Refills: 0 | Status: SHIPPED | OUTPATIENT
Start: 2020-12-23 | End: 2021-01-06

## 2020-12-23 RX ORDER — OXYCODONE AND ACETAMINOPHEN 5; 325 MG/1; MG/1
1 TABLET ORAL EVERY 12 HOURS PRN
Qty: 14 TABLET | Refills: 0 | Status: CANCELLED | OUTPATIENT
Start: 2020-12-23

## 2020-12-23 RX ORDER — AZITHROMYCIN 250 MG/1
TABLET, FILM COATED ORAL
Qty: 6 TABLET | Refills: 0 | Status: SHIPPED | OUTPATIENT
Start: 2020-12-23 | End: 2020-12-28

## 2020-12-23 NOTE — PATIENT INSTRUCTIONS
Instructions for Patients with Confirmed or Suspected COVID-19    If you are awaiting your test result, you will either be called or it will be released to the patient portal.  If you have any questions about your test, please visit [email or web address] or call our COVID-19 information line at [phone].      Instructions for non-hospitalized or discharged patients with confirmed or suspected COVID-19:       Stay home except to get medical care.    Separate yourself from other people and animals in your home.    Call ahead before visiting your doctor.    Wear a face mask.    Cover your coughs and sneezes.    Clean your hands often.    Avoid sharing personal household items.    Clean all high-touch surfaces every day.    Monitor your symptoms. Seek prompt medical attention if your illness is worsening (e.g., difficulty breathing). Before seeking care, call your healthcare provider.    If you have a medical emergency and must call 911, notify the dispatcher that you have or are being evaluated for COVID-19. If possible, put on a face mask before emergency medical services arrive.    Use the following symptom-based strategy to return to normal activity following a suspected or confirmed case of COVID-19. Continue isolation until:   o At least 3 days (72 hours) have passed since recovery defined as resolution of fever without the use of fever-reducing medications and improvement in respiratory symptoms (e.g. cough, shortness of breath), and   o At least 10 days have passed since the first positive test.       As one of the next steps, you will receive a call or text from the [place] Department of Health ([facility]) COVID-19 Tracing Team. See the contact information below so you know not to ignore the health departments call. It is important that you contact them back immediately so they can help.     Contact Tracer Number:  [phone]  Caller ID for most carriers: [place] [facility]    What is  contact tracing?   Contact tracing is a process that helps identify everyone who has been in close contact with an infected person. Contact tracers let those people know they may have been exposed and guide them on next steps. Confidentiality is important for everyone; no one will be told who may have exposed them to the virus.   Your involvement is important. The more we know about where and how this virus is spreading, the better chance we have at stopping it from spreading further.  What does exposure mean?   Exposure means you have been within 6 feet for more than 15 minutes with a person who has or had COVID-19.  What kind of questions do the contact tracers ask?   A contact tracer will confirm your basic contact information including name, address, phone number, and next of kin, as well as asking about any symptoms you may have had. Theyll also ask you how you think you may have gotten sick, such as places where you may have been exposed to the virus, and people you were with. Those names will never be shared with anyone outside of that call, and will only be used to help trace and stop the spread of the virus.   I have privacy concerns. How will the state use my information?   Your privacy about your health is important. All calls are completed using call centers that use the appropriate health privacy protection measures (HIPAA compliance), meaning that your patient information is safe. No one will ever ask you any questions related to immigration status. Your health comes first.   Do I have to participate?   You do not have to participate, but we strongly encourage you to. Contact tracing can help us catch and control new outbreaks as theyre developing to keep your friends and family safe.   What if I dont hear from anyone?   If you dont receive a call within 24 hours, you can call the number above right away to inquire about your status. That line is open from 8:00 am - 8:00 p.m., 7 days a  week.  Contact tracing saves lives! Together, we have the power to beat this virus and keep our loved ones and neighbors safe.       Instructions for household members, intimate partners and caregivers in a non-healthcare setting of a patient with confirmed or suspected COVID-19:         Close contacts should monitor their health and call their healthcare provider right away if they develop symptoms suggestive of COVID-19 (e.g., fever, cough, shortness of breath).    Stay home except to get medical care. Separate yourself from other people and animals in the home.   Monitor the patients symptoms. If the patient is getting sicker, call his or her healthcare provider. If the patient has a medical emergency and you need to call 911, notify the dispatch personnel that the patient has or is being evaluated for COVID-19.    Wear a facemask when around other people such as sharing a room or vehicle and before entering a healthcare provider's office.   Cover coughs and sneezes with a tissue. Throw used tissues in a lined trash can immediately and wash hands.   Clean hands often with soap and water for at least 20 seconds or with an alcohol-based hand , rubbing hands together until they feel dry. Avoid touching your eyes, nose, and mouth with unwashed hands.   Clean all high-touch; surfaces every day, including counters, tabletops, doorknobs, bathroom fixtures, toilets, phones, keyboards, tablets, bedside tables, etc. Use a household cleaning spray or wipe according to label instructions.   Avoid sharing personal household items such as dishes, drinking glasses, cups, towels, bedding, etc. After these items are used, they should be washed thoroughly with soap and water.   Continue isolation until:   At least 3 days (72 hours) have passed since recovery defined as resolution of fever without the use of fever-reducing medications and improvement in respiratory symptoms (e.g. cough, shortness of breath),  and    At least 10 days have passed since the patients first positive test.    https://www.cdc.gov/coronavirus/2019-ncov/your-health/index.htm

## 2020-12-23 NOTE — LETTER
[address]  [place] [address]  Phone: [phone]  Fax: [phone]          Return to Work/School    Patient: Yovani Malik  YOB: 1990   Date: 12/23/2020     To Whom It May Concern:     Yovani Malik was in contact with/seen in my office on 12/23/2020. COVID-19 is present in our communities across the state. There is limited testing for COVID at this time, so not all patients can be tested. In this situation, your employee meets the following criteria:     Yovani Malik has met the criteria for COVID-19 testing based upon symptoms, travel, and/or potential exposure. The test has been completed and is pending results at this time. During this time the employee is not able to work and should be quarantined per the Centers for Disease Control timelines.      If you have any questions or concerns, or if I can be of further assistance, please do not hesitate to contact me.     Sincerely,    Mackenzie Mckee NP

## 2020-12-23 NOTE — PROGRESS NOTES
Subjective:       Patient ID: Yovani Malik is a 30 y.o. male.    Chief Complaint: No chief complaint on file.    The patient location is: [place], [place]  The chief complaint leading to consultation is: cough    Visit type: audiovisual    Face to Face time with patient: 15  15 minutes of total time spent on the encounter, which includes face to face time and non-face to face time preparing to see the patient (eg, review of tests), Obtaining and/or reviewing separately obtained history, Documenting clinical information in the electronic or other health record, Independently interpreting results (not separately reported) and communicating results to the patient/family/caregiver, or Care coordination (not separately reported).         Each patient to whom he or she provides medical services by telemedicine is:  (1) informed of the relationship between the physician and patient and the respective role of any other health care provider with respect to management of the patient; and (2) notified that he or she may decline to receive medical services by telemedicine and may withdraw from such care at any time.    Notes:       Patient has had coughing sneezing sore throat body aches since Friday, his daughter was ill last week. He has been taking Dayquil and Nyquil. Wife has now developed symptoms/    Past Medical History:  No date: Anxiety  No date: Borderline diabetes  No date: Hyperlipidemia  No date: Hypertension    Past Surgical History:  9/13/2019: ARTHROSCOPY OF KNEE; Right      Comment:  Procedure: ARTHROSCOPY, KNEE;  Surgeon: Anurag Wells MD;  Location: [facility] OR;  Service: Orthopedics;                 Laterality: Right;  10/3/2019: ESOPHAGOGASTRODUODENOSCOPY; N/A      Comment:  Procedure: EGD (ESOPHAGOGASTRODUODENOSCOPY);  Surgeon:                Yovani Dominguez MD;  Location: [facility] ENDO;  Service:                Endoscopy;  Laterality: N/A;  1/23/2020: ESOPHAGOGASTRODUODENOSCOPY; N/A       Comment:  Procedure: EGD (ESOPHAGOGASTRODUODENOSCOPY);  Surgeon:                Yovani Dominguez MD;  Location: [facility] ENDO;  Service:                Endoscopy;  Laterality: N/A;  9/15/2020: FEMUR OSTEOTOMY; Right      Comment:  Procedure: OSTEOTOMY, FEMUR;  Surgeon: Barry Lopez MD;  Location: [facility] OR;  Service: Orthopedics;                 Laterality: Right;  Regional w/ Catheter: Epidural,                Spinal, AdductorRECK 50cc  No date: KNEE ARTHROSCOPY  9/15/2020: KNEE ARTHROSCOPY W/ MENISCECTOMY; Right      Comment:  Procedure: ARTHROSCOPY, KNEE, WITH MENISCECTOMY;                 Surgeon: Barry Lopez MD;  Location: [facility] OR;                 Service: Orthopedics;  Laterality: Right;  No date: KNEE SURGERY; Bilateral      Comment:  x 2   9/13/2019: REPAIR OF MENISCUS OF KNEE; Right      Comment:  Procedure: REPAIR, MENISCUS, KNEE;  Surgeon: Anurag Wells MD;  Location: [facility] OR;  Service:                Orthopedics;  Laterality: Right;  lateral meniscal repair  9/15/2020: SYNOVECTOMY OF KNEE; Right      Comment:  Procedure: SYNOVECTOMY, KNEE;  Surgeon: Barry Lopez MD;  Location: [facility] OR;  Service: Orthopedics;                 Laterality: Right;  No date: TONSILLECTOMY    Review of patient's family history indicates:  Problem: Diabetes      Relation: Mother          Age of Onset: (Not Specified)  Problem: Hypertension      Relation: Mother          Age of Onset: (Not Specified)  Problem: Hyperlipidemia      Relation: Mother          Age of Onset: (Not Specified)  Problem: Diabetes      Relation: Father          Age of Onset: (Not Specified)  Problem: Diabetes      Relation: Maternal Grandmother          Age of Onset: (Not Specified)  Problem: Cancer      Relation: Maternal Grandmother          Age of Onset: (Not Specified)          Comment: breast      Social History    Socioeconomic History      Marital status:       Spouse name: Not on  file      Number of children: Not on file      Years of education: Not on file      Highest education level: Not on file    Occupational History      Not on file    Social Needs      Financial resource strain: Not on file      Food insecurity        Worry: Not on file        Inability: Not on file      Transportation needs        Medical: Not on file        Non-medical: Not on file    Tobacco Use      Smoking status: Current Some Day Smoker        Packs/day: 0.50        Types: Cigarettes      Smokeless tobacco: Never Used      Tobacco comment: maybe 0.5 pack a week    Substance and Sexual Activity      Alcohol use: No      Drug use: No      Sexual activity: Yes        Partners: Female    Lifestyle      Physical activity        Days per week: Not on file        Minutes per session: Not on file      Stress: Not on file    Relationships      Social connections        Talks on phone: Not on file        Gets together: Not on file        Attends Christianity service: Not on file        Active member of club or organization: Not on file        Attends meetings of clubs or organizations: Not on file        Relationship status: Not on file    Other Topics      Concerns:        Not on file    Social History Narrative      Not on file      Current Outpatient Medications:  ALPRAZolam (XANAX) 0.25 MG tablet, , Disp: , Rfl: 1  aspirin (ECOTRIN) 325 MG EC tablet, Take 1 tablet (325 mg total) by mouth once daily., Disp: 42 tablet, Rfl: 0  celecoxib (CELEBREX) 200 MG capsule, Take 1 capsule (200 mg total) by mouth 2 (two) times daily., Disp: 60 capsule, Rfl: 2  dextroamphetamine-amphetamine (ADDERALL XR) 30 MG 24 hr capsule, TK TWO CAPSULES PO D IN THE EARLY MORNING, Disp: , Rfl: 0  ibuprofen (ADVIL,MOTRIN) 800 MG tablet, Take 1 tablet (800 mg total) by mouth 3 (three) times daily., Disp: 90 tablet, Rfl: 1  ibuprofen-famotidine (DUEXIS) 800-26.6 mg Tab, Take 1 tablet by mouth 3 (three) times daily., Disp: 90 tablet, Rfl:  1  ibuprofen-famotidine (DUEXIS) 800-26.6 mg Tab, Take 1 tablet by mouth 3 (three) times daily., Disp: 90 tablet, Rfl: 3  oxyCODONE-acetaminophen (PERCOCET) 5-325 mg per tablet, Take 1 tablet by mouth every 12 (twelve) hours as needed., Disp: 14 tablet, Rfl: 0    No current facility-administered medications for this visit.   Facility-Administered Medications Ordered in Other Visits:  fentaNYL injection 25 mcg, 25 mcg, Intravenous, Q5 Min PRN, Michi Damon MD, 50 mcg at 09/15/20 0749  midazolam (VERSED) 1 mg/mL injection 0.5 mg, 0.5 mg, Intravenous, PRN, Michi Damon MD, 2 mg at 09/15/20 0745  ropivacaine 0.2% ON-Q C-BLOC 400 ML (SELECT A FLOW), , Perineural, Continuous, Michi Damon MD, Last Rate: 6 mL/hr at 09/15/20 1135        Review of patient's allergies indicates:  No Known Allergies    Review of Systems   Constitutional: Positive for chills and fatigue. Negative for fever.   HENT: Positive for ear pain, postnasal drip, rhinorrhea and sore throat. Negative for nosebleeds.    Respiratory: Positive for cough. Negative for shortness of breath and wheezing.    Gastrointestinal: Positive for diarrhea.   Genitourinary: Negative.    Musculoskeletal: Positive for myalgias.   Neurological: Positive for headaches.         Objective:      Physical Exam  Constitutional:       Appearance: Normal appearance.   Pulmonary:      Effort: Pulmonary effort is normal.      Comments: Coughing constantly during visit  Skin:     Findings: No erythema or rash.   Neurological:      Mental Status: He is alert and oriented to person, place, and time.   Psychiatric:         Mood and Affect: Mood normal.         Behavior: Behavior normal.         Assessment:       1. Bronchitis        Plan:       1. Bronchitis  Patient needs to be covid tested, advised to self quarantine per instructions attached until result is available. Treat as below, seek in person evaluation immediately for new or worsening  symtpoms  - COVID-19 Routine Screening; Future  - azithromycin (Z-RUIZ) 250 MG tablet; Take 2 tablets by mouth on day 1; Take 1 tablet by mouth on days 2-5  Dispense: 6 tablet; Refill: 0  - methylPREDNISolone (MEDROL DOSEPACK) 4 mg tablet; use as directed  Dispense: 1 Package; Refill: 0  - albuterol (VENTOLIN HFA) 90 mcg/actuation inhaler; Inhale 2 puffs into the lungs every 6 (six) hours as needed. Rescue  Dispense: 8 g; Refill: 0

## 2020-12-24 ENCOUNTER — LAB VISIT (OUTPATIENT)
Dept: FAMILY MEDICINE | Facility: CLINIC | Age: 30
End: 2020-12-24
Payer: COMMERCIAL

## 2020-12-24 DIAGNOSIS — J40 BRONCHITIS: ICD-10-CM

## 2020-12-24 PROCEDURE — U0003 INFECTIOUS AGENT DETECTION BY NUCLEIC ACID (DNA OR RNA); SEVERE ACUTE RESPIRATORY SYNDROME CORONAVIRUS 2 (SARS-COV-2) (CORONAVIRUS DISEASE [COVID-19]), AMPLIFIED PROBE TECHNIQUE, MAKING USE OF HIGH THROUGHPUT TECHNOLOGIES AS DESCRIBED BY CMS-2020-01-R: HCPCS

## 2020-12-24 RX ORDER — OXYCODONE AND ACETAMINOPHEN 5; 325 MG/1; MG/1
1 TABLET ORAL EVERY 12 HOURS PRN
Qty: 14 TABLET | Refills: 0 | Status: SHIPPED | OUTPATIENT
Start: 2020-12-24 | End: 2021-01-04 | Stop reason: SDUPTHER

## 2020-12-25 LAB — SARS-COV-2 RNA RESP QL NAA+PROBE: NOT DETECTED

## 2020-12-27 NOTE — PROGRESS NOTES
"   Physical Therapy Daily Treatment Note     Name: Yovani Malik  Clinic Number: [id number]    Therapy Diagnosis:   Encounter Diagnoses   Name Primary?    Decreased range of motion (ROM) of right knee Yes    Weakness of right lower extremity     Gait abnormality      Physician: Barry Lopez MD    Visit Date: 12/28/2020  Physician Orders: PT Eval and Treat      Medical Diagnosis from Referral: M21.061 (ICD-10-CM) - Genu valgum, acquired, right M94.261 (ICD-10-CM) - Chondromalacia of right knee M23.200 (ICD-10-CM) - Other old tear of lateral meniscus of right knee   Evaluation Date: 9/18/2020  Authorization Period Expiration: 12/31/2020   Plan of Care Expiration: 12/08/2020  Reassessment: 10/16/2020, 11/20/2020  Visit # / Visits authorized:  8/20    Time In:1030  Time Out: 1105  Total Billable Time: 35 minutes    Precautions: Standard and blood thinners, DOS 9/15/2020    DOS: 09/15/2020, POD: 85 (12/2/2020)    Subjective     Pt reports: his stiffness has been decreasing but still having increased pain the more he has been up on it.      . He was compliant with home exercise program.  Response to previous treatment: felt a little better with decreased stiffness  Functional change: ambulating household distances without AD    Pain: 5/10 "pressure", stiff  Location: right knee      Objective     Yovani received therapeutic exercises to develop strength, ROM, flexibility and core stabilization for 10 minutes including:  Passive knee ROM x 2 min  Seated LAQ x 10  Passive quad stretch 3 x 45 sec    Yovani received the following manual therapy techniques: Soft tissue Mobilization were applied to the: right quad  for 25 minutes, including:  Soft tissue mobilization right quad      Soft Tissue Palpation Assessment to determine the necessity for Functional Dry Needling  .   Patient provided written and verbal consent to FDN.   FDN performed to rectus femoris , vastus lateralis, vastus medialis with electrical " stimulation    Home Exercises Provided and Patient Education Provided     Education provided:   - HEP    Written Home Exercises Provided: Patient instructed to cont prior HEP.  Exercises were reviewed and Yovani was able to demonstrate them prior to the end of the session.  Yovani demonstrated good  understanding of the education provided.     See EMR under Patient Instructions for exercises provided 9/23/2020.    Assessment     Yovani is reporting improvement in his stiffness but still having increased pain with the more weight bearing he does. He has done well with manual therapy and dry needling will follow up with ortho and primary therapist about need for additional appts. He may benefit from additional treatment each 2-3 weeks for a couple more but does not need weekly treatments at this time.     Patient demonstrated appropriate response to FDN.   Patient with improved overall tissue mobility with decreased tissue tension and trigger points upon palpation of treated muscle groups after Functional Dry Needling.  Good rhythmical contractions observed with estim to treated muscle groups    Yovani is progressing well towards his goals.   Pt prognosis is Good.     Pt will continue to benefit from skilled outpatient physical therapy to address the deficits listed in the problem list box on initial evaluation, provide pt/family education and to maximize pt's level of independence in the home and community environment.     Pt's spiritual, cultural and educational needs considered and pt agreeable to plan of care and goals.    Anticipated barriers to physical therapy: chronicity, multiple sx, obese    Goals:   Short Term Goals: 4 weeks, -COMPLETED   - R knee AROM ext to 0 deg to impact gait.  MET 10/16/2020, +1 DEG.  - R knee AROM flex to 90 deg towards impacting squatting and stair navigation.  MET 10/16/2020, 114 DEG.  - Improve R knee strength grossly to 4-/5 to impact gt.  MET 10/16/2020, GROSSLY 4-/5 STRENGTH.  -  Pt able to ambulate 50% Wb per MD protocol.  MET 10/16/2020, 50% WB with min inc pn.  - Improve FOTO impairment score to 70% for improved walking.  Progressing, not met, 79% at eval and still today. He is not yet WBAT and this may be reason for his perception.  MET 11/11/2020.     Short Term Goals: 8 weeks -COMPLETED  - Full WB R LE without AD or hinge brace for normal gait.  Progressing 11/11/2020, WBAT 1 crutch.  MET 11/20/2020, FWB WITHOUT AD, STILL LACKING HEEL STRIKE R AND R KNEE FLEX.  - Improve R knee AROM hyper ext to +3 deg for symmetry.  MET 11/20/2020, +4 DEG.  - Improve R knee AROM flex to 115 deg for improving symmetry.  + DEG 11/11/2020  - Improve R knee strength grossly to 4/5 to impact gt and  ADLs.  MET 11/20/2020, 4/5 R KNEE EXT, 4+/5 KNEE FLEX.  - Improve FOTO impairment score to 65% for improved walking, and abilities to help his child.  MET 47% 11/11/2020.     Short Term Goals: 12 weeks   - Normal gait without limp or pain.  - Improve R knee AROM flex to 123 deg for symmetry.  - Improve R hip and knee strength grossly to 4+/5 to impact gt and  ADLs.  - Pt able to RTW with </= 3/10 R knee pain for prolonged standing activities.  - Improve FOTO impairment score to 55% for improved RTW, HH chores and mod activities.  MET 47% 11/11/2020.     Long Term Goals: 16 weeks   - Pt to demo 5/5 R hip and knee strength for discharge from skilled PT.  - Pt will not be limited in HH chores, ambulation, static or prolonged standing and squatting activities for home, work, or .  - Improve FOTO impairment score to 48% for improved QOL.     Plan   Follow up with ortho and primary PT     Trevin Haley, PT

## 2020-12-28 ENCOUNTER — CLINICAL SUPPORT (OUTPATIENT)
Dept: REHABILITATION | Facility: HOSPITAL | Age: 30
End: 2020-12-28
Payer: COMMERCIAL

## 2020-12-28 ENCOUNTER — PATIENT MESSAGE (OUTPATIENT)
Dept: SPORTS MEDICINE | Facility: CLINIC | Age: 30
End: 2020-12-28

## 2020-12-28 ENCOUNTER — TELEPHONE (OUTPATIENT)
Dept: SPORTS MEDICINE | Facility: CLINIC | Age: 30
End: 2020-12-28

## 2020-12-28 DIAGNOSIS — R26.9 GAIT ABNORMALITY: ICD-10-CM

## 2020-12-28 DIAGNOSIS — M25.661 DECREASED RANGE OF MOTION (ROM) OF RIGHT KNEE: Primary | ICD-10-CM

## 2020-12-28 DIAGNOSIS — R29.898 WEAKNESS OF RIGHT LOWER EXTREMITY: ICD-10-CM

## 2020-12-28 DIAGNOSIS — Z98.890 S/P RIGHT KNEE SURGERY: Primary | ICD-10-CM

## 2020-12-28 PROCEDURE — 97110 THERAPEUTIC EXERCISES: CPT | Mod: PO

## 2020-12-28 PROCEDURE — 97140 MANUAL THERAPY 1/> REGIONS: CPT | Mod: PO

## 2020-12-29 ENCOUNTER — PATIENT MESSAGE (OUTPATIENT)
Dept: SPORTS MEDICINE | Facility: CLINIC | Age: 30
End: 2020-12-29

## 2020-12-29 DIAGNOSIS — M21.061 GENU VALGUM, ACQUIRED, RIGHT: ICD-10-CM

## 2020-12-29 DIAGNOSIS — M23.200 OTHER OLD TEAR OF LATERAL MENISCUS OF RIGHT KNEE: ICD-10-CM

## 2020-12-29 DIAGNOSIS — M94.261 CHONDROMALACIA OF RIGHT KNEE: ICD-10-CM

## 2020-12-30 ENCOUNTER — CLINICAL SUPPORT (OUTPATIENT)
Dept: REHABILITATION | Facility: HOSPITAL | Age: 30
End: 2020-12-30
Payer: COMMERCIAL

## 2020-12-30 ENCOUNTER — NURSE TRIAGE (OUTPATIENT)
Dept: ADMINISTRATIVE | Facility: CLINIC | Age: 30
End: 2020-12-30

## 2020-12-30 DIAGNOSIS — M25.661 DECREASED RANGE OF MOTION (ROM) OF RIGHT KNEE: Primary | ICD-10-CM

## 2020-12-30 DIAGNOSIS — R29.898 WEAKNESS OF RIGHT LOWER EXTREMITY: ICD-10-CM

## 2020-12-30 DIAGNOSIS — R26.9 GAIT ABNORMALITY: ICD-10-CM

## 2020-12-30 PROCEDURE — 97110 THERAPEUTIC EXERCISES: CPT | Mod: PO | Performed by: PHYSICAL THERAPIST

## 2020-12-30 PROCEDURE — 97140 MANUAL THERAPY 1/> REGIONS: CPT | Mod: PO | Performed by: PHYSICAL THERAPIST

## 2020-12-30 RX ORDER — CELECOXIB 200 MG/1
200 CAPSULE ORAL 2 TIMES DAILY
Qty: 60 CAPSULE | Refills: 2 | Status: SHIPPED | OUTPATIENT
Start: 2020-12-30 | End: 2021-03-29

## 2020-12-30 NOTE — TELEPHONE ENCOUNTER
Reason for Disposition   General information question, no triage required and triager able to answer question    Protocols used: INFORMATION ONLY CALL-A-    Pt stated he was calling for his boss who does not have insurance and wanted to know if [facility] offer free testing. Information given for community testing site. Pt verbalized understanding.

## 2020-12-30 NOTE — PLAN OF CARE
"  Outpatient Therapy Updated Plan of Care     Visit Date: 12/30/2020  Name: Yovani Malik  Clinic Number: [id number]    Therapy Diagnosis:   Encounter Diagnoses   Name Primary?    Decreased range of motion (ROM) of right knee Yes    Weakness of right lower extremity     Gait abnormality      Physician: Barry Lopez MD    Physician Orders: PT Eval and Treat      Medical Diagnosis from Referral: M21.061 (ICD-10-CM) - Genu valgum, acquired, right M94.261 (ICD-10-CM) - Chondromalacia of right knee M23.200 (ICD-10-CM) - Other old tear of lateral meniscus of right knee   Evaluation Date: 9/18/2020    Total Visits Received: 27  Cancelled Visits: 4  No Show Visits: 1    Current Certification Period:  9/18/2020 to 12/08/2020  Precautions:  Standard, blood thinners  Visits from Evaluation Date:  26  Functional Level Prior to Evaluation:  (I) walking but significant pain    Subjective     Update:   Pt reports: DN has helped, but he continues with stiffness. His appt with Dr. Lopez has been post poned until Jan 27th, 2021. 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 He is wearing the knee brace at work and going out. He reports this is how Dr. Lopez has instructed him.      He was compliant with home exercise program.  Response to previous treatment: felt a little better with decreased stiffness  Functional change: ambulating household distances without AD     Pain: 5/10 "pressure", stiff  Location: right knee      Objective     Update:   Yovani received therapeutic exercises to develop strength, ROM, flexibility and core stabilization for 35 minutes including:  Passive knee ROM x 2 min  Seated LAQ x 10  Active quad stretch with strap 3x30" R  Active HS stretch with strap 3x30" R  Precor leg ext 35# concentric, hold 3", slow ecc x20  Y balance to be implemented  rebounder toss red ball 3 ways 3x10 each on turf to be implemented  Wall slides 2x10     Yovani received the following manual therapy " techniques: Soft tissue Mobilization were applied to the: right quad  for 10 minutes, including:  Tibial IR and ER mobs gd III-IV  PROM with tibial IR and with tibial ER     Home Exercises Provided and Patient Education Provided      Education provided:   - HEP  - LAQ with ecc focus at home with rice pack as resistance  - Wall slides and squats added to HEP     Written Home Exercises Provided: Patient instructed to cont prior HEP.  Exercises were reviewed and Yovani was able to demonstrate them prior to the end of the session.  Yovani demonstrated good  understanding of the education provided.      See EMR under Patient Instructions for exercises provided 9/23/2020.    Assessment     Update:   Yovani is reporting improvement in his stiffness but still having increased pain with the more weight bearing he does. He has done well with manual therapy and dry needling will follow up with ortho next month, but this has been r/s. Despite efforts, he continues walking with antalgic gt. He does demo continued LE weakness as listed below. Additional goals written today to begin progressing towards improved SL strength. He will cont per continuation order rec'd 1-2x/week for 4-6 additional weeks to ensure (I) HEP, improved strength WNL, and near MD f/u. We will also monitor his RTW to make sure he is ok with job requirements. Updated POC sent to MD for signature.     Yovani is progressing well towards his goals.   Pt prognosis is Good.      Pt will continue to benefit from skilled outpatient physical therapy to address the deficits listed in the problem list box on initial evaluation, provide pt/family education and to maximize pt's level of independence in the home and community environment.      Pt's spiritual, cultural and educational needs considered and pt agreeable to plan of care and goals.     Anticipated barriers to physical therapy: chronicity, multiple sx, obese    Previous Short Term Goals Status:     Short Term  Goals: 4 weeks, -COMPLETED   - R knee AROM ext to 0 deg to impact gait.  MET 10/16/2020, +1 DEG.  - R knee AROM flex to 90 deg towards impacting squatting and stair navigation.  MET 10/16/2020, 114 DEG.  - Improve R knee strength grossly to 4-/5 to impact gt.  MET 10/16/2020, GROSSLY 4-/5 STRENGTH.  - Pt able to ambulate 50% Wb per MD protocol.  MET 10/16/2020, 50% WB with min inc pn.  - Improve FOTO impairment score to 70% for improved walking.  Progressing, not met, 79% at [facility] and still today. He is not yet WBAT and this may be reason for his perception.  MET 11/11/2020.     Short Term Goals: 8 weeks -COMPLETED  - Full WB R LE without AD or hinge brace for normal gait.  Progressing 11/11/2020, WBAT 1 crutch.  MET 11/20/2020, FWB WITHOUT AD, STILL LACKING HEEL STRIKE R AND R KNEE FLEX.  - Improve R knee AROM hyper ext to +3 deg for symmetry.  MET 11/20/2020, +4 DEG.  - Improve R knee AROM flex to 115 deg for improving symmetry.  + DEG 11/11/2020  - Improve R knee strength grossly to 4/5 to impact gt and  ADLs.  MET 11/20/2020, 4/5 R KNEE EXT, 4+/5 KNEE FLEX.  - Improve FOTO impairment score to 65% for improved walking, and abilities to help his child.  MET 47% 11/11/2020.  New Short Term Goals Status:   Cont 12 week goals.  Short Term Goals: 12 weeks -progressing  - Normal gait without limp or pain.  Progressing, good at times.  - Improve R knee AROM flex to 123 deg for symmetry.   DEG 12/30/2020.  - Improve R hip and knee strength grossly to 4+/5 to impact gt and  ADLs.  R hip abd: 4/5, add: 4+/5, ext: 4/5, flex: 4+/5, HS: 4+/5, conc quad in sitting 5/5  Progressing, not met 12/30/2020  - Pt able to RTW with </= 3/10 R knee pain for prolonged standing activities.  Not met, progressing 5/10 pain with normal ADLs.  - Improve FOTO impairment score to 55% for improved RTW, HH chores and mod activities.  MET 47% 11/11/2020.  Long Term Goal Status:   continue per initial plan of care.  Additional goals added.  Long Term Goals: 16 weeks   - Pt to demo 5/5 R hip and knee strength for discharge from skilled PT.  - Pt will not be limited in HH chores, ambulation, static or prolonged standing and squatting activities for home, work, or .  - Improve FOTO impairment score to 48% for improved QOL.  MET 47% 11/11/2020.    12/30/2020: Pt able to perform partial SL squat with 4/10 pain or less to demo improved ecc quad strength.                      Pt able to perform dynamic balance activities without LOB for work safety with standing and walking ADLs.    Reasons for Recertification of Therapy:   Pt continues with antalgic gt, impaired strength, and just beginning to RTW.    Plan     Updated Certification Period: 12/08/2020 to  02/09/2021  Recommended Treatment Plan: 1-2 times per week for 8-9 weeks: Electrical Stimulation IFC, Gait Training, Manual Therapy, Moist Heat/ Ice, Neuromuscular Re-ed, Self Care, Therapeutic Activites and Therapeutic Exercise  Other Recommendations: Sonali French, PT  12/30/2020      I CERTIFY THE NEED FOR THESE SERVICES FURNISHED UNDER THIS PLAN OF TREATMENT AND WHILE UNDER MY CARE    Physician's comments:        Physician's Signature: ___________________________________________________

## 2020-12-30 NOTE — PROGRESS NOTES
"   Reassessment/Physical Therapy Daily Treatment Note     Name: Yovani Malik  Clinic Number: [id number]    Therapy Diagnosis:   Encounter Diagnoses   Name Primary?   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 His appt with Dr. Lopez has been post poned until Jan 27th, 2021. 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 He is wearing the knee brace at work and going out. He reports this is how Dr. Lopez has instructed him.     He was compliant with home exercise program.  Response to previous treatment: felt a little better with decreased stiffness  Functional change: ambulating household distances without AD    Pain: 5/10 "pressure", stiff  Location: right knee      Objective     Yovani received therapeutic exercises to develop strength, ROM, flexibility and core stabilization for 35 minutes including:  Passive knee ROM x 2 min  Seated LAQ x 10  Active quad stretch with strap 3x30" R  Active HS stretch with strap 3x30" R  Precor leg ext 35# concentric, hold 3", slow ecc x20  Y balance to be implemented  rebounder toss red ball 3 ways 3x10 each on turf to be implemented  Wall slides 2x10    Yovani received " the following manual therapy techniques: Soft tissue Mobilization were applied to the: right quad  for 10 minutes, including:  Tibial IR and ER mobs gd III-IV  PROM with tibial IR and with tibial ER    Home Exercises Provided and Patient Education Provided     Education provided:   - HEP  - LAQ with ecc focus at home with rice pack as resistance  - Wall slides and squats added to HEP    Written Home Exercises Provided: Patient instructed to cont prior HEP.  Exercises were reviewed and Yovani was able to demonstrate them prior to the end of the session.  Yovani demonstrated good  understanding of the education provided.     See EMR under Patient Instructions for exercises provided 9/23/2020.    Assessment     Yovani is reporting improvement in his stiffness but still having increased pain with the more weight bearing he does. He has done well with manual therapy and dry needling will follow up with ortho next month, but this has been r/s. Despite efforts, he continues walking with antalgic gt. He does demo continued LE weakness as listed below. Additional goals written today to begin progressing towards improved SL strength. He will cont per continuation order rec'd 1-2x/week for 4-6 additional weeks to ensure (I) HEP, improved strength WNL, and near MD f/u. We will also monitor his RTW to make sure he is ok with job requirements. Updated POC sent to MD for signature.    Yovani is progressing well towards his goals.   Pt prognosis is Good.     Pt will continue to benefit from skilled outpatient physical therapy to address the deficits listed in the problem list box on initial evaluation, provide pt/family education and to maximize pt's level of independence in the home and community environment.     Pt's spiritual, cultural and educational needs considered and pt agreeable to plan of care and goals.    Anticipated barriers to physical therapy: chronicity, multiple sx, obese    Goals:   Short Term Goals: 4 weeks,  -COMPLETED   - R knee AROM ext to 0 deg to impact gait.  MET 10/16/2020, +1 DEG.  - R knee AROM flex to 90 deg towards impacting squatting and stair navigation.  MET 10/16/2020, 114 DEG.  - Improve R knee strength grossly to 4-/5 to impact gt.  MET 10/16/2020, GROSSLY 4-/5 STRENGTH.  - Pt able to ambulate 50% Wb per MD protocol.  MET 10/16/2020, 50% WB with min inc pn.  - Improve FOTO impairment score to 70% for improved walking.  Progressing, not met, 79% at [facility] and still today. He is not yet WBAT and this may be reason for his perception.  MET 11/11/2020.     Short Term Goals: 8 weeks -COMPLETED  - Full WB R LE without AD or hinge brace for normal gait.  Progressing 11/11/2020, WBAT 1 crutch.  MET 11/20/2020, FWB WITHOUT AD, STILL LACKING HEEL STRIKE R AND R KNEE FLEX.  - Improve R knee AROM hyper ext to +3 deg for symmetry.  MET 11/20/2020, +4 DEG.  - Improve R knee AROM flex to 115 deg for improving symmetry.  + DEG 11/11/2020  - Improve R knee strength grossly to 4/5 to impact gt and  ADLs.  MET 11/20/2020, 4/5 R KNEE EXT, 4+/5 KNEE FLEX.  - Improve FOTO impairment score to 65% for improved walking, and abilities to help his child.  MET 47% 11/11/2020.     Short Term Goals: 12 weeks -progressing  - Normal gait without limp or pain.  Progressing, good at times.  - Improve R knee AROM flex to 123 deg for symmetry.   DEG 12/30/2020.  - Improve R hip and knee strength grossly to 4+/5 to impact gt and  ADLs.  R hip abd: 4/5, add: 4+/5, ext: 4/5, flex: 4+/5, HS: 4+/5, conc quad in sitting 5/5  Progressing, not met 12/30/2020  - Pt able to RTW with </= 3/10 R knee pain for prolonged standing activities.  Not met, progressing 5/10 pain with normal ADLs.  - Improve FOTO impairment score to 55% for improved RTW, HH chores and mod activities.  MET 47% 11/11/2020.     Long Term Goals: 16 weeks   - Pt to demo 5/5 R hip and knee strength for discharge from skilled PT.  - Pt will not be  limited in HH chores, ambulation, static or prolonged standing and squatting activities for home, work, or .  - Improve FOTO impairment score to 48% for improved QOL.  MET 47% 11/11/2020.  12/30/2020: Pt able to perform partial SL squat with 4/10 pain or less to demo improved ecc quad strength.           Pt able to perform dynamic balance activities without LOB for work safety with standing and walking ADLs.     Plan     Cont plan 1-2x/week for 9 weeks, effective 12/8/2020.    Niru French, PT

## 2021-01-01 DIAGNOSIS — Z98.890 S/P RIGHT KNEE SURGERY: ICD-10-CM

## 2021-01-01 RX ORDER — OXYCODONE AND ACETAMINOPHEN 5; 325 MG/1; MG/1
1 TABLET ORAL EVERY 12 HOURS PRN
Qty: 14 TABLET | Refills: 0 | Status: CANCELLED | OUTPATIENT
Start: 2021-01-01

## 2021-01-04 ENCOUNTER — CLINICAL SUPPORT (OUTPATIENT)
Dept: REHABILITATION | Facility: HOSPITAL | Age: 31
End: 2021-01-04
Payer: COMMERCIAL

## 2021-01-04 DIAGNOSIS — Z98.890 S/P RIGHT KNEE SURGERY: ICD-10-CM

## 2021-01-04 DIAGNOSIS — M25.661 DECREASED RANGE OF MOTION (ROM) OF RIGHT KNEE: Primary | ICD-10-CM

## 2021-01-04 DIAGNOSIS — R26.9 GAIT ABNORMALITY: ICD-10-CM

## 2021-01-04 DIAGNOSIS — R29.898 WEAKNESS OF RIGHT LOWER EXTREMITY: ICD-10-CM

## 2021-01-04 PROCEDURE — 97110 THERAPEUTIC EXERCISES: CPT | Mod: PO | Performed by: PHYSICAL THERAPIST

## 2021-01-04 RX ORDER — OXYCODONE AND ACETAMINOPHEN 5; 325 MG/1; MG/1
1 TABLET ORAL EVERY 12 HOURS PRN
Qty: 14 TABLET | Refills: 0 | Status: SHIPPED | OUTPATIENT
Start: 2021-01-04 | End: 2021-01-08 | Stop reason: SDUPTHER

## 2021-01-06 ENCOUNTER — CLINICAL SUPPORT (OUTPATIENT)
Dept: REHABILITATION | Facility: HOSPITAL | Age: 31
End: 2021-01-06
Payer: COMMERCIAL

## 2021-01-06 DIAGNOSIS — M25.661 DECREASED RANGE OF MOTION (ROM) OF RIGHT KNEE: Primary | ICD-10-CM

## 2021-01-06 DIAGNOSIS — R29.898 WEAKNESS OF RIGHT LOWER EXTREMITY: ICD-10-CM

## 2021-01-06 DIAGNOSIS — R26.9 GAIT ABNORMALITY: ICD-10-CM

## 2021-01-06 PROCEDURE — 97110 THERAPEUTIC EXERCISES: CPT | Mod: PO | Performed by: PHYSICAL THERAPIST

## 2021-01-08 DIAGNOSIS — Z98.890 S/P RIGHT KNEE SURGERY: ICD-10-CM

## 2021-01-08 RX ORDER — OXYCODONE AND ACETAMINOPHEN 5; 325 MG/1; MG/1
1 TABLET ORAL EVERY 12 HOURS PRN
Qty: 14 TABLET | Refills: 0 | Status: SHIPPED | OUTPATIENT
Start: 2021-01-08 | End: 2021-11-18

## 2021-01-14 ENCOUNTER — CLINICAL SUPPORT (OUTPATIENT)
Dept: REHABILITATION | Facility: HOSPITAL | Age: 31
End: 2021-01-14
Payer: COMMERCIAL

## 2021-01-14 DIAGNOSIS — R26.9 GAIT ABNORMALITY: ICD-10-CM

## 2021-01-14 DIAGNOSIS — M25.661 DECREASED RANGE OF MOTION (ROM) OF RIGHT KNEE: Primary | ICD-10-CM

## 2021-01-14 DIAGNOSIS — Z98.890 S/P RIGHT KNEE SURGERY: ICD-10-CM

## 2021-01-14 DIAGNOSIS — R29.898 WEAKNESS OF RIGHT LOWER EXTREMITY: ICD-10-CM

## 2021-01-14 PROCEDURE — 97140 MANUAL THERAPY 1/> REGIONS: CPT | Mod: PO | Performed by: PHYSICAL THERAPIST

## 2021-01-14 PROCEDURE — 97110 THERAPEUTIC EXERCISES: CPT | Mod: PO | Performed by: PHYSICAL THERAPIST

## 2021-01-15 RX ORDER — OXYCODONE AND ACETAMINOPHEN 5; 325 MG/1; MG/1
1 TABLET ORAL EVERY 12 HOURS PRN
Qty: 14 TABLET | Refills: 0 | Status: CANCELLED | OUTPATIENT
Start: 2021-01-15

## 2021-01-18 ENCOUNTER — PATIENT MESSAGE (OUTPATIENT)
Dept: SPORTS MEDICINE | Facility: CLINIC | Age: 31
End: 2021-01-18

## 2021-01-18 DIAGNOSIS — Z98.890 S/P RIGHT KNEE SURGERY: ICD-10-CM

## 2021-01-19 ENCOUNTER — CLINICAL SUPPORT (OUTPATIENT)
Dept: REHABILITATION | Facility: HOSPITAL | Age: 31
End: 2021-01-19
Payer: COMMERCIAL

## 2021-01-19 DIAGNOSIS — M25.661 DECREASED RANGE OF MOTION (ROM) OF RIGHT KNEE: Primary | ICD-10-CM

## 2021-01-19 DIAGNOSIS — R26.9 GAIT ABNORMALITY: ICD-10-CM

## 2021-01-19 DIAGNOSIS — R29.898 WEAKNESS OF RIGHT LOWER EXTREMITY: ICD-10-CM

## 2021-01-19 PROCEDURE — 97110 THERAPEUTIC EXERCISES: CPT | Mod: PO | Performed by: PHYSICAL THERAPIST

## 2021-01-20 ENCOUNTER — PATIENT OUTREACH (OUTPATIENT)
Dept: ADMINISTRATIVE | Facility: OTHER | Age: 31
End: 2021-01-20

## 2021-01-21 ENCOUNTER — CLINICAL SUPPORT (OUTPATIENT)
Dept: REHABILITATION | Facility: HOSPITAL | Age: 31
End: 2021-01-21
Payer: COMMERCIAL

## 2021-01-21 ENCOUNTER — PATIENT MESSAGE (OUTPATIENT)
Dept: SPORTS MEDICINE | Facility: CLINIC | Age: 31
End: 2021-01-21

## 2021-01-21 ENCOUNTER — OFFICE VISIT (OUTPATIENT)
Dept: UROLOGY | Facility: CLINIC | Age: 31
End: 2021-01-21
Payer: COMMERCIAL

## 2021-01-21 ENCOUNTER — TELEPHONE (OUTPATIENT)
Dept: SPORTS MEDICINE | Facility: CLINIC | Age: 31
End: 2021-01-21

## 2021-01-21 VITALS — HEIGHT: 66 IN | WEIGHT: 257.94 LBS | BODY MASS INDEX: 41.45 KG/M2

## 2021-01-21 DIAGNOSIS — R29.898 WEAKNESS OF RIGHT LOWER EXTREMITY: ICD-10-CM

## 2021-01-21 DIAGNOSIS — R26.9 GAIT ABNORMALITY: ICD-10-CM

## 2021-01-21 DIAGNOSIS — A63.0 GENITAL CONDYLOMA, MALE: Primary | ICD-10-CM

## 2021-01-21 DIAGNOSIS — M25.661 DECREASED RANGE OF MOTION (ROM) OF RIGHT KNEE: Primary | ICD-10-CM

## 2021-01-21 PROCEDURE — 99213 PR OFFICE/OUTPT VISIT, EST, LEVL III, 20-29 MIN: ICD-10-PCS | Mod: 25,S$GLB,, | Performed by: UROLOGY

## 2021-01-21 PROCEDURE — 99999 PR PBB SHADOW E&M-EST. PATIENT-LVL III: ICD-10-PCS | Mod: PBBFAC,,, | Performed by: UROLOGY

## 2021-01-21 PROCEDURE — 3008F BODY MASS INDEX DOCD: CPT | Mod: CPTII,S$GLB,, | Performed by: UROLOGY

## 2021-01-21 PROCEDURE — 1126F PR PAIN SEVERITY QUANTIFIED, NO PAIN PRESENT: ICD-10-PCS | Mod: S$GLB,,, | Performed by: UROLOGY

## 2021-01-21 PROCEDURE — 99213 OFFICE O/P EST LOW 20 MIN: CPT | Mod: 25,S$GLB,, | Performed by: UROLOGY

## 2021-01-21 PROCEDURE — 3008F PR BODY MASS INDEX (BMI) DOCUMENTED: ICD-10-PCS | Mod: CPTII,S$GLB,, | Performed by: UROLOGY

## 2021-01-21 PROCEDURE — 54060 EXCISION OF PENIS LESION(S): CPT | Mod: S$GLB,,, | Performed by: UROLOGY

## 2021-01-21 PROCEDURE — 54060 PR DESTR PENIS LESN,SIMPL,SURG EXCIS: ICD-10-PCS | Mod: S$GLB,,, | Performed by: UROLOGY

## 2021-01-21 PROCEDURE — 1126F AMNT PAIN NOTED NONE PRSNT: CPT | Mod: S$GLB,,, | Performed by: UROLOGY

## 2021-01-21 PROCEDURE — 97110 THERAPEUTIC EXERCISES: CPT | Mod: PO | Performed by: PHYSICAL THERAPIST

## 2021-01-21 PROCEDURE — 99999 PR PBB SHADOW E&M-EST. PATIENT-LVL III: CPT | Mod: PBBFAC,,, | Performed by: UROLOGY

## 2021-01-21 RX ORDER — OXYCODONE AND ACETAMINOPHEN 5; 325 MG/1; MG/1
1 TABLET ORAL EVERY 12 HOURS PRN
Qty: 14 TABLET | Refills: 0 | Status: CANCELLED | OUTPATIENT
Start: 2021-01-21

## 2021-01-21 RX ORDER — TRAZODONE HYDROCHLORIDE 100 MG/1
TABLET ORAL
COMMUNITY
Start: 2021-01-07 | End: 2021-11-18

## 2021-01-21 RX ORDER — METHOCARBAMOL 500 MG/1
TABLET, FILM COATED ORAL
COMMUNITY
Start: 2021-01-18 | End: 2021-02-08

## 2021-01-22 ENCOUNTER — TELEPHONE (OUTPATIENT)
Dept: SPORTS MEDICINE | Facility: CLINIC | Age: 31
End: 2021-01-22

## 2021-01-25 ENCOUNTER — HOSPITAL ENCOUNTER (OUTPATIENT)
Dept: RADIOLOGY | Facility: HOSPITAL | Age: 31
Discharge: HOME | End: 2021-01-25
Attending: ORTHOPAEDIC SURGERY
Payer: COMMERCIAL

## 2021-01-25 ENCOUNTER — OFFICE VISIT (OUTPATIENT)
Dept: SPORTS MEDICINE | Facility: CLINIC | Age: 31
End: 2021-01-25
Payer: COMMERCIAL

## 2021-01-25 VITALS — DIASTOLIC BLOOD PRESSURE: 98 MMHG | SYSTOLIC BLOOD PRESSURE: 133 MMHG | HEART RATE: 83 BPM

## 2021-01-25 DIAGNOSIS — M25.561 CHRONIC PAIN OF RIGHT KNEE: Primary | ICD-10-CM

## 2021-01-25 DIAGNOSIS — M25.561 CHRONIC PAIN OF RIGHT KNEE: ICD-10-CM

## 2021-01-25 DIAGNOSIS — G89.29 CHRONIC PAIN OF RIGHT KNEE: Primary | ICD-10-CM

## 2021-01-25 DIAGNOSIS — M94.261 CHONDROMALACIA OF RIGHT KNEE: ICD-10-CM

## 2021-01-25 DIAGNOSIS — M23.200 OTHER OLD TEAR OF LATERAL MENISCUS OF RIGHT KNEE: ICD-10-CM

## 2021-01-25 DIAGNOSIS — Z98.890 S/P RIGHT KNEE SURGERY: ICD-10-CM

## 2021-01-25 DIAGNOSIS — R29.898 WEAKNESS OF RIGHT LOWER EXTREMITY: ICD-10-CM

## 2021-01-25 DIAGNOSIS — M25.661 DECREASED RANGE OF MOTION (ROM) OF RIGHT KNEE: ICD-10-CM

## 2021-01-25 DIAGNOSIS — G89.29 CHRONIC PAIN OF RIGHT KNEE: ICD-10-CM

## 2021-01-25 DIAGNOSIS — R26.9 GAIT ABNORMALITY: ICD-10-CM

## 2021-01-25 DIAGNOSIS — M21.061 GENU VALGUM, ACQUIRED, RIGHT: ICD-10-CM

## 2021-01-25 PROCEDURE — 73564 X-RAY EXAM KNEE 4 OR MORE: CPT | Mod: 26,RT,, | Performed by: RADIOLOGY

## 2021-01-25 PROCEDURE — 99214 OFFICE O/P EST MOD 30 MIN: CPT | Mod: S$GLB,,, | Performed by: ORTHOPAEDIC SURGERY

## 2021-01-25 PROCEDURE — 73564 XR KNEE ORTHO BILAT WITH FLEXION: ICD-10-PCS | Mod: 26,LT,, | Performed by: RADIOLOGY

## 2021-01-25 PROCEDURE — 1125F PR PAIN SEVERITY QUANTIFIED, PAIN PRESENT: ICD-10-PCS | Mod: S$GLB,,, | Performed by: ORTHOPAEDIC SURGERY

## 2021-01-25 PROCEDURE — 73564 X-RAY EXAM KNEE 4 OR MORE: CPT | Mod: 26,LT,, | Performed by: RADIOLOGY

## 2021-01-25 PROCEDURE — 99214 PR OFFICE/OUTPT VISIT, EST, LEVL IV, 30-39 MIN: ICD-10-PCS | Mod: S$GLB,,, | Performed by: ORTHOPAEDIC SURGERY

## 2021-01-25 PROCEDURE — 99999 PR PBB SHADOW E&M-EST. PATIENT-LVL III: CPT | Mod: PBBFAC,,, | Performed by: ORTHOPAEDIC SURGERY

## 2021-01-25 PROCEDURE — 73564 X-RAY EXAM KNEE 4 OR MORE: CPT | Mod: TC,50

## 2021-01-25 PROCEDURE — 99999 PR PBB SHADOW E&M-EST. PATIENT-LVL III: ICD-10-PCS | Mod: PBBFAC,,, | Performed by: ORTHOPAEDIC SURGERY

## 2021-01-25 PROCEDURE — 1125F AMNT PAIN NOTED PAIN PRSNT: CPT | Mod: S$GLB,,, | Performed by: ORTHOPAEDIC SURGERY

## 2021-01-25 RX ORDER — ESOMEPRAZOLE MAGNESIUM 40 MG/1
40 CAPSULE, DELAYED RELEASE ORAL
Qty: 30 CAPSULE | Refills: 11 | Status: SHIPPED | OUTPATIENT
Start: 2021-01-25 | End: 2022-08-03

## 2021-01-25 RX ORDER — METHOCARBAMOL 500 MG/1
500 TABLET, FILM COATED ORAL 3 TIMES DAILY
Qty: 90 TABLET | Refills: 0 | Status: SHIPPED | OUTPATIENT
Start: 2021-01-25 | End: 2021-02-08 | Stop reason: SDUPTHER

## 2021-01-25 RX ORDER — INDOMETHACIN 75 MG/1
75 CAPSULE, EXTENDED RELEASE ORAL 2 TIMES DAILY WITH MEALS
Qty: 60 CAPSULE | Refills: 2 | Status: SHIPPED | OUTPATIENT
Start: 2021-01-25 | End: 2021-04-29

## 2021-02-01 ENCOUNTER — CLINICAL SUPPORT (OUTPATIENT)
Dept: REHABILITATION | Facility: HOSPITAL | Age: 31
End: 2021-02-01
Attending: ORTHOPAEDIC SURGERY
Payer: COMMERCIAL

## 2021-02-01 DIAGNOSIS — R29.898 WEAKNESS OF RIGHT LOWER EXTREMITY: ICD-10-CM

## 2021-02-01 DIAGNOSIS — M25.661 DECREASED RANGE OF MOTION (ROM) OF RIGHT KNEE: Primary | ICD-10-CM

## 2021-02-01 DIAGNOSIS — R26.9 GAIT ABNORMALITY: ICD-10-CM

## 2021-02-01 PROCEDURE — 97140 MANUAL THERAPY 1/> REGIONS: CPT | Mod: PO | Performed by: PHYSICAL THERAPIST

## 2021-02-01 PROCEDURE — 97110 THERAPEUTIC EXERCISES: CPT | Mod: PO | Performed by: PHYSICAL THERAPIST

## 2021-02-06 ENCOUNTER — PATIENT MESSAGE (OUTPATIENT)
Dept: SPORTS MEDICINE | Facility: CLINIC | Age: 31
End: 2021-02-06

## 2021-02-08 ENCOUNTER — PATIENT MESSAGE (OUTPATIENT)
Dept: SPORTS MEDICINE | Facility: CLINIC | Age: 31
End: 2021-02-08

## 2021-02-08 DIAGNOSIS — Z98.890 S/P RIGHT KNEE SURGERY: ICD-10-CM

## 2021-02-08 DIAGNOSIS — G89.29 CHRONIC PAIN OF RIGHT KNEE: ICD-10-CM

## 2021-02-08 DIAGNOSIS — G89.29 CHRONIC PAIN OF RIGHT KNEE: Primary | ICD-10-CM

## 2021-02-08 DIAGNOSIS — M21.061 GENU VALGUM, ACQUIRED, RIGHT: ICD-10-CM

## 2021-02-08 DIAGNOSIS — M23.200 OLD COMPLEX TEAR OF LATERAL MENISCUS OF RIGHT KNEE: ICD-10-CM

## 2021-02-08 DIAGNOSIS — M25.661 DECREASED RANGE OF MOTION (ROM) OF RIGHT KNEE: ICD-10-CM

## 2021-02-08 DIAGNOSIS — M25.561 CHRONIC PAIN OF RIGHT KNEE: Primary | ICD-10-CM

## 2021-02-08 DIAGNOSIS — M25.561 CHRONIC PAIN OF RIGHT KNEE: ICD-10-CM

## 2021-02-08 RX ORDER — METHOCARBAMOL 500 MG/1
500 TABLET, FILM COATED ORAL 3 TIMES DAILY
Qty: 90 TABLET | Refills: 0 | Status: SHIPPED | OUTPATIENT
Start: 2021-02-08 | End: 2021-03-10

## 2021-02-09 ENCOUNTER — DOCUMENTATION ONLY (OUTPATIENT)
Dept: SPORTS MEDICINE | Facility: CLINIC | Age: 31
End: 2021-02-09

## 2021-02-10 ENCOUNTER — PATIENT MESSAGE (OUTPATIENT)
Dept: SPORTS MEDICINE | Facility: CLINIC | Age: 31
End: 2021-02-10

## 2021-02-11 ENCOUNTER — CLINICAL SUPPORT (OUTPATIENT)
Dept: REHABILITATION | Facility: HOSPITAL | Age: 31
End: 2021-02-11
Attending: ORTHOPAEDIC SURGERY
Payer: COMMERCIAL

## 2021-02-11 DIAGNOSIS — M25.661 DECREASED RANGE OF MOTION (ROM) OF RIGHT KNEE: Primary | ICD-10-CM

## 2021-02-11 DIAGNOSIS — R29.898 WEAKNESS OF RIGHT LOWER EXTREMITY: ICD-10-CM

## 2021-02-11 DIAGNOSIS — R26.9 GAIT ABNORMALITY: ICD-10-CM

## 2021-02-11 PROCEDURE — 97140 MANUAL THERAPY 1/> REGIONS: CPT | Mod: PO

## 2021-02-15 ENCOUNTER — CLINICAL SUPPORT (OUTPATIENT)
Dept: REHABILITATION | Facility: HOSPITAL | Age: 31
End: 2021-02-15
Attending: ORTHOPAEDIC SURGERY
Payer: COMMERCIAL

## 2021-02-15 ENCOUNTER — PATIENT MESSAGE (OUTPATIENT)
Dept: SPORTS MEDICINE | Facility: CLINIC | Age: 31
End: 2021-02-15

## 2021-02-15 DIAGNOSIS — R29.898 WEAKNESS OF RIGHT LOWER EXTREMITY: ICD-10-CM

## 2021-02-15 DIAGNOSIS — M25.661 DECREASED RANGE OF MOTION (ROM) OF RIGHT KNEE: Primary | ICD-10-CM

## 2021-02-15 DIAGNOSIS — R26.9 GAIT ABNORMALITY: ICD-10-CM

## 2021-02-15 PROCEDURE — 97140 MANUAL THERAPY 1/> REGIONS: CPT | Mod: PO

## 2021-02-17 ENCOUNTER — CLINICAL SUPPORT (OUTPATIENT)
Dept: REHABILITATION | Facility: HOSPITAL | Age: 31
End: 2021-02-17
Attending: ORTHOPAEDIC SURGERY
Payer: COMMERCIAL

## 2021-02-17 DIAGNOSIS — R26.9 GAIT ABNORMALITY: ICD-10-CM

## 2021-02-17 DIAGNOSIS — M25.661 DECREASED RANGE OF MOTION (ROM) OF RIGHT KNEE: Primary | ICD-10-CM

## 2021-02-17 DIAGNOSIS — R29.898 WEAKNESS OF RIGHT LOWER EXTREMITY: ICD-10-CM

## 2021-02-17 PROCEDURE — 97140 MANUAL THERAPY 1/> REGIONS: CPT | Mod: PO | Performed by: PHYSICAL THERAPIST

## 2021-02-17 PROCEDURE — 97110 THERAPEUTIC EXERCISES: CPT | Mod: PO | Performed by: PHYSICAL THERAPIST

## 2021-02-19 ENCOUNTER — PATIENT MESSAGE (OUTPATIENT)
Dept: SPORTS MEDICINE | Facility: CLINIC | Age: 31
End: 2021-02-19

## 2021-02-22 ENCOUNTER — DOCUMENTATION ONLY (OUTPATIENT)
Dept: REHABILITATION | Facility: HOSPITAL | Age: 31
End: 2021-02-22

## 2021-02-22 ENCOUNTER — PATIENT MESSAGE (OUTPATIENT)
Dept: SPORTS MEDICINE | Facility: CLINIC | Age: 31
End: 2021-02-22

## 2021-03-02 ENCOUNTER — CLINICAL SUPPORT (OUTPATIENT)
Dept: REHABILITATION | Facility: HOSPITAL | Age: 31
End: 2021-03-02
Payer: COMMERCIAL

## 2021-03-02 DIAGNOSIS — R29.898 WEAKNESS OF RIGHT LOWER EXTREMITY: ICD-10-CM

## 2021-03-02 DIAGNOSIS — M25.661 DECREASED RANGE OF MOTION (ROM) OF RIGHT KNEE: Primary | ICD-10-CM

## 2021-03-02 DIAGNOSIS — R26.9 GAIT ABNORMALITY: ICD-10-CM

## 2021-03-02 PROCEDURE — 97110 THERAPEUTIC EXERCISES: CPT | Mod: PO | Performed by: PHYSICAL THERAPIST

## 2021-03-16 ENCOUNTER — PATIENT MESSAGE (OUTPATIENT)
Dept: REHABILITATION | Facility: HOSPITAL | Age: 31
End: 2021-03-16

## 2021-03-18 ENCOUNTER — CLINICAL SUPPORT (OUTPATIENT)
Dept: REHABILITATION | Facility: HOSPITAL | Age: 31
End: 2021-03-18
Payer: COMMERCIAL

## 2021-03-18 DIAGNOSIS — R29.898 WEAKNESS OF RIGHT LOWER EXTREMITY: ICD-10-CM

## 2021-03-18 DIAGNOSIS — M25.661 DECREASED RANGE OF MOTION (ROM) OF RIGHT KNEE: Primary | ICD-10-CM

## 2021-03-18 DIAGNOSIS — R26.9 GAIT ABNORMALITY: ICD-10-CM

## 2021-03-18 PROCEDURE — 97110 THERAPEUTIC EXERCISES: CPT | Mod: PO | Performed by: PHYSICAL THERAPIST

## 2021-03-25 ENCOUNTER — CLINICAL SUPPORT (OUTPATIENT)
Dept: REHABILITATION | Facility: HOSPITAL | Age: 31
End: 2021-03-25
Payer: COMMERCIAL

## 2021-03-25 DIAGNOSIS — R26.9 GAIT ABNORMALITY: ICD-10-CM

## 2021-03-25 DIAGNOSIS — M25.661 DECREASED RANGE OF MOTION (ROM) OF RIGHT KNEE: Primary | ICD-10-CM

## 2021-03-25 DIAGNOSIS — R29.898 WEAKNESS OF RIGHT LOWER EXTREMITY: ICD-10-CM

## 2021-03-25 PROCEDURE — 97110 THERAPEUTIC EXERCISES: CPT | Mod: PO | Performed by: PHYSICAL THERAPIST

## 2021-04-06 ENCOUNTER — CLINICAL SUPPORT (OUTPATIENT)
Dept: REHABILITATION | Facility: HOSPITAL | Age: 31
End: 2021-04-06
Payer: COMMERCIAL

## 2021-04-06 DIAGNOSIS — M25.661 DECREASED RANGE OF MOTION (ROM) OF RIGHT KNEE: Primary | ICD-10-CM

## 2021-04-06 DIAGNOSIS — R26.9 GAIT ABNORMALITY: ICD-10-CM

## 2021-04-06 DIAGNOSIS — R29.898 WEAKNESS OF RIGHT LOWER EXTREMITY: ICD-10-CM

## 2021-04-06 PROCEDURE — 97110 THERAPEUTIC EXERCISES: CPT | Mod: PO | Performed by: PHYSICAL THERAPIST

## 2021-04-13 ENCOUNTER — PATIENT MESSAGE (OUTPATIENT)
Dept: REHABILITATION | Facility: HOSPITAL | Age: 31
End: 2021-04-13

## 2021-04-19 ENCOUNTER — TELEPHONE (OUTPATIENT)
Dept: SPORTS MEDICINE | Facility: CLINIC | Age: 31
End: 2021-04-19

## 2021-04-20 ENCOUNTER — PATIENT MESSAGE (OUTPATIENT)
Dept: REHABILITATION | Facility: HOSPITAL | Age: 31
End: 2021-04-20

## 2021-04-20 ENCOUNTER — DOCUMENTATION ONLY (OUTPATIENT)
Dept: REHABILITATION | Facility: HOSPITAL | Age: 31
End: 2021-04-20

## 2021-04-28 ENCOUNTER — PATIENT OUTREACH (OUTPATIENT)
Dept: ADMINISTRATIVE | Facility: OTHER | Age: 31
End: 2021-04-28

## 2021-04-29 ENCOUNTER — PATIENT MESSAGE (OUTPATIENT)
Dept: REHABILITATION | Facility: HOSPITAL | Age: 31
End: 2021-04-29

## 2021-04-29 ENCOUNTER — PATIENT MESSAGE (OUTPATIENT)
Dept: SPORTS MEDICINE | Facility: CLINIC | Age: 31
End: 2021-04-29

## 2021-05-10 ENCOUNTER — PATIENT MESSAGE (OUTPATIENT)
Dept: RESEARCH | Facility: HOSPITAL | Age: 31
End: 2021-05-10

## 2021-05-24 ENCOUNTER — PATIENT MESSAGE (OUTPATIENT)
Dept: REHABILITATION | Facility: HOSPITAL | Age: 31
End: 2021-05-24

## 2021-05-25 ENCOUNTER — DOCUMENTATION ONLY (OUTPATIENT)
Dept: REHABILITATION | Facility: HOSPITAL | Age: 31
End: 2021-05-25

## 2021-05-26 ENCOUNTER — DOCUMENTATION ONLY (OUTPATIENT)
Dept: REHABILITATION | Facility: HOSPITAL | Age: 31
End: 2021-05-26

## 2021-05-30 ENCOUNTER — PATIENT OUTREACH (OUTPATIENT)
Dept: ADMINISTRATIVE | Facility: OTHER | Age: 31
End: 2021-05-30

## 2021-05-30 NOTE — PROGRESS NOTES
Physical Therapy Daily Treatment Note     Name: Yovani Malik  [facility] Number: [id number]    Therapy Diagnosis:   Encounter Diagnoses   Name Primary?    Decreased range of motion (ROM) of right knee Yes    Weakness of right lower extremity     Gait abnormality      Physician: Angelica Brady PA-C    Visit Date: 11/11/2020  Physician Orders: PT Eval and Treat      The patient should begin physical therapy on postoperative day # 3 and will be advanced to outpatient therapy as soon as   Possible following discharge.     Weight bearing: toe touch weight bearing on the right leg  Range of Motion: Full normal motion symmetric to opposite side     At 4 weeks' time, the patient should begin advancing from TTWB to 50% weightbearing at 4-5 weeks' time. The patient should begin advancing from 100% weightbearing at 6 weeks' time.      The patient can begin full weightbearing status at 6 weeks time.     Range of motion should be limited to -10 degrees hyperextension and flexion 120 degrees now.      Additional exercises to be performed are:   to begin quad sets with a heel roll to obtain hyperextension, straight leg raise and heel slides with the heel supported in a closed-chain fashion     Immediate specific exercises should include:   Gait program should include the above stated weightbearing; in addition: active extension with a heel-to-toe gait working on maintaining extension     Immobilizer if present should be locked @ -10 degrees with gait and allowed to flex to 120 degrees at rest.      Hinged knee immobilizer should be locked in -10 degrees hyperextension with gait for  6 weeks.    Medical Diagnosis from Referral: M21.061 (ICD-10-CM) - Genu valgum, acquired, right M94.261 (ICD-10-CM) - Chondromalacia of right knee M23.200 (ICD-10-CM) - Other old tear of lateral meniscus of right knee   Evaluation Date: 9/18/2020  Authorization Period Expiration: 12/31/2020   Plan of Care Expiration: 12/08/2020  Reassessment:  "10/16/2020  Visit # / Visits authorized: 15/ 20    Time In: 1000 AM   Time Out: 1045 AM  Total Billable Time: 42 minutes    Precautions: Standard and blood thinners, DOS 9/15/2020, 100% WB at 6 weeks.    DOS: 09/15/2020, POD: 64 (11/11/2020)    Subjective     Pt reports: his knee is feeling better each day. Continue to defer pt sleeping without brace to MD. He was compliant with home exercise program.  Response to previous treatment: no adverse effect  Functional change: progressing ext and flex noted.    Pain: 2/10 "pressure"  Location: right knee      Objective     10/16/2020: R knee AROM +1 to 118 deg    Yovani received therapeutic exercises to develop strength, ROM, flexibility and core stabilization for 42 minutes including:  Upright bike x 5 minutes, seat 10 (for flexibility)  Standing hip abd 2# B 4x10  Standing hip ext 2# B 4x10  Standing HSC 2# B 4x10  Standing TKE (green TB) 2x10  Standing mini squat x20  Marching bridge x20    Balance with NBOS EC 3x30"    Yovani participated in gait training activities to restore normal ambulation mechanics and improve tolerance to weightbearing for 10 minutes:  Lateral weight shifts x 2 minutes (no brace)  A/P weight shift x 1 minutes (ea LE leading) (no brace)  Ambulation with 1 crutch and no brace x 3 minutes  Side stepping in parallel bars x entire length x 2 laps     Home Exercises Provided and Patient Education Provided     Education provided:   - HEP  - cont both HEPs    Written Home Exercises Provided: Patient instructed to cont prior HEP.  Exercises were reviewed and Yovani was able to demonstrate them prior to the end of the session.  Yovani demonstrated good  understanding of the education provided.     See EMR under Patient Instructions for exercises provided 9/23/2020.    Assessment     Yovani presents to clinic with (U) crutch and without brace during ambulation. He continues with R hip in abd and decreased WB R sided. But he continues needing cues for " proper WS to R LE and stance. Patient apprehensive with weightbearing and gait training activities today but he did not complain of knee pain during these exercises. Pt encouraged to practice walking as trained in clinic today. Will assess additional bal as ready.    Yovani is progressing well towards his goals.   Pt prognosis is Good.     Pt will continue to benefit from skilled outpatient physical therapy to address the deficits listed in the problem list box on initial evaluation, provide pt/family education and to maximize pt's level of independence in the home and community environment.     Pt's spiritual, cultural and educational needs considered and pt agreeable to plan of care and goals.    Anticipated barriers to physical therapy: chronicity, multiple sx, obese    Goals:   Short Term Goals: 4 weeks, progressing   - R knee AROM ext to 0 deg to impact gait.  MET 10/16/2020, +1 DEG.  - R knee AROM flex to 90 deg towards impacting squatting and stair navigation.  MET 10/16/2020, 114 DEG.  - Improve R knee strength grossly to 4-/5 to impact gt.  MET 10/16/2020, GROSSLY 4-/5 STRENGTH.  - Pt able to ambulate 50% Wb per MD protocol.  MET 10/16/2020, 50% WB with min inc pn.  - Improve FOTO impairment score to 70% for improved walking.  Progressing, not met, 79% at eval and still today. He is not yet WBAT and this may be reason for his perception.     Short Term Goals: 8 weeks   - Full WB R LE without AD or hinge brace for normal gait.  Progressing 11/11/2020, WBAT 1 crutch.  - Improve R knee AROM hyper ext to +3 deg for symmetry.  - Improve R knee AROM flex to 115 deg for improving symmetry.  + DEG 11/11/2020  - Improve R knee strength grossly to 4/5 to impact gt and  ADLs.  - Improve FOTO impairment score to 65% for improved walking, and abilities to help his child.     Short Term Goals: 12 weeks   - Normal gait without limp or pain.  - Improve R knee AROM flex to 123 deg for symmetry.  - Improve R  hip and knee strength grossly to 4+/5 to impact gt and  ADLs.  - Pt able to RTW with </= 3/10 R knee pain for prolonged standing activities.  - Improve FOTO impairment score to 55% for improved RTW, HH chores and mod activities.     Long Term Goals: 16 weeks   - Pt to demo 5/5 R hip and knee strength for discharge from skilled PT.  - Pt will not be limited in HH chores, ambulation, static or prolonged standing and squatting activities for home, work, or .  - Improve FOTO impairment score to 48% for improved QOL.     Plan     Plan of care Certification: 9/18/2020 to 12/08/2020.     Outpatient Physical Therapy 2-3 times weekly for 12-16 weeks to include the following interventions: Aquatic Therapy, Electrical Stimulation IFC, Gait Training, Manual Therapy, Moist Heat/ Ice, Neuromuscular Re-ed, Patient Education, Self Care, Therapeutic Activites and Therapeutic Exercise.    Cont ROM and strengthening for improved QOL and independence.    Niru French, PT     No adenopathy or splenomegaly. No cervical or inguinal lymphadenopathy.

## 2021-05-31 ENCOUNTER — HOSPITAL ENCOUNTER (OUTPATIENT)
Dept: RADIOLOGY | Facility: HOSPITAL | Age: 31
Discharge: HOME | End: 2021-05-31
Attending: ORTHOPAEDIC SURGERY
Payer: COMMERCIAL

## 2021-05-31 ENCOUNTER — OFFICE VISIT (OUTPATIENT)
Dept: SPORTS MEDICINE | Facility: CLINIC | Age: 31
End: 2021-05-31
Payer: COMMERCIAL

## 2021-05-31 VITALS
WEIGHT: 246 LBS | DIASTOLIC BLOOD PRESSURE: 94 MMHG | SYSTOLIC BLOOD PRESSURE: 146 MMHG | HEART RATE: 99 BPM | HEIGHT: 66 IN | BODY MASS INDEX: 39.53 KG/M2

## 2021-05-31 DIAGNOSIS — M25.561 CHRONIC PAIN OF RIGHT KNEE: ICD-10-CM

## 2021-05-31 DIAGNOSIS — M25.561 RIGHT KNEE PAIN, UNSPECIFIED CHRONICITY: Primary | ICD-10-CM

## 2021-05-31 DIAGNOSIS — Z98.890 S/P RIGHT KNEE SURGERY: ICD-10-CM

## 2021-05-31 DIAGNOSIS — G89.29 CHRONIC PAIN OF RIGHT KNEE: ICD-10-CM

## 2021-05-31 DIAGNOSIS — M25.561 RIGHT KNEE PAIN, UNSPECIFIED CHRONICITY: ICD-10-CM

## 2021-05-31 DIAGNOSIS — M23.200 OTHER OLD TEAR OF LATERAL MENISCUS OF RIGHT KNEE: ICD-10-CM

## 2021-05-31 PROCEDURE — 99999 PR PBB SHADOW E&M-EST. PATIENT-LVL IV: ICD-10-PCS | Mod: PBBFAC,,, | Performed by: ORTHOPAEDIC SURGERY

## 2021-05-31 PROCEDURE — 1125F PR PAIN SEVERITY QUANTIFIED, PAIN PRESENT: ICD-10-PCS | Mod: S$GLB,,, | Performed by: ORTHOPAEDIC SURGERY

## 2021-05-31 PROCEDURE — 73564 X-RAY EXAM KNEE 4 OR MORE: CPT | Mod: TC,50

## 2021-05-31 PROCEDURE — 3008F BODY MASS INDEX DOCD: CPT | Mod: CPTII,S$GLB,, | Performed by: ORTHOPAEDIC SURGERY

## 2021-05-31 PROCEDURE — 99999 PR PBB SHADOW E&M-EST. PATIENT-LVL IV: CPT | Mod: PBBFAC,,, | Performed by: ORTHOPAEDIC SURGERY

## 2021-05-31 PROCEDURE — 73564 X-RAY EXAM KNEE 4 OR MORE: CPT | Mod: 26,50,, | Performed by: RADIOLOGY

## 2021-05-31 PROCEDURE — 99214 OFFICE O/P EST MOD 30 MIN: CPT | Mod: S$GLB,,, | Performed by: ORTHOPAEDIC SURGERY

## 2021-05-31 PROCEDURE — 3008F PR BODY MASS INDEX (BMI) DOCUMENTED: ICD-10-PCS | Mod: CPTII,S$GLB,, | Performed by: ORTHOPAEDIC SURGERY

## 2021-05-31 PROCEDURE — 73564 XR KNEE ORTHO BILAT WITH FLEXION: ICD-10-PCS | Mod: 26,50,, | Performed by: RADIOLOGY

## 2021-05-31 PROCEDURE — 1125F AMNT PAIN NOTED PAIN PRSNT: CPT | Mod: S$GLB,,, | Performed by: ORTHOPAEDIC SURGERY

## 2021-05-31 PROCEDURE — 99214 PR OFFICE/OUTPT VISIT, EST, LEVL IV, 30-39 MIN: ICD-10-PCS | Mod: S$GLB,,, | Performed by: ORTHOPAEDIC SURGERY

## 2021-05-31 RX ORDER — INDOMETHACIN 75 MG/1
75 CAPSULE, EXTENDED RELEASE ORAL 2 TIMES DAILY WITH MEALS
Qty: 60 CAPSULE | Refills: 3 | Status: SHIPPED | OUTPATIENT
Start: 2021-05-31 | End: 2021-09-28

## 2021-05-31 RX ORDER — METHOCARBAMOL 750 MG/1
750 TABLET, FILM COATED ORAL 4 TIMES DAILY
Qty: 120 TABLET | Refills: 3 | Status: SHIPPED | OUTPATIENT
Start: 2021-05-31 | End: 2021-06-30

## 2021-06-27 ENCOUNTER — PATIENT MESSAGE (OUTPATIENT)
Dept: SPORTS MEDICINE | Facility: CLINIC | Age: 31
End: 2021-06-27

## 2021-06-28 ENCOUNTER — TELEPHONE (OUTPATIENT)
Dept: SPORTS MEDICINE | Facility: CLINIC | Age: 31
End: 2021-06-28

## 2021-06-30 ENCOUNTER — PATIENT OUTREACH (OUTPATIENT)
Dept: ADMINISTRATIVE | Facility: OTHER | Age: 31
End: 2021-06-30

## 2021-07-06 ENCOUNTER — DOCUMENTATION ONLY (OUTPATIENT)
Dept: REHABILITATION | Facility: HOSPITAL | Age: 31
End: 2021-07-06

## 2021-08-10 ENCOUNTER — DOCUMENTATION ONLY (OUTPATIENT)
Dept: REHABILITATION | Facility: HOSPITAL | Age: 31
End: 2021-08-10

## 2021-08-10 PROBLEM — R29.898 WEAKNESS OF RIGHT LOWER EXTREMITY: Status: RESOLVED | Noted: 2020-09-21 | Resolved: 2021-08-10

## 2021-08-10 PROBLEM — R26.9 GAIT ABNORMALITY: Status: RESOLVED | Noted: 2020-09-21 | Resolved: 2021-08-10

## 2021-08-10 PROBLEM — M25.661 DECREASED RANGE OF MOTION (ROM) OF RIGHT KNEE: Status: RESOLVED | Noted: 2020-09-21 | Resolved: 2021-08-10

## 2021-10-07 ENCOUNTER — TELEPHONE (OUTPATIENT)
Dept: FAMILY MEDICINE | Facility: CLINIC | Age: 31
End: 2021-10-07

## 2021-11-18 ENCOUNTER — LAB VISIT (OUTPATIENT)
Dept: LAB | Facility: HOSPITAL | Age: 31
End: 2021-11-18
Attending: FAMILY MEDICINE
Payer: COMMERCIAL

## 2021-11-18 ENCOUNTER — OFFICE VISIT (OUTPATIENT)
Dept: FAMILY MEDICINE | Facility: CLINIC | Age: 31
End: 2021-11-18
Payer: COMMERCIAL

## 2021-11-18 VITALS
DIASTOLIC BLOOD PRESSURE: 86 MMHG | SYSTOLIC BLOOD PRESSURE: 136 MMHG | HEART RATE: 78 BPM | BODY MASS INDEX: 40.56 KG/M2 | OXYGEN SATURATION: 98 % | WEIGHT: 251.31 LBS

## 2021-11-18 DIAGNOSIS — Z00.01 ANNUAL VISIT FOR GENERAL ADULT MEDICAL EXAMINATION WITH ABNORMAL FINDINGS: Primary | ICD-10-CM

## 2021-11-18 DIAGNOSIS — Z82.49 FAMILY HISTORY OF PREMATURE CORONARY ARTERY DISEASE: ICD-10-CM

## 2021-11-18 DIAGNOSIS — F17.200 TOBACCO DEPENDENCE: ICD-10-CM

## 2021-11-18 DIAGNOSIS — E66.01 MORBID OBESITY: ICD-10-CM

## 2021-11-18 DIAGNOSIS — Z00.01 ANNUAL VISIT FOR GENERAL ADULT MEDICAL EXAMINATION WITH ABNORMAL FINDINGS: ICD-10-CM

## 2021-11-18 DIAGNOSIS — I10 PRIMARY HYPERTENSION: ICD-10-CM

## 2021-11-18 DIAGNOSIS — R07.89 OTHER CHEST PAIN: ICD-10-CM

## 2021-11-18 LAB
BASOPHILS # BLD AUTO: 0.05 K/UL (ref 0–0.2)
BASOPHILS NFR BLD: 0.6 % (ref 0–1.9)
DIFFERENTIAL METHOD: NORMAL
EOSINOPHIL # BLD AUTO: 0.3 K/UL (ref 0–0.5)
EOSINOPHIL NFR BLD: 2.9 % (ref 0–8)
ERYTHROCYTE [DISTWIDTH] IN BLOOD BY AUTOMATED COUNT: 13.3 % (ref 11.5–14.5)
HCT VFR BLD AUTO: 46.8 % (ref 40–54)
HGB BLD-MCNC: 15.1 G/DL (ref 14–18)
IMM GRANULOCYTES # BLD AUTO: 0.03 K/UL (ref 0–0.04)
IMM GRANULOCYTES NFR BLD AUTO: 0.3 % (ref 0–0.5)
LYMPHOCYTES # BLD AUTO: 2 K/UL (ref 1–4.8)
LYMPHOCYTES NFR BLD: 22.1 % (ref 18–48)
MCH RBC QN AUTO: 27.4 PG (ref 27–31)
MCHC RBC AUTO-ENTMCNC: 32.3 G/DL (ref 32–36)
MCV RBC AUTO: 85 FL (ref 82–98)
MONOCYTES # BLD AUTO: 0.6 K/UL (ref 0.3–1)
MONOCYTES NFR BLD: 6.6 % (ref 4–15)
NEUTROPHILS # BLD AUTO: 6.1 K/UL (ref 1.8–7.7)
NEUTROPHILS NFR BLD: 67.5 % (ref 38–73)
NRBC BLD-RTO: 0 /100 WBC
PLATELET # BLD AUTO: 297 K/UL (ref 150–450)
PMV BLD AUTO: 11.6 FL (ref 9.2–12.9)
RBC # BLD AUTO: 5.52 M/UL (ref 4.6–6.2)
WBC # BLD AUTO: 9.03 K/UL (ref 3.9–12.7)

## 2021-11-18 PROCEDURE — 36415 COLL VENOUS BLD VENIPUNCTURE: CPT | Mod: PO | Performed by: FAMILY MEDICINE

## 2021-11-18 PROCEDURE — 90715 TDAP VACCINE 7 YRS/> IM: CPT | Mod: S$GLB,,, | Performed by: FAMILY MEDICINE

## 2021-11-18 PROCEDURE — 93010 EKG 12-LEAD: ICD-10-PCS | Mod: S$GLB,,, | Performed by: INTERNAL MEDICINE

## 2021-11-18 PROCEDURE — 86803 HEPATITIS C AB TEST: CPT | Performed by: FAMILY MEDICINE

## 2021-11-18 PROCEDURE — 93005 EKG 12-LEAD: ICD-10-PCS | Mod: S$GLB,,, | Performed by: FAMILY MEDICINE

## 2021-11-18 PROCEDURE — 84443 ASSAY THYROID STIM HORMONE: CPT | Performed by: FAMILY MEDICINE

## 2021-11-18 PROCEDURE — 99999 PR PBB SHADOW E&M-EST. PATIENT-LVL III: ICD-10-PCS | Mod: PBBFAC,,, | Performed by: FAMILY MEDICINE

## 2021-11-18 PROCEDURE — 99395 PR PREVENTIVE VISIT,EST,18-39: ICD-10-PCS | Mod: 25,S$GLB,, | Performed by: FAMILY MEDICINE

## 2021-11-18 PROCEDURE — 93005 ELECTROCARDIOGRAM TRACING: CPT | Mod: S$GLB,,, | Performed by: FAMILY MEDICINE

## 2021-11-18 PROCEDURE — 80061 LIPID PANEL: CPT | Performed by: FAMILY MEDICINE

## 2021-11-18 PROCEDURE — 87389 HIV-1 AG W/HIV-1&-2 AB AG IA: CPT | Performed by: FAMILY MEDICINE

## 2021-11-18 PROCEDURE — 90471 TDAP VACCINE GREATER THAN OR EQUAL TO 7YO IM: ICD-10-PCS | Mod: S$GLB,,, | Performed by: FAMILY MEDICINE

## 2021-11-18 PROCEDURE — 93010 ELECTROCARDIOGRAM REPORT: CPT | Mod: S$GLB,,, | Performed by: INTERNAL MEDICINE

## 2021-11-18 PROCEDURE — 99395 PREV VISIT EST AGE 18-39: CPT | Mod: 25,S$GLB,, | Performed by: FAMILY MEDICINE

## 2021-11-18 PROCEDURE — 85025 COMPLETE CBC W/AUTO DIFF WBC: CPT | Performed by: FAMILY MEDICINE

## 2021-11-18 PROCEDURE — 90471 IMMUNIZATION ADMIN: CPT | Mod: S$GLB,,, | Performed by: FAMILY MEDICINE

## 2021-11-18 PROCEDURE — 80053 COMPREHEN METABOLIC PANEL: CPT | Performed by: FAMILY MEDICINE

## 2021-11-18 PROCEDURE — 90715 TDAP VACCINE GREATER THAN OR EQUAL TO 7YO IM: ICD-10-PCS | Mod: S$GLB,,, | Performed by: FAMILY MEDICINE

## 2021-11-18 PROCEDURE — 99999 PR PBB SHADOW E&M-EST. PATIENT-LVL III: CPT | Mod: PBBFAC,,, | Performed by: FAMILY MEDICINE

## 2021-11-18 RX ORDER — HYDROCODONE BITARTRATE AND ACETAMINOPHEN 10; 325 MG/1; MG/1
1 TABLET ORAL EVERY 8 HOURS PRN
COMMUNITY
End: 2022-08-03

## 2021-11-19 LAB
ALBUMIN SERPL BCP-MCNC: 3.9 G/DL (ref 3.5–5.2)
ALP SERPL-CCNC: 58 U/L (ref 55–135)
ALT SERPL W/O P-5'-P-CCNC: 24 U/L (ref 10–44)
ANION GAP SERPL CALC-SCNC: 11 MMOL/L (ref 8–16)
AST SERPL-CCNC: 22 U/L (ref 10–40)
BILIRUB SERPL-MCNC: 0.6 MG/DL (ref 0.1–1)
BUN SERPL-MCNC: 13 MG/DL (ref 6–20)
CALCIUM SERPL-MCNC: 9.7 MG/DL (ref 8.7–10.5)
CHLORIDE SERPL-SCNC: 103 MMOL/L (ref 95–110)
CHOLEST SERPL-MCNC: 224 MG/DL (ref 120–199)
CHOLEST/HDLC SERPL: 4.5 {RATIO} (ref 2–5)
CO2 SERPL-SCNC: 24 MMOL/L (ref 23–29)
CREAT SERPL-MCNC: 0.9 MG/DL (ref 0.5–1.4)
EST. GFR  (AFRICAN AMERICAN): >60 ML/MIN/1.73 M^2
EST. GFR  (NON AFRICAN AMERICAN): >60 ML/MIN/1.73 M^2
GLUCOSE SERPL-MCNC: 83 MG/DL (ref 70–110)
HCV AB SERPL QL IA: NEGATIVE
HDLC SERPL-MCNC: 50 MG/DL (ref 40–75)
HDLC SERPL: 22.3 % (ref 20–50)
HIV 1+2 AB+HIV1 P24 AG SERPL QL IA: NEGATIVE
LDLC SERPL CALC-MCNC: 145.8 MG/DL (ref 63–159)
NONHDLC SERPL-MCNC: 174 MG/DL
POTASSIUM SERPL-SCNC: 4.9 MMOL/L (ref 3.5–5.1)
PROT SERPL-MCNC: 7.6 G/DL (ref 6–8.4)
SODIUM SERPL-SCNC: 138 MMOL/L (ref 136–145)
TRIGL SERPL-MCNC: 141 MG/DL (ref 30–150)
TSH SERPL DL<=0.005 MIU/L-ACNC: 0.73 UIU/ML (ref 0.4–4)

## 2021-11-23 DIAGNOSIS — E78.49 OTHER HYPERLIPIDEMIA: Primary | ICD-10-CM

## 2021-11-23 RX ORDER — ATORVASTATIN CALCIUM 10 MG/1
10 TABLET, FILM COATED ORAL NIGHTLY
Qty: 90 TABLET | Refills: 3 | Status: SHIPPED | OUTPATIENT
Start: 2021-11-23 | End: 2022-08-03

## 2021-12-20 ENCOUNTER — PATIENT OUTREACH (OUTPATIENT)
Dept: ADMINISTRATIVE | Facility: OTHER | Age: 31
End: 2021-12-20
Payer: COMMERCIAL

## 2021-12-23 ENCOUNTER — OFFICE VISIT (OUTPATIENT)
Dept: CARDIOLOGY | Facility: CLINIC | Age: 31
End: 2021-12-23
Payer: COMMERCIAL

## 2021-12-23 VITALS
DIASTOLIC BLOOD PRESSURE: 89 MMHG | WEIGHT: 255.5 LBS | BODY MASS INDEX: 41.06 KG/M2 | HEIGHT: 66 IN | HEART RATE: 67 BPM | SYSTOLIC BLOOD PRESSURE: 134 MMHG

## 2021-12-23 DIAGNOSIS — R20.0 BILATERAL LEG NUMBNESS: ICD-10-CM

## 2021-12-23 DIAGNOSIS — F17.200 SMOKER: ICD-10-CM

## 2021-12-23 DIAGNOSIS — E78.5 HYPERLIPIDEMIA, UNSPECIFIED HYPERLIPIDEMIA TYPE: ICD-10-CM

## 2021-12-23 DIAGNOSIS — F17.200 TOBACCO DEPENDENCE: ICD-10-CM

## 2021-12-23 DIAGNOSIS — E66.01 MORBID OBESITY: Primary | ICD-10-CM

## 2021-12-23 DIAGNOSIS — Z82.49 FAMILY HISTORY OF PREMATURE CORONARY ARTERY DISEASE: ICD-10-CM

## 2021-12-23 DIAGNOSIS — R07.89 OTHER CHEST PAIN: ICD-10-CM

## 2021-12-23 DIAGNOSIS — I25.9 CHEST PAIN DUE TO MYOCARDIAL ISCHEMIA, UNSPECIFIED ISCHEMIC CHEST PAIN TYPE: ICD-10-CM

## 2021-12-23 PROCEDURE — 1159F PR MEDICATION LIST DOCUMENTED IN MEDICAL RECORD: ICD-10-PCS | Mod: CPTII,S$GLB,, | Performed by: INTERNAL MEDICINE

## 2021-12-23 PROCEDURE — 3079F DIAST BP 80-89 MM HG: CPT | Mod: CPTII,S$GLB,, | Performed by: INTERNAL MEDICINE

## 2021-12-23 PROCEDURE — 3075F SYST BP GE 130 - 139MM HG: CPT | Mod: CPTII,S$GLB,, | Performed by: INTERNAL MEDICINE

## 2021-12-23 PROCEDURE — 99999 PR PBB SHADOW E&M-EST. PATIENT-LVL III: ICD-10-PCS | Mod: PBBFAC,,, | Performed by: INTERNAL MEDICINE

## 2021-12-23 PROCEDURE — 99999 PR PBB SHADOW E&M-EST. PATIENT-LVL III: CPT | Mod: PBBFAC,,, | Performed by: INTERNAL MEDICINE

## 2021-12-23 PROCEDURE — 1159F MED LIST DOCD IN RCRD: CPT | Mod: CPTII,S$GLB,, | Performed by: INTERNAL MEDICINE

## 2021-12-23 PROCEDURE — 3079F PR MOST RECENT DIASTOLIC BLOOD PRESSURE 80-89 MM HG: ICD-10-PCS | Mod: CPTII,S$GLB,, | Performed by: INTERNAL MEDICINE

## 2021-12-23 PROCEDURE — 99204 PR OFFICE/OUTPT VISIT, NEW, LEVL IV, 45-59 MIN: ICD-10-PCS | Mod: S$GLB,,, | Performed by: INTERNAL MEDICINE

## 2021-12-23 PROCEDURE — 99204 OFFICE O/P NEW MOD 45 MIN: CPT | Mod: S$GLB,,, | Performed by: INTERNAL MEDICINE

## 2021-12-23 PROCEDURE — 3075F PR MOST RECENT SYSTOLIC BLOOD PRESS GE 130-139MM HG: ICD-10-PCS | Mod: CPTII,S$GLB,, | Performed by: INTERNAL MEDICINE

## 2021-12-23 PROCEDURE — 3008F PR BODY MASS INDEX (BMI) DOCUMENTED: ICD-10-PCS | Mod: CPTII,S$GLB,, | Performed by: INTERNAL MEDICINE

## 2021-12-23 PROCEDURE — 3008F BODY MASS INDEX DOCD: CPT | Mod: CPTII,S$GLB,, | Performed by: INTERNAL MEDICINE

## 2022-01-06 ENCOUNTER — OFFICE VISIT (OUTPATIENT)
Dept: SPINE | Facility: CLINIC | Age: 32
End: 2022-01-06
Payer: COMMERCIAL

## 2022-01-06 VITALS
SYSTOLIC BLOOD PRESSURE: 136 MMHG | HEART RATE: 90 BPM | BODY MASS INDEX: 40.32 KG/M2 | WEIGHT: 250.88 LBS | HEIGHT: 66 IN | DIASTOLIC BLOOD PRESSURE: 97 MMHG

## 2022-01-06 DIAGNOSIS — M54.6 PAIN IN THORACIC SPINE: ICD-10-CM

## 2022-01-06 DIAGNOSIS — G89.29 CHRONIC BILATERAL LOW BACK PAIN WITHOUT SCIATICA: Primary | ICD-10-CM

## 2022-01-06 DIAGNOSIS — M54.9 DORSALGIA, UNSPECIFIED: ICD-10-CM

## 2022-01-06 DIAGNOSIS — M54.50 CHRONIC BILATERAL LOW BACK PAIN WITHOUT SCIATICA: Primary | ICD-10-CM

## 2022-01-06 PROCEDURE — 3008F BODY MASS INDEX DOCD: CPT | Mod: CPTII,S$GLB,, | Performed by: PHYSICIAN ASSISTANT

## 2022-01-06 PROCEDURE — 3075F SYST BP GE 130 - 139MM HG: CPT | Mod: CPTII,S$GLB,, | Performed by: PHYSICIAN ASSISTANT

## 2022-01-06 PROCEDURE — 1159F PR MEDICATION LIST DOCUMENTED IN MEDICAL RECORD: ICD-10-PCS | Mod: CPTII,S$GLB,, | Performed by: PHYSICIAN ASSISTANT

## 2022-01-06 PROCEDURE — 99999 PR PBB SHADOW E&M-EST. PATIENT-LVL IV: ICD-10-PCS | Mod: PBBFAC,,, | Performed by: PHYSICIAN ASSISTANT

## 2022-01-06 PROCEDURE — 99203 PR OFFICE/OUTPT VISIT, NEW, LEVL III, 30-44 MIN: ICD-10-PCS | Mod: S$GLB,,, | Performed by: PHYSICIAN ASSISTANT

## 2022-01-06 PROCEDURE — 99999 PR PBB SHADOW E&M-EST. PATIENT-LVL IV: CPT | Mod: PBBFAC,,, | Performed by: PHYSICIAN ASSISTANT

## 2022-01-06 PROCEDURE — 3075F PR MOST RECENT SYSTOLIC BLOOD PRESS GE 130-139MM HG: ICD-10-PCS | Mod: CPTII,S$GLB,, | Performed by: PHYSICIAN ASSISTANT

## 2022-01-06 PROCEDURE — 3008F PR BODY MASS INDEX (BMI) DOCUMENTED: ICD-10-PCS | Mod: CPTII,S$GLB,, | Performed by: PHYSICIAN ASSISTANT

## 2022-01-06 PROCEDURE — 1160F PR REVIEW ALL MEDS BY PRESCRIBER/CLIN PHARMACIST DOCUMENTED: ICD-10-PCS | Mod: CPTII,S$GLB,, | Performed by: PHYSICIAN ASSISTANT

## 2022-01-06 PROCEDURE — 1160F RVW MEDS BY RX/DR IN RCRD: CPT | Mod: CPTII,S$GLB,, | Performed by: PHYSICIAN ASSISTANT

## 2022-01-06 PROCEDURE — 99203 OFFICE O/P NEW LOW 30 MIN: CPT | Mod: S$GLB,,, | Performed by: PHYSICIAN ASSISTANT

## 2022-01-06 PROCEDURE — 1159F MED LIST DOCD IN RCRD: CPT | Mod: CPTII,S$GLB,, | Performed by: PHYSICIAN ASSISTANT

## 2022-01-06 PROCEDURE — 3080F DIAST BP >= 90 MM HG: CPT | Mod: CPTII,S$GLB,, | Performed by: PHYSICIAN ASSISTANT

## 2022-01-06 PROCEDURE — 3080F PR MOST RECENT DIASTOLIC BLOOD PRESSURE >= 90 MM HG: ICD-10-PCS | Mod: CPTII,S$GLB,, | Performed by: PHYSICIAN ASSISTANT

## 2022-01-13 ENCOUNTER — TELEPHONE (OUTPATIENT)
Dept: CARDIOLOGY | Facility: CLINIC | Age: 32
End: 2022-01-13
Payer: COMMERCIAL

## 2022-01-13 DIAGNOSIS — I25.9 CHEST PAIN DUE TO MYOCARDIAL ISCHEMIA, UNSPECIFIED ISCHEMIC CHEST PAIN TYPE: Primary | ICD-10-CM

## 2022-01-13 NOTE — TELEPHONE ENCOUNTER
Please advise: pt asking if he can do pharmacological stress instead of exercise because of his back

## 2022-01-13 NOTE — TELEPHONE ENCOUNTER
----- Message from Margareth Irwin sent at 1/13/2022  7:21 AM CST -----  Regarding: rescheduling stress test  Contact: Patient  Patient want to speak with a nurse regarding rescheduling stress test, please call back at [phone] (home)

## 2022-01-14 ENCOUNTER — HOSPITAL ENCOUNTER (OUTPATIENT)
Dept: RADIOLOGY | Facility: HOSPITAL | Age: 32
Discharge: HOME | End: 2022-01-14
Attending: PHYSICIAN ASSISTANT
Payer: COMMERCIAL

## 2022-01-14 DIAGNOSIS — M54.50 CHRONIC BILATERAL LOW BACK PAIN WITHOUT SCIATICA: ICD-10-CM

## 2022-01-14 DIAGNOSIS — G89.29 CHRONIC BILATERAL LOW BACK PAIN WITHOUT SCIATICA: ICD-10-CM

## 2022-01-14 DIAGNOSIS — M54.6 PAIN IN THORACIC SPINE: ICD-10-CM

## 2022-01-14 PROCEDURE — 72114 X-RAY EXAM L-S SPINE BENDING: CPT | Mod: TC,FY,PO

## 2022-01-14 PROCEDURE — 72070 X-RAY EXAM THORAC SPINE 2VWS: CPT | Mod: TC,FY,PO

## 2022-01-14 PROCEDURE — 72070 XR THORACIC SPINE AP LATERAL: ICD-10-PCS | Mod: 26,,, | Performed by: RADIOLOGY

## 2022-01-14 PROCEDURE — 72114 X-RAY EXAM L-S SPINE BENDING: CPT | Mod: 26,,, | Performed by: RADIOLOGY

## 2022-01-14 PROCEDURE — 72148 MRI LUMBAR SPINE W/O DYE: CPT | Mod: TC,PO

## 2022-01-14 PROCEDURE — 72148 MRI LUMBAR SPINE WITHOUT CONTRAST: ICD-10-PCS | Mod: 26,,, | Performed by: RADIOLOGY

## 2022-01-14 PROCEDURE — 72146 MRI CHEST SPINE W/O DYE: CPT | Mod: TC,PO

## 2022-01-14 PROCEDURE — 72114 XR LUMBAR SPINE 5 VIEW WITH FLEX AND EXT: ICD-10-PCS | Mod: 26,,, | Performed by: RADIOLOGY

## 2022-01-14 PROCEDURE — 72146 MRI CHEST SPINE W/O DYE: CPT | Mod: 26,,, | Performed by: RADIOLOGY

## 2022-01-14 PROCEDURE — 72070 X-RAY EXAM THORAC SPINE 2VWS: CPT | Mod: 26,,, | Performed by: RADIOLOGY

## 2022-01-14 PROCEDURE — 72146 MRI THORACIC SPINE WITHOUT CONTRAST: ICD-10-PCS | Mod: 26,,, | Performed by: RADIOLOGY

## 2022-01-14 PROCEDURE — 72148 MRI LUMBAR SPINE W/O DYE: CPT | Mod: 26,,, | Performed by: RADIOLOGY

## 2022-01-20 ENCOUNTER — PATIENT OUTREACH (OUTPATIENT)
Dept: ADMINISTRATIVE | Facility: OTHER | Age: 32
End: 2022-01-20
Payer: COMMERCIAL

## 2022-01-20 ENCOUNTER — OFFICE VISIT (OUTPATIENT)
Dept: SPINE | Facility: CLINIC | Age: 32
End: 2022-01-20
Payer: COMMERCIAL

## 2022-01-20 VITALS
SYSTOLIC BLOOD PRESSURE: 131 MMHG | HEART RATE: 109 BPM | BODY MASS INDEX: 40.32 KG/M2 | WEIGHT: 250.88 LBS | HEIGHT: 66 IN | DIASTOLIC BLOOD PRESSURE: 86 MMHG

## 2022-01-20 DIAGNOSIS — G89.29 CHRONIC BILATERAL LOW BACK PAIN WITHOUT SCIATICA: ICD-10-CM

## 2022-01-20 DIAGNOSIS — M54.6 PAIN IN THORACIC SPINE: Primary | ICD-10-CM

## 2022-01-20 DIAGNOSIS — M54.50 CHRONIC BILATERAL LOW BACK PAIN WITHOUT SCIATICA: ICD-10-CM

## 2022-01-20 PROCEDURE — 99214 PR OFFICE/OUTPT VISIT, EST, LEVL IV, 30-39 MIN: ICD-10-PCS | Mod: S$GLB,,, | Performed by: PHYSICIAN ASSISTANT

## 2022-01-20 PROCEDURE — 3079F PR MOST RECENT DIASTOLIC BLOOD PRESSURE 80-89 MM HG: ICD-10-PCS | Mod: CPTII,S$GLB,, | Performed by: PHYSICIAN ASSISTANT

## 2022-01-20 PROCEDURE — 1159F MED LIST DOCD IN RCRD: CPT | Mod: CPTII,S$GLB,, | Performed by: PHYSICIAN ASSISTANT

## 2022-01-20 PROCEDURE — 3079F DIAST BP 80-89 MM HG: CPT | Mod: CPTII,S$GLB,, | Performed by: PHYSICIAN ASSISTANT

## 2022-01-20 PROCEDURE — 3008F BODY MASS INDEX DOCD: CPT | Mod: CPTII,S$GLB,, | Performed by: PHYSICIAN ASSISTANT

## 2022-01-20 PROCEDURE — 1160F PR REVIEW ALL MEDS BY PRESCRIBER/CLIN PHARMACIST DOCUMENTED: ICD-10-PCS | Mod: CPTII,S$GLB,, | Performed by: PHYSICIAN ASSISTANT

## 2022-01-20 PROCEDURE — 3075F SYST BP GE 130 - 139MM HG: CPT | Mod: CPTII,S$GLB,, | Performed by: PHYSICIAN ASSISTANT

## 2022-01-20 PROCEDURE — 3075F PR MOST RECENT SYSTOLIC BLOOD PRESS GE 130-139MM HG: ICD-10-PCS | Mod: CPTII,S$GLB,, | Performed by: PHYSICIAN ASSISTANT

## 2022-01-20 PROCEDURE — 3008F PR BODY MASS INDEX (BMI) DOCUMENTED: ICD-10-PCS | Mod: CPTII,S$GLB,, | Performed by: PHYSICIAN ASSISTANT

## 2022-01-20 PROCEDURE — 1160F RVW MEDS BY RX/DR IN RCRD: CPT | Mod: CPTII,S$GLB,, | Performed by: PHYSICIAN ASSISTANT

## 2022-01-20 PROCEDURE — 1159F PR MEDICATION LIST DOCUMENTED IN MEDICAL RECORD: ICD-10-PCS | Mod: CPTII,S$GLB,, | Performed by: PHYSICIAN ASSISTANT

## 2022-01-20 PROCEDURE — 99999 PR PBB SHADOW E&M-EST. PATIENT-LVL IV: CPT | Mod: PBBFAC,,, | Performed by: PHYSICIAN ASSISTANT

## 2022-01-20 PROCEDURE — 99214 OFFICE O/P EST MOD 30 MIN: CPT | Mod: S$GLB,,, | Performed by: PHYSICIAN ASSISTANT

## 2022-01-20 PROCEDURE — 99999 PR PBB SHADOW E&M-EST. PATIENT-LVL IV: ICD-10-PCS | Mod: PBBFAC,,, | Performed by: PHYSICIAN ASSISTANT

## 2022-01-20 RX ORDER — METHOCARBAMOL 750 MG/1
750 TABLET, FILM COATED ORAL EVERY 6 HOURS PRN
COMMUNITY
Start: 2021-09-24 | End: 2023-03-01

## 2022-01-20 NOTE — PROGRESS NOTES
Back and Spine Follow Up    Patient ID: Yovani Malik is a 31 y.o. male.    Chief Complaint   Patient presents with    Follow-up     X-ray & Mrix2 results for low back pain.       Review of Systems   Constitutional: Negative for activity change, chills, fatigue and unexpected weight change.   HENT: Negative for hearing loss, tinnitus, trouble swallowing and voice change.    Eyes: Negative for visual disturbance.   Respiratory: Negative for apnea, chest tightness and shortness of breath.    Cardiovascular: Negative for chest pain and palpitations.   Gastrointestinal: Negative for abdominal pain, constipation, diarrhea, nausea and vomiting.   Genitourinary: Negative for difficulty urinating, dysuria and frequency.   Musculoskeletal: Positive for arthralgias and back pain. Negative for gait problem, neck pain and neck stiffness.   Skin: Negative for wound.   Neurological: Negative for dizziness, tremors, seizures, facial asymmetry, speech difficulty, weakness, light-headedness, numbness and headaches.   Psychiatric/Behavioral: Negative for confusion and decreased concentration.       Past Medical History:   Diagnosis Date    Anxiety     Borderline diabetes     Hyperlipidemia     Hypertension      Social History     Socioeconomic History    Marital status:    Tobacco Use    Smoking status: Current Some Day Smoker     Packs/day: 0.50     Types: Cigarettes    Smokeless tobacco: Never Used    Tobacco comment: maybe 0.5 pack a week   Substance and Sexual Activity    Alcohol use: No    Drug use: No    Sexual activity: Yes     Partners: Female     Family History   Problem Relation Age of Onset    Diabetes Mother     Hypertension Mother     Hyperlipidemia Mother     Diabetes Father     Diabetes Maternal Grandmother     Cancer Maternal Grandmother         breast     Review of patient's allergies indicates:  No Known Allergies    Current Outpatient Medications:     ALPRAZolam (XANAX) 0.25 MG tablet, ,  "Disp: , Rfl: 1    atorvastatin (LIPITOR) 10 MG tablet, Take 1 tablet (10 mg total) by mouth every evening., Disp: 90 tablet, Rfl: 3    celecoxib (CELEBREX) 200 MG capsule, TAKE 1 CAPSULE(200 MG) BY MOUTH TWICE DAILY, Disp: 60 capsule, Rfl: 2    dextroamphetamine-amphetamine (ADDERALL XR) 30 MG 24 hr capsule, TK TWO CAPSULES PO D IN THE EARLY MORNING, Disp: , Rfl: 0    HYDROcodone-acetaminophen (NORCO)  mg per tablet, Take 1 tablet by mouth every 8 (eight) hours as needed., Disp: , Rfl:     methocarbamoL (ROBAXIN) 750 MG Tab, Take 750 mg by mouth every 6 (six) hours as needed., Disp: , Rfl:     esomeprazole (NEXIUM) 40 MG capsule, Take 1 capsule (40 mg total) by mouth before breakfast. (Patient not taking: Reported on 1/20/2022), Disp: 30 capsule, Rfl: 11    Vitals:    01/20/22 0859   BP: 131/86   BP Location: Left arm   Patient Position: Sitting   BP Method: Large (Automatic)   Pulse: 109   Weight: 113.8 kg (250 lb 14.1 oz)   Height: 5' 6" (1.676 m)       Physical Exam  Vitals and nursing note reviewed.   Constitutional:       Appearance: He is well-developed.   HENT:      Head: Normocephalic and atraumatic.   Eyes:      Pupils: Pupils are equal, round, and reactive to light.   Cardiovascular:      Rate and Rhythm: Normal rate.   Pulmonary:      Effort: Pulmonary effort is normal.   Abdominal:      General: There is no distension.   Musculoskeletal:         General: Normal range of motion.      Cervical back: Normal range of motion and neck supple.   Skin:     General: Skin is warm and dry.   Neurological:      Mental Status: He is alert and oriented to person, place, and time.      Coordination: Finger-Nose-Finger Test, Heel to Shin Test and Romberg Test normal.      Gait: Gait is intact. Tandem walk normal.      Deep Tendon Reflexes:      Reflex Scores:       Tricep reflexes are 1+ on the right side and 1+ on the left side.       Bicep reflexes are 1+ on the right side and 1+ on the left side.       " Brachioradialis reflexes are 1+ on the right side and 1+ on the left side.       Patellar reflexes are 1+ on the right side and 1+ on the left side.       Achilles reflexes are 1+ on the right side and 1+ on the left side.  Psychiatric:         Speech: Speech normal.         Behavior: Behavior normal.         Thought Content: Thought content normal.         Judgment: Judgment normal.         Neurologic Exam     Mental Status   Oriented to person, place, and time.   Oriented to person.   Oriented to place.   Oriented to time.   Follows 3 step commands.   Attention: normal. Concentration: normal.   Speech: speech is normal   Level of consciousness: alert  Knowledge: consistent with education.   Able to name object. Able to read. Able to repeat. Able to write. Normal comprehension.     Cranial Nerves     CN II   Visual acuity: normal  Right visual field deficit: none  Left visual field deficit: none     CN III, IV, VI   Pupils are equal, round, and reactive to light.  Right pupil: Size: 3 mm. Shape: regular. Reactivity: brisk. Consensual response: intact.   Left pupil: Size: 3 mm. Shape: regular. Reactivity: brisk. Consensual response: intact.   CN III: no CN III palsy  CN VI: no CN VI palsy  Nystagmus: none   Diplopia: none  Ophthalmoparesis: none  Conjugate gaze: present    CN V   Right facial sensation deficit: none  Left facial sensation deficit: none    CN VII   Right facial weakness: none  Left facial weakness: none    CN VIII   Hearing: intact    CN IX, X   CN IX normal.   CN X normal.     CN XI   Right sternocleidomastoid strength: normal  Left sternocleidomastoid strength: normal  Right trapezius strength: normal  Left trapezius strength: normal    CN XII   Fasciculations: absent  Tongue deviation: none    Motor Exam   Muscle bulk: normal  Overall muscle tone: normal  Right arm pronator drift: absent  Left arm pronator drift: absent    Strength   Right neck flexion: 5/5  Left neck flexion: 5/5  Right neck  "extension: 5/5  Left neck extension: 5/5  Right deltoid: 5/5  Left deltoid: 5/5  Right biceps: 5/5  Left biceps: 5/5  Right triceps: 5/5  Left triceps: 5/5  Right wrist flexion: 5/5  Left wrist flexion: 5/5  Right wrist extension: 5/5  Left wrist extension: 5/5  Right interossei: 5/5  Left interossei: 5/  Right abdominals: 5/5  Left abdominals: 5/5  Right iliopsoas: 5/  Left iliopsoas: 5/  Right quadriceps: 5/5  Left quadriceps: 5/  Right hamstrin/5  Left hamstrin/5  Right glutei:   Left glutei:   Right anterior tibial:   Left anterior tibial:   Right posterior tibial:   Left posterior tibial:   Right peroneal:   Left peroneal:   Right gastroc:   Left gastroc:     Sensory Exam   Right arm light touch: normal  Left arm light touch: normal  Right leg light touch: normal  Left leg light touch: normal  Right arm vibration: normal  Left arm vibration: normal  Right arm pinprick: normal  Left arm pinprick: normal    Gait, Coordination, and Reflexes     Gait  Gait: normal    Coordination   Romberg: negative  Finger to nose coordination: normal  Heel to shin coordination: normal  Tandem walking coordination: normal    Tremor   Resting tremor: absent  Intention tremor: absent  Action tremor: absent    Reflexes   Right brachioradialis: 1+  Left brachioradialis: 1+  Right biceps: 1+  Left biceps: 1+  Right triceps: 1+  Left triceps: 1+  Right patellar: 1+  Left patellar: 1+  Right achilles: 1+  Left achilles: 1+  Right : 1+  Left : 1+  Right Morse: absent  Left Morse: absent  Right ankle clonus: absent  Left ankle clonus: absent      Provider dictation:    31 year old male smoker with history of multiple knee surgeries, anxiety, hypertension, hyperlipidemia, GERD presents to discuss back pain after undergoing imaging.  At last visitn, he described "years of back pain that has worsened after last knee surgery 2020.  He has pain just inferior to the left scapular " "region.  Also has pain in the upper /mid lumbar region.  He is not sure if pain radiates to the legs as he has chronic right leg pain that he has associated to knee problems in the past. He has taken medication, gone to chiropractor and has had injections with pain management."    Today is describes pain more generalized throughout the entire back affecting different regions at different times through the day.  At night in particular, he describes a sensation as if there is tight miller in his thoracic and lumbar region.    Current medications:  Celebrex, norco, aleve, methocarbamal  Physical therapy:  None  Chiropractic care:  Intermittently through the years with last visit 6 months ago  Interventional Procedures:  Injections in the thoracic and lumbar region, unsure if JACOBO with Dr. Bogdan Mckeon     On exam, there is 5/5 strength with 1+ DTR and no sensory deficits.  Gait and station fluid.  Denies bowel/ bladder dysfunction.  Full range of motion of the upper and lower extremities. No pain with axial facet loading.  No SI joint pain on palpation.  No pain with lumbar flexion/ extension.    Xray and MRI thoracic spine from 1-14-22 reviewed.  Mild degenerative changes with good alignment.  T11/12 small right/ central disk hernia with mild flattening of the right ventral cord and mild right foraminal narrowing.  Smaller right T6/7 disk hernia.  Overall no significant central canal or foraminal narrowing.     Xray and MRI lumbar spine from 1-14-22 reviewed. There are some Mild degenerative changes with good alignment and no instablity.  L4/5 mild facet arthropathy without any significant central canal or foraminal narrowing.       Mr. Pina has mild degenerative changes in the thoracic and lumbar regions without any majjor central canal or foramainal narrowing to explain his wide distribution of pain through the thoracic and lumbar spine.   Pain patten and imaging support myofasical pain.  I recommend healthy back PT, " weight loss, exericse and good posture.  He would like to try healthy back PT.  Follow up if no improvement.      Visit Diagnosis:  Pain in thoracic spine  -     Ambulatory referral/consult to [facility]; Future; Expected date: 01/27/2022    Chronic bilateral low back pain without sciatica  -     Ambulatory referral/consult to [facility]; Future; Expected date: 01/27/2022        Total time spent counseling greater than fifty percent of total visit time.  Counseling included discussion regarding imaging findings, diagnosis possibilities, treatment options, risks and benefits.   The patient had many questions regarding the options and long-term effects.

## 2022-01-20 NOTE — PROGRESS NOTES
Health Maintenance Due   Topic Date Due    COVID-19 Vaccine (1) Never done    Pneumococcal Vaccines (Age 0-64) (1 of 2 - PPSV23) Never done    Influenza Vaccine (1) 09/01/2021     Updates were requested from care everywhere.  Chart was reviewed for overdue Proactive [facility] Encounters (KHADRA) topics (CRS, Breast Cancer Screening, Eye exam)  Health Maintenance has been updated.  LINKS immunization registry triggered.  Immunizations were reconciled.

## 2022-01-31 ENCOUNTER — HOSPITAL ENCOUNTER (OUTPATIENT)
Dept: RADIOLOGY | Facility: HOSPITAL | Age: 32
Discharge: HOME | End: 2022-01-31
Attending: ORTHOPAEDIC SURGERY
Payer: COMMERCIAL

## 2022-01-31 ENCOUNTER — OFFICE VISIT (OUTPATIENT)
Dept: SPORTS MEDICINE | Facility: CLINIC | Age: 32
End: 2022-01-31
Payer: COMMERCIAL

## 2022-01-31 VITALS
WEIGHT: 265.19 LBS | BODY MASS INDEX: 42.62 KG/M2 | SYSTOLIC BLOOD PRESSURE: 138 MMHG | HEART RATE: 99 BPM | HEIGHT: 66 IN | DIASTOLIC BLOOD PRESSURE: 91 MMHG

## 2022-01-31 DIAGNOSIS — M17.11 PRIMARY OSTEOARTHRITIS OF RIGHT KNEE: ICD-10-CM

## 2022-01-31 DIAGNOSIS — M21.061 GENU VALGUM, ACQUIRED, RIGHT: ICD-10-CM

## 2022-01-31 DIAGNOSIS — M25.561 RIGHT KNEE PAIN, UNSPECIFIED CHRONICITY: Primary | ICD-10-CM

## 2022-01-31 DIAGNOSIS — Z98.890 S/P RIGHT KNEE SURGERY: ICD-10-CM

## 2022-01-31 DIAGNOSIS — M25.561 RIGHT KNEE PAIN, UNSPECIFIED CHRONICITY: ICD-10-CM

## 2022-01-31 PROCEDURE — 1160F RVW MEDS BY RX/DR IN RCRD: CPT | Mod: CPTII,S$GLB,, | Performed by: ORTHOPAEDIC SURGERY

## 2022-01-31 PROCEDURE — 73564 XR KNEE ORTHO BILAT WITH FLEXION: ICD-10-PCS | Mod: 26,50,, | Performed by: RADIOLOGY

## 2022-01-31 PROCEDURE — 3080F PR MOST RECENT DIASTOLIC BLOOD PRESSURE >= 90 MM HG: ICD-10-PCS | Mod: CPTII,S$GLB,, | Performed by: ORTHOPAEDIC SURGERY

## 2022-01-31 PROCEDURE — 73564 X-RAY EXAM KNEE 4 OR MORE: CPT | Mod: TC,50

## 2022-01-31 PROCEDURE — 3008F PR BODY MASS INDEX (BMI) DOCUMENTED: ICD-10-PCS | Mod: CPTII,S$GLB,, | Performed by: ORTHOPAEDIC SURGERY

## 2022-01-31 PROCEDURE — 1159F MED LIST DOCD IN RCRD: CPT | Mod: CPTII,S$GLB,, | Performed by: ORTHOPAEDIC SURGERY

## 2022-01-31 PROCEDURE — 1159F PR MEDICATION LIST DOCUMENTED IN MEDICAL RECORD: ICD-10-PCS | Mod: CPTII,S$GLB,, | Performed by: ORTHOPAEDIC SURGERY

## 2022-01-31 PROCEDURE — 73564 X-RAY EXAM KNEE 4 OR MORE: CPT | Mod: 26,50,, | Performed by: RADIOLOGY

## 2022-01-31 PROCEDURE — 99999 PR PBB SHADOW E&M-EST. PATIENT-LVL III: ICD-10-PCS | Mod: PBBFAC,,, | Performed by: ORTHOPAEDIC SURGERY

## 2022-01-31 PROCEDURE — 97110 THERAPEUTIC EXERCISES: CPT | Mod: GP,S$GLB,, | Performed by: ORTHOPAEDIC SURGERY

## 2022-01-31 PROCEDURE — 3075F SYST BP GE 130 - 139MM HG: CPT | Mod: CPTII,S$GLB,, | Performed by: ORTHOPAEDIC SURGERY

## 2022-01-31 PROCEDURE — 3075F PR MOST RECENT SYSTOLIC BLOOD PRESS GE 130-139MM HG: ICD-10-PCS | Mod: CPTII,S$GLB,, | Performed by: ORTHOPAEDIC SURGERY

## 2022-01-31 PROCEDURE — 99214 OFFICE O/P EST MOD 30 MIN: CPT | Mod: 25,S$GLB,, | Performed by: ORTHOPAEDIC SURGERY

## 2022-01-31 PROCEDURE — 3080F DIAST BP >= 90 MM HG: CPT | Mod: CPTII,S$GLB,, | Performed by: ORTHOPAEDIC SURGERY

## 2022-01-31 PROCEDURE — 1160F PR REVIEW ALL MEDS BY PRESCRIBER/CLIN PHARMACIST DOCUMENTED: ICD-10-PCS | Mod: CPTII,S$GLB,, | Performed by: ORTHOPAEDIC SURGERY

## 2022-01-31 PROCEDURE — 99214 PR OFFICE/OUTPT VISIT, EST, LEVL IV, 30-39 MIN: ICD-10-PCS | Mod: 25,S$GLB,, | Performed by: ORTHOPAEDIC SURGERY

## 2022-01-31 PROCEDURE — 3008F BODY MASS INDEX DOCD: CPT | Mod: CPTII,S$GLB,, | Performed by: ORTHOPAEDIC SURGERY

## 2022-01-31 PROCEDURE — 99999 PR PBB SHADOW E&M-EST. PATIENT-LVL III: CPT | Mod: PBBFAC,,, | Performed by: ORTHOPAEDIC SURGERY

## 2022-01-31 PROCEDURE — 97110 PR THERAPEUTIC EXERCISES: ICD-10-PCS | Mod: GP,S$GLB,, | Performed by: ORTHOPAEDIC SURGERY

## 2022-01-31 RX ORDER — CELECOXIB 200 MG/1
200 CAPSULE ORAL 2 TIMES DAILY WITH MEALS
Qty: 60 CAPSULE | Refills: 0 | Status: SHIPPED | OUTPATIENT
Start: 2022-01-31 | End: 2022-08-03

## 2022-01-31 NOTE — PROGRESS NOTES
Subjective:          Chief Complaint: Yovani Malik is a 31 y.o. male who had concerns including Pain of the Right Knee.    Yovani Malik  is a pleasant 30 y.o. y/o Male who is returning to clinic 16 months and 16 days s/p below procedure.  He states he is doing alright but still has some pain along the anterior and lateral knee which he localizes to his incision as well as the lateral joint line.  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   Notes he had a knee steroid injection from his pain management physician after his last visit with us which did not give much relief.        Surgery Date: 9/15/2020   Surgeon(s) and Role:     * Barry Lopez MD - Primary     Pre-op Diagnosis:  Genu valgum, acquired, right (M21.061)  Chondromalacia of right knee (M94.261)  Acute lateral meniscus tear of right knee, initial encounter (S83.281A)     Post-op Diagnosis: Post-Op Diagnosis Codes:     * Genu valgum, acquired, right (M21.061)     * Chondromalacia of right knee (M94.261)     * Acute lateral meniscus tear of right knee, initial encounter (S83.281A)     Procedure(s) (LRB):  OSTEOTOMY, FEMUR (Right)  ARTHROSCOPY, KNEE, WITH MENISCECTOMY (Right)  SYNOVECTOMY, KNEE (Right)      Review of Systems   Constitutional: Negative for fever and night sweats.   HENT: Negative for hearing loss.    Eyes: Negative for blurred vision and visual disturbance.   Cardiovascular: Negative for chest pain and leg swelling.   Respiratory: Negative for shortness of breath.    Endocrine: Negative for polyuria.   Hematologic/Lymphatic: Negative for bleeding problem.   Skin: Negative for rash.   Musculoskeletal: Negative for back pain, joint pain, joint swelling, muscle cramps and muscle weakness.   Gastrointestinal: Negative for melena.   Genitourinary: Negative for hematuria.   Neurological: Negative for loss of balance, numbness and paresthesias.   Psychiatric/Behavioral: Negative  for altered mental status.       Pain Related Questions  Over the past 3 days, what was your average pain during activity? (I.e. running, jogging, walking, climbing stairs, getting dressed, ect.): 9  Over the past 3 days, what was your highest pain level?: 9  Over the past 3 days, what was your lowest pain level? : 3    Other  How many nights a week are you awakened by your affected body part?: 3  Was the patient's HEIGHT measured or patient reported?: Patient Reported  Was the patient's WEIGHT measured or patient reported?: Measured      Objective:        General: Yovani is well-developed, well-nourished, appears stated age, in no acute distress, alert and oriented to time, place and person.     General    Vitals reviewed.  Constitutional: He is oriented to person, place, and time. He appears well-developed and well-nourished. No distress.   HENT:   Mouth/Throat: No oropharyngeal exudate.   Eyes: Right eye exhibits no discharge. Left eye exhibits no discharge.   Cardiovascular: Normal rate and regular rhythm.    Pulmonary/Chest: Effort normal and breath sounds normal. No respiratory distress.   Neurological: He is alert and oriented to person, place, and time. He has normal reflexes. No cranial nerve deficit. Coordination normal.   Psychiatric: He has a normal mood and affect. His behavior is normal. Judgment and thought content normal.     General Musculoskeletal Exam   Gait: abnormal and antalgic       Right Knee Exam     Inspection   Erythema: absent  Scars: present  Swelling: present  Effusion: absent  Deformity: absent  Bruising: absent    Tenderness   The patient is tender to palpation of the lateral joint line (global tenderness).    Range of Motion   Extension:  0 normal   Flexion:  140 (125) abnormal     Tests   Meniscus   Kwadwo:  Medial - negative Lateral - negative  Ligament Examination Lachman: normal (-1 to 2mm) PCL-Posterior Drawer: normal (0 to 2mm)     MCL - Valgus: normal (0 to 2mm)  LCL -  Varus: normalPivot Shift: normal (Equal)Reverse Pivot Shift: normal (Equal)Dial Test at 30 degrees: normal (< 5 degrees)Dial Test at 90 degrees: normal (< 5 degrees)  Posterior Sag Test: negative  Posterolateral Corner: unstable (>15 degrees difference)  Patella   Patellar apprehension: negative  Passive Patellar Tilt: neutral  Patellar Tracking: normal  Patellar Glide (quadrants): Lateral - 1   Medial - 2  Q-Angle at 90 degrees: normal  Patellar Grind: negative  J-Sign: none    Other   Meniscal Cyst: absent  Popliteal (Baker's) Cyst: absent  Sensation: normal    Comments:  Well healed incisions. No signs of infection.  NVI.    Left Knee Exam     Inspection   Erythema: absent  Scars: absent  Swelling: absent  Effusion: absent  Deformity: absent  Bruising: absent    Tenderness   The patient is experiencing no tenderness.     Range of Motion   Extension: 0   Flexion: 140     Tests   Meniscus   Kwadwo:  Medial - negative Lateral - negative  Stability Lachman: normal (-1 to 2mm) PCL-Posterior Drawer: normal (0 to 2mm)  MCL - Valgus: normal (0 to 2mm)  LCL - Varus: normal (0 to 2mm)Pivot Shift: normal (Equal)Reverse Pivot Shift: normal (Equal)Dial Test at 30 degrees: normal (< 5 degrees)Dial Test at 90 degrees: normal (< 5 degrees)  Posterior Sag Test: negative  Posterolateral Corner: unstable (>15 degrees difference)  Patella   Patellar apprehension: negative  Passive Patellar Tilt: neutral  Patellar Tracking: normal  Patellar Glide (Quadrants): Lateral - 1 Medial - 2  Q-Angle at 90 degrees: normal  Patellar Grind: negative  J-Sign: J sign absent    Other   Meniscal Cyst: absent  Popliteal (Baker's) Cyst: absent  Sensation: normal    Right Hip Exam     Tests   Marisa: negative  Left Hip Exam     Tests   Marisa: negative          Muscle Strength   Right Lower Extremity   Hip Abduction: 5/5   Quadriceps:  5/5   Hamstrin/5   Left Lower Extremity   Hip Abduction: 5/5   Quadriceps:  5/5   Hamstrin/5     Reflexes      Left Side  Achilles:  2+  Quadriceps:  2+    Right Side   Achilles:  2+  Quadriceps:  2+    Vascular Exam     Right Pulses  Dorsalis Pedis:      2+  Posterior Tibial:      2+        Left Pulses  Dorsalis Pedis:      2+  Posterior Tibial:      2+          X-ray Knee Ortho Bilateral with Flexion  Narrative: EXAMINATION:  XR KNEE ORTHO BILAT WITH FLEXION    CLINICAL HISTORY:  Pain in right knee    TECHNIQUE:  AP standing of both knees, PA flexion standing views of both knees, and Merchant views of both knees were performed.  Lateral views of both knees were also performed.    COMPARISON:  05/31/2021    FINDINGS:  Right: Status post femoral osteotomy, transfixed by lateral plate and screws.  Healing changes are noted.  No joint effusion.  There is loss of patellofemoral cartilage space.  Small osteophytes noted.    Left: No fracture or dislocation.  No joint effusion.  There is loss of patellofemoral cartilage space with tricompartmental osteophytes.  Impression: As above.    Electronically signed by: Frank Yoo MD  Date:    01/31/2022  Time:    13:14              Assessment:       Encounter Diagnoses   Name Primary?    Right knee pain, unspecified chronicity Yes    S/P right knee surgery     Genu valgum, acquired, right     Primary osteoarthritis of right knee           Plan:         1. IKDC, SF-12 and KOOS was filled out today in clinic.     RTC in 2-3 months for  with Dr. Barry Lopez Patient will fill out IKDC, SF-12 and KOOS on return.    2. Medications: Refills of the following Rx were sent to patients preferred Pharmacy:  celecoxib (CELEBREX) 200 MG capsule    3. Physical Therapy: Continue HEP    4. HEP: HEP 32212 - Dr Barry Lopez, instructed and demonstrated a core/bridging HEP. The patient then demonstrated understanding of exercises and proper technique. This program was performed for 15 minutes.     5. Procedures/Procedural Planning:   N/A    6. DME: N/A    7. Work/Sport Status: No  restrictions    8. Visit Summary: Yovani Malik is s/p right distal femoral osteotomy.  Doing well overall but still with some lateral sided joint line pain with prolonged activity.  Discussed hyaluronate injections, will proceed with these.  Follow up in 2-3 months for injection.  Celebrex refilled today as well.  Discussed possible cartilage restoration in the future, will revisit at a later date.  Also discussed weight loss at length.                       [facility] patient questionnaires have been collected today.

## 2022-03-10 ENCOUNTER — PATIENT OUTREACH (OUTPATIENT)
Dept: ADMINISTRATIVE | Facility: OTHER | Age: 32
End: 2022-03-10
Payer: COMMERCIAL

## 2022-03-10 NOTE — PROGRESS NOTES
Subjective:          Chief Complaint: Yovani Malik is a 31 y.o. male who had concerns including Post-op Evaluation of the Right Knee.    Mr. Malik presents to our clinic today for Right knee Durolane.     Yovani Malik  is a pleasant 30 y.o. y/o Male who is returning to clinic 16 months and 16 days s/p below procedure.  He states he is doing alright but still has some pain along the anterior and lateral knee which he localizes to his incision as well as the lateral joint line.  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   Notes he had a knee steroid injection from his pain management physician after his last visit with us which did not give much relief.        Surgery Date: 9/15/2020   Surgeon(s) and Role:     * Barry Lopez MD - Primary     Pre-op Diagnosis:  Genu valgum, acquired, right (M21.061)  Chondromalacia of right knee (M94.261)  Acute lateral meniscus tear of right knee, initial encounter (S83.281A)     Post-op Diagnosis: Post-Op Diagnosis Codes:     * Genu valgum, acquired, right (M21.061)     * Chondromalacia of right knee (M94.261)     * Acute lateral meniscus tear of right knee, initial encounter (S83.281A)     Procedure(s) (LRB):  OSTEOTOMY, FEMUR (Right)  ARTHROSCOPY, KNEE, WITH MENISCECTOMY (Right)  SYNOVECTOMY, KNEE (Right)      Review of Systems   Constitutional: Negative for fever and night sweats.   HENT: Negative for hearing loss.    Eyes: Negative for blurred vision and visual disturbance.   Cardiovascular: Negative for chest pain and leg swelling.   Respiratory: Negative for shortness of breath.    Endocrine: Negative for polyuria.   Hematologic/Lymphatic: Negative for bleeding problem.   Skin: Negative for rash.   Musculoskeletal: Negative for back pain, joint pain, joint swelling, muscle cramps and muscle weakness.   Gastrointestinal: Negative for melena.   Genitourinary: Negative for hematuria.   Neurological: Negative  for loss of balance, numbness and paresthesias.   Psychiatric/Behavioral: Negative for altered mental status.                   Objective:        General: Yovani is well-developed, well-nourished, appears stated age, in no acute distress, alert and oriented to time, place and person.     General    Vitals reviewed.  Constitutional: He is oriented to person, place, and time. He appears well-developed and well-nourished. No distress.   HENT:   Mouth/Throat: No oropharyngeal exudate.   Eyes: Right eye exhibits no discharge. Left eye exhibits no discharge.   Cardiovascular: Normal rate and regular rhythm.    Pulmonary/Chest: Effort normal and breath sounds normal. No respiratory distress.   Neurological: He is alert and oriented to person, place, and time. He has normal reflexes. No cranial nerve deficit. Coordination normal.   Psychiatric: He has a normal mood and affect. His behavior is normal. Judgment and thought content normal.     General Musculoskeletal Exam   Gait: abnormal and antalgic       Right Knee Exam     Inspection   Erythema: absent  Scars: present  Swelling: present  Effusion: absent  Deformity: absent  Bruising: absent    Tenderness   The patient is tender to palpation of the lateral joint line (global tenderness).    Range of Motion   Extension:  0 normal   Flexion: abnormal Right knee flexion: 125.     Tests   Meniscus   Kwadwo:  Medial - negative Lateral - negative  Ligament Examination Lachman: normal (-1 to 2mm) PCL-Posterior Drawer: normal (0 to 2mm)     MCL - Valgus: normal (0 to 2mm)  LCL - Varus: normalPivot Shift: normal (Equal)Reverse Pivot Shift: normal (Equal)Dial Test at 30 degrees: normal (< 5 degrees)Dial Test at 90 degrees: normal (< 5 degrees)  Posterior Sag Test: negative  Posterolateral Corner: unstable (>15 degrees difference)  Patella   Patellar apprehension: negative  Passive Patellar Tilt: neutral  Patellar Tracking: normal  Patellar Glide (quadrants): Lateral - 1   Medial -  2  Q-Angle at 90 degrees: normal  Patellar Grind: negative  J-Sign: none    Other   Meniscal Cyst: absent  Popliteal (Baker's) Cyst: absent  Sensation: normal    Comments:  Well healed incisions. No signs of infection.  NVI.    Left Knee Exam     Inspection   Erythema: absent  Scars: absent  Swelling: absent  Effusion: absent  Deformity: absent  Bruising: absent    Tenderness   The patient is experiencing no tenderness.     Range of Motion   Extension: 0   Flexion: 140     Tests   Meniscus   Kwadwo:  Medial - negative Lateral - negative  Stability Lachman: normal (-1 to 2mm) PCL-Posterior Drawer: normal (0 to 2mm)  MCL - Valgus: normal (0 to 2mm)  LCL - Varus: normal (0 to 2mm)Pivot Shift: normal (Equal)Reverse Pivot Shift: normal (Equal)Dial Test at 30 degrees: normal (< 5 degrees)Dial Test at 90 degrees: normal (< 5 degrees)  Posterior Sag Test: negative  Posterolateral Corner: unstable (>15 degrees difference)  Patella   Patellar apprehension: negative  Passive Patellar Tilt: neutral  Patellar Tracking: normal  Patellar Glide (Quadrants): Lateral - 1 Medial - 2  Q-Angle at 90 degrees: normal  Patellar Grind: negative  J-Sign: J sign absent    Other   Meniscal Cyst: absent  Popliteal (Baker's) Cyst: absent  Sensation: normal    Right Hip Exam     Tests   Marisa: negative  Left Hip Exam     Tests   Marisa: negative          Muscle Strength   Right Lower Extremity   Hip Abduction: 5/5   Quadriceps:  5/5   Hamstrin/5   Left Lower Extremity   Hip Abduction: 5/5   Quadriceps:  5/5   Hamstrin/5     Reflexes     Left Side  Achilles:  2+  Quadriceps:  2+    Right Side   Achilles:  2+  Quadriceps:  2+    Vascular Exam     Right Pulses  Dorsalis Pedis:      2+  Posterior Tibial:      2+        Left Pulses  Dorsalis Pedis:      2+  Posterior Tibial:      2+          X-ray Knee Ortho Bilateral with Flexion  Narrative: EXAMINATION:  XR KNEE ORTHO BILAT WITH FLEXION    CLINICAL HISTORY:  Pain in right  knee    TECHNIQUE:  AP standing of both knees, PA flexion standing views of both knees, and Merchant views of both knees were performed.  Lateral views of both knees were also performed.    COMPARISON:  05/31/2021    FINDINGS:  Right: Status post femoral osteotomy, transfixed by lateral plate and screws.  Healing changes are noted.  No joint effusion.  There is loss of patellofemoral cartilage space.  Small osteophytes noted.    Left: No fracture or dislocation.  No joint effusion.  There is loss of patellofemoral cartilage space with tricompartmental osteophytes.  Impression: As above.    Electronically signed by: Frank Yoo MD  Date:    01/31/2022  Time:    13:14              Assessment:       Encounter Diagnoses   Name Primary?    S/P right knee surgery Yes    Primary osteoarthritis of right knee     Other old tear of lateral meniscus of right knee     Genu valgum, acquired, right     Chondromalacia of right knee           Plan:         1. IKDC, SF-12 and KOOS was filled out today in clinic.     RTC in 4 months with Mid-level provider for follow up. Patient will fill out IKDC, SF-12 and KOOS on return.    2. Medications: Refills of the following Rx were sent to patients preferred Pharmacy:  No Refills Needed Today    3. Physical Therapy: none       4. HEP: N/A    5. Procedures/Procedural Planning:   We reviewed with Yovani today, the pathology and natural history of his diagnosis. We had an extensive discussion as to the conservative treatment and management of their condition. We also discussed the variety of treatment options to include medication, physical therapy, diagnostic testing as well as other treatments.The decision was made to go forward with:     knee - right    1. Durolane performed today, see procedure note.        6. DME: N/A    7. Work/Sport Status: No change    8. Visit Summary: doing OK with need for Durolane injection today                          [facility] patient questionnaires have  been collected today.

## 2022-03-10 NOTE — PROGRESS NOTES
Care Everywhere: updated  Immunization: updated  Health Maintenance: updated  Media Review:   Legacy Review:   DIS:  Order placed:   Upcoming appts:  EFAX:  Task Tickets:  Referrals

## 2022-03-14 ENCOUNTER — OFFICE VISIT (OUTPATIENT)
Dept: SPORTS MEDICINE | Facility: CLINIC | Age: 32
End: 2022-03-14
Payer: COMMERCIAL

## 2022-03-14 VITALS
WEIGHT: 250 LBS | DIASTOLIC BLOOD PRESSURE: 97 MMHG | HEART RATE: 93 BPM | SYSTOLIC BLOOD PRESSURE: 133 MMHG | HEIGHT: 66 IN | BODY MASS INDEX: 40.18 KG/M2

## 2022-03-14 DIAGNOSIS — Z98.890 S/P RIGHT KNEE SURGERY: Primary | ICD-10-CM

## 2022-03-14 DIAGNOSIS — M17.11 PRIMARY OSTEOARTHRITIS OF RIGHT KNEE: ICD-10-CM

## 2022-03-14 DIAGNOSIS — M21.061 GENU VALGUM, ACQUIRED, RIGHT: ICD-10-CM

## 2022-03-14 DIAGNOSIS — M94.261 CHONDROMALACIA OF RIGHT KNEE: ICD-10-CM

## 2022-03-14 DIAGNOSIS — M23.200 OTHER OLD TEAR OF LATERAL MENISCUS OF RIGHT KNEE: ICD-10-CM

## 2022-03-14 PROCEDURE — 1160F PR REVIEW ALL MEDS BY PRESCRIBER/CLIN PHARMACIST DOCUMENTED: ICD-10-PCS | Mod: CPTII,S$GLB,, | Performed by: ORTHOPAEDIC SURGERY

## 2022-03-14 PROCEDURE — 99499 NO LOS: ICD-10-PCS | Mod: S$GLB,,, | Performed by: ORTHOPAEDIC SURGERY

## 2022-03-14 PROCEDURE — 99499 UNLISTED E&M SERVICE: CPT | Mod: S$GLB,,, | Performed by: ORTHOPAEDIC SURGERY

## 2022-03-14 PROCEDURE — 3008F PR BODY MASS INDEX (BMI) DOCUMENTED: ICD-10-PCS | Mod: CPTII,S$GLB,, | Performed by: ORTHOPAEDIC SURGERY

## 2022-03-14 PROCEDURE — 99999 PR PBB SHADOW E&M-EST. PATIENT-LVL III: ICD-10-PCS | Mod: PBBFAC,,, | Performed by: ORTHOPAEDIC SURGERY

## 2022-03-14 PROCEDURE — 1159F PR MEDICATION LIST DOCUMENTED IN MEDICAL RECORD: ICD-10-PCS | Mod: CPTII,S$GLB,, | Performed by: ORTHOPAEDIC SURGERY

## 2022-03-14 PROCEDURE — 1160F RVW MEDS BY RX/DR IN RCRD: CPT | Mod: CPTII,S$GLB,, | Performed by: ORTHOPAEDIC SURGERY

## 2022-03-14 PROCEDURE — 3080F DIAST BP >= 90 MM HG: CPT | Mod: CPTII,S$GLB,, | Performed by: ORTHOPAEDIC SURGERY

## 2022-03-14 PROCEDURE — 1159F MED LIST DOCD IN RCRD: CPT | Mod: CPTII,S$GLB,, | Performed by: ORTHOPAEDIC SURGERY

## 2022-03-14 PROCEDURE — 3008F BODY MASS INDEX DOCD: CPT | Mod: CPTII,S$GLB,, | Performed by: ORTHOPAEDIC SURGERY

## 2022-03-14 PROCEDURE — 20611 DRAIN/INJ JOINT/BURSA W/US: CPT | Mod: RT,S$GLB,, | Performed by: ORTHOPAEDIC SURGERY

## 2022-03-14 PROCEDURE — 3075F PR MOST RECENT SYSTOLIC BLOOD PRESS GE 130-139MM HG: ICD-10-PCS | Mod: CPTII,S$GLB,, | Performed by: ORTHOPAEDIC SURGERY

## 2022-03-14 PROCEDURE — 99999 PR PBB SHADOW E&M-EST. PATIENT-LVL III: CPT | Mod: PBBFAC,,, | Performed by: ORTHOPAEDIC SURGERY

## 2022-03-14 PROCEDURE — 3080F PR MOST RECENT DIASTOLIC BLOOD PRESSURE >= 90 MM HG: ICD-10-PCS | Mod: CPTII,S$GLB,, | Performed by: ORTHOPAEDIC SURGERY

## 2022-03-14 PROCEDURE — 20611 LARGE JOINT ASPIRATION/INJECTION: R KNEE: ICD-10-PCS | Mod: RT,S$GLB,, | Performed by: ORTHOPAEDIC SURGERY

## 2022-03-14 PROCEDURE — 3075F SYST BP GE 130 - 139MM HG: CPT | Mod: CPTII,S$GLB,, | Performed by: ORTHOPAEDIC SURGERY

## 2022-03-14 NOTE — PROCEDURES
"Large Joint Aspiration/Injection: R knee    Date/Time: 3/14/2022 12:45 PM  Performed by: Barry Lopez MD  Authorized by: Barry Lopez MD     Consent Done?:  Yes (Verbal)  Indications:  Arthritis and joint swelling  Site marked: the procedure site was marked    Timeout: prior to procedure the correct patient, procedure, and site was verified    Prep: patient was prepped and draped in usual sterile fashion    Local anesthesia used?: No    Local anesthetic:  Topical anesthetic    Details:  Needle Size:  22 G  Ultrasonic Guidance for needle placement?: Yes    Images are saved and documented.  Approach: superolateral.  Location:  Knee  Site:  R knee  Medications:  60 mg hyaluronate sodium, stabilized 60 mg/3 mL  Aspirate:  Yellow  Patient tolerance:  Patient tolerated the procedure well with no immediate complications     Description of ultrasound utilization for needle guidance:   Ultrasound guidance used for needle localization. Images saved and stored for documentation. The knee joint was visualized. Dynamic visualization of the 22g x 1.5" needle was continuous throughout the procedure.         "

## 2022-03-18 ENCOUNTER — CLINICAL SUPPORT (OUTPATIENT)
Dept: REHABILITATION | Facility: HOSPITAL | Age: 32
End: 2022-03-18
Payer: COMMERCIAL

## 2022-03-18 DIAGNOSIS — R29.898 DECREASED STRENGTH OF TRUNK AND BACK: ICD-10-CM

## 2022-03-18 DIAGNOSIS — M54.6 PAIN IN THORACIC SPINE: ICD-10-CM

## 2022-03-18 DIAGNOSIS — G89.29 CHRONIC BILATERAL LOW BACK PAIN WITHOUT SCIATICA: ICD-10-CM

## 2022-03-18 DIAGNOSIS — M54.6 CHRONIC BILATERAL THORACIC BACK PAIN: ICD-10-CM

## 2022-03-18 DIAGNOSIS — G89.29 CHRONIC BILATERAL THORACIC BACK PAIN: ICD-10-CM

## 2022-03-18 DIAGNOSIS — M25.69 DECREASED ROM OF TRUNK AND BACK: ICD-10-CM

## 2022-03-18 DIAGNOSIS — M54.50 CHRONIC BILATERAL LOW BACK PAIN WITHOUT SCIATICA: ICD-10-CM

## 2022-03-18 PROCEDURE — 97161 PT EVAL LOW COMPLEX 20 MIN: CPT | Mod: PO | Performed by: PHYSICAL THERAPIST

## 2022-03-18 PROCEDURE — 97110 THERAPEUTIC EXERCISES: CPT | Mod: PO | Performed by: PHYSICAL THERAPIST

## 2022-03-18 NOTE — PLAN OF CARE
[facility] - PHYSICAL THERAPY EVALUATION     Name: Yovani Malik  Clinic Number: [id number]    Therapy Diagnosis:   Encounter Diagnoses   Name Primary?    Pain in thoracic spine     Chronic bilateral low back pain without sciatica     Chronic bilateral thoracic back pain     Decreased strength of trunk and back     Decreased ROM of trunk and back      Physician: Rhianna Chavez PA-C    Physician Orders: PT Eval and Treat Healthy Back  Medical Diagnosis from Referral: Pain in thoracic spine, chronic bilateral low back pain without sciatica  Evaluation Date: 3/18/2022  Authorization Period Expiration: 1/20/23  Plan of Care Expiration: 6/18/22  Reassessment Due: 10th visit  Visit # / Visits authorized: 1/ 20    Time In: 9:15AM  Time Out: 10:45AM  Total Billable Time: 90 minutes    Precautions: Standard    Pattern of pain determined: Pattern 1 PEN (likely will transition to PEP)      Subjective   Date of onset: 1 year  History of current condition - Yovani reports: he had intermittent thoracic and lumbar pain for approximately 5 years. He states pain is constant in last year since right knee surgery. He states he awakens with pain, improves with medications and worsens again as day progresses and medication wears off. Pain will sometimes radiate to left buttock, especially with driving. Pain increases with sitting, bending and lifting. Pain disturbs his sleep. He occasionally sleeps in recliner for relief. He has history of multiple right knee surgeries.     Medical History:   Past Medical History:   Diagnosis Date    Anxiety     Borderline diabetes     Hyperlipidemia     Hypertension        Surgical History:   Yovani Malik  has a past surgical history that includes Knee surgery (Bilateral); Knee arthroscopy; Arthroscopy of knee (Right, 9/13/2019); Repair of meniscus of knee (Right, 9/13/2019); Esophagogastroduodenoscopy (N/A, 10/3/2019); Tonsillectomy; Esophagogastroduodenoscopy (N/A, 1/23/2020);  Femur Osteotomy (Right, 9/15/2020); Knee arthroscopy w/ meniscectomy (Right, 9/15/2020); and Synovectomy of knee (Right, 9/15/2020).    Medications:   Yovani has a current medication list which includes the following prescription(s): alprazolam, atorvastatin, celecoxib, celecoxib, dextroamphetamine-amphetamine, esomeprazole, hydrocodone-acetaminophen, and methocarbamol.    Allergies:   Review of patient's allergies indicates:  No Known Allergies     Imaging, MRI studies thoracic: 1. Multilevel degenerative change of the thoracic spine, as described in detail above, most pronounced at T11-12 with a right central disc extrusion through an annular fissure which mildly flattens the right ventral cord surface and contributes to mild spinal canal stenosis.  No definite abnormal cord signal.  2. Small right central disc protrusion at T6-7 with mild flattening of the right ventral cord surface.  No definite abnormal cord signal.  MRI lumbar:  There is decreased MRI signal from the disc at L4-5 and L5-S1 consistent with desiccation. At L4-5 there is minor disc bulge and facet arthropathy with mild spinal canal and mild neural foraminal stenosis. A disc herniation is not seen.   Xrays thoracic:  Alignment: Alignment is maintained.     Vertebrae: Mild chronic anterior wedging of the T11 and T12 vertebral bodies.  Otherwise, the remaining vertebral body heights are maintained.  No suspicious appearing lytic or blastic lesions.     Discs and facets: Mild multilevel disc degeneration with intervertebral disc space narrowing, degenerative endplate change and anterior marginal osteophyte formation  Xrays lumbar:  Lumbar vertebral bodies are normal in height without evidence of fracture.     Normal sagittal alignment is preserved.  No spondylolisthesis. No abnormal translation with flexion and extension.     Mild intervertebral disc space narrowing at L4-5 and L5-S1.Lower lumbar facet arthropathy        Prior Therapy: for knee  2021  Prior Treatment: chiropractor, pain management, [facility] 3 months ago, helped for awhile  Social History:  lives with their spouse and 6 year old child  Occupation: asst mgr gas station  Leisure: spend time with child      Prior Level of Function: able to bend and lift with minimal difficulty  Current Level of Function: pain interfering with sleep, bending, lifting, prolonged driving  DME owned/used: no        Pain:  Current 9/10, worst 9/10, best 5/10   Location: bilateral /midline thoracic and lumbar    Description: Burning and Sharp, stabbing  Aggravating Factors: Standing, Bending, Flexing and sleeping  Easing Factors: heat, ice, medication, sitting in recliner  Disturbed Sleep: yes        Pattern of pain questions:  1.  Where is your pain the worst? Mid to lower back  2.  Is your pain constant or intermittent? constant  3.  Does bending forward make your typical pain worse? yes  4.  Since the start of your back pain, has there been a change in your bowel or bladder? no  5.  What can't you do now that you use to be able to do? Get up without back pain,  child, sustained flexion      Pts goals: to lessen back pain      Red Flag Screening:   Cough  Sneeze  Strain: (--)  Bladder/ bowel: (--)  Falls: (--)  Night pain: (--)  Unexplained weight loss: (--)  General health: good    OBJECTIVE     Postural examination/scapula alignment: Rounded shoulder, Head forward and increased thoracic kyphosis, slight increase lumbar lordosis  Joint integrity: decreased T-12/L1 ant glide  Skin integrity: normal  Edema: no  Sitting: flexed  Standing: as above  Correction of posture: better with lumbar roll, slimline    MOVEMENT LOSS    ROM Loss   Flexion moderate loss   Extension moderate loss   Side glide Right moderate loss   Side glide Left moderate loss   Rotation Right moderate loss   Rotation Left moderate loss     Lower Extremity Strength  Right LE  Left LE    Hip flexion: 5/5 Hip flexion: 5/5   Hip extension:  5/5 Hip  extension: 5/5   Hip abduction: 5/5 Hip abduction: 5/5   Hip adduction:  5/5 Hip adduction:  5/5   Hip External Rotation 5/5 Hip External Rotation 5/5   Hip Internal rotation   5/5 Hip Internal rotation 5/5   Knee Flexion 5/5 Knee Flexion 5/5   Knee Extension 4/5 Knee Extension 5/5   Ankle dorsiflexion: 5/5 Ankle dorsiflexion: 5/5   Ankle plantarflexion: 5/5 Ankle plantarflexion: 5/5       GAIT:  Assistive Device used: none  Level of Assistance: independent  Patient displays the following gait deviations:  decreased step length.     Special Tests:   Test Name  Test Result   Prone Instability Test (--)   SI Joint Provocation Test (--)   Straight Leg Raise (--)   Neural Tension Test (--)   Crossed Straight Leg Raise (--)   Walking on toes (--)   Walking on heels  (--)       NEUROLOGICAL SCREENING     Sensory deficit: none    Reflexes:    Left Right   Patella Tendon 2+ 2+   Achilles Tendon 2+ 2+   Babinski  (--) (--)   Clonus (--) (--)     REPEATED TEST MOVEMENTS:    Baseline symptoms:  Repeated Flexion in Standing worse   Repeated Extension in Standing worse   Repeated Flexion in lying worse   Repeated Extension in lying  worse       STATIC TESTS and other movements:   Baseline symptoms:  Prone lie no effect   Prone lie on elbows no effect   Sustained flexion worse   Sitting slouched  worse   Sitting erect better   Standing slouched worse   Standing erect  better   Lying prone in extension  worse   Long sitting   worse       Baseline Isometric Testing on Med X equipment: Testing administered by PT  Date of testing: 3/18/22  ROM 0-27 deg   Max Peak Torque 125    Min Peak Torque 60    Flex/Ext Ratio 2:1   % below normative data 57         Limitation/Restriction for FOTO lumbar Survey    Therapist reviewed FOTO scores for Yovani Malik on 3/18/2022.   FOTO documents entered into [employer] - see Media section.    Limitation Score: 52%       [facility] Therapy 3/18/2022   Visit Number 1   VAS Pain Rating 7   Lumbar Stretches -  Slouch Overcorrection 10   Flexion in Lying 10   Lumbar Extension Seat Pad 0   Femur Restraint 6   [facility] Dead Center 24   Counterweight 321   Lumbar Flexion 27   Lumbar Extension 0   Lumbar Peak Torque 125   Min Torque 60   Test Percent Below Normative Data 57   Ice - Z Lie (in min.) 10           Treatment   Treatment Time In: 10:05AM  Treatment Time Out: 10:45AM  Total Treatment time separate from Evaluation: 40 minutes      Yovani received therapeutic exercises to develop/improve posture, lumbar/cervical ROM, strength and muscular endurance for 30 minutes including the following exercises:     Med x dynamic exercise and baseline IM test  LTR 5x 5 sec  Bilateral KTC 10x 5 sec  Post pelvic tilt x20  Slouch to overcorrect x10        Written Home Exercises Provided: yes.  Exercises were reviewed and Yovani was able to demonstrate them prior to the end of the session.  Yovani demonstrated good  understanding of the education provided.     See EMR under Patient Instructions for exercises provided 3/18/2022.      Education provided:   - Patient received education regarding proper posture and body mechanics.  Patient was given top [facility] Visit 1 handouts which discuss what to expect in therapy, the purpose and opportunity for health coaching, the program,  wellness when discharged from therapy, back education and care specifically for posture seated, standing, lifting correctly, components of exercise, importance of nutrition and hydration, and importance of sleep.   Information on lumbar rolls provided.  - Emanuel roll tried, recommended, and purchase information was provided.    - Patient received a handout regarding anticipated muscular soreness following the isometric test and strategies for management were reviewed with patient including stretching, using ice and scheduled rest.   - Patient received education on the [facility] program, purpose of the isometric test, progression of back strengthening as  well as wellness approach and systemic strengthening.  Details of the program were discussed.  Reviewed that patient should feel support/pressure from med ex restraints but no pain or discomfort and patient expressed understanding.    Yovani received cold pack for 10 minutes to low back.    Assessment   Yovani is a 31 y.o. male referred to [facility] with a medical diagnosis of pain in thoracic spine, chronic bilateral low back pain without sciatica. Pt presents with chronic thoracic and lumbar pain. He has limited ROM and pain in all directions of movement. He is somewhat guarded and fearful of movement. He has some poor postural habits. He has been untrained in importance of proper posture and body mechanics. His lumbar strength is 57% below normative data with isometric testing on MEDX. I suspect he will transition to an extension preference as he begins to improve his mobility.      Pain Pattern: Pattern 1 PEN (likely will transition to PEP)         Pt prognosis is Excellent.   Pt will benefit from skilled outpatient Physical Therapy to address the deficits stated above and in the chart below, provide pt/family education, and to maximize pt's level of independence. Based on the above history and physical examination an active physical therapy program is recommended.  Pt will continue to benefit from skilled outpatient physical therapy to address the deficits listed below in the chart, provide pt/family education and to maximize pt's level of independence in the home and community environment. .       Plan of care discussed with patient: Yes  Pt's spiritual, cultural and educational needs considered and patient is agreeable to the plan of care and goals as stated below:     Anticipated Barriers for therapy: none    PT Evaluation Completed? Yes    Medical necessity is demonstrated by the following problem list.    Pt presents with the following impairments:     History  Co-morbidities and personal  factors that may impact the plan of care Co-morbidities:   high BMI and history of right knee surgery    Personal Factors:   no deficits     low   Examination  Body Structures and Functions, activity limitations and participation restrictions that may impact the plan of care Body Regions:   back  lower extremities  trunk    Body Systems:    gross symmetry  ROM  strength  gross coordinated movement  balance  gait  transfers  transitions  motor control    Participation Restrictions:   none    Activity limitations:   Learning and applying knowledge  no deficits    General Tasks and Commands  no deficits    Communication  no deficits    Mobility  lifting and carrying objects  driving (bike, car, motorcycle)    Self care  no deficits    Domestic Life  doing house work (cleaning house, washing dishes, laundry)    Interactions/Relationships  no deficits    Life Areas  no deficits    Community and Social Life  recreation and leisure         low   Clinical Presentation stable and uncomplicated low   Decision Making/ Complexity Score: low       GOALS: Pt is in agreement with the following goals.    Short term goals:  6 weeks or 10 visits   1.  Pt will demonstrate increased lumbar ROM by at least 3 degrees from the initial ROM value with improvements noted in functional ROM and ability to perform ADLs.  (approp and ongoing)  2.  Pt will demonstrate increased MedX average isometric strength value  by 20% from initial test resulting in improved ability to perform bending, lifting, and carrying activities safely, confidently.  (approp and ongoing)  3.  Patient report a reduction in worst pain score by 1-2 points for improved tolerance for bending, lifting, sleeping.  (approp and ongoing)  4.  Pt able to perform HEP correctly with minimal cueing or supervision from therapist to encourage independent management of symptoms. (approp and ongoing)      Long term goals: 10 weeks or 20 visits   1. Pt will demonstrate increased lumbar  "ROM by at least 6 degrees from initial ROM value, resulting in improved ability to perform functional fwd bending while standing and sitting. (approp and ongoing)  2. Pt will demonstrate increased MedX average isometric strength value  by 30% from initial test resulting in improved ability to perform bending, lifting, and carrying activities safely, confidently.  (approp and ongoing)  3. Pt to demonstrate ability to independently control and reduce their pain through posture positioning and mechanical movements throughout a typical day.  (approp and ongoing)  4.  Pt will demonstrate reduced pain and improved functional outcomes as reported on the FOTO by reaching a limitation score of < or = 45% or less in order to demonstrate subjective improvement in pt's condition.    (approp and ongoing)  5. Pt will demonstrate independence with the HEP at discharge  (approp and ongoing)  6.  Pt will have overall decrease in pain in ADLs. (patient goal)  (approp and ongoing)      Plan   Outpatient physical therapy 2x week for 10 weeks or 20 visits to include the following:   - Patient education  - Therapeutic exercise  - Manual therapy  - Performance testing   - Neuromuscular Re-education  - Therapeutic activity   - Modalities    Pt may be seen by PTA as part of the rehabilitation team.     Therapist: Tori Adam, PT  3/19/2022    "I certify the need for these services furnished under this plan of treatment and while under my care."    ____________________________________  Physician/Referring Practitioner    _______________  Date of Signature          "

## 2022-03-19 PROBLEM — R29.898 DECREASED STRENGTH OF TRUNK AND BACK: Status: ACTIVE | Noted: 2022-03-19

## 2022-03-19 PROBLEM — G89.29 CHRONIC BILATERAL LOW BACK PAIN WITHOUT SCIATICA: Status: ACTIVE | Noted: 2022-03-19

## 2022-03-19 PROBLEM — M54.6 CHRONIC BILATERAL THORACIC BACK PAIN: Status: ACTIVE | Noted: 2022-03-19

## 2022-03-19 PROBLEM — M25.69 DECREASED ROM OF TRUNK AND BACK: Status: ACTIVE | Noted: 2022-03-19

## 2022-03-19 PROBLEM — G89.29 CHRONIC BILATERAL THORACIC BACK PAIN: Status: ACTIVE | Noted: 2022-03-19

## 2022-03-19 PROBLEM — M54.50 CHRONIC BILATERAL LOW BACK PAIN WITHOUT SCIATICA: Status: ACTIVE | Noted: 2022-03-19

## 2022-03-21 ENCOUNTER — CLINICAL SUPPORT (OUTPATIENT)
Dept: REHABILITATION | Facility: HOSPITAL | Age: 32
End: 2022-03-21
Payer: COMMERCIAL

## 2022-03-21 DIAGNOSIS — M54.50 CHRONIC BILATERAL LOW BACK PAIN WITHOUT SCIATICA: Primary | ICD-10-CM

## 2022-03-21 DIAGNOSIS — M54.6 CHRONIC BILATERAL THORACIC BACK PAIN: ICD-10-CM

## 2022-03-21 DIAGNOSIS — G89.29 CHRONIC BILATERAL LOW BACK PAIN WITHOUT SCIATICA: Primary | ICD-10-CM

## 2022-03-21 DIAGNOSIS — M25.69 DECREASED ROM OF TRUNK AND BACK: ICD-10-CM

## 2022-03-21 DIAGNOSIS — G89.29 CHRONIC BILATERAL THORACIC BACK PAIN: ICD-10-CM

## 2022-03-21 DIAGNOSIS — R29.898 DECREASED STRENGTH OF TRUNK AND BACK: ICD-10-CM

## 2022-03-21 PROCEDURE — 97110 THERAPEUTIC EXERCISES: CPT | Mod: PO,CQ

## 2022-03-21 NOTE — PROGRESS NOTES
[facility] Physical Therapy Treatment      Name: Yovani Malik  Clinic Number: [id number]    Therapy Diagnosis:   Encounter Diagnoses   Name Primary?    Chronic bilateral low back pain without sciatica Yes    Chronic bilateral thoracic back pain     Decreased strength of trunk and back     Decreased ROM of trunk and back      Physician: Rhianna Chavez PA-C    Visit Date: 3/21/2022      Physician: Rhianna Chavez PA-C     Physician Orders: PT Eval and Treat Healthy Back  Medical Diagnosis from Referral: Pain in thoracic spine, chronic bilateral low back pain without sciatica  Evaluation Date: 3/18/2022  Authorization Period Expiration: 1/20/23  Plan of Care Expiration: 6/18/22  Reassessment Due: 10th visit  Visit # / Visits authorized: 2/ 20     Time In: 1:46AM  Time Out: 2:52AM  Total Billable Time: 51 minutes     Precautions: Standard     Pattern of pain determined: Pattern 1 PEN (likely will transition to PEP)  Subjective   Yovani reports still having his LBP. .      Patient reports tolerating previous visit well, but was sore after.  Patient reports their pain to be 5/10 on a 0-10 scale with 0 being no pain and 10 being the worst pain imaginable.  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        Pts goals: to lessen back pain    Objective   Baseline Isometric Testing on Med X equipment: Testing administered by PT  Date of testing: 3/18/22  ROM 0-27 deg   Max Peak Torque 125    Min Peak Torque 60    Flex/Ext Ratio 2:1   % below normative data 57          Treatment    Pt was instructed in and performed the following:     Yovani received therapeutic exercises to develop/improved posture, cardiovascular endurance, muscular endurance, lumbar/cervical ROM, strength and muscular endurance for 71 minutes including the following exercises:       LTR 10x 5 sec  Bilateral KTC 10x 5 sec  Post pelvic tilt x20  Slouch to overcorrect x10  DKTC 10x   [facility]  Therapy 3/21/2022   Visit Number 2   VAS Pain Rating 5   Recumbent Bike Seat Pos. 12   Time 10   Lumbar Stretches - Slouch Overcorrection 10   Flexion in Lying 10   Lumbar Extension Seat Pad -   Femur Restraint -   Top Dead Center -   Counterweight -   Lumbar Flexion -   Lumbar Extension -   Lumbar Peak Torque -   Min Torque -   Test Percent Below Normative Data -   Lumbar Weight 62   Repetitions 18   Rating of Perceived Exertion 3   Ice - Z Lie (in min.) 10           Peripheral muscle strengthening which included 1 set of 15-20 repetitions at a slow, controlled 10-13 second per rep pace focused on strengthening supporting musculature for improved body mechanics and functional mobility.  Pt and therapist focused on proper form during treatment to ensure optimal strengthening of each targeted muscle group.  Machines were utilized including torso rotation, leg extension, leg curl, chest press, upright row. Tricep extension, bicep curl, leg press, and hip abduction added visit 3    Yovani received the following manual therapy techniques: n/a were applied to the: n/a for n/a minutes.         Home Exercises Provided and Patient Education Provided   Home exercises include  LTR 10x 5 sec  Bilateral KTC 10x 5 sec  Post pelvic tilt x20  Slouch to overcorrect x10  DKTC 10x    Cardio program:none  Lifting education date  Lumbar roll:     Education provided:   -Educated pt on the importance of daily stretch to increase the benefit of therapy and positive results.     Written Home Exercises Provided: Patient instructed to cont prior HEP.  Exercises were reviewed and Yovani was able to demonstrate them prior to the end of the session.  Yovani demonstrated good  understanding of the education provided.     See EMR under Patient Instructions for exercises provided 3/21/2022.          Assessment   Pt with moderate LBP that did decrease with session.  Reviewed HEP with mod vc for tech. Educated pt to do seated trunk flexion at work.   Also discussed importance for upright posture, use of lumbar roll and to stand every hour to decrease back pain.  Pt understood. Pt demo well. Pt tolerated lumbar medx machine with starting weight 50% of pts max peak torque with no c/o pain. Pt tolerated the medx machines well to the LE machines with no c/o increased LB pain or any limb pain. Iced LB and R knee.        Patient is making good progress towards established goals.  Pt will continue to benefit from skilled outpatient physical therapy to address the deficits stated in the impairment chart, provide pt/family education and to maximize pt's level of independence in the home and community environment.     Anticipated Barriers for therapy: none  Pt's spiritual, cultural and educational needs considered and pt agreeable to plan of care and goals as stated below:       GOALS: Pt is in agreement with the following goals.     Short term goals:  6 weeks or 10 visits   1.  Pt will demonstrate increased lumbar ROM by at least 3 degrees from the initial ROM value with improvements noted in functional ROM and ability to perform ADLs.  (approp and ongoing)  2.  Pt will demonstrate increased MedX average isometric strength value  by 20% from initial test resulting in improved ability to perform bending, lifting, and carrying activities safely, confidently.  (approp and ongoing)  3.  Patient report a reduction in worst pain score by 1-2 points for improved tolerance for bending, lifting, sleeping.  (approp and ongoing)  4.  Pt able to perform HEP correctly with minimal cueing or supervision from therapist to encourage independent management of symptoms. (approp and ongoing)        Long term goals: 10 weeks or 20 visits   1. Pt will demonstrate increased lumbar ROM by at least 6 degrees from initial ROM value, resulting in improved ability to perform functional fwd bending while standing and sitting. (approp and ongoing)  2. Pt will demonstrate increased MedX average  isometric strength value  by 30% from initial test resulting in improved ability to perform bending, lifting, and carrying activities safely, confidently.  (approp and ongoing)  3. Pt to demonstrate ability to independently control and reduce their pain through posture positioning and mechanical movements throughout a typical day.  (approp and ongoing)  4.  Pt will demonstrate reduced pain and improved functional outcomes as reported on the FOTO by reaching a limitation score of < or = 45% or less in order to demonstrate subjective improvement in pt's condition.    (approp and ongoing)  5. Pt will demonstrate independence with the HEP at discharge  (approp and ongoing)  6.  Pt will have overall decrease in pain in ADLs. (patient goal)  (approp and ongoing)             Plan   Continue with established Plan of Care towards established PT goals.

## 2022-03-28 ENCOUNTER — CLINICAL SUPPORT (OUTPATIENT)
Dept: REHABILITATION | Facility: HOSPITAL | Age: 32
End: 2022-03-28
Payer: COMMERCIAL

## 2022-03-28 DIAGNOSIS — M25.69 DECREASED ROM OF TRUNK AND BACK: ICD-10-CM

## 2022-03-28 DIAGNOSIS — G89.29 CHRONIC BILATERAL LOW BACK PAIN WITHOUT SCIATICA: Primary | ICD-10-CM

## 2022-03-28 DIAGNOSIS — G89.29 CHRONIC BILATERAL THORACIC BACK PAIN: ICD-10-CM

## 2022-03-28 DIAGNOSIS — M54.50 CHRONIC BILATERAL LOW BACK PAIN WITHOUT SCIATICA: Primary | ICD-10-CM

## 2022-03-28 DIAGNOSIS — M54.6 CHRONIC BILATERAL THORACIC BACK PAIN: ICD-10-CM

## 2022-03-28 DIAGNOSIS — R29.898 DECREASED STRENGTH OF TRUNK AND BACK: ICD-10-CM

## 2022-03-28 PROCEDURE — 97110 THERAPEUTIC EXERCISES: CPT | Mod: PO | Performed by: PHYSICAL THERAPIST

## 2022-03-28 NOTE — PROGRESS NOTES
[facility]      Name: Yovani Malik  Clinic Number: [id number]    Therapy Diagnosis:   No diagnosis found.  Physician: Rhianna Chavez PA-C    Visit Date: 3/28/2022      Physician: Rhianna Chavez PA-C     Physician Orders: PT Eval and Treat Healthy [facility]  Medical Diagnosis from Referral: Pain in thoracic spine, chronic bilateral low back pain without sciatica  Evaluation Date: 3/18/2022  Authorization Period Expiration: 1/20/23  Plan of Care Expiration: 6/18/22  Reassessment Due: 10th visit  Visit # / Visits authorized: 3/ 20     Time In: 10:45AM  Time Out: 11:45AM  Total Billable Time: 40 minutes     Precautions: Standard     Pattern of pain determined: Pattern 1 PEN (likely will transition to PEP)  Subjective   Yovani reports continued constant low back pain without change.    Patient reports tolerating previous visit well, but was sore after. Some right knee pain following session.  Patient reports their pain to be 5/10 on a 0-10 scale with 0 being no pain and 10 being the worst pain imaginable.  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        Pts goals: to lessen back pain    Objective   Baseline Isometric Testing on Med X equipment: Testing administered by PT  Date of testing: 3/18/22  ROM 0-27 deg   Max Peak Torque 125    Min Peak Torque 60    Flex/Ext Ratio 2:1   % below normative data 57          Treatment    Pt was instructed in and performed the following:     Yovani received therapeutic exercises to develop/improved posture, cardiovascular endurance, muscular endurance, lumbar/cervical ROM, strength and muscular endurance for 40 minutes including the following exercises:     LTR 10x 5 sec  Bilateral KTC 10x 5 sec  Post pelvic tilt x20  Slouch to overcorrect x10  Standing extension x10    [facility] 3/28/2022   Visit Number 3   VAS Pain Rating 5   Recumbent Bike Seat Pos. 12   Time 10   Lumbar Stretches -  Slouch Overcorrection 10   Extension in Standing 10   Flexion in Lying 10   Lumbar Extension Seat Pad -   Femur Restraint -   Top Dead Center -   Counterweight -   Lumbar Flexion -   Lumbar Extension -   Lumbar Peak Torque -   Min Torque -   Test Percent Below Normative Data -   Lumbar Weight 62   Repetitions 20   Rating of Perceived Exertion 4   Ice - Z Lie (in min.) 10             Peripheral muscle strengthening which included 1 set of 15-20 repetitions at a slow, controlled 10-13 second per rep pace focused on strengthening supporting musculature for improved body mechanics and functional mobility.  Pt and therapist focused on proper form during treatment to ensure optimal strengthening of each targeted muscle group.  Machines were utilized including torso rotation, leg extension, leg curl, chest press, upright row. Tricep extension, bicep curl, leg press, and hip abduction added visit 3    Yovani received the following manual therapy techniques: n/a were applied to the: n/a for n/a minutes.         Home Exercises Provided and Patient Education Provided   Home exercises include  LTR 10x 5 sec  Bilateral KTC 10x 5 sec  Post pelvic tilt x20  Slouch to overcorrect x10  Standing extension x10    Cardio program:none  Lifting education date  Lumbar roll:     Education provided:   -Educated pt on the importance of daily stretch to increase the benefit of therapy and positive results.     Written Home Exercises Provided: Patient instructed to cont prior HEP.  Exercises were reviewed and Yovani was able to demonstrate them prior to the end of the session.  Yovani demonstrated good  understanding of the education provided.     See EMR under Patient Instructions for exercises provided 3/21/2022.          Assessment     Added standing extension to pt's exercise routine and instructed to do standing extension every time he rises from sitting or bending. Progressed with reps on MEDX with slightly higher perceived exertion.  Initiated UE strengthening exercises today with low perceived exertion. Encouraged stretching through full ROM. Shaking of right quad visible with leg extension and leg press exercises.        Patient is making good progress towards established goals.  Pt will continue to benefit from skilled outpatient physical therapy to address the deficits stated in the impairment chart, provide pt/family education and to maximize pt's level of independence in the home and community environment.     Anticipated Barriers for therapy: none  Pt's spiritual, cultural and educational needs considered and pt agreeable to plan of care and goals as stated below:       GOALS: Pt is in agreement with the following goals.     Short term goals:  6 weeks or 10 visits   1.  Pt will demonstrate increased lumbar ROM by at least 3 degrees from the initial ROM value with improvements noted in functional ROM and ability to perform ADLs.  (approp and ongoing)  2.  Pt will demonstrate increased MedX average isometric strength value  by 20% from initial test resulting in improved ability to perform bending, lifting, and carrying activities safely, confidently.  (approp and ongoing)  3.  Patient report a reduction in worst pain score by 1-2 points for improved tolerance for bending, lifting, sleeping.  (approp and ongoing)  4.  Pt able to perform HEP correctly with minimal cueing or supervision from therapist to encourage independent management of symptoms. (approp and ongoing)        Long term goals: 10 weeks or 20 visits   1. Pt will demonstrate increased lumbar ROM by at least 6 degrees from initial ROM value, resulting in improved ability to perform functional fwd bending while standing and sitting. (approp and ongoing)  2. Pt will demonstrate increased MedX average isometric strength value  by 30% from initial test resulting in improved ability to perform bending, lifting, and carrying activities safely, confidently.  (approp and ongoing)  3. Pt  to demonstrate ability to independently control and reduce their pain through posture positioning and mechanical movements throughout a typical day.  (approp and ongoing)  4.  Pt will demonstrate reduced pain and improved functional outcomes as reported on the FOTO by reaching a limitation score of < or = 45% or less in order to demonstrate subjective improvement in pt's condition.    (approp and ongoing)  5. Pt will demonstrate independence with the HEP at discharge  (approp and ongoing)  6.  Pt will have overall decrease in pain in ADLs. (patient goal)  (approp and ongoing)             Plan   Continue with established Plan of Care towards established PT goals.

## 2022-03-31 ENCOUNTER — CLINICAL SUPPORT (OUTPATIENT)
Dept: REHABILITATION | Facility: HOSPITAL | Age: 32
End: 2022-03-31
Payer: COMMERCIAL

## 2022-03-31 DIAGNOSIS — M54.50 CHRONIC BILATERAL LOW BACK PAIN WITHOUT SCIATICA: Primary | ICD-10-CM

## 2022-03-31 DIAGNOSIS — G89.29 CHRONIC BILATERAL LOW BACK PAIN WITHOUT SCIATICA: Primary | ICD-10-CM

## 2022-03-31 DIAGNOSIS — G89.29 CHRONIC BILATERAL THORACIC BACK PAIN: ICD-10-CM

## 2022-03-31 DIAGNOSIS — M25.69 DECREASED ROM OF TRUNK AND BACK: ICD-10-CM

## 2022-03-31 DIAGNOSIS — R29.898 DECREASED STRENGTH OF TRUNK AND BACK: ICD-10-CM

## 2022-03-31 DIAGNOSIS — M54.6 CHRONIC BILATERAL THORACIC BACK PAIN: ICD-10-CM

## 2022-03-31 PROCEDURE — 97110 THERAPEUTIC EXERCISES: CPT | Mod: PO | Performed by: PHYSICAL THERAPIST

## 2022-03-31 NOTE — PROGRESS NOTES
[facility]      Name: Yovani Malik  Clinic Number: [id number]    Therapy Diagnosis:   No diagnosis found.  Physician: Rhianna Chavez PA-C    Visit Date: 3/31/2022      Physician: Rhianna Chavez PA-C     Physician Orders: PT Eval and Treat [facility]  Medical Diagnosis from Referral: Pain in thoracic spine, chronic bilateral low back pain without sciatica  Evaluation Date: 3/18/2022  Authorization Period Expiration: 1/20/23  Plan of Care Expiration: 6/18/22  Reassessment Due: 10th visit  Visit # / Visits authorized: 4/ 20     Time In: 9:15AM  Time Out: 10:10AM  Total Billable Time: 45 minutes     Precautions: Standard     Pattern of pain determined: Pattern 1 PEN (likely will transition to PEP)  Subjective   Yovani reports awakening with increased low back pain this morning.    Patient reports tolerating previous visit well, but was sore after. Some right knee pain following session.  Patient reports their pain to be 7/10 on a 0-10 scale with 0 being no pain and 10 being the worst pain imaginable.  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        Pts goals: to lessen back pain    Objective   Baseline Isometric Testing on Med X equipment: Testing administered by PT  Date of testing: 3/18/22  ROM 0-27 deg   Max Peak Torque 125    Min Peak Torque 60    Flex/Ext Ratio 2:1   % below normative data 57      FOTO outcomes:  Initial visit: 52%      Treatment    Pt was instructed in and performed the following:     Yovani received therapeutic exercises to develop/improved posture, cardiovascular endurance, muscular endurance, lumbar/cervical ROM, strength and muscular endurance for 45 minutes including the following exercises:     LTR 10x 5 sec  Bilateral KTC 10x 5 sec  Post pelvic tilt x20  Slouch to overcorrect x10  Standing extension x10    [facility] 3/31/2022   Visit Number 4   VAS Pain Rating 7   Recumbent Bike Seat Pos.  12   Time 10   Lumbar Stretches - Slouch Overcorrection 10   Extension in Standing 10   Flexion in Lying 10   Lumbar Extension Seat Pad -   Femur Restraint -   Top Dead Center -   Counterweight -   Lumbar Flexion -   Lumbar Extension -   Lumbar Peak Torque -   Min Torque -   Test Percent Below Normative Data -   Lumbar Weight 62   Repetitions 20   Rating of Perceived Exertion 4   Ice - Z Lie (in min.) 10               Peripheral muscle strengthening which included 1 set of 15-20 repetitions at a slow, controlled 10-13 second per rep pace focused on strengthening supporting musculature for improved body mechanics and functional mobility.  Pt and therapist focused on proper form during treatment to ensure optimal strengthening of each targeted muscle group.  Machines were utilized including torso rotation, leg extension, leg curl, chest press, upright row. Tricep extension, bicep curl, leg press, and hip abduction added visit 3    Yovani received the following manual therapy techniques: n/a were applied to the: n/a for n/a minutes.         Home Exercises Provided and Patient Education Provided   Home exercises include  LTR 10x 5 sec  Bilateral KTC 10x 5 sec  Post pelvic tilt x20  Slouch to overcorrect x10  Standing extension x10    Cardio program:none  Lifting education date  Lumbar roll:     Education provided:   -Educated pt on the importance of daily stretch to increase the benefit of therapy and positive results.     Written Home Exercises Provided: Patient instructed to cont prior HEP.  Exercises were reviewed and Yovani was able to demonstrate them prior to the end of the session.  Yovani demonstrated good  understanding of the education provided.     See EMR under Patient Instructions for exercises provided 3/21/2022.          Assessment     Progressed resistance several peripheral and trunk rotation exercises with appropriate perceived exertion. Visible quad trembling and difficulty with leg extension.  Encouraged daily stretching for relief of back stiffness and pain.    Patient is making good progress towards established goals.  Pt will continue to benefit from skilled outpatient physical therapy to address the deficits stated in the impairment chart, provide pt/family education and to maximize pt's level of independence in the home and community environment.     Anticipated Barriers for therapy: none  Pt's spiritual, cultural and educational needs considered and pt agreeable to plan of care and goals as stated below:       GOALS: Pt is in agreement with the following goals.     Short term goals:  6 weeks or 10 visits   1.  Pt will demonstrate increased lumbar ROM by at least 3 degrees from the initial ROM value with improvements noted in functional ROM and ability to perform ADLs.  (approp and ongoing)  2.  Pt will demonstrate increased MedX average isometric strength value  by 20% from initial test resulting in improved ability to perform bending, lifting, and carrying activities safely, confidently.  (approp and ongoing)  3.  Patient report a reduction in worst pain score by 1-2 points for improved tolerance for bending, lifting, sleeping.  (approp and ongoing)  4.  Pt able to perform HEP correctly with minimal cueing or supervision from therapist to encourage independent management of symptoms. (approp and ongoing)        Long term goals: 10 weeks or 20 visits   1. Pt will demonstrate increased lumbar ROM by at least 6 degrees from initial ROM value, resulting in improved ability to perform functional fwd bending while standing and sitting. (approp and ongoing)  2. Pt will demonstrate increased MedX average isometric strength value  by 30% from initial test resulting in improved ability to perform bending, lifting, and carrying activities safely, confidently.  (approp and ongoing)  3. Pt to demonstrate ability to independently control and reduce their pain through posture positioning and mechanical movements  throughout a typical day.  (approp and ongoing)  4.  Pt will demonstrate reduced pain and improved functional outcomes as reported on the FOTO by reaching a limitation score of < or = 45% or less in order to demonstrate subjective improvement in pt's condition.    (approp and ongoing)  5. Pt will demonstrate independence with the HEP at discharge  (approp and ongoing)  6.  Pt will have overall decrease in pain in ADLs. (patient goal)  (approp and ongoing)             Plan   Continue with established Plan of Care towards established PT goals.

## 2022-04-04 ENCOUNTER — CLINICAL SUPPORT (OUTPATIENT)
Dept: REHABILITATION | Facility: HOSPITAL | Age: 32
End: 2022-04-04
Payer: COMMERCIAL

## 2022-04-04 DIAGNOSIS — G89.29 CHRONIC BILATERAL THORACIC BACK PAIN: ICD-10-CM

## 2022-04-04 DIAGNOSIS — G89.29 CHRONIC BILATERAL LOW BACK PAIN WITHOUT SCIATICA: Primary | ICD-10-CM

## 2022-04-04 DIAGNOSIS — M25.69 DECREASED ROM OF TRUNK AND BACK: ICD-10-CM

## 2022-04-04 DIAGNOSIS — R29.898 DECREASED STRENGTH OF TRUNK AND BACK: ICD-10-CM

## 2022-04-04 DIAGNOSIS — M54.6 CHRONIC BILATERAL THORACIC BACK PAIN: ICD-10-CM

## 2022-04-04 DIAGNOSIS — M54.50 CHRONIC BILATERAL LOW BACK PAIN WITHOUT SCIATICA: Primary | ICD-10-CM

## 2022-04-04 PROCEDURE — 97110 THERAPEUTIC EXERCISES: CPT | Mod: PO,CQ

## 2022-04-04 NOTE — PROGRESS NOTES
[facility]      Name: Yovani Malik  Clinic Number: [id number]    Therapy Diagnosis:   Encounter Diagnoses   Name Primary?    Chronic bilateral low back pain without sciatica Yes    Chronic bilateral thoracic back pain     Decreased strength of trunk and back     Decreased ROM of trunk and back      Physician: Rhianna Chavez PA-C    Visit Date: 4/4/2022      Physician: Rhianna Chavez PA-C     Physician Orders: PT Eval and Treat Healthy [facility]  Medical Diagnosis from Referral: Pain in thoracic spine, chronic bilateral low back pain without sciatica  Evaluation Date: 3/18/2022  Authorization Period Expiration: 1/20/23  Plan of Care Expiration: 6/18/22  Reassessment Due: 10th visit  Visit # / Visits authorized: 5 20     Time In: 12:50pm  Time Out: 1:34pm  Total Billable Time: 38 minutes     Precautions: Standard     Pattern of pain determined: Pattern 1 PEN (likely will transition to PEP)  Subjective   Yovani reports sore after last visit. He feels he is not getting better so far.     Patient reports tolerating previous visit well, but was sore after. Some right knee pain following session.  Patient reports their pain to be 5/10 on a 0-10 scale with 0 being no pain and 10 being the worst pain imaginable.  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        Pts goals: to lessen back pain    Objective   Baseline Isometric Testing on Med X equipment: Testing administered by PT  Date of testing: 3/18/22  ROM 0-27 deg   Max Peak Torque 125    Min Peak Torque 60    Flex/Ext Ratio 2:1   % below normative data 57      FOTO outcomes:    Initial visit: 52%      Treatment    Pt was instructed in and performed the following:     Yovani received therapeutic exercises to develop/improved posture, cardiovascular endurance, muscular endurance, lumbar/cervical ROM, strength and muscular endurance for 44 minutes including the following exercises:      LTR 10x 5 sec  Bilateral KTC 10x 5 sec  Post pelvic tilt x20  Slouch to overcorrect x10  Standing extension x10    [facility] 4/4/2022   Visit Number 5   VAS Pain Rating 5   Recumbent Bike Seat Pos. 12   Time 10   Lumbar Stretches - Slouch Overcorrection 10   Extension in Standing 10   Flexion in Lying 10   Lumbar Extension Seat Pad -   Femur Restraint -   Top Dead Center -   Counterweight -   Lumbar Flexion -   Lumbar Extension -   Lumbar Peak Torque -   Min Torque -   Test Percent Below Normative Data -   Lumbar Weight 65   Repetitions 16   Rating of Perceived Exertion 4   Ice - Z Lie (in min.) -         Peripheral muscle strengthening which included 1 set of 15-20 repetitions at a slow, controlled 10-13 second per rep pace focused on strengthening supporting musculature for improved body mechanics and functional mobility.  Pt and therapist focused on proper form during treatment to ensure optimal strengthening of each targeted muscle group.  Machines were utilized including torso rotation, leg extension, leg curl, chest press, upright row. Tricep extension, bicep curl, leg press, and hip abduction added visit 3    Yovani received the following manual therapy techniques: n/a were applied to the: n/a for n/a minutes.         Home Exercises Provided and Patient Education Provided   Home exercises include  LTR 10x 5 sec  Bilateral KTC 10x 5 sec  Post pelvic tilt x20  Slouch to overcorrect x10  Standing extension x10    Cardio program:none  Lifting education date  Lumbar roll:     Education provided:   -Educated pt on the importance of daily stretch to increase the benefit of therapy and positive results.     Written Home Exercises Provided: Patient instructed to cont prior HEP.  Exercises were reviewed and Yovani was able to demonstrate them prior to the end of the session.  Yovani demonstrated good  understanding of the education provided.     See EMR under Patient Instructions for exercises provided  3/21/2022.          Assessment     Pt felt the same post session with no symptom relief.Visible quad trembling and difficulty with leg extension. Encouraged daily stretching for relief of back stiffness and pain. Pt understood. Pt progressed on the lumbar and peripheral machines.     Patient is making good progress towards established goals.  Pt will continue to benefit from skilled outpatient physical therapy to address the deficits stated in the impairment chart, provide pt/family education and to maximize pt's level of independence in the home and community environment.     Anticipated Barriers for therapy: none  Pt's spiritual, cultural and educational needs considered and pt agreeable to plan of care and goals as stated below:       GOALS: Pt is in agreement with the following goals.     Short term goals:  6 weeks or 10 visits   1.  Pt will demonstrate increased lumbar ROM by at least 3 degrees from the initial ROM value with improvements noted in functional ROM and ability to perform ADLs.  (approp and ongoing)  2.  Pt will demonstrate increased MedX average isometric strength value  by 20% from initial test resulting in improved ability to perform bending, lifting, and carrying activities safely, confidently.  (approp and ongoing)  3.  Patient report a reduction in worst pain score by 1-2 points for improved tolerance for bending, lifting, sleeping.  (approp and ongoing)  4.  Pt able to perform HEP correctly with minimal cueing or supervision from therapist to encourage independent management of symptoms. (approp and ongoing)        Long term goals: 10 weeks or 20 visits   1. Pt will demonstrate increased lumbar ROM by at least 6 degrees from initial ROM value, resulting in improved ability to perform functional fwd bending while standing and sitting. (approp and ongoing)  2. Pt will demonstrate increased MedX average isometric strength value  by 30% from initial test resulting in improved ability to perform  bending, lifting, and carrying activities safely, confidently.  (approp and ongoing)  3. Pt to demonstrate ability to independently control and reduce their pain through posture positioning and mechanical movements throughout a typical day.  (approp and ongoing)  4.  Pt will demonstrate reduced pain and improved functional outcomes as reported on the FOTO by reaching a limitation score of < or = 45% or less in order to demonstrate subjective improvement in pt's condition.    (approp and ongoing)  5. Pt will demonstrate independence with the HEP at discharge  (approp and ongoing)  6.  Pt will have overall decrease in pain in ADLs. (patient goal)  (approp and ongoing)             Plan   Continue with established Plan of Care towards established PT goals.

## 2022-04-07 ENCOUNTER — PATIENT MESSAGE (OUTPATIENT)
Dept: REHABILITATION | Facility: HOSPITAL | Age: 32
End: 2022-04-07

## 2022-04-07 ENCOUNTER — CLINICAL SUPPORT (OUTPATIENT)
Dept: REHABILITATION | Facility: HOSPITAL | Age: 32
End: 2022-04-07
Payer: COMMERCIAL

## 2022-04-07 DIAGNOSIS — M54.6 CHRONIC BILATERAL THORACIC BACK PAIN: ICD-10-CM

## 2022-04-07 DIAGNOSIS — G89.29 CHRONIC BILATERAL THORACIC BACK PAIN: ICD-10-CM

## 2022-04-07 DIAGNOSIS — R29.898 DECREASED STRENGTH OF TRUNK AND BACK: ICD-10-CM

## 2022-04-07 DIAGNOSIS — M25.69 DECREASED ROM OF TRUNK AND BACK: ICD-10-CM

## 2022-04-07 DIAGNOSIS — G89.29 CHRONIC BILATERAL LOW BACK PAIN WITHOUT SCIATICA: Primary | ICD-10-CM

## 2022-04-07 DIAGNOSIS — M54.50 CHRONIC BILATERAL LOW BACK PAIN WITHOUT SCIATICA: Primary | ICD-10-CM

## 2022-04-07 PROCEDURE — 97110 THERAPEUTIC EXERCISES: CPT | Mod: PO | Performed by: PHYSICAL THERAPIST

## 2022-04-07 NOTE — PATIENT INSTRUCTIONS
"        PRONE ON ELBOWS - KHADRA    Lying face down, slowly press up and prop yourself up on your elbows. Hold, lower back down and repeat. 2-5 minutes, 2x/day    PRESS [employer]    Lying face down, slowly press up and arch your back using your arms. 2 sets of 10, 2x/day    BRIDGE - BRIDGING    While lying on your back with knees bent, tighten your lower abdominal muscles, squeeze your buttocks and then raise your buttocks off the floor/bed as creating a "Bridge" with your body. Hold and then lower yourself and repeat. 2 sets of 10, 2x/day    Open Book    Lying on your side, arms straight out in front of you. Keeping arms straight, move top arm away from bottom and try to reach back of hand to opposite side of mat while keeping lower half still (legs don't move). 10x, 2x/day      STANDING EXTENSIONS    Start by standing and place your hands on your hips with your thumbs grasping your low back. Lean back to arch your back then return to starting position. Use your thumbs to help isolate where you want to bend. 10x, 2x/day. In addition, do 1-2x every time you get up from sitting or bending.  "

## 2022-04-07 NOTE — PROGRESS NOTES
[facility]      Name: Yovani Malik  Clinic Number: [id number]    Therapy Diagnosis:   Encounter Diagnoses   Name Primary?    Chronic bilateral low back pain without sciatica Yes    Chronic bilateral thoracic back pain     Decreased strength of trunk and back     Decreased ROM of trunk and back      Physician: Rhianna Chavez PA-C    Visit Date: 4/7/2022      Physician: Rhianna Chavez PA-C     Physician Orders: PT Eval and Treat Healthy [facility]  Medical Diagnosis from Referral: Pain in thoracic spine, chronic bilateral low back pain without sciatica  Evaluation Date: 3/18/2022  Authorization Period Expiration: 1/20/23  Plan of Care Expiration: 6/18/22  Reassessment Due: 10th visit  Visit # / Visits authorized: 6/ 20     Time In: 1:00pm  Time Out: 1:45pm  Total Billable Time: 45 minutes     Precautions: Standard     Pattern of pain determined: Pattern 1 PEN (likely will transition to PEP)  Subjective   Yovani reports he has had some increased pain following each session of PT. He reports no change in pain with stretching at home.    Patient reports tolerating previous visit well, but was sore after. Some right knee pain following session.  Patient reports their pain to be 5/10 on a 0-10 scale with 0 being no pain and 10 being the worst pain imaginable.  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[employer]  Leisure: spend time with child        Pts goals: to lessen back pain    Objective   Baseline Isometric Testing on Med X equipment: Testing administered by PT  Date of testing: 3/18/22  ROM 0-27 deg   Max Peak Torque 125    Min Peak Torque 60    Flex/Ext Ratio 2:1   % below normative data 57      FOTO outcomes:    Initial visit: 52%  Visit 6: 45%      Treatment    Pt was instructed in and performed the following:     Yovani received therapeutic exercises to develop/improved posture, cardiovascular endurance, muscular endurance, lumbar/cervical ROM, strength and  muscular endurance for 45 minutes including the following exercises:     Prone on elbows 2 min  Press ups 2/10  Bridging 2/10  Open book x10  Standing extension x10    LTR 10x 5 sec  Bilateral KTC 10x 5 sec  Post pelvic tilt x20  Slouch to overcorrect x10    [facility] 4/4/2022   Visit Number 5   VAS Pain Rating 5   Recumbent Bike Seat Pos. 12   Time 10   Lumbar Stretches - Slouch Overcorrection 10   Extension in Standing 10   Flexion in Lying 10   Lumbar Extension Seat Pad -   Femur Restraint -   Top Dead Center -   Counterweight -   Lumbar Flexion -   Lumbar Extension -   Lumbar Peak Torque -   Min Torque -   Test Percent Below Normative Data -   Lumbar Weight 65   Repetitions 16   Rating of Perceived Exertion 4   Ice - Z Lie (in min.) -           Peripheral muscle strengthening which included 1 set of 15-20 repetitions at a slow, controlled 10-13 second per rep pace focused on strengthening supporting musculature for improved body mechanics and functional mobility.  Pt and therapist focused on proper form during treatment to ensure optimal strengthening of each targeted muscle group.  Machines were utilized including torso rotation, leg extension, leg curl, chest press, upright row. Tricep extension, bicep curl, leg press, and hip abduction added visit 3    Yovani received the following manual therapy techniques: n/a were applied to the: n/a for n/a minutes.         Home Exercises Provided and Patient Education Provided   Home exercises include  LTR 10x 5 sec  Bilateral KTC 10x 5 sec  Post pelvic tilt x20  Slouch to overcorrect x10  Standing extension x10    Cardio program: instruct 4/7/22  Lifting education date  Lumbar roll:     Education provided:   -Educated pt on the importance of daily stretch to increase the benefit of therapy and positive results.     Written Home Exercises Provided: Patient instructed to cont prior HEP.  Exercises were reviewed and Yovani was able to demonstrate them prior to  "the end of the session.  Yovani demonstrated good  understanding of the education provided.     See EMR under Patient Instructions for exercises provided 3/21/2022.          Assessment     Changed pt's exercises to extension preference and initiated walking on treadmill. Pain primarily in T-L region. Instructed in open book exercise to increase mobility there. He states it feels better when his wife "pops his back". Improving perceived exertion with quad exercises, but still has muscle fasciculations of quads with exertion.    Patient is making good progress towards established goals.  Pt will continue to benefit from skilled outpatient physical therapy to address the deficits stated in the impairment chart, provide pt/family education and to maximize pt's level of independence in the home and community environment.     Anticipated Barriers for therapy: none  Pt's spiritual, cultural and educational needs considered and pt agreeable to plan of care and goals as stated below:       GOALS: Pt is in agreement with the following goals.     Short term goals:  6 weeks or 10 visits   1.  Pt will demonstrate increased lumbar ROM by at least 3 degrees from the initial ROM value with improvements noted in functional ROM and ability to perform ADLs.  (approp and ongoing)  2.  Pt will demonstrate increased MedX average isometric strength value  by 20% from initial test resulting in improved ability to perform bending, lifting, and carrying activities safely, confidently.  (approp and ongoing)  3.  Patient report a reduction in worst pain score by 1-2 points for improved tolerance for bending, lifting, sleeping.  (approp and ongoing)  4.  Pt able to perform HEP correctly with minimal cueing or supervision from therapist to encourage independent management of symptoms. (approp and ongoing)        Long term goals: 10 weeks or 20 visits   1. Pt will demonstrate increased lumbar ROM by at least 6 degrees from initial ROM value, " resulting in improved ability to perform functional fwd bending while standing and sitting. (approp and ongoing)  2. Pt will demonstrate increased MedX average isometric strength value  by 30% from initial test resulting in improved ability to perform bending, lifting, and carrying activities safely, confidently.  (approp and ongoing)  3. Pt to demonstrate ability to independently control and reduce their pain through posture positioning and mechanical movements throughout a typical day.  (approp and ongoing)  4.  Pt will demonstrate reduced pain and improved functional outcomes as reported on the FOTO by reaching a limitation score of < or = 45% or less in order to demonstrate subjective improvement in pt's condition.    (approp and ongoing)  5. Pt will demonstrate independence with the HEP at discharge  (approp and ongoing)  6.  Pt will have overall decrease in pain in ADLs. (patient goal)  (approp and ongoing)             Plan   Continue with established Plan of Care towards established PT goals.

## 2022-04-19 ENCOUNTER — CLINICAL SUPPORT (OUTPATIENT)
Dept: REHABILITATION | Facility: HOSPITAL | Age: 32
End: 2022-04-19
Payer: COMMERCIAL

## 2022-04-19 DIAGNOSIS — G89.29 CHRONIC BILATERAL THORACIC BACK PAIN: ICD-10-CM

## 2022-04-19 DIAGNOSIS — M54.6 CHRONIC BILATERAL THORACIC BACK PAIN: ICD-10-CM

## 2022-04-19 DIAGNOSIS — M54.50 CHRONIC BILATERAL LOW BACK PAIN WITHOUT SCIATICA: Primary | ICD-10-CM

## 2022-04-19 DIAGNOSIS — G89.29 CHRONIC BILATERAL LOW BACK PAIN WITHOUT SCIATICA: Primary | ICD-10-CM

## 2022-04-19 DIAGNOSIS — R29.898 DECREASED STRENGTH OF TRUNK AND BACK: ICD-10-CM

## 2022-04-19 DIAGNOSIS — M25.69 DECREASED ROM OF TRUNK AND BACK: ICD-10-CM

## 2022-04-19 PROCEDURE — 97110 THERAPEUTIC EXERCISES: CPT | Mod: PO,CQ

## 2022-04-19 NOTE — PROGRESS NOTES
[facility]      Name: Yovani Malik  Clinic Number: [id number]    Therapy Diagnosis:   Encounter Diagnoses   Name Primary?    Chronic bilateral low back pain without sciatica Yes    Chronic bilateral thoracic back pain     Decreased strength of trunk and back     Decreased ROM of trunk and back      Physician: Rhianna Chavez PA-C    Visit Date: 4/19/2022      Physician: Rhianna Chavez PA-C     Physician Orders: PT Eval and Treat Healthy [facility]  Medical Diagnosis from Referral: Pain in thoracic spine, chronic bilateral low back pain without sciatica  Evaluation Date: 3/18/2022  Authorization Period Expiration: 1/20/23  Plan of Care Expiration: 6/18/22  Reassessment Due: 10th visit  Visit # / Visits authorized: 7/ 20     Time In: 1:55pm  Time Out: 2:52pm  Total Billable Time: 46 minutes     Precautions: Standard     Pattern of pain determined: Pattern 1 PEN (likely will transition to PEP)  Subjective   Yovani reports he has had some increased pain following each session of PT. He reports no change in pain with stretching at home. He did get an epidermal injection in his lumbar spine that helped and has intermittent psin now, compared to constant.     Patient reports tolerating previous visit well, but was sore after. Some right knee pain following session.  Patient reports their pain to be 5/10 on a 0-10 scale with 0 being no pain and 10 being the worst pain imaginable.  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[employer]  Leisure: spend time with child        Pts goals: to lessen back pain    Objective   Baseline Isometric Testing on Med X equipment: Testing administered by PT  Date of testing: 3/18/22  ROM 0-27 deg   Max Peak Torque 125    Min Peak Torque 60    Flex/Ext Ratio 2:1   % below normative data 57      FOTO outcomes:    Initial visit: 52%  Visit 6: 45%      Treatment    Pt was instructed in and performed the following:     Yovani received therapeutic  exercises to develop/improved posture, cardiovascular endurance, muscular endurance, lumbar/cervical ROM, strength and muscular endurance for 56 minutes including the following exercises:     Prone on elbows 2 min  Press ups 2/10  Bridging 2/10  Open book x10  Standing extension x10    LTR 10x 5 sec  Bilateral KTC 10x 5 sec  Post pelvic tilt x20  Slouch to overcorrect x10 (NP)  [facility] 4/19/2022   Visit Number 6   VAS Pain Rating 5   Recumbent Bike Seat Pos. -No warm 2/2 pt late   Time -   Lumbar Stretches - Slouch Overcorrection -   Extension in Standing 5   Flexion in Lying 10   Lumbar Extension Seat Pad -   Femur Restraint -   Top Dead Center -   Counterweight -   Lumbar Flexion -   Lumbar Extension -   Lumbar Peak Torque -   Min Torque -   Test Percent Below Normative Data -   Lumbar Weight 68   Repetitions 20   Rating of Perceived Exertion 5   Ice - Z Lie (in min.) 10   Manual DTM 7 mins.       Peripheral muscle strengthening which included 1 set of 15-20 repetitions at a slow, controlled 10-13 second per rep pace focused on strengthening supporting musculature for improved body mechanics and functional mobility.  Pt and therapist focused on proper form during treatment to ensure optimal strengthening of each targeted muscle group.  Machines were utilized including torso rotation, leg extension, leg curl, chest press, upright row. Tricep extension, bicep curl, leg press, and hip abduction added visit 3    Yovani received the following manual therapy techniques: DTM to B LB with pt prone to increase blood flow and soften muscle tissue. 7 minutes.         Home Exercises Provided and Patient Education Provided   Home exercises include  LTR 10x 5 sec  Bilateral KTC 10x 5 sec  Post pelvic tilt x20  Slouch to overcorrect x10  Standing extension x10    Cardio program: instruct 4/7/22  Lifting education date  Lumbar roll:     Education provided:   -Educated pt on the importance of daily stretch to increase  "the benefit of therapy and positive results.     Written Home Exercises Provided: Patient instructed to cont prior HEP.  Exercises were reviewed and Yovani was able to demonstrate them prior to the end of the session.  Yovani demonstrated good  understanding of the education provided.     See EMR under Patient Instructions for exercises provided 3/21/2022.          Assessment     Pt stated today EIL has been hurting his back. Added DKTC again today which felt better on his LB, but when finished HEP he stated his back felt the same.  He states it feels better when his wife "pops his back". Pt received DTM to B LB, but no improvement.  Pt progressing with lumbar and peripheral machines.   Patient is making good progress towards established goals.  Pt will continue to benefit from skilled outpatient physical therapy to address the deficits stated in the impairment chart, provide pt/family education and to maximize pt's level of independence in the home and community environment.     Anticipated Barriers for therapy: none  Pt's spiritual, cultural and educational needs considered and pt agreeable to plan of care and goals as stated below:       GOALS: Pt is in agreement with the following goals.     Short term goals:  6 weeks or 10 visits   1.  Pt will demonstrate increased lumbar ROM by at least 3 degrees from the initial ROM value with improvements noted in functional ROM and ability to perform ADLs.  (approp and ongoing)  2.  Pt will demonstrate increased MedX average isometric strength value  by 20% from initial test resulting in improved ability to perform bending, lifting, and carrying activities safely, confidently.  (approp and ongoing)  3.  Patient report a reduction in worst pain score by 1-2 points for improved tolerance for bending, lifting, sleeping.  (approp and ongoing)  4.  Pt able to perform HEP correctly with minimal cueing or supervision from therapist to encourage independent management of symptoms. " (approp and ongoing)        Long term goals: 10 weeks or 20 visits   1. Pt will demonstrate increased lumbar ROM by at least 6 degrees from initial ROM value, resulting in improved ability to perform functional fwd bending while standing and sitting. (approp and ongoing)  2. Pt will demonstrate increased MedX average isometric strength value  by 30% from initial test resulting in improved ability to perform bending, lifting, and carrying activities safely, confidently.  (approp and ongoing)  3. Pt to demonstrate ability to independently control and reduce their pain through posture positioning and mechanical movements throughout a typical day.  (approp and ongoing)  4.  Pt will demonstrate reduced pain and improved functional outcomes as reported on the FOTO by reaching a limitation score of < or = 45% or less in order to demonstrate subjective improvement in pt's condition.    (approp and ongoing)  5. Pt will demonstrate independence with the HEP at discharge  (approp and ongoing)  6.  Pt will have overall decrease in pain in ADLs. (patient goal)  (approp and ongoing)             Plan   Continue with established Plan of Care towards established PT goals.

## 2022-04-22 ENCOUNTER — PATIENT MESSAGE (OUTPATIENT)
Dept: REHABILITATION | Facility: HOSPITAL | Age: 32
End: 2022-04-22
Payer: COMMERCIAL

## 2022-04-28 ENCOUNTER — DOCUMENTATION ONLY (OUTPATIENT)
Dept: REHABILITATION | Facility: HOSPITAL | Age: 32
End: 2022-04-28
Payer: COMMERCIAL

## 2022-06-12 ENCOUNTER — PATIENT MESSAGE (OUTPATIENT)
Dept: SPINE | Facility: CLINIC | Age: 32
End: 2022-06-12
Payer: COMMERCIAL

## 2022-07-01 ENCOUNTER — DOCUMENTATION ONLY (OUTPATIENT)
Dept: REHABILITATION | Facility: HOSPITAL | Age: 32
End: 2022-07-01
Payer: COMMERCIAL

## 2022-07-01 DIAGNOSIS — M54.6 CHRONIC BILATERAL THORACIC BACK PAIN: ICD-10-CM

## 2022-07-01 DIAGNOSIS — M25.69 DECREASED ROM OF TRUNK AND BACK: ICD-10-CM

## 2022-07-01 DIAGNOSIS — G89.29 CHRONIC BILATERAL THORACIC BACK PAIN: ICD-10-CM

## 2022-07-01 DIAGNOSIS — R29.898 DECREASED STRENGTH OF TRUNK AND BACK: ICD-10-CM

## 2022-07-01 DIAGNOSIS — M54.50 CHRONIC BILATERAL LOW BACK PAIN WITHOUT SCIATICA: Primary | ICD-10-CM

## 2022-07-01 DIAGNOSIS — G89.29 CHRONIC BILATERAL LOW BACK PAIN WITHOUT SCIATICA: Primary | ICD-10-CM

## 2022-07-01 NOTE — PROGRESS NOTES
Outpatient Physical Therapy Discharge Summary    Date: 07/01/2022      Date of PT eval: 3/18/22  Number of PT visits: 7  Date of last visit: 4/19/22    Assessment:  Unable to assess if goals met secondary to lack of attendance.     Short term goals:  6 weeks or 10 visits NOT MET  1.  Pt will demonstrate increased lumbar ROM by at least 3 degrees from the initial ROM value with improvements noted in functional ROM and ability to perform ADLs.  (approp and ongoing)  2.  Pt will demonstrate increased MedX average isometric strength value  by 20% from initial test resulting in improved ability to perform bending, lifting, and carrying activities safely, confidently.  (approp and ongoing)  3.  Patient report a reduction in worst pain score by 1-2 points for improved tolerance for bending, lifting, sleeping.  (approp and ongoing)  4.  Pt able to perform HEP correctly with minimal cueing or supervision from therapist to encourage independent management of symptoms. (approp and ongoing)        Long term goals: 10 weeks or 20 visits NOT MET  1. Pt will demonstrate increased lumbar ROM by at least 6 degrees from initial ROM value, resulting in improved ability to perform functional fwd bending while standing and sitting. (approp and ongoing)  2. Pt will demonstrate increased MedX average isometric strength value  by 30% from initial test resulting in improved ability to perform bending, lifting, and carrying activities safely, confidently.  (approp and ongoing)  3. Pt to demonstrate ability to independently control and reduce their pain through posture positioning and mechanical movements throughout a typical day.  (approp and ongoing)  4.  Pt will demonstrate reduced pain and improved functional outcomes as reported on the FOTO by reaching a limitation score of < or = 45% or less in order to demonstrate subjective improvement in pt's condition.    (approp and ongoing)  5. Pt will demonstrate independence with the HEP at  discharge  (approp and ongoing)  6.  Pt will have overall decrease in pain in ADLs. (patient goal)  (approp and ongoing)         Plan: Discharge from outpatient physical therapy due to self discharged

## 2022-08-03 ENCOUNTER — OFFICE VISIT (OUTPATIENT)
Dept: FAMILY MEDICINE | Facility: CLINIC | Age: 32
End: 2022-08-03
Payer: COMMERCIAL

## 2022-08-03 ENCOUNTER — HOSPITAL ENCOUNTER (OUTPATIENT)
Dept: RADIOLOGY | Facility: HOSPITAL | Age: 32
Discharge: HOME | End: 2022-08-03
Attending: FAMILY MEDICINE
Payer: COMMERCIAL

## 2022-08-03 ENCOUNTER — PATIENT MESSAGE (OUTPATIENT)
Dept: FAMILY MEDICINE | Facility: CLINIC | Age: 32
End: 2022-08-03

## 2022-08-03 VITALS
BODY MASS INDEX: 39.74 KG/M2 | HEIGHT: 66 IN | HEART RATE: 108 BPM | DIASTOLIC BLOOD PRESSURE: 86 MMHG | SYSTOLIC BLOOD PRESSURE: 120 MMHG | RESPIRATION RATE: 18 BRPM | WEIGHT: 247.25 LBS | OXYGEN SATURATION: 97 %

## 2022-08-03 DIAGNOSIS — R10.13 EPIGASTRIC PAIN: ICD-10-CM

## 2022-08-03 DIAGNOSIS — R10.811 RIGHT UPPER QUADRANT ABDOMINAL TENDERNESS WITHOUT REBOUND TENDERNESS: ICD-10-CM

## 2022-08-03 DIAGNOSIS — I10 PRIMARY HYPERTENSION: ICD-10-CM

## 2022-08-03 DIAGNOSIS — R10.13 EPIGASTRIC PAIN: Primary | ICD-10-CM

## 2022-08-03 DIAGNOSIS — K80.20 GALLSTONES: ICD-10-CM

## 2022-08-03 DIAGNOSIS — E66.01 MORBID OBESITY: ICD-10-CM

## 2022-08-03 PROCEDURE — 74018 RADEX ABDOMEN 1 VIEW: CPT | Mod: 26,,, | Performed by: RADIOLOGY

## 2022-08-03 PROCEDURE — 3008F BODY MASS INDEX DOCD: CPT | Mod: CPTII,S$GLB,, | Performed by: FAMILY MEDICINE

## 2022-08-03 PROCEDURE — 1160F RVW MEDS BY RX/DR IN RCRD: CPT | Mod: CPTII,S$GLB,, | Performed by: FAMILY MEDICINE

## 2022-08-03 PROCEDURE — 99214 OFFICE O/P EST MOD 30 MIN: CPT | Mod: S$GLB,,, | Performed by: FAMILY MEDICINE

## 2022-08-03 PROCEDURE — 76705 ECHO EXAM OF ABDOMEN: CPT | Mod: TC,PO

## 2022-08-03 PROCEDURE — 3008F PR BODY MASS INDEX (BMI) DOCUMENTED: ICD-10-PCS | Mod: CPTII,S$GLB,, | Performed by: FAMILY MEDICINE

## 2022-08-03 PROCEDURE — 76705 US ABDOMEN LIMITED: ICD-10-PCS | Mod: 26,,, | Performed by: RADIOLOGY

## 2022-08-03 PROCEDURE — 74018 XR ABDOMEN AP 1 VIEW: ICD-10-PCS | Mod: 26,,, | Performed by: RADIOLOGY

## 2022-08-03 PROCEDURE — 1160F PR REVIEW ALL MEDS BY PRESCRIBER/CLIN PHARMACIST DOCUMENTED: ICD-10-PCS | Mod: CPTII,S$GLB,, | Performed by: FAMILY MEDICINE

## 2022-08-03 PROCEDURE — 3079F DIAST BP 80-89 MM HG: CPT | Mod: CPTII,S$GLB,, | Performed by: FAMILY MEDICINE

## 2022-08-03 PROCEDURE — 99999 PR PBB SHADOW E&M-EST. PATIENT-LVL IV: CPT | Mod: PBBFAC,,, | Performed by: FAMILY MEDICINE

## 2022-08-03 PROCEDURE — 99214 PR OFFICE/OUTPT VISIT, EST, LEVL IV, 30-39 MIN: ICD-10-PCS | Mod: S$GLB,,, | Performed by: FAMILY MEDICINE

## 2022-08-03 PROCEDURE — 99999 PR PBB SHADOW E&M-EST. PATIENT-LVL IV: ICD-10-PCS | Mod: PBBFAC,,, | Performed by: FAMILY MEDICINE

## 2022-08-03 PROCEDURE — 3074F SYST BP LT 130 MM HG: CPT | Mod: CPTII,S$GLB,, | Performed by: FAMILY MEDICINE

## 2022-08-03 PROCEDURE — 74018 RADEX ABDOMEN 1 VIEW: CPT | Mod: TC,FY,PO

## 2022-08-03 PROCEDURE — 1159F MED LIST DOCD IN RCRD: CPT | Mod: CPTII,S$GLB,, | Performed by: FAMILY MEDICINE

## 2022-08-03 PROCEDURE — 1159F PR MEDICATION LIST DOCUMENTED IN MEDICAL RECORD: ICD-10-PCS | Mod: CPTII,S$GLB,, | Performed by: FAMILY MEDICINE

## 2022-08-03 PROCEDURE — 3079F PR MOST RECENT DIASTOLIC BLOOD PRESSURE 80-89 MM HG: ICD-10-PCS | Mod: CPTII,S$GLB,, | Performed by: FAMILY MEDICINE

## 2022-08-03 PROCEDURE — 3074F PR MOST RECENT SYSTOLIC BLOOD PRESSURE < 130 MM HG: ICD-10-PCS | Mod: CPTII,S$GLB,, | Performed by: FAMILY MEDICINE

## 2022-08-03 PROCEDURE — 76705 ECHO EXAM OF ABDOMEN: CPT | Mod: 26,,, | Performed by: RADIOLOGY

## 2022-08-03 RX ORDER — PANTOPRAZOLE SODIUM 40 MG/1
40 TABLET, DELAYED RELEASE ORAL DAILY
Qty: 30 TABLET | Refills: 11 | Status: SHIPPED | OUTPATIENT
Start: 2022-08-03 | End: 2023-03-01

## 2022-08-03 RX ORDER — OXYCODONE AND ACETAMINOPHEN 10; 325 MG/1; MG/1
1 TABLET ORAL DAILY PRN
COMMUNITY
Start: 2022-07-07

## 2022-08-06 NOTE — PROGRESS NOTES
Assessment:       1. Epigastric pain    2. Morbid obesity    3. Primary hypertension    4. Right upper quadrant abdominal tenderness without rebound tenderness    5. Gallstones        Plan:       Epigastric pain:  New problem workup needed  -     CBC Auto Differential; Future; Expected date: 08/03/2022  -     Comprehensive Metabolic Panel; Future; Expected date: 08/03/2022  -     AMYLASE; Future; Expected date: 08/03/2022  -     LIPASE; Future; Expected date: 08/03/2022  -     X-Ray Abdomen AP 1 View; Future; Expected date: 08/03/2022    Morbid obesity:  Stable    Primary hypertension:  Uncontrolled  -     Hypertension [facility] ([facility]) Enrollment Order  -     Hypertension Digital Medicine ([facility]): Assign Onboarding Questionnaires    Right upper quadrant abdominal tenderness without rebound tenderness:  New problem workup needed  -     US Abdomen Limited; Future; Expected date: 08/03/2022  -     pantoprazole (PROTONIX) 40 MG tablet; Take 1 tablet (40 mg total) by mouth once daily.  Dispense: 30 tablet; Refill: 11    Gallstones:  New problem workup needed  -     Ambulatory referral/consult to General Surgery; Future; Expected date: 08/10/2022      Blood work did not show any abnormalities.  Gallbladder ultrasound showed presence of gallstones.  Will refer the patient to surgery, abdominal x-rays did not show any abnormalities.  The patient was advised if the symptoms get any worse to go to the ER, will start patient also on pantoprazole daily.  If the symptoms get worse, the patient will need to go to the emergency room immediately.  Will enroll the patient in the digital hypertension program.  The patient's BMI has been recorded in the chart. The patient has been provided educational materials regarding the benefits of attaining and maintaining a normal weight. We will continue to address and follow this issue during follow up visits.   Patient agreed with assessment and plan. Patient verbalized understanding.      Subjective:       Patient ID: Yovani Malik is a 32 y.o. male.    Chief Complaint: Diarrhea and Abdominal Pain    HPI     Abdominal pain:  The patient stated for the last few days he has been having severe abdominal pain.  The symptoms are getting worse.  The patient stated that the pain also is on the right upper quadrant.  Not radiated to the back, the patient takes normally oxycodone for chronic pain.  The patient stated the pain is very intense, he also is been having some diarrhea, denies any fever, chills, vomiting, chest pain shortness of breath.    Hypertension:  Patient currently not taking medications but upon review of the last few encounter, the blood pressure is been elevated.  The patient is interested in be in the digital hypertension program.    Past medical history, past social history was reviewed and discussed with the patient.    Review of Systems   Constitutional: Positive for activity change and appetite change. Negative for chills.   HENT: Negative for congestion and ear discharge.    Eyes: Negative for discharge and itching.   Respiratory: Negative for choking and chest tightness.    Cardiovascular: Negative for chest pain and leg swelling.   Gastrointestinal: Positive for abdominal distention, abdominal pain and diarrhea.   Endocrine: Negative for cold intolerance and heat intolerance.   Genitourinary: Negative for dysuria and flank pain.   Skin: Negative for pallor and rash.   Allergic/Immunologic: Negative for environmental allergies and food allergies.   Neurological: Negative for dizziness, facial asymmetry and headaches.   Hematological: Negative for adenopathy. Does not bruise/bleed easily.   Psychiatric/Behavioral: Negative for agitation, confusion and decreased concentration.       Objective:      Physical Exam  Vitals and nursing note reviewed.   Constitutional:       General: He is not in acute distress.     Appearance: Normal appearance. He is well-developed. He is obese. He  is not diaphoretic.   HENT:      Head: Normocephalic and atraumatic.      Right Ear: External ear normal.      Left Ear: External ear normal.      Nose: Nose normal.   Eyes:      General: No scleral icterus.        Left eye: No discharge.   Cardiovascular:      Rate and Rhythm: Normal rate and regular rhythm.      Heart sounds: Normal heart sounds.   Pulmonary:      Effort: Pulmonary effort is normal. No respiratory distress.      Breath sounds: Normal breath sounds. No wheezing.   Abdominal:      General: Bowel sounds are normal.      Palpations: Abdomen is soft.      Tenderness: There is abdominal tenderness (Epigastrium and right upper quadrant). There is no guarding.   Musculoskeletal:         General: No tenderness.      Cervical back: Normal range of motion and neck supple.   Skin:     General: Skin is warm and dry.      Coloration: Skin is not pale.      Findings: No erythema.   Neurological:      Mental Status: He is alert.      Cranial Nerves: No cranial nerve deficit.      Motor: No abnormal muscle tone.      Coordination: Coordination normal.   Psychiatric:         Behavior: Behavior normal.         Thought Content: Thought content normal.         Judgment: Judgment normal.

## 2022-08-09 ENCOUNTER — OFFICE VISIT (OUTPATIENT)
Dept: SURGERY | Facility: CLINIC | Age: 32
End: 2022-08-09
Payer: COMMERCIAL

## 2022-08-09 VITALS
WEIGHT: 252.88 LBS | SYSTOLIC BLOOD PRESSURE: 121 MMHG | DIASTOLIC BLOOD PRESSURE: 73 MMHG | HEART RATE: 99 BPM | TEMPERATURE: 98 F | BODY MASS INDEX: 40.81 KG/M2

## 2022-08-09 DIAGNOSIS — K80.20 GALLSTONES: ICD-10-CM

## 2022-08-09 PROCEDURE — 3008F BODY MASS INDEX DOCD: CPT | Mod: CPTII,S$GLB,, | Performed by: STUDENT IN AN ORGANIZED HEALTH CARE EDUCATION/TRAINING PROGRAM

## 2022-08-09 PROCEDURE — 3008F PR BODY MASS INDEX (BMI) DOCUMENTED: ICD-10-PCS | Mod: CPTII,S$GLB,, | Performed by: STUDENT IN AN ORGANIZED HEALTH CARE EDUCATION/TRAINING PROGRAM

## 2022-08-09 PROCEDURE — 3078F DIAST BP <80 MM HG: CPT | Mod: CPTII,S$GLB,, | Performed by: STUDENT IN AN ORGANIZED HEALTH CARE EDUCATION/TRAINING PROGRAM

## 2022-08-09 PROCEDURE — 99203 OFFICE O/P NEW LOW 30 MIN: CPT | Mod: S$GLB,,, | Performed by: STUDENT IN AN ORGANIZED HEALTH CARE EDUCATION/TRAINING PROGRAM

## 2022-08-09 PROCEDURE — 3074F PR MOST RECENT SYSTOLIC BLOOD PRESSURE < 130 MM HG: ICD-10-PCS | Mod: CPTII,S$GLB,, | Performed by: STUDENT IN AN ORGANIZED HEALTH CARE EDUCATION/TRAINING PROGRAM

## 2022-08-09 PROCEDURE — 1159F PR MEDICATION LIST DOCUMENTED IN MEDICAL RECORD: ICD-10-PCS | Mod: CPTII,S$GLB,, | Performed by: STUDENT IN AN ORGANIZED HEALTH CARE EDUCATION/TRAINING PROGRAM

## 2022-08-09 PROCEDURE — 1159F MED LIST DOCD IN RCRD: CPT | Mod: CPTII,S$GLB,, | Performed by: STUDENT IN AN ORGANIZED HEALTH CARE EDUCATION/TRAINING PROGRAM

## 2022-08-09 PROCEDURE — 3078F PR MOST RECENT DIASTOLIC BLOOD PRESSURE < 80 MM HG: ICD-10-PCS | Mod: CPTII,S$GLB,, | Performed by: STUDENT IN AN ORGANIZED HEALTH CARE EDUCATION/TRAINING PROGRAM

## 2022-08-09 PROCEDURE — 99999 PR PBB SHADOW E&M-EST. PATIENT-LVL III: CPT | Mod: PBBFAC,,, | Performed by: STUDENT IN AN ORGANIZED HEALTH CARE EDUCATION/TRAINING PROGRAM

## 2022-08-09 PROCEDURE — 99999 PR PBB SHADOW E&M-EST. PATIENT-LVL III: ICD-10-PCS | Mod: PBBFAC,,, | Performed by: STUDENT IN AN ORGANIZED HEALTH CARE EDUCATION/TRAINING PROGRAM

## 2022-08-09 PROCEDURE — 3074F SYST BP LT 130 MM HG: CPT | Mod: CPTII,S$GLB,, | Performed by: STUDENT IN AN ORGANIZED HEALTH CARE EDUCATION/TRAINING PROGRAM

## 2022-08-09 PROCEDURE — 99203 PR OFFICE/OUTPT VISIT, NEW, LEVL III, 30-44 MIN: ICD-10-PCS | Mod: S$GLB,,, | Performed by: STUDENT IN AN ORGANIZED HEALTH CARE EDUCATION/TRAINING PROGRAM

## 2022-08-09 RX ORDER — ALPRAZOLAM 0.5 MG/1
0.5 TABLET ORAL 2 TIMES DAILY PRN
COMMUNITY
Start: 2022-07-11 | End: 2023-03-01

## 2022-08-09 NOTE — PROGRESS NOTES
History & Physical    SUBJECTIVE:     History of Present Illness:  Patient is a 32 y.o. male presents with  1 episode recently of epigastric and right upper quadrant abdominal pain after a meal.  He underwent ultrasound imaging which showed gallstones.  Every now and then when he has a very large meal, he has some epigastric tenderness but not as significant as this last episode.  No prior abdominal surgeries.    Chief Complaint   Patient presents with    Consult     Gallstones       Review of patient's allergies indicates:  No Known Allergies    Current Outpatient Medications   Medication Sig Dispense Refill    methocarbamoL (ROBAXIN) 750 MG Tab Take 750 mg by mouth every 6 (six) hours as needed.      oxyCODONE-acetaminophen (PERCOCET)  mg per tablet Take 1 tablet by mouth daily as needed.      pantoprazole (PROTONIX) 40 MG tablet Take 1 tablet (40 mg total) by mouth once daily. 30 tablet 11    ALPRAZolam (XANAX) 0.5 MG tablet Take 0.5 mg by mouth 2 (two) times daily as needed.      dextroamphetamine-amphetamine (ADDERALL XR) 30 MG 24 hr capsule TK TWO CAPSULES PO D IN THE EARLY MORNING  0     No current facility-administered medications for this visit.       Past Medical History:   Diagnosis Date    Anxiety     Borderline diabetes     Hyperlipidemia     Hypertension      Past Surgical History:   Procedure Laterality Date    ARTHROSCOPY OF KNEE Right 9/13/2019    Procedure: ARTHROSCOPY, KNEE;  Surgeon: Anurag Wells MD;  Location: [facility] OR;  Service: Orthopedics;  Laterality: Right;    ESOPHAGOGASTRODUODENOSCOPY N/A 10/3/2019    Procedure: EGD (ESOPHAGOGASTRODUODENOSCOPY);  Surgeon: Yovani Dominguez MD;  Location: [facility] ENDO;  Service: Endoscopy;  Laterality: N/A;    ESOPHAGOGASTRODUODENOSCOPY N/A 1/23/2020    Procedure: EGD (ESOPHAGOGASTRODUODENOSCOPY);  Surgeon: Yovani Dominguez MD;  Location: [facility] ENDO;  Service: Endoscopy;  Laterality: N/A;    FEMUR OSTEOTOMY Right 9/15/2020     Procedure: OSTEOTOMY, FEMUR;  Surgeon: Barry Lopez MD;  Location: [facility] OR;  Service: Orthopedics;  Laterality: Right;  Regional w/ Catheter: Epidural, Spinal, Adductor  ANUSHA 50cc    KNEE ARTHROSCOPY      KNEE ARTHROSCOPY W/ MENISCECTOMY Right 9/15/2020    Procedure: ARTHROSCOPY, KNEE, WITH MENISCECTOMY;  Surgeon: Barry Lopez MD;  Location: [facility] OR;  Service: Orthopedics;  Laterality: Right;    KNEE SURGERY Bilateral     x 2     REPAIR OF MENISCUS OF KNEE Right 9/13/2019    Procedure: REPAIR, MENISCUS, KNEE;  Surgeon: Anurag Wells MD;  Location[facility] OR;  Service: Orthopedics;  Laterality: Right;  lateral meniscal repair    SYNOVECTOMY OF KNEE Right 9/15/2020    Procedure: SYNOVECTOMY, KNEE;  Surgeon: Barry Lopez MD;  Location: [facility] OR;  Service: Orthopedics;  Laterality: Right;    TONSILLECTOMY       Family History   Problem Relation Age of Onset    Diabetes Mother     Hypertension Mother     Hyperlipidemia Mother     Diabetes Father     Diabetes Maternal Grandmother     Cancer Maternal Grandmother         breast     Social History     Tobacco Use    Smoking status: Former Smoker     Packs/day: 0.50     Types: Cigarettes     Start date: 6/16/2022    Smokeless tobacco: Never Used   Substance Use Topics    Alcohol use: No    Drug use: No        Review of Systems:  Review of Systems   Constitutional: Negative.  Negative for fatigue and fever.   HENT: Negative.    Eyes: Negative.    Respiratory: Negative.  Negative for shortness of breath.    Cardiovascular: Negative.  Negative for chest pain.   Gastrointestinal: Positive for abdominal pain.   Endocrine: Negative.    Genitourinary: Negative.    Musculoskeletal: Negative.    Skin: Negative.    Allergic/Immunologic: Negative.    Neurological: Negative.    Hematological: Negative.    Psychiatric/Behavioral: Negative.        OBJECTIVE:     Vital Signs (Most Recent)  Temp: 98.1 °F (36.7 °C) (08/09/22 1022)  Pulse: 99 (08/09/22 1022)  BP:  121/73 (08/09/22 1022)     114.7 kg (252 lb 13.9 oz)     Physical Exam:  Physical Exam  Constitutional:       General: He is not in acute distress.     Appearance: Normal appearance. He is not ill-appearing, toxic-appearing or diaphoretic.   HENT:      Head: Normocephalic.      Nose: Nose normal.   Eyes:      Conjunctiva/sclera: Conjunctivae normal.   Cardiovascular:      Rate and Rhythm: Normal rate and regular rhythm.   Pulmonary:      Effort: Pulmonary effort is normal.   Abdominal:      Palpations: Abdomen is soft.   Musculoskeletal:         General: Normal range of motion.      Cervical back: Normal range of motion.   Skin:     General: Skin is warm.   Neurological:      General: No focal deficit present.      Mental Status: He is alert.   Psychiatric:         Mood and Affect: Mood normal.         Laboratory    CBC, CMP, lipase were reviewed,  Essentially normal    Diagnostic Results:   ultrasound was reviewed.  Gallstones    ASSESSMENT/PLAN:      32-year-old male with 1 episode of likely biliary colic related to gallstones.  I did discuss cholecystectomy with the patient.  We discussed the natural progression of this disease.  Should he become more symptomatic, notably, having postprandial right upper quadrant abdominal pain after most meals, can consider elective cholecystectomy    PLAN:   patient wishes to observe for now.  Should he become more symptomatic, he will call the set up follow-up.

## 2022-08-22 ENCOUNTER — TELEPHONE (OUTPATIENT)
Dept: SPORTS MEDICINE | Facility: CLINIC | Age: 32
End: 2022-08-22
Payer: COMMERCIAL

## 2022-08-26 ENCOUNTER — OFFICE VISIT (OUTPATIENT)
Dept: SPORTS MEDICINE | Facility: CLINIC | Age: 32
End: 2022-08-26
Payer: COMMERCIAL

## 2022-08-26 ENCOUNTER — HOSPITAL ENCOUNTER (OUTPATIENT)
Dept: RADIOLOGY | Facility: HOSPITAL | Age: 32
Discharge: HOME | End: 2022-08-26
Attending: PHYSICIAN ASSISTANT
Payer: COMMERCIAL

## 2022-08-26 VITALS
DIASTOLIC BLOOD PRESSURE: 88 MMHG | BODY MASS INDEX: 40.5 KG/M2 | HEIGHT: 66 IN | WEIGHT: 252 LBS | SYSTOLIC BLOOD PRESSURE: 124 MMHG

## 2022-08-26 DIAGNOSIS — G89.29 CHRONIC PAIN OF RIGHT KNEE: Primary | ICD-10-CM

## 2022-08-26 DIAGNOSIS — M25.561 CHRONIC PAIN OF RIGHT KNEE: ICD-10-CM

## 2022-08-26 DIAGNOSIS — M25.561 CHRONIC PAIN OF RIGHT KNEE: Primary | ICD-10-CM

## 2022-08-26 DIAGNOSIS — G89.29 CHRONIC PAIN OF RIGHT KNEE: ICD-10-CM

## 2022-08-26 DIAGNOSIS — M17.11 PRIMARY OSTEOARTHRITIS OF RIGHT KNEE: ICD-10-CM

## 2022-08-26 PROCEDURE — 99214 PR OFFICE/OUTPT VISIT, EST, LEVL IV, 30-39 MIN: ICD-10-PCS | Mod: S$GLB,,, | Performed by: PHYSICIAN ASSISTANT

## 2022-08-26 PROCEDURE — 73564 X-RAY EXAM KNEE 4 OR MORE: CPT | Mod: TC,50

## 2022-08-26 PROCEDURE — 99214 OFFICE O/P EST MOD 30 MIN: CPT | Mod: S$GLB,,, | Performed by: PHYSICIAN ASSISTANT

## 2022-08-26 PROCEDURE — 73564 XR KNEE ORTHO BILAT WITH FLEXION: ICD-10-PCS | Mod: 26,50,, | Performed by: INTERNAL MEDICINE

## 2022-08-26 PROCEDURE — 3079F PR MOST RECENT DIASTOLIC BLOOD PRESSURE 80-89 MM HG: ICD-10-PCS | Mod: CPTII,S$GLB,, | Performed by: PHYSICIAN ASSISTANT

## 2022-08-26 PROCEDURE — 1159F PR MEDICATION LIST DOCUMENTED IN MEDICAL RECORD: ICD-10-PCS | Mod: CPTII,S$GLB,, | Performed by: PHYSICIAN ASSISTANT

## 2022-08-26 PROCEDURE — 99999 PR PBB SHADOW E&M-EST. PATIENT-LVL III: ICD-10-PCS | Mod: PBBFAC,,, | Performed by: PHYSICIAN ASSISTANT

## 2022-08-26 PROCEDURE — 1160F PR REVIEW ALL MEDS BY PRESCRIBER/CLIN PHARMACIST DOCUMENTED: ICD-10-PCS | Mod: CPTII,S$GLB,, | Performed by: PHYSICIAN ASSISTANT

## 2022-08-26 PROCEDURE — 1160F RVW MEDS BY RX/DR IN RCRD: CPT | Mod: CPTII,S$GLB,, | Performed by: PHYSICIAN ASSISTANT

## 2022-08-26 PROCEDURE — 99999 PR PBB SHADOW E&M-EST. PATIENT-LVL III: CPT | Mod: PBBFAC,,, | Performed by: PHYSICIAN ASSISTANT

## 2022-08-26 PROCEDURE — 3008F PR BODY MASS INDEX (BMI) DOCUMENTED: ICD-10-PCS | Mod: CPTII,S$GLB,, | Performed by: PHYSICIAN ASSISTANT

## 2022-08-26 PROCEDURE — 3079F DIAST BP 80-89 MM HG: CPT | Mod: CPTII,S$GLB,, | Performed by: PHYSICIAN ASSISTANT

## 2022-08-26 PROCEDURE — 3074F PR MOST RECENT SYSTOLIC BLOOD PRESSURE < 130 MM HG: ICD-10-PCS | Mod: CPTII,S$GLB,, | Performed by: PHYSICIAN ASSISTANT

## 2022-08-26 PROCEDURE — 73564 X-RAY EXAM KNEE 4 OR MORE: CPT | Mod: 26,50,, | Performed by: INTERNAL MEDICINE

## 2022-08-26 PROCEDURE — 1159F MED LIST DOCD IN RCRD: CPT | Mod: CPTII,S$GLB,, | Performed by: PHYSICIAN ASSISTANT

## 2022-08-26 PROCEDURE — 3008F BODY MASS INDEX DOCD: CPT | Mod: CPTII,S$GLB,, | Performed by: PHYSICIAN ASSISTANT

## 2022-08-26 PROCEDURE — 3074F SYST BP LT 130 MM HG: CPT | Mod: CPTII,S$GLB,, | Performed by: PHYSICIAN ASSISTANT

## 2022-08-26 NOTE — PROGRESS NOTES
Subjective:          Chief Complaint: Yovani Malik is a 32 y.o. male who had concerns including Pain of the Right Knee.    Mr. Malik presents to our clinic today for follow up right knee after receiving right knee Durolane injection on 3/18/22. He reports significant improvement of pain and function for approximately 5 months. He would like to receive the injection again when it is approved. Pain at rest is 5/10. Pain at its worst is 8/10. He is 2 year s/p below procedure.  He states he is doing alright but still has some pain along the anterior and lateral knee which he localizes to his incision as well as the lateral joint line.  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   Notes he had a knee steroid injection from his pain management physician which did not give much relief.        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       Review of Systems   Constitutional: Negative for fever and night sweats.   HENT: Negative for hearing loss.    Eyes: Negative for blurred vision and visual disturbance.   Cardiovascular: Negative for chest pain and leg swelling.   Respiratory: Negative for shortness of breath.    Endocrine: Negative for polyuria.   Hematologic/Lymphatic: Negative for bleeding problem.   Skin: Negative  for rash.   Musculoskeletal: Positive for joint pain. Negative for back pain, joint swelling, muscle cramps and muscle weakness.   Gastrointestinal: Negative for melena.   Genitourinary: Negative for hematuria.   Neurological: Negative for loss of balance, numbness and paresthesias.   Psychiatric/Behavioral: Negative for altered mental status.       Pain Related Questions  Over the past 3 days, what was your average pain during activity? (I.e. running, jogging, walking, climbing stairs, getting dressed, ect.): 5  Over the past 3 days, what was your highest pain level?: 8  Over the past 3 days, what was your lowest pain level? : 5    Other  How many nights a week are you awakened by your affected body part?: 0  Was the patient's HEIGHT measured or patient reported?: Patient Reported  Was the patient's WEIGHT measured or patient reported?: Measured      Objective:        General: Yovani is well-developed, well-nourished, appears stated age, in no acute distress, alert and oriented to time, place and person.     General    Vitals reviewed.  Constitutional: He is oriented to person, place, and time. He appears well-developed and well-nourished. No distress.   HENT:   Mouth/Throat: No oropharyngeal exudate.   Eyes: Right eye exhibits no discharge. Left eye exhibits no discharge.   Cardiovascular: Normal rate and regular rhythm.    Pulmonary/Chest: Effort normal and breath sounds normal. No respiratory distress.   Neurological: He is alert and oriented to person, place, and time. He has normal reflexes. No cranial nerve deficit. Coordination normal.   Psychiatric: He has a normal mood and affect. His behavior is normal. Judgment and thought content normal.     General Musculoskeletal Exam   Gait: abnormal and antalgic       Right Knee Exam     Inspection   Erythema: absent  Scars: present  Swelling: present  Effusion: absent  Deformity: absent  Bruising: absent    Tenderness   The patient is tender to palpation of the  lateral joint line (global tenderness).    Range of Motion   Extension:  0 normal   Flexion: abnormal Right knee flexion: 125.     Tests   Meniscus   Kwadwo:  Medial - negative Lateral - negative  Ligament Examination Lachman: normal (-1 to 2mm) PCL-Posterior Drawer: normal (0 to 2mm)     MCL - Valgus: normal (0 to 2mm)  LCL - Varus: normalPivot Shift: normal (Equal)Reverse Pivot Shift: normal (Equal)  Posterior Sag Test: negative  Posterolateral Corner: stable  Patella   Patellar apprehension: negative  Passive Patellar Tilt: neutral  Patellar Tracking: normal  Patellar Glide (quadrants): Lateral - 1   Medial - 2  Q-Angle at 90 degrees: normal  Patellar Grind: negative  J-Sign: none    Other   Meniscal Cyst: absent  Popliteal (Baker's) Cyst: absent  Sensation: normal    Comments:  Well healed incisions. No signs of infection.  NVI.    Left Knee Exam     Inspection   Erythema: absent  Scars: absent  Swelling: absent  Effusion: absent  Deformity: absent  Bruising: absent    Tenderness   The patient is experiencing no tenderness.     Range of Motion   Extension: 0   Flexion: 140     Tests   Meniscus   Kwadwo:  Medial - negative Lateral - negative  Stability Lachman: normal (-1 to 2mm) PCL-Posterior Drawer: normal (0 to 2mm)  MCL - Valgus: normal (0 to 2mm)  LCL - Varus: normal (0 to 2mm)Pivot Shift: normal (Equal)Reverse Pivot Shift: normal (Equal)  Posterior Sag Test: negative  Posterolateral Corner: stable  Patella   Patellar apprehension: negative  Passive Patellar Tilt: neutral  Patellar Tracking: normal  Patellar Glide (Quadrants): Lateral - 1 Medial - 2  Q-Angle at 90 degrees: normal  Patellar Grind: negative  J-Sign: J sign absent    Other   Meniscal Cyst: absent  Popliteal (Baker's) Cyst: absent  Sensation: normal    Right Hip Exam     Tests   Marisa: negative  Left Hip Exam     Tests   Marisa: negative          Muscle Strength   Right Lower Extremity   Hip Abduction: 5/5   Quadriceps:  5/5   Hamstrin/5    Left Lower Extremity   Hip Abduction: 5/5   Quadriceps:  5/5   Hamstrin/5     Reflexes     Left Side  Achilles:  2+  Quadriceps:  2+    Right Side   Achilles:  2+  Quadriceps:  2+    Vascular Exam     Right Pulses  Dorsalis Pedis:      2+  Posterior Tibial:      2+        Left Pulses  Dorsalis Pedis:      2+  Posterior Tibial:      2+          X-ray Knee Ortho Bilateral with Flexion  Narrative: EXAMINATION:  XR KNEE ORTHO BILAT WITH FLEXION    CLINICAL HISTORY:  Pain in right knee    TECHNIQUE:  AP standing of both knees, PA flexion standing views of both knees, and Merchant views of both knees were performed.  Lateral views of both knees were also performed.    COMPARISON:  2022    FINDINGS:  There are surgical changes of right femoral osteotomy with a lateral plate and screws.  The hardware is intact.  Alignment is good.  Mild osteophyte formation present at the tibia femoral joint space.  There is no joint effusion.    On the left, there is no fracture or osseous destruction.  No joint effusion.  Mild osteophytic spurring present at the tibia femoral joint space.  Impression: As above    Electronically signed by: Anya Reyna MD  Date:    2022  Time:    11:22            Assessment:       Encounter Diagnoses   Name Primary?    Chronic pain of right knee Yes    Primary osteoarthritis of right knee           Plan:         1. IKDC, SF-12 and KOOS was not filled out today in clinic.     RTC in 3 weeks with Angelica Brady PA-C for right knee Durolane injection. Patient will fill out IKDC, SF-12 and KOOS on return.    2. We have discussed a variety of treatment options including medications, injections, physical therapy and other alternative treatments. I also explained the indications, risks and benefits of surgery.  Patient's pain is refractory HEP, conservative management, and NSAIDs. Pt would like to proceed with visco-supplementation.    Medical Necessity for viscosupplementation use:  After thorough evaluation of the patient, I have determined that viscosupplementation treatment is medically necessary. 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 Oral analgesics including NSAIDs have not adequately controlled the patient's symptoms. 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     I made the decision to obtain old records of the patient including previous notes and imaging. I independently reviewed and interpreted lab results today as well as prior imaging.  Reviewed radiographs with patient in detail.     3. Pre-authorization placed for right knee Durolane injection.  4. Ice compress to the affected area 2-3x a day for 15-20 minutes as needed for pain management.  5. RTC to see Angelica Brady PA-C for right knee Durolane injection.    All of the patient's questions were answered and the patient will contact us if they have any questions or concerns in the interim.                            [facility] patient questionnaires have been collected today.

## 2022-09-30 ENCOUNTER — OFFICE VISIT (OUTPATIENT)
Dept: SPORTS MEDICINE | Facility: CLINIC | Age: 32
End: 2022-09-30
Payer: COMMERCIAL

## 2022-09-30 VITALS
HEIGHT: 66 IN | WEIGHT: 252 LBS | BODY MASS INDEX: 40.5 KG/M2 | DIASTOLIC BLOOD PRESSURE: 95 MMHG | SYSTOLIC BLOOD PRESSURE: 144 MMHG

## 2022-09-30 DIAGNOSIS — M17.11 PRIMARY OSTEOARTHRITIS OF RIGHT KNEE: Primary | ICD-10-CM

## 2022-09-30 PROCEDURE — 3077F PR MOST RECENT SYSTOLIC BLOOD PRESSURE >= 140 MM HG: ICD-10-PCS | Mod: CPTII,S$GLB,, | Performed by: PHYSICIAN ASSISTANT

## 2022-09-30 PROCEDURE — 3077F SYST BP >= 140 MM HG: CPT | Mod: CPTII,S$GLB,, | Performed by: PHYSICIAN ASSISTANT

## 2022-09-30 PROCEDURE — 99499 UNLISTED E&M SERVICE: CPT | Mod: S$GLB,,, | Performed by: PHYSICIAN ASSISTANT

## 2022-09-30 PROCEDURE — 99499 NO LOS: ICD-10-PCS | Mod: S$GLB,,, | Performed by: PHYSICIAN ASSISTANT

## 2022-09-30 PROCEDURE — 3008F PR BODY MASS INDEX (BMI) DOCUMENTED: ICD-10-PCS | Mod: CPTII,S$GLB,, | Performed by: PHYSICIAN ASSISTANT

## 2022-09-30 PROCEDURE — 20610 PR DRAIN/INJECT LARGE JOINT/BURSA: ICD-10-PCS | Mod: RT,S$GLB,, | Performed by: PHYSICIAN ASSISTANT

## 2022-09-30 PROCEDURE — 20610 DRAIN/INJ JOINT/BURSA W/O US: CPT | Mod: RT,S$GLB,, | Performed by: PHYSICIAN ASSISTANT

## 2022-09-30 PROCEDURE — 99999 PR PBB SHADOW E&M-EST. PATIENT-LVL III: ICD-10-PCS | Mod: PBBFAC,,, | Performed by: PHYSICIAN ASSISTANT

## 2022-09-30 PROCEDURE — 1160F RVW MEDS BY RX/DR IN RCRD: CPT | Mod: CPTII,S$GLB,, | Performed by: PHYSICIAN ASSISTANT

## 2022-09-30 PROCEDURE — 3080F DIAST BP >= 90 MM HG: CPT | Mod: CPTII,S$GLB,, | Performed by: PHYSICIAN ASSISTANT

## 2022-09-30 PROCEDURE — 1159F PR MEDICATION LIST DOCUMENTED IN MEDICAL RECORD: ICD-10-PCS | Mod: CPTII,S$GLB,, | Performed by: PHYSICIAN ASSISTANT

## 2022-09-30 PROCEDURE — 1159F MED LIST DOCD IN RCRD: CPT | Mod: CPTII,S$GLB,, | Performed by: PHYSICIAN ASSISTANT

## 2022-09-30 PROCEDURE — 3080F PR MOST RECENT DIASTOLIC BLOOD PRESSURE >= 90 MM HG: ICD-10-PCS | Mod: CPTII,S$GLB,, | Performed by: PHYSICIAN ASSISTANT

## 2022-09-30 PROCEDURE — 1160F PR REVIEW ALL MEDS BY PRESCRIBER/CLIN PHARMACIST DOCUMENTED: ICD-10-PCS | Mod: CPTII,S$GLB,, | Performed by: PHYSICIAN ASSISTANT

## 2022-09-30 PROCEDURE — 99999 PR PBB SHADOW E&M-EST. PATIENT-LVL III: CPT | Mod: PBBFAC,,, | Performed by: PHYSICIAN ASSISTANT

## 2022-09-30 PROCEDURE — 3008F BODY MASS INDEX DOCD: CPT | Mod: CPTII,S$GLB,, | Performed by: PHYSICIAN ASSISTANT

## 2022-09-30 NOTE — PROGRESS NOTES
Patient is here for follow up of right knee arthritis. Pt is [facility] right [facility] Has failed other conservative modalities including NSAIDS, activity modification, weight loss.    The prior shot was tolerated well.    PHYSICAL EXAMINATION:     General: The patient is alert and oriented x 3. Mood is pleasant.   Observation of ears, eyes and nose reveals no gross abnormalities. No   labored breathing observed.     No signs of infection or adverse reaction to knee.    PROCEDURE NOTE:  Injection Procedure right knee  A time out was performed, including verification of patient ID, procedure, site and side, availability of information and equipment, review of safety issues, and agreement with consent, the procedure site was marked.    After time out was performed, the patient was prepped aseptically with povidone-iodine swabsticks. A diagnostic and therapeutic injection of 3cc Durolane was given under sterile technique using a 22g x 1.5 needle from the Superolateral  aspect of the right Knee Joint in the supine position.      Yovani Malik had no adverse reactions to the medication. Pain decreased. He was instructed to apply ice to the joint for 20 minutes and avoid strenuous activities for 24-36 hours following the injection. He was warned of possible blood sugar and/or blood pressure changes during that time. Following that time, he can resume regular activities.    He was reminded to call the clinic immediately for any adverse side effects as explained in clinic today.      RTC in 6 months with Angelica Brady PA-C for repeat visco supplementation  All questions were answered, pt will contact us for questions or concerns in the interim.

## 2022-10-03 ENCOUNTER — OFFICE VISIT (OUTPATIENT)
Dept: FAMILY MEDICINE | Facility: CLINIC | Age: 32
End: 2022-10-03
Payer: COMMERCIAL

## 2022-10-03 DIAGNOSIS — U07.1 COVID-19: Primary | ICD-10-CM

## 2022-10-03 PROCEDURE — 1160F PR REVIEW ALL MEDS BY PRESCRIBER/CLIN PHARMACIST DOCUMENTED: ICD-10-PCS | Mod: CPTII,95,, | Performed by: NURSE PRACTITIONER

## 2022-10-03 PROCEDURE — 99213 OFFICE O/P EST LOW 20 MIN: CPT | Mod: 95,,, | Performed by: NURSE PRACTITIONER

## 2022-10-03 PROCEDURE — 1160F RVW MEDS BY RX/DR IN RCRD: CPT | Mod: CPTII,95,, | Performed by: NURSE PRACTITIONER

## 2022-10-03 PROCEDURE — 99213 PR OFFICE/OUTPT VISIT, EST, LEVL III, 20-29 MIN: ICD-10-PCS | Mod: 95,,, | Performed by: NURSE PRACTITIONER

## 2022-10-03 PROCEDURE — 1159F PR MEDICATION LIST DOCUMENTED IN MEDICAL RECORD: ICD-10-PCS | Mod: CPTII,95,, | Performed by: NURSE PRACTITIONER

## 2022-10-03 PROCEDURE — 1159F MED LIST DOCD IN RCRD: CPT | Mod: CPTII,95,, | Performed by: NURSE PRACTITIONER

## 2022-10-03 RX ORDER — PROMETHAZINE HYDROCHLORIDE AND DEXTROMETHORPHAN HYDROBROMIDE 6.25; 15 MG/5ML; MG/5ML
5 SYRUP ORAL 3 TIMES DAILY PRN
Qty: 240 ML | Refills: 0 | Status: SHIPPED | OUTPATIENT
Start: 2022-10-03 | End: 2022-10-13

## 2022-10-03 RX ORDER — AZITHROMYCIN 250 MG/1
TABLET, FILM COATED ORAL
Qty: 6 TABLET | Refills: 0 | Status: SHIPPED | OUTPATIENT
Start: 2022-10-03 | End: 2022-10-08

## 2022-10-03 RX ORDER — GUAIFENESIN 1200 MG/1
1200 TABLET, EXTENDED RELEASE ORAL 2 TIMES DAILY
Qty: 20 TABLET | Refills: 0 | Status: SHIPPED | OUTPATIENT
Start: 2022-10-03 | End: 2022-10-13

## 2022-10-03 RX ORDER — PREDNISONE 20 MG/1
TABLET ORAL
Qty: 9 TABLET | Refills: 0 | Status: SHIPPED | OUTPATIENT
Start: 2022-10-03 | End: 2023-03-01

## 2022-10-03 RX ORDER — ALBUTEROL SULFATE 90 UG/1
2 AEROSOL, METERED RESPIRATORY (INHALATION) EVERY 6 HOURS PRN
Qty: 18 G | Refills: 0 | Status: SHIPPED | OUTPATIENT
Start: 2022-10-03 | End: 2022-10-25

## 2022-10-03 NOTE — PATIENT INSTRUCTIONS
Vit C 2000 mg daily, Vit D 1000 iu daily, Zinc 50 mg daily  ED for Fever > 102 not resolving with medication, significant shortness of breath or chest pain, Oxygen level less than 93%, or other worsening symptoms.    Instructions for Patients with Confirmed or Suspected COVID-19    If you are awaiting your test result, you will either be called or it will be released to the patient portal.  If you have any questions about your test, please visit [email or web address] or call our COVID-19 information line at [phone].      Please isolate yourself at home.  You may leave home and/or return to work once the following conditions are met:    If you have symptoms and tested positive:  More than 5 days since symptoms first appeared AND  More than 24 hours fever free without medications AND       symptoms have improved   For five days after ending isolation, masks are required.    If you had no symptoms but tested positive:  More than 5 days since the date of the first positive test. If you develop symptoms, then use the guidelines above  For five days after ending isolation, masks are required.      Testing is not recommended if you are symptom free after completing isolation.

## 2022-10-03 NOTE — PROGRESS NOTES
Subjective:       Patient ID: Yovani Malik is a 32 y.o. male.    Chief Complaint: No chief complaint on file.    The patient location is: [place]  The chief complaint leading to consultation is: COVID    Visit type: audiovisual    Face to Face time with patient: 15  15 minutes of total time spent on the encounter, which includes face to face time and non-face to face time preparing to see the patient (eg, review of tests), Obtaining and/or reviewing separately obtained history, Documenting clinical information in the electronic or other health record, Independently interpreting results (not separately reported) and communicating results to the patient/family/caregiver, or Care coordination (not separately reported).         Each patient to whom he or she provides medical services by telemedicine is:  (1) informed of the relationship between the physician and patient and the respective role of any other health care provider with respect to management of the patient; and (2) notified that he or she may decline to receive medical services by telemedicine and may withdraw from such care at any time.    Notes:          Daughter diagnosed Wednesday with covid, he started symptoms on Thursday. High fever over the weekend, had lower fever last night, none today. He is coughing and wheezing with expirations, paroxysmal cough.    Past Medical History:  No date: Anxiety  No date: Borderline diabetes  No date: Hyperlipidemia  No date: Hypertension    Past Surgical History:  9/13/2019: ARTHROSCOPY OF KNEE; Right      Comment:  Procedure: ARTHROSCOPY, KNEE;  Surgeon: Anurag Wells MD;  Location: [facility] OR;  Service: Orthopedics;                 Laterality: Right;  10/3/2019: ESOPHAGOGASTRODUODENOSCOPY; N/A      Comment:  Procedure: EGD (ESOPHAGOGASTRODUODENOSCOPY);  Surgeon:                Yovani Dominguez MD;  Location: [facility] ENDO;  Service:                Endoscopy;  Laterality: N/A;  1/23/2020:  ESOPHAGOGASTRODUODENOSCOPY; N/A      Comment:  Procedure: EGD (ESOPHAGOGASTRODUODENOSCOPY);  Surgeon:                Yovani Dominguez MD;  Location: [facility] ENDO;  Service:                Endoscopy;  Laterality: N/A;  9/15/2020: FEMUR OSTEOTOMY; Right      Comment:  Procedure: OSTEOTOMY, FEMUR;  Surgeon: Barry Lopez MD;  Location: [facility] OR;  Service: Orthopedics;                 Laterality: Right;  Regional w/ Catheter: Epidural,                Spinal, AdductorRECK 50cc  No date: KNEE ARTHROSCOPY  9/15/2020: KNEE ARTHROSCOPY W/ MENISCECTOMY; Right      Comment:  Procedure: ARTHROSCOPY, KNEE, WITH MENISCECTOMY;                 Surgeon: Barry Lopez MD;  Location: [facility] OR;                 Service: Orthopedics;  Laterality: Right;  No date: KNEE SURGERY; Bilateral      Comment:  x 2   9/13/2019: REPAIR OF MENISCUS OF KNEE; Right      Comment:  Procedure: REPAIR, MENISCUS, KNEE;  Surgeon: Anurag Wells MD;  Location: [facility] OR;  Service:                Orthopedics;  Laterality: Right;  lateral meniscal repair  9/15/2020: SYNOVECTOMY OF KNEE; Right      Comment:  Procedure: SYNOVECTOMY, KNEE;  Surgeon: Barry Lopez MD;  Location: [facility] OR;  Service: Orthopedics;                 Laterality: Right;  No date: TONSILLECTOMY    Review of patient's family history indicates:      Social History    Socioeconomic History      Marital status:     Tobacco Use      Smoking status: Former        Packs/day: 0.50        Types: Cigarettes        Start date: 6/16/2022      Smokeless tobacco: Never    Substance and Sexual Activity      Alcohol use: No      Drug use: No      Sexual activity: Yes        Partners: Female      Current Outpatient Medications:  ALPRAZolam (XANAX) 0.5 MG tablet, Take 0.5 mg by mouth 2 (two) times daily as needed., Disp: , Rfl:   dextroamphetamine-amphetamine (ADDERALL XR) 30 MG 24 hr capsule, TK TWO CAPSULES PO D IN THE EARLY MORNING, Disp: ,  Rfl: 0  methocarbamoL (ROBAXIN) 750 MG Tab, Take 750 mg by mouth every 6 (six) hours as needed., Disp: , Rfl:   oxyCODONE-acetaminophen (PERCOCET)  mg per tablet, Take 1 tablet by mouth daily as needed., Disp: , Rfl:   pantoprazole (PROTONIX) 40 MG tablet, Take 1 tablet (40 mg total) by mouth once daily., Disp: 30 tablet, Rfl: 11    No current facility-administered medications for this visit.      Review of patient's allergies indicates:  No Known Allergies     Fever   This is a new problem. The current episode started in the past 7 days. The problem occurs 2 to 4 times per day. The problem has been unchanged. The maximum temperature noted was 103 to 103.9 F. The temperature was taken using a tympanic thermometer. Associated symptoms include chest pain, congestion, coughing, diarrhea, ear pain, headaches, muscle aches, nausea, sleepiness, a sore throat and wheezing. Pertinent negatives include no abdominal pain, rash, urinary pain or vomiting. He has tried NSAIDs, acetaminophen, cool baths and fluids for the symptoms. The treatment provided mild relief.   Review of Systems   Constitutional:  Positive for fever.   HENT:  Positive for nasal congestion, ear pain and sore throat.    Respiratory:  Positive for cough and wheezing.    Cardiovascular:  Positive for chest pain.   Gastrointestinal:  Positive for diarrhea and nausea. Negative for abdominal pain and vomiting.   Genitourinary:  Negative for dysuria.   Integumentary:  Negative for rash.   Neurological:  Positive for headaches.       Objective:      Physical Exam  Constitutional:       General: He is not in acute distress.     Appearance: He is ill-appearing. He is not toxic-appearing.   HENT:      Head: Normocephalic and atraumatic.   Pulmonary:      Effort: Pulmonary effort is normal. No respiratory distress.      Comments: coughing  Neurological:      General: No focal deficit present.      Mental Status: He is oriented to person, place, and time.        Assessment:       Problem List Items Addressed This Visit    None  Visit Diagnoses       COVID-19    -  Primary    Relevant Medications    azithromycin (Z-RUIZ) 250 MG tablet    predniSONE (DELTASONE) 20 MG tablet    albuterol (VENTOLIN HFA) 90 mcg/actuation inhaler    promethazine-dextromethorphan (PROMETHAZINE-DM) 6.25-15 mg/5 mL Syrp    guaiFENesin (MUCINEX) 1,200 mg Ta12            Plan:       1. COVID-19  RPresumed COVID 19, dicussed with patient, questions answered. Symptomatic treatment as discussed, Self Quarantine guidelines as discussed, and ED precuaitons reviewed. Patient states understanding. COVID 19 patient education handout given.   - azithromycin (Z-RUIZ) 250 MG tablet; Take 2 tablets by mouth on day 1; Take 1 tablet by mouth on days 2-5  Dispense: 6 tablet; Refill: 0  - predniSONE (DELTASONE) 20 MG tablet; 2 pills daily x 2 days, 1 pill daily x 3 days  Dispense: 9 tablet; Refill: 0  - albuterol (VENTOLIN HFA) 90 mcg/actuation inhaler; Inhale 2 puffs into the lungs every 6 (six) hours as needed for Wheezing. Rescue  Dispense: 18 g; Refill: 0  - promethazine-dextromethorphan (PROMETHAZINE-DM) 6.25-15 mg/5 mL Syrp; Take 5 mLs by mouth 3 (three) times daily as needed.  Dispense: 240 mL; Refill: 0  - guaiFENesin (MUCINEX) 1,200 mg Ta12; Take 1,200 mg by mouth 2 (two) times a day. for 10 days  Dispense: 20 tablet; Refill: 0

## 2022-10-03 NOTE — LETTER
[address]  [place] [address]  Phone: [phone]  Fax: [phone]          Return to Work/School    Patient: Yovani Malik  YOB: 1990   Date: 10/03/2022     To Whom It May Concern:     Yovani Malik was in contact with/seen in my office on 10/03/2022. COVID-19 is present in our communities across the state. There is limited testing for COVID at this time, so not all patients can be tested. In this situation, your employee meets the following criteria:     Yovani Malik has met the criteria for COVID-19 testing and has a POSITIVE result. He can return to work once they are asymptomatic for 24 hours without the use of fever reducing medications AND at least five days from the start of symptoms (or from the first positive result if they have no symptoms).      If you have any questions or concerns, or if I can be of further assistance, please do not hesitate to contact me.     Sincerely,    Mackenzie Mckee, NP

## 2022-10-03 NOTE — PROGRESS NOTES
Answers submitted by the patient for this visit:  Fever Questionnaire (Submitted on 10/3/2022)  Chief Complaint: Fever  Chronicity: new  Onset: in the past 7 days  Frequency: 2 to 4 times per day  Progression since onset: unchanged  Max temp prior to arrival: 103 to 103.9 F  Temperature source: a tympanic thermometer  abdominal pain: No  chest pain: Yes  congestion: Yes  cough: Yes  diarrhea: Yes  ear pain: Yes  headaches: Yes  muscle aches: Yes  nausea: Yes  rash: No  sleepiness: Yes  sore throat: Yes  vomiting: No  wheezing: Yes  urinary pain: No  Treatments tried: NSAIDs, acetaminophen, cool baths, fluids  Improvement on treatment: mild

## 2022-12-28 ENCOUNTER — TELEPHONE (OUTPATIENT)
Dept: FAMILY MEDICINE | Facility: CLINIC | Age: 32
End: 2022-12-28
Payer: COMMERCIAL

## 2022-12-28 NOTE — TELEPHONE ENCOUNTER
----- Message from Paige Wolff sent at 12/28/2022  9:22 AM CST -----  Regarding: order  Contact: Sarah with [facility]  Type: Needs Medical Advice  Who Called:  Sarah with [facility]  Symptoms (please be specific):    How long has patient had these symptoms:    Pharmacy name and phone #:    Best Call Back Number: [phone]  Additional Information: Sarah states she called patient's insurance and they told her she needs a referral for a chiropractor. Patient is seeing Dr Loyd Farris. The start date needs to be 11/18/22. Please fax to [phone]. Please call Sarah to advise.Thanks!

## 2022-12-28 NOTE — TELEPHONE ENCOUNTER
----- Message from Paige Wolff sent at 12/28/2022  9:22 AM CST -----  Regarding: order  Contact: Sarah with [facility]  Type: Needs Medical Advice  Who Called:  Sarah with [facility]  Symptoms (please be specific):    How long has patient had these symptoms:    Pharmacy name and phone #:    Best Call Back Number: [phone]  Additional Information: Sarah states she called patient's insurance and they told her she needs a referral for a chiropractor. Patient is seeing Dr Loyd Farris. The start date needs to be 11/18/22. Please fax to [phone]. Please call Sarah to advise.Thanks!

## 2023-01-01 NOTE — PROGRESS NOTES
Post op meds sent to [facility] in [place]. [facility] notified me that his insurance will not cover medication prescribed to an [facility].      
51.5

## 2023-01-19 ENCOUNTER — TELEPHONE (OUTPATIENT)
Dept: FAMILY MEDICINE | Facility: CLINIC | Age: 33
End: 2023-01-19

## 2023-01-19 DIAGNOSIS — G89.29 CHRONIC BILATERAL LOW BACK PAIN WITHOUT SCIATICA: Primary | ICD-10-CM

## 2023-01-19 DIAGNOSIS — M25.69 DECREASED ROM OF TRUNK AND BACK: ICD-10-CM

## 2023-01-19 DIAGNOSIS — R29.898 DECREASED STRENGTH OF TRUNK AND BACK: ICD-10-CM

## 2023-01-19 DIAGNOSIS — M54.50 CHRONIC BILATERAL LOW BACK PAIN WITHOUT SCIATICA: Primary | ICD-10-CM

## 2023-01-19 NOTE — TELEPHONE ENCOUNTER
----- Message from Sindi Arvizu sent at 1/19/2023 11:16 AM CST -----  Regarding: Please advise  Who Called:[facility] (Sarah)         What is the reqeust in detail: Requesting call back to discuss referral for pt to see Chiropractor Loyd Farris that needed to start 11/18/22 please fax to  [phone]. Please advise.         Can the clinic reply by [facility]?no         Best Call Back Number:[phone] Sarah         Additional Information:

## 2023-02-16 ENCOUNTER — TELEPHONE (OUTPATIENT)
Dept: SPORTS MEDICINE | Facility: CLINIC | Age: 33
End: 2023-02-16
Payer: COMMERCIAL

## 2023-02-16 DIAGNOSIS — M17.11 PRIMARY OSTEOARTHRITIS OF RIGHT KNEE: Primary | ICD-10-CM

## 2023-02-16 NOTE — PROGRESS NOTES
We have discussed a variety of treatment options including medications, injections, physical therapy and other alternative treatments. I also explained the indications, risks and benefits of surgery.  Patient's pain is refractory HEP, conservative management, and NSAIDs. Pt would like to proceed with visco-supplementation.    Medical Necessity for viscosupplementation use: After thorough evaluation of the patient, I have determined that viscosupplementation treatment is medically necessary. 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 Oral analgesics including NSAIDs have not adequately controlled the patient's symptoms. 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     I made the decision to obtain old records of the patient including previous notes and imaging. I independently reviewed and interpreted lab results today as well as prior imaging.        1. Pre-authorization placed for  right knee Durolane  injections.  2. Ice compress to the affected area 2-3x a day for 15-20 minutes as needed for pain management.  3. RTC to see Angelica Brady PA-C for  right knee Durolane  injection. After 3/30/23.    All of the patient's questions were answered and the patient will contact us if they have any questions or concerns in the interim.

## 2023-03-01 ENCOUNTER — OFFICE VISIT (OUTPATIENT)
Dept: FAMILY MEDICINE | Facility: CLINIC | Age: 33
End: 2023-03-01
Payer: COMMERCIAL

## 2023-03-01 ENCOUNTER — HOSPITAL ENCOUNTER (OUTPATIENT)
Dept: RADIOLOGY | Facility: HOSPITAL | Age: 33
Discharge: HOME | End: 2023-03-01
Attending: FAMILY MEDICINE
Payer: COMMERCIAL

## 2023-03-01 VITALS
BODY MASS INDEX: 40.6 KG/M2 | HEIGHT: 66 IN | HEART RATE: 93 BPM | SYSTOLIC BLOOD PRESSURE: 128 MMHG | OXYGEN SATURATION: 98 % | WEIGHT: 252.63 LBS | DIASTOLIC BLOOD PRESSURE: 96 MMHG

## 2023-03-01 DIAGNOSIS — M54.2 NECK PAIN: ICD-10-CM

## 2023-03-01 DIAGNOSIS — E78.49 OTHER HYPERLIPIDEMIA: ICD-10-CM

## 2023-03-01 DIAGNOSIS — R20.2 NUMBNESS AND TINGLING OF RIGHT ARM: Primary | ICD-10-CM

## 2023-03-01 DIAGNOSIS — R73.09 ABNORMAL GLUCOSE: ICD-10-CM

## 2023-03-01 DIAGNOSIS — I10 PRIMARY HYPERTENSION: ICD-10-CM

## 2023-03-01 DIAGNOSIS — R20.0 NUMBNESS AND TINGLING: ICD-10-CM

## 2023-03-01 DIAGNOSIS — E66.01 CLASS 3 SEVERE OBESITY DUE TO EXCESS CALORIES WITH SERIOUS COMORBIDITY AND BODY MASS INDEX (BMI) OF 40.0 TO 44.9 IN ADULT: ICD-10-CM

## 2023-03-01 DIAGNOSIS — E66.01 MORBID OBESITY: ICD-10-CM

## 2023-03-01 DIAGNOSIS — R20.0 NUMBNESS AND TINGLING OF RIGHT ARM: Primary | ICD-10-CM

## 2023-03-01 DIAGNOSIS — R20.2 NUMBNESS AND TINGLING: ICD-10-CM

## 2023-03-01 DIAGNOSIS — Z00.01 ANNUAL VISIT FOR GENERAL ADULT MEDICAL EXAMINATION WITH ABNORMAL FINDINGS: Primary | ICD-10-CM

## 2023-03-01 PROCEDURE — 72040 X-RAY EXAM NECK SPINE 2-3 VW: CPT | Mod: TC,FY,PO

## 2023-03-01 PROCEDURE — 3008F BODY MASS INDEX DOCD: CPT | Mod: CPTII,S$GLB,, | Performed by: FAMILY MEDICINE

## 2023-03-01 PROCEDURE — 99214 OFFICE O/P EST MOD 30 MIN: CPT | Mod: S$GLB,,, | Performed by: FAMILY MEDICINE

## 2023-03-01 PROCEDURE — 99999 PR PBB SHADOW E&M-EST. PATIENT-LVL III: ICD-10-PCS | Mod: PBBFAC,,, | Performed by: FAMILY MEDICINE

## 2023-03-01 PROCEDURE — 3008F PR BODY MASS INDEX (BMI) DOCUMENTED: ICD-10-PCS | Mod: CPTII,S$GLB,, | Performed by: FAMILY MEDICINE

## 2023-03-01 PROCEDURE — 72040 X-RAY EXAM NECK SPINE 2-3 VW: CPT | Mod: 26,,, | Performed by: RADIOLOGY

## 2023-03-01 PROCEDURE — 3080F DIAST BP >= 90 MM HG: CPT | Mod: CPTII,S$GLB,, | Performed by: FAMILY MEDICINE

## 2023-03-01 PROCEDURE — 99999 PR PBB SHADOW E&M-EST. PATIENT-LVL III: CPT | Mod: PBBFAC,,, | Performed by: FAMILY MEDICINE

## 2023-03-01 PROCEDURE — 1159F MED LIST DOCD IN RCRD: CPT | Mod: CPTII,S$GLB,, | Performed by: FAMILY MEDICINE

## 2023-03-01 PROCEDURE — 72040 XR CERVICAL SPINE AP LATERAL: ICD-10-PCS | Mod: 26,,, | Performed by: RADIOLOGY

## 2023-03-01 PROCEDURE — 3080F PR MOST RECENT DIASTOLIC BLOOD PRESSURE >= 90 MM HG: ICD-10-PCS | Mod: CPTII,S$GLB,, | Performed by: FAMILY MEDICINE

## 2023-03-01 PROCEDURE — 1159F PR MEDICATION LIST DOCUMENTED IN MEDICAL RECORD: ICD-10-PCS | Mod: CPTII,S$GLB,, | Performed by: FAMILY MEDICINE

## 2023-03-01 PROCEDURE — 3074F SYST BP LT 130 MM HG: CPT | Mod: CPTII,S$GLB,, | Performed by: FAMILY MEDICINE

## 2023-03-01 PROCEDURE — 99214 PR OFFICE/OUTPT VISIT, EST, LEVL IV, 30-39 MIN: ICD-10-PCS | Mod: S$GLB,,, | Performed by: FAMILY MEDICINE

## 2023-03-01 PROCEDURE — 3074F PR MOST RECENT SYSTOLIC BLOOD PRESSURE < 130 MM HG: ICD-10-PCS | Mod: CPTII,S$GLB,, | Performed by: FAMILY MEDICINE

## 2023-03-01 RX ORDER — AMLODIPINE BESYLATE 2.5 MG/1
2.5 TABLET ORAL DAILY
Qty: 30 TABLET | Refills: 11 | Status: SHIPPED | OUTPATIENT
Start: 2023-03-01 | End: 2023-07-27

## 2023-03-01 RX ORDER — ALPRAZOLAM 1 MG/1
1 TABLET ORAL 2 TIMES DAILY PRN
COMMUNITY
Start: 2023-02-08

## 2023-03-01 NOTE — PROGRESS NOTES
Assessment:       1. Numbness and tingling of right arm    2. Neck pain    3. Primary hypertension    4. Other hyperlipidemia    5. Morbid obesity          Plan:       Numbness and tingling of right arm: Worsening  -     EMG W/ ULTRASOUND AND NERVE CONDUCTION TEST 1 Extremity; Future    Neck pain:  New problem workup needed  -     X-Ray Cervical Spine AP And Lateral; Future; Expected date: 03/01/2023    Primary hypertension: Uncontrolled  -     Hypertension Digital Medicine (HDMP) Enrollment Order  -     amLODIPine (NORVASC) 2.5 MG tablet; Take 1 tablet (2.5 mg total) by mouth once daily.  Dispense: 30 tablet; Refill: 11    Other hyperlipidemia: Uncontrolled    Morbid obesity: Stable         Healthy habits, avoid processed foods, processed starches, decrease salt intake, drink more water, will enroll the patient in the digital hypertension program.  Will start patient amlodipine 2.5 mg daily, will call the patient we have the results of the test.  The patient's BMI has been recorded in the chart. The patient has been provided educational materials regarding the benefits of attaining and maintaining a normal weight. We will continue to address and follow this issue during follow up visits.   Patient agreed with assessment and plan. Patient verbalized understanding.     Subjective:       Patient ID: Yovani Malik is a 32 y.o. male.    Chief Complaint: Follow-up (Right Arm numbness. Starts behind shoulder blade and goes down to his fingers. )    HPI    Hypertension:  The patient stated that he has been monitoring his blood pressure sometimes and is been elevated and every time that is check is elevated, the patient currently not taking medications for blood pressure, the patient stated that he has been drinking a lot a water and trying to eat healthier.  He is not able to exercise as much because he is having back problems and actually physical therapy make the pain worse.    Right arm numbness:  The patient complains  of numbness sensation on the right arm, before the numbness sensation was gone down from the elbow and was diagnosed with carpal tunnel syndrome but now the numbness sensation is all arm and start behind the shoulder blade a and goes down to his fingers.  The patient is currently taking pain medication to the pain management specialist secondary to lower back problems.    Hyperlipidemia:  The last cholesterol levels were elevated, the patient is currently not taking cholesterol medication.  The patient stated that he has strong family history of heart problems.      Past medical history, past social history was reviewed and discussed with the patient.    Review of Systems   Constitutional:  Negative for activity change and appetite change.   HENT:  Negative for ear discharge.    Eyes:  Positive for visual disturbance. Negative for discharge and itching.   Respiratory:  Negative for choking and chest tightness.    Cardiovascular:  Negative for palpitations and leg swelling.   Gastrointestinal:  Negative for abdominal distention.   Endocrine: Negative for cold intolerance and heat intolerance.   Genitourinary:  Negative for dysuria and flank pain.   Musculoskeletal:  Positive for back pain, neck pain and neck stiffness.   Skin:  Negative for pallor.   Allergic/Immunologic: Negative for environmental allergies and food allergies.   Neurological:  Positive for numbness. Negative for dizziness and facial asymmetry.   Hematological:  Negative for adenopathy. Does not bruise/bleed easily.   Psychiatric/Behavioral:  Negative for agitation, confusion and decreased concentration.      Objective:      Physical Exam  Vitals and nursing note reviewed.   Constitutional:       General: He is not in acute distress.     Appearance: Normal appearance. He is well-developed. He is obese. He is not diaphoretic.   HENT:      Head: Normocephalic and atraumatic.      Right Ear: External ear normal.      Left Ear: External ear normal.    Eyes:      General: No scleral icterus.        Left eye: No discharge.   Cardiovascular:      Rate and Rhythm: Normal rate and regular rhythm.      Heart sounds: Normal heart sounds.   Pulmonary:      Effort: Pulmonary effort is normal. No respiratory distress.      Breath sounds: Normal breath sounds. No wheezing.   Musculoskeletal:         General: Tenderness (Neck) present.      Cervical back: Normal range of motion and neck supple.   Skin:     General: Skin is warm and dry.      Coloration: Skin is not pale.      Findings: No erythema.   Neurological:      Mental Status: He is alert.      Cranial Nerves: No cranial nerve deficit.      Sensory: Sensory deficit (Decreased sensation on the right upper extremity.) present.      Motor: No abnormal muscle tone.      Coordination: Coordination normal.   Psychiatric:         Behavior: Behavior normal.         Thought Content: Thought content normal.         Judgment: Judgment normal.

## 2023-03-06 ENCOUNTER — PATIENT MESSAGE (OUTPATIENT)
Dept: FAMILY MEDICINE | Facility: CLINIC | Age: 33
End: 2023-03-06
Payer: COMMERCIAL

## 2023-03-09 ENCOUNTER — PATIENT MESSAGE (OUTPATIENT)
Dept: ADMINISTRATIVE | Facility: HOSPITAL | Age: 33
End: 2023-03-09
Payer: COMMERCIAL

## 2023-03-10 ENCOUNTER — PATIENT MESSAGE (OUTPATIENT)
Dept: ADMINISTRATIVE | Facility: OTHER | Age: 33
End: 2023-03-10
Payer: COMMERCIAL

## 2023-03-20 ENCOUNTER — TELEPHONE (OUTPATIENT)
Dept: FAMILY MEDICINE | Facility: CLINIC | Age: 33
End: 2023-03-20
Payer: COMMERCIAL

## 2023-03-20 RX ORDER — NAPROXEN 500 MG/1
TABLET ORAL
Qty: 30 TABLET | Refills: 0 | Status: SHIPPED | OUTPATIENT
Start: 2023-03-20 | End: 2023-07-27

## 2023-03-20 NOTE — TELEPHONE ENCOUNTER
"  Received phone message "Please put in EMG/Nerve Conduction Study order for 2 extremities for insurance and scheduling purposes. Also, for comparison."  "

## 2023-03-20 NOTE — TELEPHONE ENCOUNTER
Asked if patient wanted to schedule EMG/ Nerve conduction test with Dr. Butterfield in [place]. Pt stated he did and appointment was made. All questions answered.

## 2023-03-20 NOTE — TELEPHONE ENCOUNTER
----- Message from Yolanda Mercer MA sent at 3/20/2023  9:12 AM CDT -----  Contact: pt  Good morning,    Please put in EMG/Nerve Conduction Study order for 2 extremities for insurance and scheduling purposes. Also, for comparison.    Thank you,  Yolanda SERNA    ----- Message -----  From: Jordan Zayas  Sent: 3/16/2023  11:14 AM CDT  To: Yolanda Mercer MA      ----- Message -----  From: Leatha Talbot  Sent: 3/16/2023  11:01 AM CDT  To: [facility]    Type: Needs Medical Advice  Who Called:  pt     Best Call Back Number: [phone]  Additional Information: pt is trying to schedule emg order in system. Please advise.

## 2023-04-03 ENCOUNTER — PATIENT MESSAGE (OUTPATIENT)
Dept: FAMILY MEDICINE | Facility: CLINIC | Age: 33
End: 2023-04-03
Payer: COMMERCIAL

## 2023-05-01 ENCOUNTER — TELEPHONE (OUTPATIENT)
Dept: FAMILY MEDICINE | Facility: CLINIC | Age: 33
End: 2023-05-01
Payer: COMMERCIAL

## 2023-05-01 NOTE — TELEPHONE ENCOUNTER
----- Message from Katherin Day sent at 4/28/2023  6:36 PM CDT -----  Type:  Appointment Request    Name of Caller:JADEN BLUE [[id number]]  When is the first available appointment?5/9/23  Symptoms:rash on leg   Would the patient rather a call back or a response via [facility]? Call back   Best Call Back Number:[phone]  Additional Information: Patient indicates he has been suffering with a rash on his leg on and off for years. Patient indicates it not is very itchy and red and has puss coming out of it. Patient indicates he is concerned and would like to have the provider look at it as soon as possible. Patient indicates he would like to be seen virtually if possible sometime between 5/1/23-5/5/23. Please call patient back with further assistance and more information.

## 2023-05-05 ENCOUNTER — TELEPHONE (OUTPATIENT)
Dept: PHYSICAL MEDICINE AND REHAB | Facility: CLINIC | Age: 33
End: 2023-05-05
Payer: COMMERCIAL

## 2023-05-05 NOTE — TELEPHONE ENCOUNTER
----- Message from Sherleychelsea Garciary sent at 5/5/2023  8:58 AM CDT -----  Regarding: appt access  Name of Caller: JADEN BLUE [[id number]]      When is the first available appointment? PT's EMG       Symptoms:       Best Call Back Number: [phone]        Additional Information: Pt would like an earlier appt for his emg. Pt would like it before June 1st. Preferably the 9th,10th, 11th,16th, 17th, or the 23rd and 28th Please advise.

## 2023-05-05 NOTE — TELEPHONE ENCOUNTER
Returned call to patient to advised he has been scheduled for EMG in first available opening 6/2/2023, verbalizes understanding.

## 2023-05-12 ENCOUNTER — TELEPHONE (OUTPATIENT)
Dept: FAMILY MEDICINE | Facility: CLINIC | Age: 33
End: 2023-05-12

## 2023-05-12 NOTE — TELEPHONE ENCOUNTER
----- Message from Chanda Estevez, Patient Care Assistant sent at 5/11/2023 12:04 PM CDT -----  Regarding: PA  Contact: Viola with [facility]  Type: Needs Medical Advice  Who Called:  Viola with [facility]     Pharmacy name and phone #:    [id number] [place] [address] & [address] [address]  Phone: [phone] Fax: [phone]    Best Call Back Number: [phone]    Additional Information: Viola with [facility] states she would like a callback regarding an pre authorization for an treatment. Please call to advise. Thanks!

## 2023-05-18 ENCOUNTER — TELEPHONE (OUTPATIENT)
Dept: FAMILY MEDICINE | Facility: CLINIC | Age: 33
End: 2023-05-18
Payer: COMMERCIAL

## 2023-05-18 NOTE — TELEPHONE ENCOUNTER
----- Message from Manda Barakat sent at 5/18/2023  2:11 PM CDT -----  Contact: Viola   Type: Needs Medical Advice  Who Called:  Viola ortiz/ [facility]    Best Call Back Number:  [phone]  Additional Information: Viola calling about Auth for pts emg. Viola stated it is close to the end of pts 180 day thang. She is requesting a call back from office.

## 2023-06-02 ENCOUNTER — OFFICE VISIT (OUTPATIENT)
Dept: PHYSICAL MEDICINE AND REHAB | Facility: CLINIC | Age: 33
End: 2023-06-02
Payer: COMMERCIAL

## 2023-06-02 DIAGNOSIS — G56.01 RIGHT CARPAL TUNNEL SYNDROME: Primary | ICD-10-CM

## 2023-06-02 PROCEDURE — 95886 PR EMG COMPLETE, W/ NERVE CONDUCTION STUDIES, 5+ MUSCLES: ICD-10-PCS | Mod: S$GLB,,, | Performed by: PHYSICAL MEDICINE & REHABILITATION

## 2023-06-02 PROCEDURE — 99999 PR PBB SHADOW E&M-EST. PATIENT-LVL I: CPT | Mod: PBBFAC,,, | Performed by: PHYSICAL MEDICINE & REHABILITATION

## 2023-06-02 PROCEDURE — 99999 PR PBB SHADOW E&M-EST. PATIENT-LVL I: ICD-10-PCS | Mod: PBBFAC,,, | Performed by: PHYSICAL MEDICINE & REHABILITATION

## 2023-06-02 PROCEDURE — 95909 PR NERVE CONDUCTION STUDY; 5-6 STUDIES: ICD-10-PCS | Mod: S$GLB,,, | Performed by: PHYSICAL MEDICINE & REHABILITATION

## 2023-06-02 PROCEDURE — 99499 NO LOS: ICD-10-PCS | Mod: S$GLB,,, | Performed by: PHYSICAL MEDICINE & REHABILITATION

## 2023-06-02 PROCEDURE — 95909 NRV CNDJ TST 5-6 STUDIES: CPT | Mod: S$GLB,,, | Performed by: PHYSICAL MEDICINE & REHABILITATION

## 2023-06-02 PROCEDURE — 99499 UNLISTED E&M SERVICE: CPT | Mod: S$GLB,,, | Performed by: PHYSICAL MEDICINE & REHABILITATION

## 2023-06-02 PROCEDURE — 95886 MUSC TEST DONE W/N TEST COMP: CPT | Mod: S$GLB,,, | Performed by: PHYSICAL MEDICINE & REHABILITATION

## 2023-06-02 NOTE — PROGRESS NOTES
[facility]  [address]  [place] [address]             Full Name: Yovani Malik Gender: Male  Patient ID: [id number] YOB: 1990  History: Right hand numbness and pain. Chronic neck pain. No neck surgery.       Visit Date: 6/2/2023 11:25 AM  Age: 32 Years  Examining Physician: Inna Butterfield DO  Referring Physician: Courtney Donnelly      Sensory NCS      Nerve / Sites Rec. Site Onset Lat Peak Lat NP Amp PP Amp Segments Distance Velocity     ms ms µV µV  cm m/s   R Median - Digit III (Antidromic)      Wrist Dig III NR NR NR NR Wrist - Dig III 14 NR   R Ulnar - Digit V (Antidromic)      Wrist Dig V 2.92 3.58 6.3 10.2 Wrist - Dig V 14 48   R Radial - Anatomical snuff box (Forearm)      Forearm Wrist 2.08 2.60 20.9 29.3 Forearm - Wrist 10 48       Motor NCS      Nerve / Sites Muscle Latency Amplitude Amp % Duration Segments Distance Lat Diff Velocity     ms mV % ms  cm ms m/s   R Median - APB      Wrist APB 6.42 0.9 100 5.77 Wrist - APB 8        Elbow APB 10.42 0.8 94.6 5.79 Elbow - Wrist 20 4.00 50   R Ulnar - ADM      Wrist ADM 3.17 9.9 100 7.31 Wrist - ADM 8        B.Elbow ADM 7.73 10.7 108 6.94 B.Elbow - Wrist 26 4.56 57      A.Elbow ADM 8.90 10.5 106 6.90 A.Elbow - B.Elbow 10 1.17 86       EMG Summary Table     Spontaneous MUAP Recruitment   Muscle IA Fib PSW Fasc CRD Amp Dur. Poly Pattern   R. Deltoid N None None None None N N None N   R. Biceps brachii N None None None None N N None N   R. Triceps brachii N None None None None N N None N   R. Pronator teres N None None None None N N None N   R. Abductor pollicis brevis N None None None None N N None N   R. First dorsal interosseous N None None None None N N None N       Summary    The motor conduction test was performed on 2 nerve(s). The results were normal in 1 nerve(s): R Ulnar - ADM. Results outside the specified normal range were found in 1 nerve(s), as follows:  In the R Median - APB study  the take off latency result was  increased for Wrist stimulation  the peak amplitude result was reduced for Wrist stimulation    The sensory conduction test was performed on 3 nerve(s). The results were normal in 1 nerve(s): R Radial - Anatomical snuff box (Forearm). Results outside the specified normal range were found in 2 nerve(s), as follows:  In the R Median - Digit III (Antidromic) study  the response was considered absent for Wrist stimulation  In the R Ulnar - Digit V (Antidromic) study  the peak amplitude result was reduced for Wrist stimulation    The needle EMG study was normal in all 6 tested muscles: R. Deltoid, R. Biceps brachii, R. Triceps brachii, R. Pronator teres, R. Abductor pollicis brevis, R. First dorsal interosseous.     Impression:  Abnormal study.   Moderate/severe right carpal tunnel syndrome, without active denervation.   Noted right ulnar sensory decreased amplitude, unsure if this is clinically significant. Recommend further clinical correlation.   No electrophysiologic evidence of right cervical radiculopathy/plexopathy, or peripheral neuropathy in right upper extremity.     ------------------------------  Inna Butterfield, DO

## 2023-07-27 ENCOUNTER — LAB VISIT (OUTPATIENT)
Dept: LAB | Facility: HOSPITAL | Age: 33
End: 2023-07-27
Attending: FAMILY MEDICINE
Payer: COMMERCIAL

## 2023-07-27 ENCOUNTER — OFFICE VISIT (OUTPATIENT)
Dept: FAMILY MEDICINE | Facility: CLINIC | Age: 33
End: 2023-07-27
Payer: COMMERCIAL

## 2023-07-27 VITALS
HEART RATE: 85 BPM | DIASTOLIC BLOOD PRESSURE: 96 MMHG | HEIGHT: 66 IN | WEIGHT: 236.31 LBS | SYSTOLIC BLOOD PRESSURE: 136 MMHG | BODY MASS INDEX: 37.98 KG/M2 | OXYGEN SATURATION: 98 %

## 2023-07-27 DIAGNOSIS — R20.2 NUMBNESS AND TINGLING: ICD-10-CM

## 2023-07-27 DIAGNOSIS — B37.9 YEAST INFECTION: ICD-10-CM

## 2023-07-27 DIAGNOSIS — L98.9 SKIN LESION: ICD-10-CM

## 2023-07-27 DIAGNOSIS — E66.01 CLASS 3 SEVERE OBESITY DUE TO EXCESS CALORIES WITH SERIOUS COMORBIDITY AND BODY MASS INDEX (BMI) OF 40.0 TO 44.9 IN ADULT: ICD-10-CM

## 2023-07-27 DIAGNOSIS — I10 ESSENTIAL HYPERTENSION: Primary | ICD-10-CM

## 2023-07-27 DIAGNOSIS — E66.01 CLASS 2 SEVERE OBESITY DUE TO EXCESS CALORIES WITH SERIOUS COMORBIDITY AND BODY MASS INDEX (BMI) OF 38.0 TO 38.9 IN ADULT: ICD-10-CM

## 2023-07-27 DIAGNOSIS — B35.3 TINEA PEDIS OF LEFT FOOT: ICD-10-CM

## 2023-07-27 DIAGNOSIS — R20.0 NUMBNESS AND TINGLING: ICD-10-CM

## 2023-07-27 DIAGNOSIS — Z00.01 ANNUAL VISIT FOR GENERAL ADULT MEDICAL EXAMINATION WITH ABNORMAL FINDINGS: ICD-10-CM

## 2023-07-27 DIAGNOSIS — R05.2 SUBACUTE COUGH: ICD-10-CM

## 2023-07-27 DIAGNOSIS — R73.09 ABNORMAL GLUCOSE: ICD-10-CM

## 2023-07-27 LAB
25(OH)D3+25(OH)D2 SERPL-MCNC: 39 NG/ML (ref 30–96)
ALBUMIN SERPL BCP-MCNC: 3.9 G/DL (ref 3.5–5.2)
ALP SERPL-CCNC: 63 U/L (ref 55–135)
ALT SERPL W/O P-5'-P-CCNC: 28 U/L (ref 10–44)
ANION GAP SERPL CALC-SCNC: 13 MMOL/L (ref 8–16)
AST SERPL-CCNC: 21 U/L (ref 10–40)
BASOPHILS # BLD AUTO: 0.06 K/UL (ref 0–0.2)
BASOPHILS NFR BLD: 0.7 % (ref 0–1.9)
BILIRUB SERPL-MCNC: 0.3 MG/DL (ref 0.1–1)
BUN SERPL-MCNC: 19 MG/DL (ref 6–20)
CALCIUM SERPL-MCNC: 9.1 MG/DL (ref 8.7–10.5)
CHLORIDE SERPL-SCNC: 103 MMOL/L (ref 95–110)
CHOLEST SERPL-MCNC: 211 MG/DL (ref 120–199)
CHOLEST/HDLC SERPL: 4.9 {RATIO} (ref 2–5)
CO2 SERPL-SCNC: 23 MMOL/L (ref 23–29)
CREAT SERPL-MCNC: 0.9 MG/DL (ref 0.5–1.4)
DIFFERENTIAL METHOD: ABNORMAL
EOSINOPHIL # BLD AUTO: 0.3 K/UL (ref 0–0.5)
EOSINOPHIL NFR BLD: 3.8 % (ref 0–8)
ERYTHROCYTE [DISTWIDTH] IN BLOOD BY AUTOMATED COUNT: 13.7 % (ref 11.5–14.5)
EST. GFR  (NO RACE VARIABLE): >60 ML/MIN/1.73 M^2
ESTIMATED AVG GLUCOSE: 105 MG/DL (ref 68–131)
GLUCOSE SERPL-MCNC: 90 MG/DL (ref 70–110)
HBA1C MFR BLD: 5.3 % (ref 4–5.6)
HCT VFR BLD AUTO: 43.2 % (ref 40–54)
HDLC SERPL-MCNC: 43 MG/DL (ref 40–75)
HDLC SERPL: 20.4 % (ref 20–50)
HGB BLD-MCNC: 13.9 G/DL (ref 14–18)
IMM GRANULOCYTES # BLD AUTO: 0.02 K/UL (ref 0–0.04)
IMM GRANULOCYTES NFR BLD AUTO: 0.2 % (ref 0–0.5)
INSULIN COLLECTION INTERVAL: 1
INSULIN SERPL-ACNC: 17.7 UU/ML
LDLC SERPL CALC-MCNC: 111.8 MG/DL (ref 63–159)
LYMPHOCYTES # BLD AUTO: 2.8 K/UL (ref 1–4.8)
LYMPHOCYTES NFR BLD: 32.8 % (ref 18–48)
MCH RBC QN AUTO: 27.1 PG (ref 27–31)
MCHC RBC AUTO-ENTMCNC: 32.2 G/DL (ref 32–36)
MCV RBC AUTO: 84 FL (ref 82–98)
MONOCYTES # BLD AUTO: 0.8 K/UL (ref 0.3–1)
MONOCYTES NFR BLD: 9.2 % (ref 4–15)
NEUTROPHILS # BLD AUTO: 4.6 K/UL (ref 1.8–7.7)
NEUTROPHILS NFR BLD: 53.3 % (ref 38–73)
NONHDLC SERPL-MCNC: 168 MG/DL
NRBC BLD-RTO: 0 /100 WBC
PLATELET # BLD AUTO: 338 K/UL (ref 150–450)
PMV BLD AUTO: 11.1 FL (ref 9.2–12.9)
POTASSIUM SERPL-SCNC: 4.1 MMOL/L (ref 3.5–5.1)
PROT SERPL-MCNC: 7.1 G/DL (ref 6–8.4)
RBC # BLD AUTO: 5.12 M/UL (ref 4.6–6.2)
SODIUM SERPL-SCNC: 139 MMOL/L (ref 136–145)
T4 FREE SERPL-MCNC: 0.86 NG/DL (ref 0.71–1.51)
TRIGL SERPL-MCNC: 281 MG/DL (ref 30–150)
TSH SERPL DL<=0.005 MIU/L-ACNC: 5.94 UIU/ML (ref 0.4–4)
VIT B12 SERPL-MCNC: 412 PG/ML (ref 210–950)
WBC # BLD AUTO: 8.66 K/UL (ref 3.9–12.7)

## 2023-07-27 PROCEDURE — 82607 VITAMIN B-12: CPT | Performed by: FAMILY MEDICINE

## 2023-07-27 PROCEDURE — 83525 ASSAY OF INSULIN: CPT | Performed by: FAMILY MEDICINE

## 2023-07-27 PROCEDURE — 80061 LIPID PANEL: CPT | Performed by: FAMILY MEDICINE

## 2023-07-27 PROCEDURE — 1160F PR REVIEW ALL MEDS BY PRESCRIBER/CLIN PHARMACIST DOCUMENTED: ICD-10-PCS | Mod: CPTII,S$GLB,, | Performed by: FAMILY MEDICINE

## 2023-07-27 PROCEDURE — 85025 COMPLETE CBC W/AUTO DIFF WBC: CPT | Performed by: FAMILY MEDICINE

## 2023-07-27 PROCEDURE — 3080F PR MOST RECENT DIASTOLIC BLOOD PRESSURE >= 90 MM HG: ICD-10-PCS | Mod: CPTII,S$GLB,, | Performed by: FAMILY MEDICINE

## 2023-07-27 PROCEDURE — 3075F PR MOST RECENT SYSTOLIC BLOOD PRESS GE 130-139MM HG: ICD-10-PCS | Mod: CPTII,S$GLB,, | Performed by: FAMILY MEDICINE

## 2023-07-27 PROCEDURE — 82306 VITAMIN D 25 HYDROXY: CPT | Performed by: FAMILY MEDICINE

## 2023-07-27 PROCEDURE — 3008F BODY MASS INDEX DOCD: CPT | Mod: CPTII,S$GLB,, | Performed by: FAMILY MEDICINE

## 2023-07-27 PROCEDURE — 3008F PR BODY MASS INDEX (BMI) DOCUMENTED: ICD-10-PCS | Mod: CPTII,S$GLB,, | Performed by: FAMILY MEDICINE

## 2023-07-27 PROCEDURE — 1160F RVW MEDS BY RX/DR IN RCRD: CPT | Mod: CPTII,S$GLB,, | Performed by: FAMILY MEDICINE

## 2023-07-27 PROCEDURE — 99214 OFFICE O/P EST MOD 30 MIN: CPT | Mod: S$GLB,,, | Performed by: FAMILY MEDICINE

## 2023-07-27 PROCEDURE — 84443 ASSAY THYROID STIM HORMONE: CPT | Performed by: FAMILY MEDICINE

## 2023-07-27 PROCEDURE — 99999 PR PBB SHADOW E&M-EST. PATIENT-LVL IV: ICD-10-PCS | Mod: PBBFAC,,, | Performed by: FAMILY MEDICINE

## 2023-07-27 PROCEDURE — 80053 COMPREHEN METABOLIC PANEL: CPT | Performed by: FAMILY MEDICINE

## 2023-07-27 PROCEDURE — 3075F SYST BP GE 130 - 139MM HG: CPT | Mod: CPTII,S$GLB,, | Performed by: FAMILY MEDICINE

## 2023-07-27 PROCEDURE — 84439 ASSAY OF FREE THYROXINE: CPT | Performed by: FAMILY MEDICINE

## 2023-07-27 PROCEDURE — 99214 PR OFFICE/OUTPT VISIT, EST, LEVL IV, 30-39 MIN: ICD-10-PCS | Mod: S$GLB,,, | Performed by: FAMILY MEDICINE

## 2023-07-27 PROCEDURE — 99999 PR PBB SHADOW E&M-EST. PATIENT-LVL IV: CPT | Mod: PBBFAC,,, | Performed by: FAMILY MEDICINE

## 2023-07-27 PROCEDURE — 3080F DIAST BP >= 90 MM HG: CPT | Mod: CPTII,S$GLB,, | Performed by: FAMILY MEDICINE

## 2023-07-27 PROCEDURE — 83036 HEMOGLOBIN GLYCOSYLATED A1C: CPT | Performed by: FAMILY MEDICINE

## 2023-07-27 PROCEDURE — 1159F MED LIST DOCD IN RCRD: CPT | Mod: CPTII,S$GLB,, | Performed by: FAMILY MEDICINE

## 2023-07-27 PROCEDURE — 36415 COLL VENOUS BLD VENIPUNCTURE: CPT | Mod: PO | Performed by: FAMILY MEDICINE

## 2023-07-27 PROCEDURE — 1159F PR MEDICATION LIST DOCUMENTED IN MEDICAL RECORD: ICD-10-PCS | Mod: CPTII,S$GLB,, | Performed by: FAMILY MEDICINE

## 2023-07-27 RX ORDER — AMLODIPINE BESYLATE 5 MG/1
5 TABLET ORAL DAILY
Qty: 90 TABLET | Refills: 3 | Status: SHIPPED | OUTPATIENT
Start: 2023-07-27 | End: 2023-11-02

## 2023-07-27 RX ORDER — DEXTROAMPHETAMINE SULFATE, DEXTROAMPHETAMINE SACCHARATE, AMPHETAMINE SULFATE AND AMPHETAMINE ASPARTATE 7.5; 7.5; 7.5; 7.5 MG/1; MG/1; MG/1; MG/1
CAPSULE, EXTENDED RELEASE ORAL
COMMUNITY
Start: 2023-07-14

## 2023-07-27 RX ORDER — TERBINAFINE HYDROCHLORIDE 250 MG/1
250 TABLET ORAL DAILY
Qty: 14 TABLET | Refills: 0 | Status: SHIPPED | OUTPATIENT
Start: 2023-07-27 | End: 2023-08-10

## 2023-07-27 RX ORDER — MUPIROCIN 20 MG/G
OINTMENT TOPICAL 3 TIMES DAILY
COMMUNITY
Start: 2023-04-28

## 2023-07-27 NOTE — PROGRESS NOTES
Assessment:       1. Essential hypertension    2. Class 2 severe obesity due to excess calories with serious comorbidity and body mass index (BMI) of 38.0 to 38.9 in adult    3. Skin lesion    4. Yeast infection    5. Tinea pedis of left foot        Plan:            Essential hypertension:  Uncontrolled  -     amLODIPine (NORVASC) 5 MG tablet; Take 1 tablet (5 mg total) by mouth once daily.  Dispense: 90 tablet; Refill: 3    Class 2 severe obesity due to excess calories with serious comorbidity and body mass index (BMI) of 38.0 to 38.9 in adult:  Improved    Skin lesion:  New problem workup needed  -     Ambulatory referral/consult to Dermatology; Future; Expected date: 08/03/2023    Yeast infection:  New problem, next visit workup    Tinea pedis of left foot:  New problem, next visit workup  -     terbinafine HCL (LAMISIL) 250 mg tablet; Take 1 tablet (250 mg total) by mouth once daily. for 14 days  Dispense: 14 tablet; Refill: 0     Will increase the dosage of the amlodipine to 5 mg daily, will start patient on terbinafine 250 mg once daily for 14 days secondary to over-the-counter medications are not working.  Will refer the patient to the dermatologist secondary to skin lesion on the left lower extremity to rule out skin cancer.  The patient's BMI has been recorded in the chart. The patient has been provided educational materials regarding the benefits of attaining and maintaining a normal weight. We will continue to address and follow this issue during follow up visits.   Patient agreed with assessment and plan. Patient verbalized understanding.     Subjective:       Patient ID: Yovani Malik is a 33 y.o. male.    Chief Complaint: Follow-up (3 month follow up)    HPI    Skin lesion:  The patient complains of a skin lesion on the left lower extremity that is not healing, brought pictures since started and continue needs to not healing.  The patient stated the lesion is not painful.    Rash:  Complains of a rash  in the left foods that is to be peak, painful, has cracks between his toes, also some purulent material come up with some lesions on the foot.  The patient has been having this for a while, taking over-the-counter medications for athlete's foot.  The symptoms do not improve with this medications.    Hypertension:  The patient has been taking amlodipine 2.5 mg daily, the patient is active in the digital hypertension program.  The patient is been taking his medicines as directed, the patient stated that he is in pain because of his lower back and currently seeing another pain management.  The patient stated that he has a new job and is being more active and walking more and he is losing weight.  He also had couple weeks ago an episode of coughing and wheezing, he is using breathing treatments and is doing better, the patient stated that his symptoms are almost resolved.    Past medical history, past social history was reviewed and discussed with the patient.    Review of Systems   Constitutional:  Negative for activity change and appetite change.   HENT:  Positive for congestion. Negative for ear discharge.    Eyes:  Negative for discharge and itching.   Respiratory:  Positive for cough and wheezing. Negative for choking and chest tightness.    Cardiovascular:  Negative for chest pain and palpitations.   Gastrointestinal:  Negative for abdominal distention and abdominal pain.   Endocrine: Negative for cold intolerance and heat intolerance.   Genitourinary:  Negative for dysuria and flank pain.   Musculoskeletal:  Negative for arthralgias and back pain.   Skin:  Positive for rash. Negative for pallor.        Skin lesion   Allergic/Immunologic: Negative for environmental allergies and food allergies.   Neurological:  Negative for dizziness and facial asymmetry.   Hematological:  Negative for adenopathy. Does not bruise/bleed easily.   Psychiatric/Behavioral:  Negative for agitation and confusion.      Objective:               Physical Exam  Vitals and nursing note reviewed.   Constitutional:       General: He is not in acute distress.     Appearance: Normal appearance. He is well-developed. He is obese. He is not diaphoretic.   HENT:      Head: Normocephalic and atraumatic.      Right Ear: External ear normal.      Left Ear: External ear normal.      Nose: Nose normal.   Eyes:      General: No scleral icterus.        Left eye: No discharge.   Cardiovascular:      Rate and Rhythm: Normal rate and regular rhythm.      Heart sounds: Normal heart sounds.   Pulmonary:      Effort: Pulmonary effort is normal. No respiratory distress.      Breath sounds: Normal breath sounds. No wheezing.   Musculoskeletal:         General: No tenderness.      Cervical back: Normal range of motion and neck supple.   Skin:     General: Skin is warm and dry.      Coloration: Skin is not pale.      Findings: Lesion (Left lower extremity) and rash (Left foot) present.   Neurological:      Mental Status: He is alert.      Cranial Nerves: No cranial nerve deficit.      Motor: No abnormal muscle tone.      Coordination: Coordination normal.   Psychiatric:         Behavior: Behavior normal.         Thought Content: Thought content normal.         Judgment: Judgment normal.

## 2023-07-30 DIAGNOSIS — D64.9 LOW HEMOGLOBIN: Primary | ICD-10-CM

## 2023-07-30 DIAGNOSIS — R94.6 ABNORMAL THYROID FUNCTION TEST: ICD-10-CM

## 2023-08-05 ENCOUNTER — PATIENT MESSAGE (OUTPATIENT)
Dept: SPORTS MEDICINE | Facility: CLINIC | Age: 33
End: 2023-08-05
Payer: COMMERCIAL

## 2023-08-05 DIAGNOSIS — M17.11 PRIMARY OSTEOARTHRITIS OF RIGHT KNEE: Primary | ICD-10-CM

## 2023-08-16 ENCOUNTER — OFFICE VISIT (OUTPATIENT)
Dept: DERMATOLOGY | Facility: CLINIC | Age: 33
End: 2023-08-16
Payer: COMMERCIAL

## 2023-08-16 VITALS — BODY MASS INDEX: 37.93 KG/M2 | HEIGHT: 66 IN | WEIGHT: 236 LBS

## 2023-08-16 DIAGNOSIS — B07.8 VERRUCA PLANA: Primary | ICD-10-CM

## 2023-08-16 DIAGNOSIS — L98.9 SKIN LESION: ICD-10-CM

## 2023-08-16 DIAGNOSIS — D48.5 NEOPLASM OF UNCERTAIN BEHAVIOR OF SKIN: ICD-10-CM

## 2023-08-16 PROCEDURE — 88342 IMHCHEM/IMCYTCHM 1ST ANTB: CPT | Performed by: PATHOLOGY

## 2023-08-16 PROCEDURE — 3008F BODY MASS INDEX DOCD: CPT | Mod: CPTII,S$GLB,, | Performed by: DERMATOLOGY

## 2023-08-16 PROCEDURE — 88312 SPECIAL STAINS GROUP 1: CPT | Mod: 59 | Performed by: PATHOLOGY

## 2023-08-16 PROCEDURE — 88342 CHG IMMUNOCYTOCHEMISTRY: ICD-10-PCS | Mod: 26,,, | Performed by: PATHOLOGY

## 2023-08-16 PROCEDURE — 88305 TISSUE EXAM BY PATHOLOGIST: CPT | Mod: 26,,, | Performed by: PATHOLOGY

## 2023-08-16 PROCEDURE — 88312 SPECIAL STAINS GROUP 1: CPT | Mod: 26,,, | Performed by: PATHOLOGY

## 2023-08-16 PROCEDURE — 1159F MED LIST DOCD IN RCRD: CPT | Mod: CPTII,S$GLB,, | Performed by: DERMATOLOGY

## 2023-08-16 PROCEDURE — 88342 IMHCHEM/IMCYTCHM 1ST ANTB: CPT | Mod: 26,,, | Performed by: PATHOLOGY

## 2023-08-16 PROCEDURE — 3008F PR BODY MASS INDEX (BMI) DOCUMENTED: ICD-10-PCS | Mod: CPTII,S$GLB,, | Performed by: DERMATOLOGY

## 2023-08-16 PROCEDURE — 99203 PR OFFICE/OUTPT VISIT, NEW, LEVL III, 30-44 MIN: ICD-10-PCS | Mod: 25,S$GLB,, | Performed by: DERMATOLOGY

## 2023-08-16 PROCEDURE — 99203 OFFICE O/P NEW LOW 30 MIN: CPT | Mod: 25,S$GLB,, | Performed by: DERMATOLOGY

## 2023-08-16 PROCEDURE — 1159F PR MEDICATION LIST DOCUMENTED IN MEDICAL RECORD: ICD-10-PCS | Mod: CPTII,S$GLB,, | Performed by: DERMATOLOGY

## 2023-08-16 PROCEDURE — 3044F HG A1C LEVEL LT 7.0%: CPT | Mod: CPTII,S$GLB,, | Performed by: DERMATOLOGY

## 2023-08-16 PROCEDURE — 88305 TISSUE EXAM BY PATHOLOGIST: ICD-10-PCS | Mod: 26,,, | Performed by: PATHOLOGY

## 2023-08-16 PROCEDURE — 11104 PUNCH BX SKIN SINGLE LESION: CPT | Mod: S$GLB,,, | Performed by: DERMATOLOGY

## 2023-08-16 PROCEDURE — 1160F PR REVIEW ALL MEDS BY PRESCRIBER/CLIN PHARMACIST DOCUMENTED: ICD-10-PCS | Mod: CPTII,S$GLB,, | Performed by: DERMATOLOGY

## 2023-08-16 PROCEDURE — 88312 PR  SPECIAL STAINS,GROUP I: ICD-10-PCS | Mod: 26,,, | Performed by: PATHOLOGY

## 2023-08-16 PROCEDURE — 1160F RVW MEDS BY RX/DR IN RCRD: CPT | Mod: CPTII,S$GLB,, | Performed by: DERMATOLOGY

## 2023-08-16 PROCEDURE — 11104 PR PUNCH BIOPSY, SKIN, SINGLE LESION: ICD-10-PCS | Mod: S$GLB,,, | Performed by: DERMATOLOGY

## 2023-08-16 PROCEDURE — 88305 TISSUE EXAM BY PATHOLOGIST: CPT | Performed by: PATHOLOGY

## 2023-08-16 PROCEDURE — 3044F PR MOST RECENT HEMOGLOBIN A1C LEVEL <7.0%: ICD-10-PCS | Mod: CPTII,S$GLB,, | Performed by: DERMATOLOGY

## 2023-08-16 RX ORDER — TAZAROTENE 1 MG/G
CREAM TOPICAL
Qty: 30 G | Refills: 2 | Status: SHIPPED | OUTPATIENT
Start: 2023-08-16 | End: 2023-11-02

## 2023-08-16 NOTE — PROGRESS NOTES
Subjective:      Patient ID:  Yovani Malik is a 33 y.o. male who presents for   Chief Complaint   Patient presents with    Skin Ulcer     LLE    Rash     Left side of foot     New patient    Here today for a skin lesion on his left lower leg x 2 years that will not heal all the way-keeps returning  Has had courses of abts without any improvement    C/o rash on left outer foot that comes and goes-white bumps that are very itchy- last year  Has tried OTCs without any effect    Has no hx of NMSC  Has no fhx of MM      Review of Systems   Constitutional:  Negative for fever, chills and fatigue.   Skin:  Positive for activity-related sunscreen use and wears hat. Negative for itching, rash, dry skin and daily sunscreen use.   Hematologic/Lymphatic: Does not bruise/bleed easily.       Objective:   Physical Exam   Constitutional: He appears well-developed and well-nourished. No distress.   Neurological: He is alert and oriented to person, place, and time. He is not disoriented.   Psychiatric: He has a normal mood and affect.   Skin:   Areas Examined (abnormalities noted in diagram):   RLE Inspected  LLE Inspection Performed            Diagram Legend     Erythematous scaling macule/papule c/w actinic keratosis       Vascular papule c/w angioma      Pigmented verrucoid papule/plaque c/w seborrheic keratosis      Yellow umbilicated papule c/w sebaceous hyperplasia      Irregularly shaped tan macule c/w lentigo     1-2 mm smooth white papules consistent with Milia      Movable subcutaneous cyst with punctum c/w epidermal inclusion cyst      Subcutaneous movable cyst c/w pilar cyst      Firm pink to brown papule c/w dermatofibroma      Pedunculated fleshy papule(s) c/w skin tag(s)      Evenly pigmented macule c/w junctional nevus     Mildly variegated pigmented, slightly irregular-bordered macule c/w mildly atypical nevus      Flesh colored to evenly pigmented papule c/w intradermal nevus       Pink pearly papule/plaque c/w  basal cell carcinoma      Erythematous hyperkeratotic cursted plaque c/w SCC      Surgical scar with no sign of skin cancer recurrence      Open and closed comedones      Inflammatory papules and pustules      Verrucoid papule consistent consistent with wart     Erythematous eczematous patches and plaques     Dystrophic onycholytic nail with subungual debris c/w onychomycosis     Umbilicated papule    Erythematous-base heme-crusted tan verrucoid plaque consistent with inflamed seborrheic keratosis     Erythematous Silvery Scaling Plaque c/w Psoriasis     See annotation        Assessment / Plan:      Pathology Orders:       Normal Orders This Visit    Specimen to Pathology, Dermatology     Comments:    Number of Specimens:->1  ------------------------->-------------------------  Spec 1 Procedure:->Biopsy  Spec 1 Clinical Impression:->Pink macule with central  heme-crust, itchy, non healing, recurring ulceration x years,  investigate for underlying cause  Spec 1 Source:->left lower shin    Questions:    Procedure Type: Dermatology and skin neoplasms    Number of Specimens: 1    ------------------------: -------------------------    Spec 1 Procedure: Biopsy    Spec 1 Clinical Impression: Pink macule with central heme-crust, itchy, non healing, recurring ulceration x years, investigate for underlying cause    Spec 1 Source: left lower shin    Release to patient:           Verruca plana  -     tazarotene (TAZORAC) 0.1 % cream; Apply thin film to left lower leg nightly  Dispense: 30 g; Refill: 2    Neoplasm of uncertain behavior of skin  -     Ambulatory referral/consult to Dermatology  -     Specimen to Pathology, Dermatology    Punch biopsy procedure note:  Punch biopsy performed after verbal consent obtained. Area marked and prepped with alcohol. Approximately 1cc of 1% lidocaine with epinephrine injected. 5 mm disposable punch used to remove lesion. Hemostasis obtained and biopsy site closed with 4 Prolene sutures.  Wound care instructions reviewed with patient and handout given.           Follow up in about 2 weeks (around 8/30/2023) for suture removal.

## 2023-08-16 NOTE — PATIENT INSTRUCTIONS
Punch Biopsy Wound Care    Your doctor has performed a punch biopsy today.  A band aid and antibiotic ointment has been placed over the site.  This should remain in place for 24 hours.  It is recommended that you keep the area dry for the first 24 hours.  After 24 hours, you may remove the band aid and wash the area with warm soap and water and apply Vaseline jelly.  Many patients prefer to use Neosporin or Bacitracin ointment.  This is acceptable; however know that you can develop an allergy to this medication even if you have used it safely for years.  It is important to keep the area moist.  Letting it dry out and get air slows healing time, will worsen the scar, and make it more difficult to remove the stitches if they were placed.  Band aid is optional after first 24 hours.      If you notice increasing redness, tenderness, pain, or yellow drainage at the biopsy or surgical site, please notify your doctor.  These are signs of an infection.    If your biopsy/surgical site is bleeding, apply firm pressure for 15 minutes straight.  Repeat for another 15 minutes, if it is still bleeding.   If the surgical site continues to bleed, then please contact your doctor.     [facility] - DERMATOLOGY  [id number], [address]  [place] [address]  Dept: [phone]  Dept Fax: [phone]

## 2023-08-25 ENCOUNTER — PATIENT MESSAGE (OUTPATIENT)
Dept: DERMATOLOGY | Facility: CLINIC | Age: 33
End: 2023-08-25
Payer: COMMERCIAL

## 2023-08-30 ENCOUNTER — CLINICAL SUPPORT (OUTPATIENT)
Dept: DERMATOLOGY | Facility: CLINIC | Age: 33
End: 2023-08-30
Payer: COMMERCIAL

## 2023-08-30 DIAGNOSIS — Z48.02 VISIT FOR SUTURE REMOVAL: Primary | ICD-10-CM

## 2023-08-30 LAB
COMMENT: NORMAL
FINAL PATHOLOGIC DIAGNOSIS: NORMAL
GROSS: NORMAL
Lab: NORMAL
MICROSCOPIC EXAM: NORMAL

## 2023-08-30 PROCEDURE — 99024 POSTOP FOLLOW-UP VISIT: CPT | Mod: S$GLB,,, | Performed by: DERMATOLOGY

## 2023-08-30 PROCEDURE — 99024 PR POST-OP FOLLOW-UP VISIT: ICD-10-PCS | Mod: S$GLB,,, | Performed by: DERMATOLOGY

## 2023-09-05 ENCOUNTER — TELEPHONE (OUTPATIENT)
Dept: DERMATOLOGY | Facility: CLINIC | Age: 33
End: 2023-09-05
Payer: COMMERCIAL

## 2023-09-05 NOTE — TELEPHONE ENCOUNTER
----- Message from Kelsie Felix MD sent at 9/1/2023  7:34 AM CDT -----  No concerning features identified to warrant further workup. Isolated lesion.     Skin, left lower shin, punch biopsy:   -REACTIVE EPIDERMIS WITH UNDERLYING DERMAL FIBROSIS AND MIXED INFLAMMATION, see comment     COMMENT:  Clinical images were reviewed in epic and differential diagnosis noted.  There is a superficial and deep lymphohistiocytic inflammatory infiltrate with some scattered eosinophils and rare neutrophils which could represent a delayed   hypersensitivity reaction.  Hypertrophic lichen planus could also be considered in the correct clinical context.  The subcutaneous tissue shows an area of septal thickening and granulomatous inflammation reminiscent of erythema nodosum.  Correlation is   recommended.

## 2023-09-07 ENCOUNTER — OFFICE VISIT (OUTPATIENT)
Dept: SPORTS MEDICINE | Facility: CLINIC | Age: 33
End: 2023-09-07
Payer: COMMERCIAL

## 2023-09-07 VITALS
HEIGHT: 66 IN | HEART RATE: 99 BPM | WEIGHT: 233.69 LBS | BODY MASS INDEX: 37.56 KG/M2 | DIASTOLIC BLOOD PRESSURE: 82 MMHG | SYSTOLIC BLOOD PRESSURE: 121 MMHG

## 2023-09-07 DIAGNOSIS — M17.11 PRIMARY OSTEOARTHRITIS OF RIGHT KNEE: ICD-10-CM

## 2023-09-07 DIAGNOSIS — M25.561 CHRONIC PAIN OF RIGHT KNEE: ICD-10-CM

## 2023-09-07 DIAGNOSIS — G89.29 CHRONIC PAIN OF RIGHT KNEE: ICD-10-CM

## 2023-09-07 DIAGNOSIS — Z98.890 S/P RIGHT KNEE SURGERY: ICD-10-CM

## 2023-09-07 DIAGNOSIS — M25.561 RIGHT KNEE PAIN, UNSPECIFIED CHRONICITY: Primary | ICD-10-CM

## 2023-09-07 DIAGNOSIS — M54.6 THORACIC SPINE PAIN: ICD-10-CM

## 2023-09-07 DIAGNOSIS — M17.11 PRIMARY OSTEOARTHRITIS OF RIGHT KNEE: Primary | ICD-10-CM

## 2023-09-07 PROCEDURE — 99999 PR PBB SHADOW E&M-EST. PATIENT-LVL III: CPT | Mod: PBBFAC,,, | Performed by: ORTHOPAEDIC SURGERY

## 2023-09-07 PROCEDURE — 3008F BODY MASS INDEX DOCD: CPT | Mod: CPTII,S$GLB,, | Performed by: ORTHOPAEDIC SURGERY

## 2023-09-07 PROCEDURE — 99214 OFFICE O/P EST MOD 30 MIN: CPT | Mod: 25,S$GLB,, | Performed by: ORTHOPAEDIC SURGERY

## 2023-09-07 PROCEDURE — 99999 PR PBB SHADOW E&M-EST. PATIENT-LVL III: ICD-10-PCS | Mod: PBBFAC,,, | Performed by: ORTHOPAEDIC SURGERY

## 2023-09-07 PROCEDURE — 20611 DRAIN/INJ JOINT/BURSA W/US: CPT | Mod: RT,S$GLB,, | Performed by: ORTHOPAEDIC SURGERY

## 2023-09-07 PROCEDURE — 3074F SYST BP LT 130 MM HG: CPT | Mod: CPTII,S$GLB,, | Performed by: ORTHOPAEDIC SURGERY

## 2023-09-07 PROCEDURE — 20611 LARGE JOINT ASPIRATION/INJECTION: R KNEE: ICD-10-PCS | Mod: RT,S$GLB,, | Performed by: ORTHOPAEDIC SURGERY

## 2023-09-07 PROCEDURE — 3044F PR MOST RECENT HEMOGLOBIN A1C LEVEL <7.0%: ICD-10-PCS | Mod: CPTII,S$GLB,, | Performed by: ORTHOPAEDIC SURGERY

## 2023-09-07 PROCEDURE — 1159F MED LIST DOCD IN RCRD: CPT | Mod: CPTII,S$GLB,, | Performed by: ORTHOPAEDIC SURGERY

## 2023-09-07 PROCEDURE — 3074F PR MOST RECENT SYSTOLIC BLOOD PRESSURE < 130 MM HG: ICD-10-PCS | Mod: CPTII,S$GLB,, | Performed by: ORTHOPAEDIC SURGERY

## 2023-09-07 PROCEDURE — 3008F PR BODY MASS INDEX (BMI) DOCUMENTED: ICD-10-PCS | Mod: CPTII,S$GLB,, | Performed by: ORTHOPAEDIC SURGERY

## 2023-09-07 PROCEDURE — 1159F PR MEDICATION LIST DOCUMENTED IN MEDICAL RECORD: ICD-10-PCS | Mod: CPTII,S$GLB,, | Performed by: ORTHOPAEDIC SURGERY

## 2023-09-07 PROCEDURE — 3079F PR MOST RECENT DIASTOLIC BLOOD PRESSURE 80-89 MM HG: ICD-10-PCS | Mod: CPTII,S$GLB,, | Performed by: ORTHOPAEDIC SURGERY

## 2023-09-07 PROCEDURE — 3079F DIAST BP 80-89 MM HG: CPT | Mod: CPTII,S$GLB,, | Performed by: ORTHOPAEDIC SURGERY

## 2023-09-07 PROCEDURE — 99214 PR OFFICE/OUTPT VISIT, EST, LEVL IV, 30-39 MIN: ICD-10-PCS | Mod: 25,S$GLB,, | Performed by: ORTHOPAEDIC SURGERY

## 2023-09-07 PROCEDURE — 3044F HG A1C LEVEL LT 7.0%: CPT | Mod: CPTII,S$GLB,, | Performed by: ORTHOPAEDIC SURGERY

## 2023-09-07 NOTE — PROGRESS NOTES
Subjective:          Chief Complaint: Yovani Malik is a 33 y.o. male who had concerns including Pain of the Right Knee.      Mr. Malik presents to our clinic today for follow up right knee after receiving right knee Durolane injection on 3/18/22. He reports significant improvement of pain and function for approximately 5 months. He would like to receive the injection again when it is approved. Pain at rest is 5/10. Pain at its worst is 8/10. He is 2 year s/p below procedure.  He states he is doing alright but still has some pain along the anterior and lateral knee which he localizes to his incision as well as the lateral joint line.  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   Notes he had a knee steroid injection from his pain management physician which did not give much relief.  Previous Durolane injection 9/2022 with relief. Complaints of thoracic and lumbar pain with previous MRIs in 2022.         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   HENT:  Negative for hearing loss.    Eyes:  Negative for blurred vision and visual disturbance.   Cardiovascular:  Negative for chest pain and leg swelling.   Respiratory:  Negative for shortness of breath.    Endocrine: Negative for polyuria.   Hematologic/Lymphatic: Negative  for bleeding problem.   Skin:  Negative for rash.   Musculoskeletal:  Positive for back pain and joint pain. Negative for joint swelling, muscle cramps and muscle weakness.   Gastrointestinal:  Negative for melena.   Genitourinary:  Negative for hematuria.   Neurological:  Negative for loss of balance, numbness and paresthesias.   Psychiatric/Behavioral:  Negative for altered mental status.        Pain Related Questions  Over the past 3 days, what was your average pain during activity? (I.e. running, jogging, walking, climbing stairs, getting dressed, ect.): 8  Over the past 3 days, what was your highest pain level?: 8  Over the past 3 days, what was your lowest pain level? : 5    Other  Was the patient's HEIGHT measured or patient reported?: Patient Reported  Was the patient's WEIGHT measured or patient reported?: Measured      Objective:        General: Yovani is well-developed, well-nourished, appears stated age, in no acute distress, alert and oriented to time, place and person.     General    Vitals reviewed.  Constitutional: He is oriented to person, place, and time. He appears well-developed and well-nourished. No distress.   HENT:   Mouth/Throat: No oropharyngeal exudate.   Eyes: Right eye exhibits no discharge. Left eye exhibits no discharge.   Pulmonary/Chest: Effort normal and breath sounds normal. No respiratory distress.   Neurological: He is alert and oriented to person, place, and time. He has normal reflexes. No cranial nerve deficit. Coordination normal.   Psychiatric: He has a normal mood and affect. His behavior is normal. Judgment and thought content normal.     General Musculoskeletal Exam   Gait: normal       Right Knee Exam     Inspection   Erythema: absent  Scars: present  Swelling: absent  Effusion: absent  Deformity: absent  Bruising: absent    Tenderness   The patient is tender to palpation of the lateral joint line.    Crepitus   The patient has crepitus of the patella (mild to  moderate).    Range of Motion   Extension:  0   Flexion:  abnormal Right knee flexion: 125.    Tests   Meniscus   Kwadwo:  Medial - negative Lateral - negative  Ligament Examination   Lachman: normal (-1 to 2mm)   PCL-Posterior Drawer: normal (0 to 2mm)     MCL - Valgus: normal (0 to 2mm)  LCL - Varus: normal  Pivot Shift: normal (Equal)  Reverse Pivot Shift: normal (Equal)  Dial Test at 30 degrees: normal (< 5 degrees)  Dial Test at 90 degrees: normal (< 5 degrees)  Posterior Sag Test: negative  Posterolateral Corner: stable  Patella   Patellar apprehension: negative  Passive Patellar Tilt: neutral  Patellar Tracking: normal  Patellar Glide (quadrants): Lateral - 1   Medial - 2  Q-Angle at 90 degrees: normal  Patellar Grind: negative  J-Sign: none    Other   Meniscal Cyst: absent  Popliteal (Baker's) Cyst: absent  Sensation: normal    Left Knee Exam     Inspection   Erythema: absent  Scars: absent  Swelling: absent  Effusion: absent  Deformity: absent  Bruising: absent    Tenderness   The patient is experiencing no tenderness.     Range of Motion   Extension:  0   Flexion:  140     Tests   Meniscus   Kwadwo:  Medial - negative Lateral - negative  Stability   Lachman: normal (-1 to 2mm)   PCL-Posterior Drawer: normal (0 to 2mm)  MCL - Valgus: normal (0 to 2mm)  LCL - Varus: normal (0 to 2mm)  Pivot Shift: normal (Equal)  Reverse Pivot Shift: normal (Equal)  Dial Test at 30 degrees: normal (< 5 degrees)  Dial Test at 90 degrees: normal (< 5 degrees)  Posterior Sag Test: negative  Posterolateral Corner: stable  Patella   Patellar apprehension: negative  Passive Patellar Tilt: neutral  Patellar Tracking: normal  Patellar Glide (Quadrants): Lateral - 1 Medial - 2  Q-Angle at 90 degrees: normal  Patellar Grind: negative  J-Sign: J sign absent    Other   Meniscal Cyst: absent  Popliteal (Baker's) Cyst: absent  Sensation: normal    Right Hip Exam     Tests   Marisa: negative  Left Hip Exam     Tests   Marisa:  negative          Muscle Strength   Right Lower Extremity   Hip Abduction: 5/5   Quadriceps:  4/5   Hamstrin/5   Left Lower Extremity   Hip Abduction: 5/5   Quadriceps:  5/5   Hamstrin/5     Reflexes     Left Side  Achilles:  2+  Quadriceps:  2+    Right Side   Achilles:  2+  Quadriceps:  2+    Vascular Exam     Right Pulses  Dorsalis Pedis:      2+  Posterior Tibial:      2+        Left Pulses  Dorsalis Pedis:      2+  Posterior Tibial:      2+        X-Ray Tibia Fibula 2 View Right  Narrative: EXAMINATION:  Two radiographic views of the RIGHT TIBIA FIBULA.    CLINICAL HISTORY:  Pain in right lower leg    TECHNIQUE:  2 radiographic views of the RIGHT TIBIA FIBULA.    COMPARISON:  None.    FINDINGS:  Frontal and lateral views of the right tibia fibula demonstrate normal alignment.  There is no fracture.  There is no bony mass lesion.  There is no soft tissue swelling.  Impression: No acute abnormality of the right tibia fibula.    Electronically signed by: Ganesh Rojo MD  Date:    2023  Time:    08:22  US Lower Extremity Veins Right  Narrative: EXAMINATION:  US LOWER EXTREMITY VEINS RIGHT    CLINICAL HISTORY:  right lower leg pain; .    COMPARISON:  None    FINDINGS:  Multiple grayscale and color Doppler sonographic images were acquired through the right lower extremity deep venous system. The visualized right common femoral, profunda femoral, superficial femoral, popliteal, peroneal, posterior tibial, anterior tibial, and greater saphenous veins demonstrate compressibility where anatomically appropriate and possible, display phasic waveforms, and displayed augmentation by Doppler.  Impression: 1. No evidence of deep venous thrombosis in the visualized deep veins of the right lower extremity is sonographically demonstrated.    Electronically signed by: Ld Phillips MD  Date:    2023  Time:    07:57            Assessment:       Encounter Diagnoses   Name Primary?    Right knee pain, unspecified  chronicity Yes    Primary osteoarthritis of right knee     S/P right knee surgery     Chronic pain of right knee     Thoracic spine pain           Plan:       1. RTC in 6 months with Advanced Practice Provider. IKDC, SF-12 and KOOS was filled out today in clinic. Patient will fill out IKDC, SF-12 and KOOS on return.    2. Medications: Refills of the following Rx were sent to patients preferred Pharmacy:  No Refills Needed Today    3. Physical Therapy: completed    4. HEP: N/A    5. Procedures/Procedural Planning:   We reviewed with Yovani bell, the pathology and natural history of his diagnosis. We had an extensive discussion as to the conservative treatment and management of their condition. We also discussed the variety of treatment options to include medication, physical therapy, diagnostic testing as well as other treatments.The decision was made to go forward with:     knee - right    Durolane performed today, see procedure note.      6. DME: N/A    7. Work/Sport Status: no interval change    8. Visit Summary: medically necessary for Dr. Lenin Jones to evaluate the Thoracic MRI findings to assess for disc resection with failure of conservative measures. Return for repeat Durolane injection in 6 months to right knee.                          [facility] patient questionnaires have been collected today.

## 2023-09-07 NOTE — PROCEDURES
"Large Joint Aspiration/Injection: R knee    Date/Time: 9/7/2023 11:00 AM    Performed by: Barry Lopez MD  Authorized by: Barry Lopez MD    Consent Done?:  Yes (Verbal)  Indications:  Arthritis and joint swelling  Site marked: the procedure site was marked    Timeout: prior to procedure the correct patient, procedure, and site was verified    Prep: patient was prepped and draped in usual sterile fashion    Local anesthesia used?: No    Local anesthetic:  Topical anesthetic    Details:  Needle Size:  22 G  Ultrasonic Guidance for needle placement?: Yes    Images are saved and documented.  Approach: superolateral.  Location:  Knee  Site:  R knee  Medications:  60 mg hyaluronate sodium, stabilized (DUROLANE) 60 mg/3 mL  Aspirate:  Yellow  Patient tolerance:  Patient tolerated the procedure well with no immediate complications     Description of ultrasound utilization for needle guidance:   Ultrasound guidance used for needle localization. Images saved and stored for documentation. The knee joint was visualized. Dynamic visualization of the 22g x 1.5" needle was continuous throughout the procedure.       "

## 2023-09-24 ENCOUNTER — PATIENT MESSAGE (OUTPATIENT)
Dept: FAMILY MEDICINE | Facility: CLINIC | Age: 33
End: 2023-09-24
Payer: COMMERCIAL

## 2023-09-25 ENCOUNTER — OFFICE VISIT (OUTPATIENT)
Dept: FAMILY MEDICINE | Facility: CLINIC | Age: 33
End: 2023-09-25
Payer: COMMERCIAL

## 2023-09-25 ENCOUNTER — TELEPHONE (OUTPATIENT)
Dept: FAMILY MEDICINE | Facility: CLINIC | Age: 33
End: 2023-09-25
Payer: COMMERCIAL

## 2023-09-25 VITALS
OXYGEN SATURATION: 98 % | RESPIRATION RATE: 18 BRPM | BODY MASS INDEX: 37.82 KG/M2 | DIASTOLIC BLOOD PRESSURE: 84 MMHG | WEIGHT: 234.38 LBS | HEART RATE: 95 BPM | SYSTOLIC BLOOD PRESSURE: 138 MMHG

## 2023-09-25 DIAGNOSIS — M54.14 RADICULOPATHY, THORACIC REGION: ICD-10-CM

## 2023-09-25 DIAGNOSIS — M51.34 DEGENERATIVE DISC DISEASE, THORACIC: Primary | ICD-10-CM

## 2023-09-25 PROCEDURE — 3075F PR MOST RECENT SYSTOLIC BLOOD PRESS GE 130-139MM HG: ICD-10-PCS | Mod: CPTII,S$GLB,, | Performed by: FAMILY MEDICINE

## 2023-09-25 PROCEDURE — 1159F PR MEDICATION LIST DOCUMENTED IN MEDICAL RECORD: ICD-10-PCS | Mod: CPTII,S$GLB,, | Performed by: FAMILY MEDICINE

## 2023-09-25 PROCEDURE — 3075F SYST BP GE 130 - 139MM HG: CPT | Mod: CPTII,S$GLB,, | Performed by: FAMILY MEDICINE

## 2023-09-25 PROCEDURE — 99213 PR OFFICE/OUTPT VISIT, EST, LEVL III, 20-29 MIN: ICD-10-PCS | Mod: S$GLB,,, | Performed by: FAMILY MEDICINE

## 2023-09-25 PROCEDURE — 99999 PR PBB SHADOW E&M-EST. PATIENT-LVL III: ICD-10-PCS | Mod: PBBFAC,,, | Performed by: FAMILY MEDICINE

## 2023-09-25 PROCEDURE — 99213 OFFICE O/P EST LOW 20 MIN: CPT | Mod: S$GLB,,, | Performed by: FAMILY MEDICINE

## 2023-09-25 PROCEDURE — 3008F BODY MASS INDEX DOCD: CPT | Mod: CPTII,S$GLB,, | Performed by: FAMILY MEDICINE

## 2023-09-25 PROCEDURE — 3044F PR MOST RECENT HEMOGLOBIN A1C LEVEL <7.0%: ICD-10-PCS | Mod: CPTII,S$GLB,, | Performed by: FAMILY MEDICINE

## 2023-09-25 PROCEDURE — 3079F PR MOST RECENT DIASTOLIC BLOOD PRESSURE 80-89 MM HG: ICD-10-PCS | Mod: CPTII,S$GLB,, | Performed by: FAMILY MEDICINE

## 2023-09-25 PROCEDURE — 1159F MED LIST DOCD IN RCRD: CPT | Mod: CPTII,S$GLB,, | Performed by: FAMILY MEDICINE

## 2023-09-25 PROCEDURE — 3044F HG A1C LEVEL LT 7.0%: CPT | Mod: CPTII,S$GLB,, | Performed by: FAMILY MEDICINE

## 2023-09-25 PROCEDURE — 3008F PR BODY MASS INDEX (BMI) DOCUMENTED: ICD-10-PCS | Mod: CPTII,S$GLB,, | Performed by: FAMILY MEDICINE

## 2023-09-25 PROCEDURE — 99999 PR PBB SHADOW E&M-EST. PATIENT-LVL III: CPT | Mod: PBBFAC,,, | Performed by: FAMILY MEDICINE

## 2023-09-25 PROCEDURE — 3079F DIAST BP 80-89 MM HG: CPT | Mod: CPTII,S$GLB,, | Performed by: FAMILY MEDICINE

## 2023-09-25 RX ORDER — METHYLPREDNISOLONE 4 MG/1
TABLET ORAL
Qty: 21 EACH | Refills: 0 | Status: SHIPPED | OUTPATIENT
Start: 2023-09-25 | End: 2023-10-16

## 2023-09-25 NOTE — TELEPHONE ENCOUNTER
Tried to call pt. No answer. Left message to return call   --can pt come in today at 1:00 to see  for arm pain

## 2023-09-25 NOTE — PROGRESS NOTES
Assessment:       1. Degenerative disc disease, thoracic    2. Radiculopathy, thoracic region        Plan:       Degenerative disc disease, thoracic:  Worsening  -     MRI Thoracic Spine Without Contrast; Future; Expected date: 09/25/2023  -     methylPREDNISolone (MEDROL DOSEPACK) 4 mg tablet; use as directed  Dispense: 21 each; Refill: 0    Radiculopathy, thoracic region:  Worsening  -     MRI Thoracic Spine Without Contrast; Future; Expected date: 09/25/2023  -     methylPREDNISolone (MEDROL DOSEPACK) 4 mg tablet; use as directed  Dispense: 21 each; Refill: 0         Recommend MRI as soon as possible, follow-up with the neurosurgeon.  Will start patient on Medrol Dosepak, strong ER warning signs given to the patient.   Patient agreed with assessment and plan. Patient verbalized understanding.     Subjective:       Patient ID: Yovani Malik is a 33 y.o. male.    Chief Complaint: Numbness (On left and right arm)    HPI    Patient here today complaining of worsening symptoms on back pain and neck pain, the patient complains of numbness sensation on the left hand and goes up on the left arm until neck and midthoracic area, the symptoms are getting worse, the pain is severe, the patient stated that is having weakness on the left arm.  The patient has an appointment to see a neurosurgeon this week on 09/29/2023.  Previous MRI of the thoracic spine showed presence of multilevel degenerative change of the thoracic spine March pronounced at T11-T12 with the right central disc extrusion through an annular fissure which mildly flattens the right ventral cord and contributes to mild spinal canal stenosis.  The results of presence of a small right central disc protrusion at T6-T7 which mild flattening of the right ventral cord surface.    Past medical history, past social history was reviewed and discussed with the patient.    Review of Systems   Constitutional:  Positive for activity change and fatigue. Negative for appetite  change and chills.   HENT:  Negative for congestion and ear discharge.    Eyes:  Negative for discharge and itching.   Respiratory:  Negative for choking and chest tightness.    Cardiovascular:  Negative for chest pain and palpitations.   Gastrointestinal:  Negative for abdominal distention and abdominal pain.   Endocrine: Negative for cold intolerance and heat intolerance.   Genitourinary:  Negative for dysuria and flank pain.   Musculoskeletal:  Positive for arthralgias, back pain and neck pain.   Skin:  Negative for pallor and rash.   Allergic/Immunologic: Negative for environmental allergies and food allergies.   Neurological:  Positive for weakness and numbness. Negative for dizziness, facial asymmetry and headaches.   Hematological:  Negative for adenopathy. Does not bruise/bleed easily.   Psychiatric/Behavioral:  Negative for agitation and confusion.        Objective:      Physical Exam  Vitals and nursing note reviewed.   Constitutional:       General: He is in acute distress.      Appearance: Normal appearance. He is well-developed. He is obese. He is not diaphoretic.      Comments: The patient has some limited ambulation   HENT:      Head: Normocephalic and atraumatic.      Right Ear: External ear normal.      Left Ear: External ear normal.      Nose: Nose normal.   Eyes:      General: No scleral icterus.        Left eye: No discharge.   Cardiovascular:      Rate and Rhythm: Normal rate and regular rhythm.      Heart sounds: Normal heart sounds.   Pulmonary:      Effort: Pulmonary effort is normal. No respiratory distress.      Breath sounds: Normal breath sounds. No wheezing.   Musculoskeletal:         General: Tenderness (Cervical spine, thoracic spine) present.      Cervical back: Normal range of motion and neck supple.      Comments: Left arm with decreased range of motion, tenderness to palpation   Skin:     General: Skin is warm and dry.      Coloration: Skin is not pale.   Neurological:      Mental  Status: He is alert.      Cranial Nerves: No cranial nerve deficit.      Motor: No abnormal muscle tone.   Psychiatric:         Behavior: Behavior normal.         Thought Content: Thought content normal.         Judgment: Judgment normal.

## 2023-09-25 NOTE — TELEPHONE ENCOUNTER
----- Message from Todd Lopez sent at 9/25/2023  8:02 AM CDT -----  Type:  Patient Returning Call    Who Called:  Pema  Who Left Message for Patient:  Rhianna  Does the patient know what this is regarding?:  yes/to get a sooner appt  Best Call Back Number:  [phone]    Additional Information:  pl call bk to advise thanks

## 2023-09-28 ENCOUNTER — TELEPHONE (OUTPATIENT)
Dept: NEUROSURGERY | Facility: CLINIC | Age: 33
End: 2023-09-28
Payer: COMMERCIAL

## 2023-10-02 ENCOUNTER — PATIENT MESSAGE (OUTPATIENT)
Dept: FAMILY MEDICINE | Facility: CLINIC | Age: 33
End: 2023-10-02
Payer: COMMERCIAL

## 2023-10-03 ENCOUNTER — OFFICE VISIT (OUTPATIENT)
Dept: NEUROSURGERY | Facility: CLINIC | Age: 33
End: 2023-10-03
Payer: COMMERCIAL

## 2023-10-03 ENCOUNTER — PATIENT MESSAGE (OUTPATIENT)
Dept: NEUROSURGERY | Facility: CLINIC | Age: 33
End: 2023-10-03

## 2023-10-03 ENCOUNTER — TELEPHONE (OUTPATIENT)
Dept: FAMILY MEDICINE | Facility: CLINIC | Age: 33
End: 2023-10-03
Payer: COMMERCIAL

## 2023-10-03 ENCOUNTER — HOSPITAL ENCOUNTER (OUTPATIENT)
Dept: RADIOLOGY | Facility: HOSPITAL | Age: 33
Discharge: HOME | End: 2023-10-03
Attending: PHYSICIAN ASSISTANT
Payer: COMMERCIAL

## 2023-10-03 VITALS
WEIGHT: 234 LBS | SYSTOLIC BLOOD PRESSURE: 137 MMHG | BODY MASS INDEX: 37.61 KG/M2 | RESPIRATION RATE: 18 BRPM | HEART RATE: 73 BPM | HEIGHT: 66 IN | DIASTOLIC BLOOD PRESSURE: 89 MMHG

## 2023-10-03 DIAGNOSIS — M54.6 PAIN IN THORACIC SPINE: ICD-10-CM

## 2023-10-03 DIAGNOSIS — M54.2 CERVICALGIA: ICD-10-CM

## 2023-10-03 DIAGNOSIS — M54.2 CERVICALGIA: Primary | ICD-10-CM

## 2023-10-03 PROCEDURE — 72070 XR THORACIC SPINE AP LATERAL: ICD-10-PCS | Mod: 26,,, | Performed by: RADIOLOGY

## 2023-10-03 PROCEDURE — 72050 X-RAY EXAM NECK SPINE 4/5VWS: CPT | Mod: TC,FY,PO

## 2023-10-03 PROCEDURE — 3044F HG A1C LEVEL LT 7.0%: CPT | Mod: CPTII,S$GLB,, | Performed by: PHYSICIAN ASSISTANT

## 2023-10-03 PROCEDURE — 72070 X-RAY EXAM THORAC SPINE 2VWS: CPT | Mod: 26,,, | Performed by: RADIOLOGY

## 2023-10-03 PROCEDURE — 3075F SYST BP GE 130 - 139MM HG: CPT | Mod: CPTII,S$GLB,, | Performed by: PHYSICIAN ASSISTANT

## 2023-10-03 PROCEDURE — 1159F PR MEDICATION LIST DOCUMENTED IN MEDICAL RECORD: ICD-10-PCS | Mod: CPTII,S$GLB,, | Performed by: PHYSICIAN ASSISTANT

## 2023-10-03 PROCEDURE — 3075F PR MOST RECENT SYSTOLIC BLOOD PRESS GE 130-139MM HG: ICD-10-PCS | Mod: CPTII,S$GLB,, | Performed by: PHYSICIAN ASSISTANT

## 2023-10-03 PROCEDURE — 3008F PR BODY MASS INDEX (BMI) DOCUMENTED: ICD-10-PCS | Mod: CPTII,S$GLB,, | Performed by: PHYSICIAN ASSISTANT

## 2023-10-03 PROCEDURE — 3079F DIAST BP 80-89 MM HG: CPT | Mod: CPTII,S$GLB,, | Performed by: PHYSICIAN ASSISTANT

## 2023-10-03 PROCEDURE — 99214 OFFICE O/P EST MOD 30 MIN: CPT | Mod: S$GLB,,, | Performed by: PHYSICIAN ASSISTANT

## 2023-10-03 PROCEDURE — 99214 PR OFFICE/OUTPT VISIT, EST, LEVL IV, 30-39 MIN: ICD-10-PCS | Mod: S$GLB,,, | Performed by: PHYSICIAN ASSISTANT

## 2023-10-03 PROCEDURE — 3079F PR MOST RECENT DIASTOLIC BLOOD PRESSURE 80-89 MM HG: ICD-10-PCS | Mod: CPTII,S$GLB,, | Performed by: PHYSICIAN ASSISTANT

## 2023-10-03 PROCEDURE — 72050 X-RAY EXAM NECK SPINE 4/5VWS: CPT | Mod: 26,,, | Performed by: RADIOLOGY

## 2023-10-03 PROCEDURE — 1159F MED LIST DOCD IN RCRD: CPT | Mod: CPTII,S$GLB,, | Performed by: PHYSICIAN ASSISTANT

## 2023-10-03 PROCEDURE — 72050 XR CERVICAL SPINE AP LAT WITH FLEX EXTEN: ICD-10-PCS | Mod: 26,,, | Performed by: RADIOLOGY

## 2023-10-03 PROCEDURE — 3008F BODY MASS INDEX DOCD: CPT | Mod: CPTII,S$GLB,, | Performed by: PHYSICIAN ASSISTANT

## 2023-10-03 PROCEDURE — 72070 X-RAY EXAM THORAC SPINE 2VWS: CPT | Mod: TC,FY,PO

## 2023-10-03 PROCEDURE — 3044F PR MOST RECENT HEMOGLOBIN A1C LEVEL <7.0%: ICD-10-PCS | Mod: CPTII,S$GLB,, | Performed by: PHYSICIAN ASSISTANT

## 2023-10-03 NOTE — PROGRESS NOTES
"Neurosurgery History and Physical    Patient ID: Yovani Malik is a 33 y.o. male.    Chief Complaint   Patient presents with    Lumbar Spine Pain (L-Spine)     Patient present to clinic today as back pain. Experiencing BUE numbness/tingling     Patient is a 33 year old male who presents to [facility] referred by his PCP for evaluation of worsening upper extremity pain and numbness as well as chronic low back pain. He started to notice worsening right hand pain and the entire hand going numb in April of this year and then about 2 weeks ago his left hand started doing the same thing. His pain now travels down the left arm and he has pain in his left sided mid back scapular region as well. He feels like the left hand is weak.. He had a NCS in June 2023 which demonstrated severe right carpal tunnel syndrome, decreased ulnar sensory signal, and no clear cervical radiculopathy. There was no left sided NCS performed.     He has had years of back pain and right sided leg pain, but has always attributed this to his right knee issues after undergoing multiple knee surgeries over the years. Now, he is describing a "lightening miller" in his thoracic spine. It is severe and prevents him from working longer than 4 hours at a time at [employer]. He cannot stand or lie down without severe pain and a loud pop in the mid back when he draws in the left leg. He has minimal pain with sitting and walking, but for no more than 30 minutes at a time.     His pain management physician is Dr. Bogdan Hernández and has seen LESLYE Stahl in the past with [facility]. He has completed Healthy Back Program, gabapentin, Percocet 10, trigger point injections, tens unit therapy, cupping, medrol dosepak and has seen 3 chiropractors without relief of his pain.       Review of Systems   Constitutional:  Negative for appetite change and chills.   Eyes:  Negative for visual disturbance.   Respiratory:  Negative for cough and shortness of breath.  "   Cardiovascular:  Negative for chest pain and palpitations.   Gastrointestinal:  Negative for abdominal distention and abdominal pain.   Genitourinary:  Negative for dysuria and flank pain.   Musculoskeletal:  Positive for arthralgias, back pain, gait problem and neck pain.   Skin:  Negative for pallor and rash.   Neurological:  Positive for weakness and numbness. Negative for dizziness, facial asymmetry and headaches.   Hematological:  Negative for adenopathy. Does not bruise/bleed easily.   Psychiatric/Behavioral:  Negative for agitation and confusion.        Past Medical History:   Diagnosis Date    Anxiety     Borderline diabetes     Hyperlipidemia     Hypertension      Social History     Socioeconomic History    Marital status:    Tobacco Use    Smoking status: Former     Current packs/day: 0.50     Average packs/day: 0.5 packs/day for 1.3 years (0.7 ttl pk-yrs)     Types: Cigarettes     Start date: 6/16/2022    Smokeless tobacco: Never   Substance and Sexual Activity    Alcohol use: No    Drug use: No    Sexual activity: Yes     Partners: Female     Family History   Problem Relation Age of Onset    Diabetes Mother     Hypertension Mother     Hyperlipidemia Mother     Diabetes Father     Diabetes Maternal Grandmother     Cancer Maternal Grandmother         breast     Review of patient's allergies indicates:  No Known Allergies    Current Outpatient Medications:     ADDERALL XR 30 mg 24 hr capsule, Take by mouth., Disp: , Rfl:     ALPRAZolam (XANAX) 1 MG tablet, Take 1 mg by mouth 2 (two) times daily as needed., Disp: , Rfl:     amLODIPine (NORVASC) 5 MG tablet, Take 1 tablet (5 mg total) by mouth once daily., Disp: 90 tablet, Rfl: 3    methylPREDNISolone (MEDROL DOSEPACK) 4 mg tablet, use as directed, Disp: 21 each, Rfl: 0    mupirocin (BACTROBAN) 2 % ointment, Apply topically 3 (three) times daily., Disp: , Rfl:     oxyCODONE-acetaminophen (PERCOCET)  mg per tablet, Take 1 tablet by mouth daily  "as needed., Disp: , Rfl:     tazarotene (TAZORAC) 0.1 % cream, Apply thin film to left lower leg nightly (Patient not taking: Reported on 2023), Disp: 30 g, Rfl: 2  Blood pressure 137/89, pulse 73, resp. rate 18, height 5' 6" (1.676 m), weight 106.1 kg (234 lb).      Neurologic Exam     Mental Status   Oriented to person, place, and time.   Follows 3 step commands.   Attention: normal. Concentration: normal.   Speech: speech is normal   Level of consciousness: alert  Normal comprehension.     Cranial Nerves     CN III, IV, VI   Extraocular motions are normal.     CN VII   Facial expression full, symmetric.   Right facial weakness: none  Left facial weakness: none    CN VIII   Hearing: intact    CN XI   Right trapezius strength: normal  Left trapezius strength: normal    CN XII   Fasciculations: absent  Tongue deviation: none    Motor Exam   Muscle bulk: normal  Overall muscle tone: normal  Right arm pronator drift: absent  Left arm pronator drift: absent    Strength   Right deltoid: 5/5  Left deltoid: 5/5  Right biceps: 5/5  Left biceps: 5/5  Right triceps: 5/5  Left triceps: 5/5  Right wrist flexion: 5/5  Left wrist flexion: 5/5  Right wrist extension: 5/5  Left wrist extension: 5/5  Right interossei: 5/5  Left interossei: 5/5  Right abdominals: 5/5  Left abdominals: 5/5  Right iliopsoas: 5/5  Left iliopsoas: 5/5  Right quadriceps: 5/5  Left quadriceps: 5/5  Right hamstrin/5  Left hamstrin/5  Right glutei: 5/5  Left glutei: 5/5  Right anterior tibial: 5/5  Left anterior tibial: 5/5  Right posterior tibial: 5/5  Left posterior tibial: 5/5  Right peroneal: 5/5  Left peroneal: 5/5  Right gastroc: 5/5  Left gastroc: 5/5    Sensory Exam   Right leg light touch: normal  Left leg light touch: normal  Bilateral hands numb digits 2-5, no splitting of ring fingers. Negative Tinels at cubital or carpal tunnel.      Gait, Coordination, and Reflexes     Gait  Gait: normal    Tremor   Resting tremor: " "absent  Intention tremor: absent  Action tremor: absent    Reflexes   Right brachioradialis: 1+  Left brachioradialis: 1+  Right biceps: 1+  Left biceps: 1+  Right triceps: 1+  Left triceps: 1+  Right patellar: 1+  Left patellar: 1+  Right achilles: 1+  Left achilles: 1+  Right : 1+  Left : 1+  Right Morse: absent  Left Morse: absent  Right ankle clonus: absent  Left ankle clonus: absent      Physical Exam  Eyes:      Extraocular Movements: EOM normal.   Neurological:      Mental Status: He is oriented to person, place, and time.      Gait: Gait is intact.      Deep Tendon Reflexes:      Reflex Scores:       Tricep reflexes are 1+ on the right side and 1+ on the left side.       Bicep reflexes are 1+ on the right side and 1+ on the left side.       Brachioradialis reflexes are 1+ on the right side and 1+ on the left side.       Patellar reflexes are 1+ on the right side and 1+ on the left side.       Achilles reflexes are 1+ on the right side and 1+ on the left side.  Psychiatric:         Speech: Speech normal.         Vital Signs  Pulse: 73  Resp: 18  BP: 137/89  BP Location: Right arm  Patient Position: Sitting  Pain Score:   8  Pain Loc: Back  Height and Weight  Height: 5' 6" (167.6 cm)  Weight: 106.1 kg (234 lb)  BSA (Calculated - sq m): 2.22 sq meters  BMI (Calculated): 37.8  Weight in (lb) to have BMI = 25: 154.6]    Provider dictation:    MRI of the thoracic spine dated 1/6/22 showed multilevel degenerative changes; T11-T12 right central disc extrusion through an annular fissure which mildly flattens the right ventral cord and contributes to mild spinal canal stenosis; right central disc protrusion at T6-T7 which mild flattening of the right ventral cord surface.    MRI lumbar spine dated 1/6/22 shows disc dessication of L4-5 and L5-S1 without significant stenosis or compression of thecal sac.    XR cervical spine dated 3/1/23 reveals loss of lordosis with disc height loss worse in C3-4 and C5-6 " with osteophyte formation.     We will order updated imaging as his pain has worsened and continues to fail conservative treatments; will call with results.     Visit Diagnosis:  Cervicalgia  -     MRI Cervical Spine Without Contrast; Future; Expected date: 10/03/2023  -     X-Ray Cervical Spine AP Lat with Flexion  Extension; Future; Expected date: 10/03/2023  -     MRI Cervical Spine W WO Cont; Future; Expected date: 10/03/2023    Pain in thoracic spine  -     MRI Thoracic Spine Without Contrast; Future; Expected date: 10/03/2023  -     X-Ray Thoracic Spine AP Lateral; Future; Expected date: 10/03/2023  -     MRI Thoracic Spine W WO Contrast; Future; Expected date: 10/03/2023

## 2023-10-03 NOTE — TELEPHONE ENCOUNTER
----- Message from Sasha Taylor sent at 10/2/2023  4:18 PM CDT -----  Contact: marcia ortiz/[facility]  Type: Needs Medical Advice  Who Called:  marcia ortiz/ [facility]  Best Call Back Number: [phone]   Additional Information: needs some-one to call to request a peer to peer for mri phone [phone] please call

## 2023-10-05 NOTE — TELEPHONE ENCOUNTER
Spoke with [facility]. Set up Peer to Peer for 12:30 PM tomorrow.     Case Ref. ID: [id number]    Dr. Kimberly Ashley will call.

## 2023-10-06 ENCOUNTER — TELEPHONE (OUTPATIENT)
Dept: FAMILY MEDICINE | Facility: CLINIC | Age: 33
End: 2023-10-06
Payer: COMMERCIAL

## 2023-10-06 NOTE — TELEPHONE ENCOUNTER
----- Message from Thais Frost sent at 10/6/2023 12:37 PM CDT -----  Contact: Dr. Gonzalez  Type:  Needs Medical Advice    Who Called: Dr. Gonzalez, [facility] for Peer to Peer w/Dr. Donnelly  Re: MRI of spine... Peer to Peer will need to be reschedule...   Dr. Britt [phone] Option #1 [facility]   Please call to reschedule... Thank you...

## 2023-10-06 NOTE — TELEPHONE ENCOUNTER
Dr. Marcela Amin (Female)    Will be reaching out to Dr. Donnelly at [phone] on Monday, 10/9/23 at 12:30 PM.

## 2023-10-10 ENCOUNTER — TELEPHONE (OUTPATIENT)
Dept: FAMILY MEDICINE | Facility: CLINIC | Age: 33
End: 2023-10-10
Payer: COMMERCIAL

## 2023-10-10 NOTE — TELEPHONE ENCOUNTER
----- Message from Rhianna Cifuentes MA sent at 10/9/2023  4:08 PM CDT -----    ----- Message -----  From: Teofilo Barba  Sent: 10/9/2023   2:12 PM CDT  To: Andrzej Valdez Staff    Type:  Patient Returning Call    Who Called:  Shaye/ Oliver Ramsay   Who Left Message for Patient:  Rhianna  Does the patient know what this is regarding?:  Peer to Peer --Pt's MRI Denial  Best Call Back Number:  [phone] Options1 and 3  Additional Information:  Case# [id number]

## 2023-10-12 ENCOUNTER — TELEPHONE (OUTPATIENT)
Dept: FAMILY MEDICINE | Facility: CLINIC | Age: 33
End: 2023-10-12
Payer: COMMERCIAL

## 2023-10-12 NOTE — TELEPHONE ENCOUNTER
----- Message from Gretta Talbert sent at 10/12/2023 12:39 PM CDT -----  Regarding: peer to peer  Contact: Dr. Dumont  Type: Needs Medical Advice  Who Called:  Dr. Dumont  Symptoms (please be specific):    How long has patient had these symptoms:    Pharmacy name and phone #:    Best Call Back Number:   Additional Information: Calling regarding a peer to peer for pt. Call to advise.

## 2023-10-14 ENCOUNTER — PATIENT MESSAGE (OUTPATIENT)
Dept: DERMATOLOGY | Facility: CLINIC | Age: 33
End: 2023-10-14
Payer: COMMERCIAL

## 2023-10-18 ENCOUNTER — HOSPITAL ENCOUNTER (OUTPATIENT)
Dept: RADIOLOGY | Facility: HOSPITAL | Age: 33
Discharge: HOME | End: 2023-10-18
Attending: PHYSICIAN ASSISTANT
Payer: COMMERCIAL

## 2023-10-18 DIAGNOSIS — M54.2 CERVICALGIA: ICD-10-CM

## 2023-10-18 DIAGNOSIS — M54.6 PAIN IN THORACIC SPINE: ICD-10-CM

## 2023-10-18 PROCEDURE — 72156 MRI NECK SPINE W/O & W/DYE: CPT | Mod: 26,,, | Performed by: RADIOLOGY

## 2023-10-18 PROCEDURE — A9585 GADOBUTROL INJECTION: HCPCS | Mod: PO | Performed by: PHYSICIAN ASSISTANT

## 2023-10-18 PROCEDURE — 72156 MRI CERVICAL SPINE W WO CONTRAST: ICD-10-PCS | Mod: 26,,, | Performed by: RADIOLOGY

## 2023-10-18 PROCEDURE — 72157 MRI CHEST SPINE W/O & W/DYE: CPT | Mod: TC,PO

## 2023-10-18 PROCEDURE — 25500020 PHARM REV CODE 255: Mod: PO | Performed by: PHYSICIAN ASSISTANT

## 2023-10-18 PROCEDURE — 72156 MRI NECK SPINE W/O & W/DYE: CPT | Mod: TC,PO

## 2023-10-18 PROCEDURE — 72157 MRI THORACIC SPINE W WO CONTRAST: ICD-10-PCS | Mod: 26,,, | Performed by: RADIOLOGY

## 2023-10-18 PROCEDURE — 72157 MRI CHEST SPINE W/O & W/DYE: CPT | Mod: 26,,, | Performed by: RADIOLOGY

## 2023-10-18 RX ORDER — GADOBUTROL 604.72 MG/ML
10 INJECTION INTRAVENOUS
Status: COMPLETED | OUTPATIENT
Start: 2023-10-18 | End: 2023-10-18

## 2023-10-18 RX ADMIN — GADOBUTROL 10 ML: 604.72 INJECTION INTRAVENOUS at 04:10

## 2023-10-20 ENCOUNTER — PATIENT MESSAGE (OUTPATIENT)
Dept: NEUROSURGERY | Facility: CLINIC | Age: 33
End: 2023-10-20
Payer: COMMERCIAL

## 2023-10-23 DIAGNOSIS — G56.03 CARPAL TUNNEL SYNDROME ON BOTH SIDES: Primary | ICD-10-CM

## 2023-10-23 NOTE — PROGRESS NOTES
Reviewed imaging with Dr. Walker and discussed with patient. No surgical indications for cervical or thoracic spine at this time. Imaging is overall stable and unchanged from previous studies. Patient has an appointment with pain management 10/31/23 and we will refer him to orthopedics for carpal tunnel/ulnar treatment options. He may return to us if pain persists despite conservative treatment.

## 2023-11-02 ENCOUNTER — TELEPHONE (OUTPATIENT)
Dept: PHYSICAL MEDICINE AND REHAB | Facility: CLINIC | Age: 33
End: 2023-11-02
Payer: COMMERCIAL

## 2023-11-02 ENCOUNTER — OFFICE VISIT (OUTPATIENT)
Dept: FAMILY MEDICINE | Facility: CLINIC | Age: 33
End: 2023-11-02
Payer: COMMERCIAL

## 2023-11-02 ENCOUNTER — HOSPITAL ENCOUNTER (OUTPATIENT)
Dept: RADIOLOGY | Facility: HOSPITAL | Age: 33
Discharge: HOME | End: 2023-11-02
Attending: FAMILY MEDICINE
Payer: COMMERCIAL

## 2023-11-02 VITALS
BODY MASS INDEX: 37.33 KG/M2 | HEART RATE: 97 BPM | DIASTOLIC BLOOD PRESSURE: 65 MMHG | HEIGHT: 66 IN | SYSTOLIC BLOOD PRESSURE: 118 MMHG | WEIGHT: 232.25 LBS | OXYGEN SATURATION: 98 %

## 2023-11-02 DIAGNOSIS — E78.49 OTHER HYPERLIPIDEMIA: ICD-10-CM

## 2023-11-02 DIAGNOSIS — M25.522 LEFT ELBOW PAIN: ICD-10-CM

## 2023-11-02 DIAGNOSIS — M54.6 THORACIC SPINE PAIN: Primary | ICD-10-CM

## 2023-11-02 DIAGNOSIS — G89.29 CHRONIC BILATERAL LOW BACK PAIN WITHOUT SCIATICA: ICD-10-CM

## 2023-11-02 DIAGNOSIS — M54.50 CHRONIC BILATERAL LOW BACK PAIN WITHOUT SCIATICA: ICD-10-CM

## 2023-11-02 DIAGNOSIS — I10 ESSENTIAL HYPERTENSION: ICD-10-CM

## 2023-11-02 DIAGNOSIS — R20.2 NUMBNESS AND TINGLING IN LEFT ARM: ICD-10-CM

## 2023-11-02 DIAGNOSIS — R20.0 NUMBNESS AND TINGLING IN LEFT ARM: ICD-10-CM

## 2023-11-02 DIAGNOSIS — R94.6 ABNORMAL THYROID FUNCTION TEST: ICD-10-CM

## 2023-11-02 PROCEDURE — 99214 PR OFFICE/OUTPT VISIT, EST, LEVL IV, 30-39 MIN: ICD-10-PCS | Mod: S$GLB,,, | Performed by: FAMILY MEDICINE

## 2023-11-02 PROCEDURE — 3074F SYST BP LT 130 MM HG: CPT | Mod: CPTII,S$GLB,, | Performed by: FAMILY MEDICINE

## 2023-11-02 PROCEDURE — 99999 PR PBB SHADOW E&M-EST. PATIENT-LVL III: CPT | Mod: PBBFAC,,, | Performed by: FAMILY MEDICINE

## 2023-11-02 PROCEDURE — 3044F PR MOST RECENT HEMOGLOBIN A1C LEVEL <7.0%: ICD-10-PCS | Mod: CPTII,S$GLB,, | Performed by: FAMILY MEDICINE

## 2023-11-02 PROCEDURE — 99214 OFFICE O/P EST MOD 30 MIN: CPT | Mod: S$GLB,,, | Performed by: FAMILY MEDICINE

## 2023-11-02 PROCEDURE — 1159F MED LIST DOCD IN RCRD: CPT | Mod: CPTII,S$GLB,, | Performed by: FAMILY MEDICINE

## 2023-11-02 PROCEDURE — G0008 ADMIN INFLUENZA VIRUS VAC: HCPCS | Mod: S$GLB,,, | Performed by: FAMILY MEDICINE

## 2023-11-02 PROCEDURE — 3008F BODY MASS INDEX DOCD: CPT | Mod: CPTII,S$GLB,, | Performed by: FAMILY MEDICINE

## 2023-11-02 PROCEDURE — 3044F HG A1C LEVEL LT 7.0%: CPT | Mod: CPTII,S$GLB,, | Performed by: FAMILY MEDICINE

## 2023-11-02 PROCEDURE — 73080 X-RAY EXAM OF ELBOW: CPT | Mod: 26,LT,, | Performed by: RADIOLOGY

## 2023-11-02 PROCEDURE — 73080 XR ELBOW COMPLETE 3 VIEW LEFT: ICD-10-PCS | Mod: 26,LT,, | Performed by: RADIOLOGY

## 2023-11-02 PROCEDURE — 1159F PR MEDICATION LIST DOCUMENTED IN MEDICAL RECORD: ICD-10-PCS | Mod: CPTII,S$GLB,, | Performed by: FAMILY MEDICINE

## 2023-11-02 PROCEDURE — 90686 FLU VACCINE (QUAD) GREATER THAN OR EQUAL TO 3YO PRESERVATIVE FREE IM: ICD-10-PCS | Mod: S$GLB,,, | Performed by: FAMILY MEDICINE

## 2023-11-02 PROCEDURE — 99999 PR PBB SHADOW E&M-EST. PATIENT-LVL III: ICD-10-PCS | Mod: PBBFAC,,, | Performed by: FAMILY MEDICINE

## 2023-11-02 PROCEDURE — G0008 FLU VACCINE (QUAD) GREATER THAN OR EQUAL TO 3YO PRESERVATIVE FREE IM: ICD-10-PCS | Mod: S$GLB,,, | Performed by: FAMILY MEDICINE

## 2023-11-02 PROCEDURE — 90686 IIV4 VACC NO PRSV 0.5 ML IM: CPT | Mod: S$GLB,,, | Performed by: FAMILY MEDICINE

## 2023-11-02 PROCEDURE — 3078F PR MOST RECENT DIASTOLIC BLOOD PRESSURE < 80 MM HG: ICD-10-PCS | Mod: CPTII,S$GLB,, | Performed by: FAMILY MEDICINE

## 2023-11-02 PROCEDURE — 3074F PR MOST RECENT SYSTOLIC BLOOD PRESSURE < 130 MM HG: ICD-10-PCS | Mod: CPTII,S$GLB,, | Performed by: FAMILY MEDICINE

## 2023-11-02 PROCEDURE — 3008F PR BODY MASS INDEX (BMI) DOCUMENTED: ICD-10-PCS | Mod: CPTII,S$GLB,, | Performed by: FAMILY MEDICINE

## 2023-11-02 PROCEDURE — 73080 X-RAY EXAM OF ELBOW: CPT | Mod: TC,FY,PO,LT

## 2023-11-02 PROCEDURE — 3078F DIAST BP <80 MM HG: CPT | Mod: CPTII,S$GLB,, | Performed by: FAMILY MEDICINE

## 2023-11-02 RX ORDER — GABAPENTIN 300 MG/1
300 CAPSULE ORAL NIGHTLY
Qty: 90 CAPSULE | Refills: 0 | Status: SHIPPED | OUTPATIENT
Start: 2023-11-02 | End: 2024-01-29

## 2023-11-02 NOTE — TELEPHONE ENCOUNTER
----- Message from Thais Frost sent at 11/2/2023 10:20 AM CDT -----  Contact: self  Type:  Sooner Appointment Request    Caller is requesting a sooner appointment.  Caller declined first available appointment listed below.  Caller will not accept being placed on the waitlist and is requesting a message be sent to doctor.  Name of Caller:Pt  Symptoms: EMG W/ ULTRASOUND AND NERVE CONDUCTION TEST 1 Extremity  Would the patient rather a call back or a response via [employer]? call  Best Call Back Number: [phone]  Please call pt to advise/schedule... Thank you....

## 2023-11-02 NOTE — TELEPHONE ENCOUNTER
Left voicemail for pt in regards to ask if patient wanted to schedule EMG/ Nerve conduction test with Dr. Petit or Dr. Butterfield in [place].

## 2023-11-02 NOTE — TELEPHONE ENCOUNTER
----- Message from Sarah Lentz sent at 11/2/2023 11:55 AM CDT -----  Contact: patient  Type:  Patient Returning Call    Who Called:  patient  Who Left Message for Patient:  Yolanda  Does the patient know what this is regarding?:  isabelle  Best Call Back Number:  [phone] (home)   Additional Information:  December appt is ok

## 2023-11-02 NOTE — PROGRESS NOTES
Assessment:       1. Thoracic spine pain    2. Chronic bilateral low back pain without sciatica    3. Essential hypertension    4. Numbness and tingling in left arm    5. Abnormal thyroid function test    6. Other hyperlipidemia    7. Left elbow pain        Plan:       Thoracic spine pain:  Stable    Chronic bilateral low back pain without sciatica:  Stable    Essential hypertension:  Stable    Numbness and tingling in left arm:  Worsening  -     EMG W/ ULTRASOUND AND NERVE CONDUCTION TEST 1 Extremity; Future  -     gabapentin (NEURONTIN) 300 MG capsule; Take 1 capsule (300 mg total) by mouth every evening.  Dispense: 90 capsule; Refill: 0    Abnormal thyroid function test:  New problem workup needed  -     TSH; Future; Expected date: 11/02/2023    Other hyperlipidemia:  Uncontrolled  -     Lipid Panel; Future; Expected date: 11/02/2023    Left elbow pain:  New problem workup needed  -     X-Ray Elbow Complete Left; Future; Expected date: 11/02/2023    Other orders  -     Influenza - [employer]riv[employer] (PF)         Elbow x-rays did not show any abnormalities.  Will order EMG studies and will start patient on gabapentin 300 mg at bedtime.  Will see the patient back in 3 months.  The patient will follow-up with the pain management, will repeat thyroid function and cholesterol levels.  The patient's BMI has been recorded in the chart. The patient has been provided educational materials regarding the benefits of attaining and maintaining a normal weight. We will continue to address and follow this issue during follow up visits.   Patient agreed with assessment and plan. Patient verbalized understanding.     Subjective:       Patient ID: Yovani Malik is a 33 y.o. male.    Chief Complaint: Follow-up, Back Pain, Leg Pain, and Arm Pain    HPI    The patient is coming here today for a follow-up visit, the patient is currently seen a pain management specialist and was prescribed oxycodone extended release, the patient did not try  the medication yet, is still trying to get approval from the pharmacy.  The patient also was recommended marijuana gummies at nighttime to help him sleep but he did not want to take this medication.  The patient is complaining of pain on the left elbow, numbness sensation on the hand and the arm.  He also has chronic neck pain, midback pain a lower back pain.  The patient stated the symptoms of the numbness start now the left arm initially was more on the right arm but yesterday was trying to do some work and notice severe pain on the left elbow.  He also stop taking his blood pressure medication because it was going low.  His last cholesterol levels are elevated.    Past medical history, past social history was reviewed and discussed with the patient.    Review of Systems   Constitutional:  Positive for activity change. Negative for appetite change.   HENT:  Negative for congestion and ear discharge.    Eyes:  Negative for discharge and itching.   Respiratory:  Negative for choking and chest tightness.    Cardiovascular:  Negative for chest pain and palpitations.   Gastrointestinal:  Negative for abdominal distention and abdominal pain.   Endocrine: Negative for cold intolerance and heat intolerance.   Genitourinary:  Negative for dysuria and flank pain.   Musculoskeletal:  Positive for arthralgias and back pain.   Skin:  Negative for pallor and rash.   Allergic/Immunologic: Negative for environmental allergies and food allergies.   Neurological:  Positive for numbness. Negative for dizziness and facial asymmetry.   Hematological:  Negative for adenopathy. Does not bruise/bleed easily.   Psychiatric/Behavioral:  Positive for sleep disturbance. Negative for agitation and confusion.        Objective:      Physical Exam  Vitals and nursing note reviewed.   Constitutional:       General: He is in acute distress.      Appearance: Normal appearance. He is well-developed. He is obese. He is not diaphoretic.   HENT:       Head: Normocephalic and atraumatic.      Right Ear: External ear normal.      Left Ear: External ear normal.      Nose: Nose normal.   Eyes:      General: No scleral icterus.        Left eye: No discharge.   Cardiovascular:      Rate and Rhythm: Normal rate and regular rhythm.      Heart sounds: Normal heart sounds.   Pulmonary:      Effort: Pulmonary effort is normal. No respiratory distress.      Breath sounds: Normal breath sounds. No wheezing.   Musculoskeletal:         General: Tenderness (Left elbow) present.      Cervical back: Normal range of motion and neck supple.   Skin:     General: Skin is warm and dry.      Coloration: Skin is not pale.      Findings: No erythema.   Neurological:      Mental Status: He is alert.      Motor: No abnormal muscle tone.   Psychiatric:         Behavior: Behavior normal.         Thought Content: Thought content normal.         Judgment: Judgment normal.

## 2023-11-02 NOTE — TELEPHONE ENCOUNTER
Left voicemail for pt in regards to ask if patient wanted to schedule EMG/ Nerve conduction test with Dr. Petit or Dr. Butterfield in [place].

## 2023-11-03 ENCOUNTER — TELEPHONE (OUTPATIENT)
Dept: PHYSICAL MEDICINE AND REHAB | Facility: CLINIC | Age: 33
End: 2023-11-03
Payer: COMMERCIAL

## 2023-11-03 NOTE — TELEPHONE ENCOUNTER
Asked if patient wanted to schedule EMG/ Nerve conduction test with Dr. Petit in [place]. Pt stated he did and appointment was made.

## 2023-11-21 ENCOUNTER — TELEPHONE (OUTPATIENT)
Dept: FAMILY MEDICINE | Facility: CLINIC | Age: 33
End: 2023-11-21
Payer: COMMERCIAL

## 2023-11-21 DIAGNOSIS — M54.6 THORACIC SPINE PAIN: Primary | ICD-10-CM

## 2023-11-22 NOTE — TELEPHONE ENCOUNTER
----- Message from Cee Carr sent at 11/21/2023  4:07 PM CST -----  Regarding: Needs Medical Order-[facility]  Contact: Chester Hoang, Dr. Bennett Cabrera at [phone]  Type: Needs Medical Order  Who Called:  Dr. Bennett Payan at [phone] fax #[phone]    Additional Information: facility is calling to get a referral for patient for insurance for new patient office visit on 10/31/23 and for future work scheduled 11/27/2023 for a thoracic JACOBO. They need this referral today to get approved by insurance for the future work needed. Please call and advise. Thank you

## 2023-12-04 ENCOUNTER — PATIENT MESSAGE (OUTPATIENT)
Dept: SPORTS MEDICINE | Facility: CLINIC | Age: 33
End: 2023-12-04
Payer: COMMERCIAL

## 2023-12-06 ENCOUNTER — TELEPHONE (OUTPATIENT)
Dept: FAMILY MEDICINE | Facility: CLINIC | Age: 33
End: 2023-12-06
Payer: COMMERCIAL

## 2023-12-06 ENCOUNTER — OFFICE VISIT (OUTPATIENT)
Dept: ORTHOPEDICS | Facility: CLINIC | Age: 33
End: 2023-12-06
Payer: COMMERCIAL

## 2023-12-06 ENCOUNTER — HOSPITAL ENCOUNTER (OUTPATIENT)
Dept: RADIOLOGY | Facility: HOSPITAL | Age: 33
Discharge: HOME | End: 2023-12-06
Attending: ORTHOPAEDIC SURGERY
Payer: COMMERCIAL

## 2023-12-06 VITALS — BODY MASS INDEX: 36.92 KG/M2 | WEIGHT: 229.75 LBS | HEIGHT: 66 IN

## 2023-12-06 DIAGNOSIS — M54.50 CHRONIC MIDLINE LOW BACK PAIN WITHOUT SCIATICA: Primary | ICD-10-CM

## 2023-12-06 DIAGNOSIS — R20.0 NUMBNESS AND TINGLING: Primary | ICD-10-CM

## 2023-12-06 DIAGNOSIS — M54.9 DORSALGIA, UNSPECIFIED: ICD-10-CM

## 2023-12-06 DIAGNOSIS — G89.29 CHRONIC MIDLINE LOW BACK PAIN WITHOUT SCIATICA: ICD-10-CM

## 2023-12-06 DIAGNOSIS — R20.2 NUMBNESS AND TINGLING: Primary | ICD-10-CM

## 2023-12-06 DIAGNOSIS — G89.29 CHRONIC MIDLINE LOW BACK PAIN WITHOUT SCIATICA: Primary | ICD-10-CM

## 2023-12-06 DIAGNOSIS — M54.50 CHRONIC MIDLINE LOW BACK PAIN WITHOUT SCIATICA: ICD-10-CM

## 2023-12-06 PROCEDURE — 72082 X-RAY EXAM ENTIRE SPI 2/3 VW: CPT | Mod: TC

## 2023-12-06 PROCEDURE — 3044F PR MOST RECENT HEMOGLOBIN A1C LEVEL <7.0%: ICD-10-PCS | Mod: CPTII,S$GLB,, | Performed by: ORTHOPAEDIC SURGERY

## 2023-12-06 PROCEDURE — 99214 PR OFFICE/OUTPT VISIT, EST, LEVL IV, 30-39 MIN: ICD-10-PCS | Mod: S$GLB,,, | Performed by: ORTHOPAEDIC SURGERY

## 2023-12-06 PROCEDURE — 99214 OFFICE O/P EST MOD 30 MIN: CPT | Mod: S$GLB,,, | Performed by: ORTHOPAEDIC SURGERY

## 2023-12-06 PROCEDURE — 3008F BODY MASS INDEX DOCD: CPT | Mod: CPTII,S$GLB,, | Performed by: ORTHOPAEDIC SURGERY

## 2023-12-06 PROCEDURE — 72082 XR SCOLIOSIS COMPLETE: ICD-10-PCS | Mod: 26,,, | Performed by: RADIOLOGY

## 2023-12-06 PROCEDURE — 3044F HG A1C LEVEL LT 7.0%: CPT | Mod: CPTII,S$GLB,, | Performed by: ORTHOPAEDIC SURGERY

## 2023-12-06 PROCEDURE — 99999 PR PBB SHADOW E&M-EST. PATIENT-LVL III: ICD-10-PCS | Mod: PBBFAC,,, | Performed by: ORTHOPAEDIC SURGERY

## 2023-12-06 PROCEDURE — 72082 X-RAY EXAM ENTIRE SPI 2/3 VW: CPT | Mod: 26,,, | Performed by: RADIOLOGY

## 2023-12-06 PROCEDURE — 3008F PR BODY MASS INDEX (BMI) DOCUMENTED: ICD-10-PCS | Mod: CPTII,S$GLB,, | Performed by: ORTHOPAEDIC SURGERY

## 2023-12-06 PROCEDURE — 99999 PR PBB SHADOW E&M-EST. PATIENT-LVL III: CPT | Mod: PBBFAC,,, | Performed by: ORTHOPAEDIC SURGERY

## 2023-12-06 NOTE — TELEPHONE ENCOUNTER
----- Message from Rhianna Cifuentes MA sent at 12/6/2023  2:37 PM CST -----  Regarding: RE: EMG  Please place new order for two extremities.     ----- Message -----  From: Courtney Donnelly MD  Sent: 12/6/2023   1:34 PM CST  To: Rhianna Cifuentes MA  Subject: RE: EMG                                          Can we do the left arm and left leg, let me know if I need to place a new order. Thanks.  ----- Message -----  From: Rhianna Cifuentes MA  Sent: 12/6/2023   1:20 PM CST  To: Courtney Donnelly MD  Subject: FW: EMG                                          Please advise:   ----- Message -----  From: Alyssa Cummings  Sent: 12/6/2023  11:53 AM CST  To: Andrzej Valdez Staff  Subject: EMG                                              This pt is scheduled for a 1 extremity EMG for tomorrow and on the referral it states numbness and tingling in left arm and then it also states numbness and pain in left lower extremity. Can someone please verify which extremity the provider is actually wanting the test done on since it is only for one.     Thanks,  Alyssa

## 2023-12-07 ENCOUNTER — PATIENT MESSAGE (OUTPATIENT)
Dept: FAMILY MEDICINE | Facility: CLINIC | Age: 33
End: 2023-12-07
Payer: COMMERCIAL

## 2023-12-07 ENCOUNTER — OFFICE VISIT (OUTPATIENT)
Dept: PHYSICAL MEDICINE AND REHAB | Facility: CLINIC | Age: 33
End: 2023-12-07
Payer: COMMERCIAL

## 2023-12-07 VITALS — HEIGHT: 66 IN | WEIGHT: 229 LBS | BODY MASS INDEX: 36.8 KG/M2

## 2023-12-07 DIAGNOSIS — R20.0 NUMBNESS AND TINGLING IN LEFT ARM: ICD-10-CM

## 2023-12-07 DIAGNOSIS — G56.02 CARPAL TUNNEL SYNDROME OF LEFT WRIST: Primary | ICD-10-CM

## 2023-12-07 DIAGNOSIS — R20.2 NUMBNESS AND TINGLING IN LEFT ARM: ICD-10-CM

## 2023-12-07 PROCEDURE — 95909 PR NERVE CONDUCTION STUDY; 5-6 STUDIES: ICD-10-PCS | Mod: S$GLB,,, | Performed by: STUDENT IN AN ORGANIZED HEALTH CARE EDUCATION/TRAINING PROGRAM

## 2023-12-07 PROCEDURE — 95886 MUSC TEST DONE W/N TEST COMP: CPT | Mod: S$GLB,,, | Performed by: STUDENT IN AN ORGANIZED HEALTH CARE EDUCATION/TRAINING PROGRAM

## 2023-12-07 PROCEDURE — 99499 NO LOS: ICD-10-PCS | Mod: S$GLB,,, | Performed by: STUDENT IN AN ORGANIZED HEALTH CARE EDUCATION/TRAINING PROGRAM

## 2023-12-07 PROCEDURE — 99499 UNLISTED E&M SERVICE: CPT | Mod: S$GLB,,, | Performed by: STUDENT IN AN ORGANIZED HEALTH CARE EDUCATION/TRAINING PROGRAM

## 2023-12-07 PROCEDURE — 99999 PR PBB SHADOW E&M-EST. PATIENT-LVL I: ICD-10-PCS | Mod: PBBFAC,,, | Performed by: STUDENT IN AN ORGANIZED HEALTH CARE EDUCATION/TRAINING PROGRAM

## 2023-12-07 PROCEDURE — 95886 PR EMG COMPLETE, W/ NERVE CONDUCTION STUDIES, 5+ MUSCLES: ICD-10-PCS | Mod: S$GLB,,, | Performed by: STUDENT IN AN ORGANIZED HEALTH CARE EDUCATION/TRAINING PROGRAM

## 2023-12-07 PROCEDURE — 99999 PR PBB SHADOW E&M-EST. PATIENT-LVL I: CPT | Mod: PBBFAC,,, | Performed by: STUDENT IN AN ORGANIZED HEALTH CARE EDUCATION/TRAINING PROGRAM

## 2023-12-07 PROCEDURE — 95909 NRV CNDJ TST 5-6 STUDIES: CPT | Mod: S$GLB,,, | Performed by: STUDENT IN AN ORGANIZED HEALTH CARE EDUCATION/TRAINING PROGRAM

## 2023-12-07 NOTE — PROGRESS NOTES
DATE: 12/7/2023  PATIENT: Yovani Malik    Attending Physician: Lenin Jones M.D.    CHIEF COMPLAINT: thoracic back pain    HISTORY:  Yovani Malik is a 33 y.o. male here for initial evaluation of mid back pain (Back - 9). The pain has been present for 1.5 years. There was no inciting injury. The pain is located in the midline thoracic spine without notable radiation into the flanks or legs. The patient describes the pain as sharp.  The pain is worse with prolonged sitting and improved by standing and moving around. There is no associated numbness and tingling. There is no subjective weakness. Prior treatments have included medications including chronic opioids, physical therapy, dry needling, and epidural steroid injections, but no surgery.    The Patient denies myelopathic symptoms such as handwriting changes or difficulty with buttons/coins/keys. Denies perineal paresthesias, bowel/bladder dysfunction.    PAST MEDICAL/SURGICAL HISTORY:  Past Medical History:   Diagnosis Date    Anxiety     Borderline diabetes     Hyperlipidemia     Hypertension      Past Surgical History:   Procedure Laterality Date    ARTHROSCOPY OF KNEE Right 9/13/2019    Procedure: ARTHROSCOPY, KNEE;  Surgeon: Anurag Wells MD;  Location: [facility] OR;  Service: Orthopedics;  Laterality: Right;    ESOPHAGOGASTRODUODENOSCOPY N/A 10/3/2019    Procedure: EGD (ESOPHAGOGASTRODUODENOSCOPY);  Surgeon: Yovani Dominguez MD;  Location: [facility] ENDO;  Service: Endoscopy;  Laterality: N/A;    ESOPHAGOGASTRODUODENOSCOPY N/A 1/23/2020    Procedure: EGD (ESOPHAGOGASTRODUODENOSCOPY);  Surgeon: Yovani Dominguez MD;  Location: [facility] ENDO;  Service: Endoscopy;  Laterality: N/A;    FEMUR OSTEOTOMY Right 9/15/2020    Procedure: OSTEOTOMY, FEMUR;  Surgeon: Barry Lopez MD;  Location: [facility] OR;  Service: Orthopedics;  Laterality: Right;  Regional w/ Catheter: Epidural, Spinal, Adductor  ANUSHA 50cc    KNEE ARTHROSCOPY      KNEE ARTHROSCOPY W/ MENISCECTOMY  "Right 9/15/2020    Procedure: ARTHROSCOPY, KNEE, WITH MENISCECTOMY;  Surgeon: Barry Lopez MD;  Location: [facility] OR;  Service: Orthopedics;  Laterality: Right;    KNEE SURGERY Bilateral     x 2     REPAIR OF MENISCUS OF KNEE Right 9/13/2019    Procedure: REPAIR, MENISCUS, KNEE;  Surgeon: Anurag Wells MD;  Location: [facility] OR;  Service: Orthopedics;  Laterality: Right;  lateral meniscal repair    SYNOVECTOMY OF KNEE Right 9/15/2020    Procedure: SYNOVECTOMY, KNEE;  Surgeon: Barry Lopez MD;  Location: [facility] OR;  Service: Orthopedics;  Laterality: Right;    TONSILLECTOMY         Current Medications:   Current Outpatient Medications:     ADDERALL XR 30 mg 24 hr capsule, Take by mouth., Disp: , Rfl:     ALPRAZolam (XANAX) 1 MG tablet, Take 1 mg by mouth 2 (two) times daily as needed., Disp: , Rfl:     gabapentin (NEURONTIN) 300 MG capsule, Take 1 capsule (300 mg total) by mouth every evening., Disp: 90 capsule, Rfl: 0    mupirocin (BACTROBAN) 2 % ointment, Apply topically 3 (three) times daily., Disp: , Rfl:     oxyCODONE-acetaminophen (PERCOCET)  mg per tablet, Take 1 tablet by mouth daily as needed., Disp: , Rfl:     Social History:   Social History     Socioeconomic History    Marital status:    Tobacco Use    Smoking status: Former     Current packs/day: 0.50     Average packs/day: 0.5 packs/day for 1.5 years (0.7 ttl pk-yrs)     Types: Cigarettes     Start date: 6/16/2022    Smokeless tobacco: Never   Substance and Sexual Activity    Alcohol use: No    Drug use: No    Sexual activity: Yes     Partners: Female        EXAM:  Ht 5' 6" (1.676 m)   Wt 104.2 kg (229 lb 11.5 oz)   BMI 37.08 kg/m²     PHYSICAL EXAMINATION:    Gait: Normal station and gait, no difficulty with toe or heel walk.   Skin: Dorsal thoracic and lumbar skin negative for rashes, lesions, hairy patches and surgical scars. There is some mild para-thoracic tenderness to palpation, but no lumbar tenderness to palpation.  Range " of motion: Thoracic and lumbar range of motion is acceptable.  Spinal Balance: Global saggital and coronal spinal balance notable for thoracic kyphosis that partially corrects with extension. No significant coronal deformity.  Musculoskeletal: No pain with the range of motion of the bilateral hips. No trochanteric tenderness to palpation.  Vascular: Bilateral lower extremities warm and well perfused, Dorsalis pedis pulses 2+ bilaterally.  Neurological: Normal strength and tone in all major motor groups in the bilateral lower extremities. Normal sensation to light touch in the L2-S1 dermatomes bilaterally.  Deep tendon reflexes symmetric in the bilateral lower extremities.  Negative Babinski bilaterally. Straight leg raise negative bilaterally.    IMAGING:      Today I personally reviewed AP, Lat thoracic spine X-rays notable for some degenerative changes in the mid thoracic spine and the thoracolumbar junction. Prominent kyphosis that is corrected when compared to the supine MRI.    MRI thoracic spine with degenerative changes and disc bulges at T6/7 and T11/12 causing some mild central stenosis without cord compression.    Body mass index is 37.08 kg/m².  Hemoglobin A1C   Date Value Ref Range Status   07/27/2023 5.3 4.0 - 5.6 % Final     Comment:     ADA Screening Guidelines:  5.7-6.4%  Consistent with prediabetes  >or=6.5%  Consistent with diabetes    High levels of fetal hemoglobin interfere with the HbA1C  assay. Heterozygous hemoglobin variants (HbS, HgC, etc)do  not significantly interfere with this assay.   However, presence of multiple variants may affect accuracy.         ASSESSMENT/PLAN:    Diagnoses and all orders for this visit:    Chronic midline low back pain without sciatica  -     X-Ray Scoliosis Complete; Future    Dorsalgia, unspecified  -     X-Ray Scoliosis Complete; Future  -     MRI Lumbar Spine Without Contrast; Future      No follow-ups on file.    Yovani Malik is a 33 y.o. male with midline  thoracic spine pain without radiculopathy. He does have some degenerative changes in his thoracic spine which could be a cause of his pain. He has no significant central stenosis or symptoms of myelopathy in the cervical and thoracic spine. His thoracic kyphosis is correctable and there is no vertebral body wedging. I would like to obtain further imaging for continued workup of the patient. I have ordered scoliosis X-rays to be completed for a better assessment of his overall alignment. I have also ordered an MRI of his lumbar spine for additional evaluation. He will follow up after completion of the scoliosis X-ray and MRI lumbar spine.

## 2023-12-07 NOTE — PROGRESS NOTES
[facility]  [address] [place] [address]     Full Name: Yovani Malik Gender: Male  MRN: [id number] YOB: 1990  History: LUE pain when making a fist that radiates to the elbow with numbness and tingling into the whole arm. Pt admits Neck pain as well. He has been diagnosed with severe CTS already on the Right.       Visit Date: 12/7/2023 10:31 AM  Age: 33 Years  Examining Physician: Kirk Petit MD  Referring Physician: Courtney Donnelly MD  Height: 5 feet 6 inch      Sensory NCS      Nerve / Sites Rec. Site Onset Lat Peak Lat NP Amp PP Amp Segments Distance Velocity     ms ms µV µV  cm m/s   L Median - Digit III (Antidromic)      Wrist Dig III 3.48 4.48 16.9 67.2 Wrist - Dig III 14 40   L Ulnar - Digit V (Antidromic)      Wrist Dig V 2.15 3.06 13.5 17.2 Wrist - Dig V 14 65   L Radial - Anatomical snuff box (Forearm)      Forearm Wrist 1.96 2.27 23.8  Forearm - Wrist 10 51       Motor NCS      Nerve / Sites Muscle Latency Amplitude Amp % Duration Segments Distance Lat Diff Velocity     ms mV % ms  cm ms m/s   L Median - APB      Wrist APB 5.15 4.5 100 5.83 Wrist - APB 8        Elbow APB 9.42 4.1 91.6 5.65 Elbow - Wrist 22 4.27 52   L Ulnar - ADM      Wrist ADM 3.52 7.8 100 9.06 Wrist - ADM 8        B.Elbow ADM 7.23 6.6 83.5 8.58 B.Elbow - Wrist 23 3.71 62      A.Elbow ADM 9.10 6.4 81.8 8.35 A.Elbow - B.Elbow 10 1.87 53       EMG Summary Table     Spontaneous MUAP Recruitment   Muscle IA Fib PSW Fasc CRD Amp Dur. Poly Pattern   L. Deltoid N None None None None N N None N   L. Biceps brachii N None None None None N N None N   L. Triceps brachii N None None None None N N None N   L. Pronator teres N None None None None N N None N   L. First dorsal interosseous N None None None None N N None N   L. Abductor pollicis brevis N None None None None N N None N       Summary    The motor conduction test was performed on 2 nerve(s). The results were normal in 1 nerve(s): L Ulnar - ADM.  Results outside the specified normal range were found in 1 nerve(s), as follows:  In the L Median - APB study  the take off latency result was increased for Wrist stimulation    The sensory conduction test was performed on 3 nerve(s). The results were normal in 2 nerve(s): L Ulnar - Digit V (Antidromic), L Radial - Anatomical snuff box (Forearm). Results outside the specified normal range were found in 1 nerve(s), as follows:  In the L Median - Digit III (Antidromic) study  the peak latency result was increased for Wrist stimulation    The needle EMG study was normal in all 6 tested muscles: L. Deltoid, L. Biceps brachii, L. Triceps brachii, L. Pronator teres, L. First dorsal interosseous, L. Abductor pollicis brevis.    Impression:    Abnormal study     EMG and nerve conduction study of the LEFT upper extremity revealed the following:      LEFT moderate to severe median mononeuropathy at the wrist (carpal tunnel syndrome)     There was no electrodiagnostic evidence of LEFT cervical radiculopathy, plexopathy, peripheral polyneuropathy or myopathy     PLAN: Recommended bracing of the involved wrist at night and f/u with referring provider for additional management    ------------------------------  Kirk Petit MD

## 2023-12-08 ENCOUNTER — OFFICE VISIT (OUTPATIENT)
Dept: FAMILY MEDICINE | Facility: CLINIC | Age: 33
End: 2023-12-08
Payer: COMMERCIAL

## 2023-12-08 ENCOUNTER — PATIENT MESSAGE (OUTPATIENT)
Dept: FAMILY MEDICINE | Facility: CLINIC | Age: 33
End: 2023-12-08

## 2023-12-08 ENCOUNTER — HOSPITAL ENCOUNTER (OUTPATIENT)
Dept: RADIOLOGY | Facility: HOSPITAL | Age: 33
Discharge: HOME | End: 2023-12-08
Attending: PHYSICIAN ASSISTANT
Payer: COMMERCIAL

## 2023-12-08 VITALS
SYSTOLIC BLOOD PRESSURE: 120 MMHG | HEIGHT: 66 IN | HEART RATE: 65 BPM | BODY MASS INDEX: 36.56 KG/M2 | DIASTOLIC BLOOD PRESSURE: 76 MMHG | WEIGHT: 227.5 LBS | OXYGEN SATURATION: 95 %

## 2023-12-08 DIAGNOSIS — M79.671 RIGHT FOOT PAIN: Primary | ICD-10-CM

## 2023-12-08 DIAGNOSIS — M79.671 RIGHT FOOT PAIN: ICD-10-CM

## 2023-12-08 PROCEDURE — 99999 PR PBB SHADOW E&M-EST. PATIENT-LVL IV: ICD-10-PCS | Mod: PBBFAC,,, | Performed by: PHYSICIAN ASSISTANT

## 2023-12-08 PROCEDURE — 73630 XR FOOT COMPLETE 3 VIEW RIGHT: ICD-10-PCS | Mod: 26,RT,, | Performed by: RADIOLOGY

## 2023-12-08 PROCEDURE — 73630 X-RAY EXAM OF FOOT: CPT | Mod: 26,RT,, | Performed by: RADIOLOGY

## 2023-12-08 PROCEDURE — 3008F BODY MASS INDEX DOCD: CPT | Mod: CPTII,S$GLB,, | Performed by: PHYSICIAN ASSISTANT

## 2023-12-08 PROCEDURE — 3078F PR MOST RECENT DIASTOLIC BLOOD PRESSURE < 80 MM HG: ICD-10-PCS | Mod: CPTII,S$GLB,, | Performed by: PHYSICIAN ASSISTANT

## 2023-12-08 PROCEDURE — 73630 X-RAY EXAM OF FOOT: CPT | Mod: TC,FY,PO,RT

## 2023-12-08 PROCEDURE — 99213 OFFICE O/P EST LOW 20 MIN: CPT | Mod: S$GLB,,, | Performed by: PHYSICIAN ASSISTANT

## 2023-12-08 PROCEDURE — 99213 PR OFFICE/OUTPT VISIT, EST, LEVL III, 20-29 MIN: ICD-10-PCS | Mod: S$GLB,,, | Performed by: PHYSICIAN ASSISTANT

## 2023-12-08 PROCEDURE — 99999 PR PBB SHADOW E&M-EST. PATIENT-LVL IV: CPT | Mod: PBBFAC,,, | Performed by: PHYSICIAN ASSISTANT

## 2023-12-08 PROCEDURE — 3008F PR BODY MASS INDEX (BMI) DOCUMENTED: ICD-10-PCS | Mod: CPTII,S$GLB,, | Performed by: PHYSICIAN ASSISTANT

## 2023-12-08 PROCEDURE — 1160F PR REVIEW ALL MEDS BY PRESCRIBER/CLIN PHARMACIST DOCUMENTED: ICD-10-PCS | Mod: CPTII,S$GLB,, | Performed by: PHYSICIAN ASSISTANT

## 2023-12-08 PROCEDURE — 3044F HG A1C LEVEL LT 7.0%: CPT | Mod: CPTII,S$GLB,, | Performed by: PHYSICIAN ASSISTANT

## 2023-12-08 PROCEDURE — 3074F SYST BP LT 130 MM HG: CPT | Mod: CPTII,S$GLB,, | Performed by: PHYSICIAN ASSISTANT

## 2023-12-08 PROCEDURE — 3044F PR MOST RECENT HEMOGLOBIN A1C LEVEL <7.0%: ICD-10-PCS | Mod: CPTII,S$GLB,, | Performed by: PHYSICIAN ASSISTANT

## 2023-12-08 PROCEDURE — 3078F DIAST BP <80 MM HG: CPT | Mod: CPTII,S$GLB,, | Performed by: PHYSICIAN ASSISTANT

## 2023-12-08 PROCEDURE — 1160F RVW MEDS BY RX/DR IN RCRD: CPT | Mod: CPTII,S$GLB,, | Performed by: PHYSICIAN ASSISTANT

## 2023-12-08 PROCEDURE — 1159F PR MEDICATION LIST DOCUMENTED IN MEDICAL RECORD: ICD-10-PCS | Mod: CPTII,S$GLB,, | Performed by: PHYSICIAN ASSISTANT

## 2023-12-08 PROCEDURE — 3074F PR MOST RECENT SYSTOLIC BLOOD PRESSURE < 130 MM HG: ICD-10-PCS | Mod: CPTII,S$GLB,, | Performed by: PHYSICIAN ASSISTANT

## 2023-12-08 PROCEDURE — 1159F MED LIST DOCD IN RCRD: CPT | Mod: CPTII,S$GLB,, | Performed by: PHYSICIAN ASSISTANT

## 2023-12-08 RX ORDER — MORPHINE SULFATE 15 MG/1
15 TABLET, FILM COATED, EXTENDED RELEASE ORAL EVERY 12 HOURS
COMMUNITY
Start: 2023-11-10

## 2023-12-08 NOTE — PROGRESS NOTES
"Subjective:      Patient ID: Yovani Malik is a 33 y.o. male.    Chief Complaint: Foot Injury    HPI  Patient has PMH of SHIRLEY, HLD, HTN, GERD, and chronic low back pain.  Has had metatarsal stress fracture of right foot in the past.    Patient reports worsening right foot pain for two weeks.  No injury or fall noted.  Taken regular medications for pain.  Goes up and down ladders for work regularly.    Review of Systems   Constitutional:  Negative for chills and fever.   Respiratory:  Negative for shortness of breath.    Cardiovascular:  Negative for chest pain.   Musculoskeletal:  Positive for arthralgias (right foot pain).       Objective:   /76   Pulse 65   Ht 5' 6" (1.676 m)   Wt 103.2 kg (227 lb 8.2 oz)   SpO2 95%   BMI 36.72 kg/m²     Physical Exam  Vitals reviewed.   Constitutional:       Appearance: Normal appearance. He is well-developed.   HENT:      Head: Normocephalic and atraumatic.      Right Ear: External ear normal.      Left Ear: External ear normal.   Eyes:      Conjunctiva/sclera: Conjunctivae normal.   Cardiovascular:      Rate and Rhythm: Normal rate and regular rhythm.      Heart sounds: Normal heart sounds. No murmur heard.     No friction rub. No gallop.   Pulmonary:      Effort: Pulmonary effort is normal. No respiratory distress.      Breath sounds: Normal breath sounds. No wheezing, rhonchi or rales.   Musculoskeletal:         General: Normal range of motion.      Right foot: Normal range of motion. Swelling and tenderness (severe TTP of middle of right sole, no erythema) present. No bony tenderness.   Skin:     General: Skin is warm and dry.      Findings: No rash.   Neurological:      General: No focal deficit present.      Mental Status: He is alert and oriented to person, place, and time.   Psychiatric:         Mood and Affect: Mood normal.         Behavior: Behavior normal.         Judgment: Judgment normal.       Assessment:      1. Right foot pain       Plan:   1. Right " foot pain  Gave letter for work.  - X-Ray Foot Complete Right; Future  - Ambulatory referral/consult to Podiatry; Future    Follow up as needed.  Patient agreed with plan and expressed understanding.    Thank you for allowing me to serve you,

## 2023-12-20 ENCOUNTER — OFFICE VISIT (OUTPATIENT)
Dept: PODIATRY | Facility: CLINIC | Age: 33
End: 2023-12-20
Payer: COMMERCIAL

## 2023-12-20 VITALS — HEIGHT: 66 IN | BODY MASS INDEX: 36.48 KG/M2 | WEIGHT: 227 LBS

## 2023-12-20 DIAGNOSIS — M79.671 RIGHT FOOT PAIN: ICD-10-CM

## 2023-12-20 DIAGNOSIS — M72.2 PLANTAR FASCIITIS: Primary | ICD-10-CM

## 2023-12-20 PROCEDURE — 99999 PR PBB SHADOW E&M-EST. PATIENT-LVL III: ICD-10-PCS | Mod: PBBFAC,,, | Performed by: STUDENT IN AN ORGANIZED HEALTH CARE EDUCATION/TRAINING PROGRAM

## 2023-12-20 PROCEDURE — 3044F PR MOST RECENT HEMOGLOBIN A1C LEVEL <7.0%: ICD-10-PCS | Mod: CPTII,S$GLB,, | Performed by: STUDENT IN AN ORGANIZED HEALTH CARE EDUCATION/TRAINING PROGRAM

## 2023-12-20 PROCEDURE — 1159F MED LIST DOCD IN RCRD: CPT | Mod: CPTII,S$GLB,, | Performed by: STUDENT IN AN ORGANIZED HEALTH CARE EDUCATION/TRAINING PROGRAM

## 2023-12-20 PROCEDURE — 3008F BODY MASS INDEX DOCD: CPT | Mod: CPTII,S$GLB,, | Performed by: STUDENT IN AN ORGANIZED HEALTH CARE EDUCATION/TRAINING PROGRAM

## 2023-12-20 PROCEDURE — 3008F PR BODY MASS INDEX (BMI) DOCUMENTED: ICD-10-PCS | Mod: CPTII,S$GLB,, | Performed by: STUDENT IN AN ORGANIZED HEALTH CARE EDUCATION/TRAINING PROGRAM

## 2023-12-20 PROCEDURE — 99999 PR PBB SHADOW E&M-EST. PATIENT-LVL III: CPT | Mod: PBBFAC,,, | Performed by: STUDENT IN AN ORGANIZED HEALTH CARE EDUCATION/TRAINING PROGRAM

## 2023-12-20 PROCEDURE — 1160F PR REVIEW ALL MEDS BY PRESCRIBER/CLIN PHARMACIST DOCUMENTED: ICD-10-PCS | Mod: CPTII,S$GLB,, | Performed by: STUDENT IN AN ORGANIZED HEALTH CARE EDUCATION/TRAINING PROGRAM

## 2023-12-20 PROCEDURE — 20550 NJX 1 TENDON SHEATH/LIGAMENT: CPT | Mod: RT,S$GLB,, | Performed by: STUDENT IN AN ORGANIZED HEALTH CARE EDUCATION/TRAINING PROGRAM

## 2023-12-20 PROCEDURE — 1160F RVW MEDS BY RX/DR IN RCRD: CPT | Mod: CPTII,S$GLB,, | Performed by: STUDENT IN AN ORGANIZED HEALTH CARE EDUCATION/TRAINING PROGRAM

## 2023-12-20 PROCEDURE — 20550 PR INJECT TENDON SHEATH/LIGAMENT: ICD-10-PCS | Mod: RT,S$GLB,, | Performed by: STUDENT IN AN ORGANIZED HEALTH CARE EDUCATION/TRAINING PROGRAM

## 2023-12-20 PROCEDURE — 99203 OFFICE O/P NEW LOW 30 MIN: CPT | Mod: 25,S$GLB,, | Performed by: STUDENT IN AN ORGANIZED HEALTH CARE EDUCATION/TRAINING PROGRAM

## 2023-12-20 PROCEDURE — 3044F HG A1C LEVEL LT 7.0%: CPT | Mod: CPTII,S$GLB,, | Performed by: STUDENT IN AN ORGANIZED HEALTH CARE EDUCATION/TRAINING PROGRAM

## 2023-12-20 PROCEDURE — 1159F PR MEDICATION LIST DOCUMENTED IN MEDICAL RECORD: ICD-10-PCS | Mod: CPTII,S$GLB,, | Performed by: STUDENT IN AN ORGANIZED HEALTH CARE EDUCATION/TRAINING PROGRAM

## 2023-12-20 PROCEDURE — 99203 PR OFFICE/OUTPT VISIT, NEW, LEVL III, 30-44 MIN: ICD-10-PCS | Mod: 25,S$GLB,, | Performed by: STUDENT IN AN ORGANIZED HEALTH CARE EDUCATION/TRAINING PROGRAM

## 2023-12-20 RX ORDER — TRIAMCINOLONE ACETONIDE 40 MG/ML
40 INJECTION, SUSPENSION INTRA-ARTICULAR; INTRAMUSCULAR
Status: COMPLETED | OUTPATIENT
Start: 2023-12-20 | End: 2023-12-20

## 2023-12-20 RX ADMIN — TRIAMCINOLONE ACETONIDE 40 MG: 40 INJECTION, SUSPENSION INTRA-ARTICULAR; INTRAMUSCULAR at 05:12

## 2023-12-20 NOTE — PROGRESS NOTES
Chief Complaint   Patient presents with    Foot Pain         MEDICAL DECISION MAKING         1. Right foot pain  -     Ambulatory referral/consult to Podiatry           I counseled the patient on the patient's conditions, their implications and medical management.     Plantar Fasciitis:  -Patient was given a printout of stretching exercises to stretch the achilles tendon and increase ankle dorsiflexion  -Patient instructed to ice with a frozen water bottle as instructed in the handout.  -Patient instructed to refrain from barefoot walking. I recommend Hoka slippers.  -Shoe gear was discussed with patient and recommended a supportive shoe with a stiff shank that doesn't flex at the arch. The shoe should also have an elevated heel. Some brands include Smith, Asics and Hokas. The length of the shoe should accommodate the width of a thumb between the big toe and the edge of the shoe.    Steroid injection given.  Boot offloading. Wean out after 2 weeks.    F/u 3 months    Sean Chan DPM  Plantar Fascia    Date/Time: 12/20/2023 4:20 PM    Performed by: Sean Chan DPM  Authorized by: Sean Chan DPM    Consent Done?:  Yes (Verbal)  Indications:  Pain  Location:  Foot  Foot joint: R heel.  Ultrasonic guidance for needle placement?: No    Needle size:  25 G  Approach:  Plantar  Patient tolerance:  Patient tolerated the procedure well with no immediate complications          HPI:       Yovani Malik is a 33 y.o. male who presents to clinic with concerns of right heel pain for about 1 month.     Pain is worse with weight bearing and first few steps after prolonged periods of rest.     Patient denies acute trauma to the affected area.   Treatment tried: staying off of it. Hoka shoes      Patient Active Problem List   Diagnosis    SHIRLEY (generalized anxiety disorder)    Hyperlipidemia    Metatarsal stress fracture of right foot, initial encounter    Right elbow pain    Old complex tear of lateral meniscus of right  "knee    Chronic pain of right knee    GERD (gastroesophageal reflux disease)    Primary osteoarthritis of right knee    Genu valgum, acquired, right    Chondromalacia of right knee    Old tear of lateral meniscus of right knee    S/P right knee surgery    Morbid obesity    Chest pain due to myocardial ischemia    Smoker    Chronic bilateral low back pain without sciatica    Thoracic spine pain    Decreased strength of trunk and back    Decreased ROM of trunk and back    Epigastric pain    Primary hypertension         Current Outpatient Medications on File Prior to Visit   Medication Sig Dispense Refill    ADDERALL XR 30 mg 24 hr capsule Take by mouth.      ALPRAZolam (XANAX) 1 MG tablet Take 1 mg by mouth 2 (two) times daily as needed.      gabapentin (NEURONTIN) 300 MG capsule Take 1 capsule (300 mg total) by mouth every evening. 90 capsule 0    morphine (MS CONTIN) 15 MG 12 hr tablet Take 15 mg by mouth every 12 (twelve) hours.      mupirocin (BACTROBAN) 2 % ointment Apply topically 3 (three) times daily.      oxyCODONE-acetaminophen (PERCOCET)  mg per tablet Take 1 tablet by mouth daily as needed.       No current facility-administered medications on file prior to visit.           Review of patient's allergies indicates:  No Known Allergies      ROS:  General ROS: negative for  chills, fatigue or fever  Cardiovascular ROS: no chest pain or dyspnea on exertion  Musculoskeletal ROS: negative for joint pain or joint stiffness.  Negative for loss of strength.  Positive for foot pain.   Neuro ROS: Negative for syncope, numbness, or muscle weakness  Skin ROS: Negative for rash, itching or nail/hair changes.           OBJECTIVE:         Vitals:    12/20/23 1630   Weight: 103 kg (227 lb)   Height: 5' 6" (1.676 m)        Right Lower extremity exam:  Vasc:   Palpable pedal pulses.   Feet appropriately warm to touch.   Cap refill time is within normal limits   Edema: none    Neurological:    Light touch, " proprioception, and Sharp/dull sensation are all intact.   There is no Tinel's along the tarsal tunnel.      Derm:   No open lesions, macerations, or rashes  Bruising:  absent  Redness:  absent  Pedal hair:  present      MSK:    Palpable pain plantar medial tubercle of the calcaneus right,    tightness to the Achilles tendon with ROM right   There is no pain with the heel squeeze/compression  test.

## 2024-01-10 ENCOUNTER — OFFICE VISIT (OUTPATIENT)
Dept: ORTHOPEDICS | Facility: CLINIC | Age: 34
End: 2024-01-10
Payer: COMMERCIAL

## 2024-01-10 VITALS — BODY MASS INDEX: 36.48 KG/M2 | WEIGHT: 227 LBS | HEIGHT: 66 IN

## 2024-01-10 DIAGNOSIS — G56.02 CARPAL TUNNEL SYNDROME OF LEFT WRIST: ICD-10-CM

## 2024-01-10 PROCEDURE — 1159F MED LIST DOCD IN RCRD: CPT | Mod: CPTII,S$GLB,, | Performed by: ORTHOPAEDIC SURGERY

## 2024-01-10 PROCEDURE — 3008F BODY MASS INDEX DOCD: CPT | Mod: CPTII,S$GLB,, | Performed by: ORTHOPAEDIC SURGERY

## 2024-01-10 PROCEDURE — 99999 PR PBB SHADOW E&M-EST. PATIENT-LVL III: CPT | Mod: PBBFAC,,, | Performed by: ORTHOPAEDIC SURGERY

## 2024-01-10 PROCEDURE — 99203 OFFICE O/P NEW LOW 30 MIN: CPT | Mod: S$GLB,,, | Performed by: ORTHOPAEDIC SURGERY

## 2024-01-10 NOTE — PROGRESS NOTES
1/10/2024    Chief Complaint:  Chief Complaint   Patient presents with    Left Wrist - Numbness, Pain       HPI:  Yovani Malik is a 33 y.o. male, who presents to clinic today has a history of bilateral hand numbness and tingling.  He states that currently his left is worse than his right.  He has recently had a right wrist EMG.  He states that he did have a left wrist hand EMG at some point in the past.  He is here today to discuss treatment options.  He has had injection in the past which did provide him with some relief.    PMHX:  Past Medical History:   Diagnosis Date    Anxiety     Borderline diabetes     Hyperlipidemia     Hypertension        PSHX:  Past Surgical History:   Procedure Laterality Date    ARTHROSCOPY OF KNEE Right 9/13/2019    Procedure: ARTHROSCOPY, KNEE;  Surgeon: Anurag Wells MD;  Location: [facility];  Service: Orthopedics;  Laterality: Right;    ESOPHAGOGASTRODUODENOSCOPY N/A 10/3/2019    Procedure: EGD (ESOPHAGOGASTRODUODENOSCOPY);  Surgeon: Yovani Dominguez MD;  Location: [facility];  Service: Endoscopy;  Laterality: N/A;    ESOPHAGOGASTRODUODENOSCOPY N/A 1/23/2020    Procedure: EGD (ESOPHAGOGASTRODUODENOSCOPY);  Surgeon: Yovani Dominguez MD;  Location: [facility];  Service: Endoscopy;  Laterality: N/A;    FEMUR OSTEOTOMY Right 9/15/2020    Procedure: OSTEOTOMY, FEMUR;  Surgeon: Barry Lopez MD;  Location: [facility];  Service: Orthopedics;  Laterality: Right;  Regional w/ Catheter: Epidural, Spinal, Adductor  ANUSHA 50cc    KNEE ARTHROSCOPY      KNEE ARTHROSCOPY W/ MENISCECTOMY Right 9/15/2020    Procedure: ARTHROSCOPY, KNEE, WITH MENISCECTOMY;  Surgeon: Barry Lopez MD;  Location: [facility] OR;  Service: Orthopedics;  Laterality: Right;    KNEE SURGERY Bilateral     x 2     REPAIR OF MENISCUS OF KNEE Right 9/13/2019    Procedure: REPAIR, MENISCUS, KNEE;  Surgeon: Anurag Wells MD;  Location: [facility];  Service: Orthopedics;  Laterality: Right;  lateral meniscal repair     "SYNOVECTOMY OF KNEE Right 9/15/2020    Procedure: SYNOVECTOMY, KNEE;  Surgeon: Barry Lopez MD;  Location: [facility];  Service: Orthopedics;  Laterality: Right;    TONSILLECTOMY         FMHX:  Family History   Problem Relation Age of Onset    Diabetes Mother     Hypertension Mother     Hyperlipidemia Mother     Diabetes Father     Diabetes Maternal Grandmother     Cancer Maternal Grandmother         breast       SOCHX:  Social History     Tobacco Use    Smoking status: Former     Current packs/day: 0.50     Average packs/day: 0.5 packs/day for 1.6 years (0.8 ttl pk-yrs)     Types: Cigarettes     Start date: 6/16/2022    Smokeless tobacco: Never   Substance Use Topics    Alcohol use: No       ALLERGIES:  Patient has no known allergies.    CURRENT MEDICATIONS:  Current Outpatient Medications on File Prior to Visit   Medication Sig Dispense Refill    ADDERALL XR 30 mg 24 hr capsule Take by mouth.      ALPRAZolam (XANAX) 1 MG tablet Take 1 mg by mouth 2 (two) times daily as needed.      gabapentin (NEURONTIN) 300 MG capsule Take 1 capsule (300 mg total) by mouth every evening. 90 capsule 0    morphine (MS CONTIN) 15 MG 12 hr tablet Take 15 mg by mouth every 12 (twelve) hours.      mupirocin (BACTROBAN) 2 % ointment Apply topically 3 (three) times daily.      oxyCODONE-acetaminophen (PERCOCET)  mg per tablet Take 1 tablet by mouth daily as needed.       No current facility-administered medications on file prior to visit.       REVIEW OF SYSTEMS:  Review of Systems   Constitutional: Negative.    HENT: Negative.     Eyes: Negative.    Respiratory: Negative.     Cardiovascular: Negative.    Gastrointestinal: Negative.    Genitourinary: Negative.    Musculoskeletal:  Positive for joint pain.   Skin: Negative.    Neurological:  Positive for tingling and weakness.   Endo/Heme/Allergies: Negative.    Psychiatric/Behavioral: Negative.       GENERAL PHYSICAL EXAM:   Ht 5' 6" (1.676 m)   Wt 103 kg (227 lb)   BMI 36.64 " kg/m²    GEN: well developed, well nourished, no acute distress   HENT: Normocephalic, atraumatic   EYES: No discharge, conjunctiva normal   NECK: Supple, non-tender   PULM: No wheezing, no respiratory distress   CV: RRR   ABD: Soft, non-tender    ORTHO EXAM:   Examination of bilateral hands and wrist reveals that there is no edema.  There are no skin changes.  Palpation produces no tenderness.  He does have decreased sensation in the median distribution on the left with intact right.  He has negative Tinel's but positive Durkan's.  He does have 5/5 thenar muscular strength.    RADIOLOGY:   EMG nerve conduction study has been reviewed.  Nerve conduction study from :  Reveals that he does have moderate severe right carpal tunnel syndrome.  Nerve conduction study also revealed moderate to severe left carpal tunnel syndrome    ASSESSMENT:   Bilateral carpal tunnel syndrome left worse than right    PLAN:  I have discussed treatment options with the patient.  We have discussed continuing steroid injection versus surgical release.  After discussion of the risks and benefits of procedures the patient has elected to undergo left carpal tunnel release   2. Will proceed with left carpal tunnel release under general anesthesia  3. Will follow up with me 2 weeks postoperatively

## 2024-01-10 NOTE — PATIENT INSTRUCTIONS
Surgery Instructions:     Your surgery is scheduled on 02/08/2024 at the surgery center: [address] [address] [facility] [address], 1st floor, second entrance.    The pre-op department will be in contact with you prior to your procedure to review medications and instructions.       Nothing to eat or drink after midnight prior to day of surgery.    The surgery center will contact you the day prior to surgery to advise you of your arrival time for surgery.     Your post op appointment is scheduled on 02/21/2024 @ 1:00pm.

## 2024-01-17 ENCOUNTER — OFFICE VISIT (OUTPATIENT)
Dept: ORTHOPEDICS | Facility: CLINIC | Age: 34
End: 2024-01-17
Payer: COMMERCIAL

## 2024-01-17 VITALS — BODY MASS INDEX: 36.49 KG/M2 | WEIGHT: 227.06 LBS | HEIGHT: 66 IN

## 2024-01-17 DIAGNOSIS — M40.204 KYPHOSIS OF THORACIC REGION, UNSPECIFIED KYPHOSIS TYPE: ICD-10-CM

## 2024-01-17 DIAGNOSIS — M54.6 THORACIC SPINE PAIN: Primary | ICD-10-CM

## 2024-01-17 PROCEDURE — 3008F BODY MASS INDEX DOCD: CPT | Mod: CPTII,S$GLB,, | Performed by: ORTHOPAEDIC SURGERY

## 2024-01-17 PROCEDURE — 1159F MED LIST DOCD IN RCRD: CPT | Mod: CPTII,S$GLB,, | Performed by: ORTHOPAEDIC SURGERY

## 2024-01-17 PROCEDURE — 99214 OFFICE O/P EST MOD 30 MIN: CPT | Mod: S$GLB,,, | Performed by: ORTHOPAEDIC SURGERY

## 2024-01-17 PROCEDURE — 99999 PR PBB SHADOW E&M-EST. PATIENT-LVL III: CPT | Mod: PBBFAC,,, | Performed by: ORTHOPAEDIC SURGERY

## 2024-01-18 ENCOUNTER — PATIENT MESSAGE (OUTPATIENT)
Dept: PODIATRY | Facility: CLINIC | Age: 34
End: 2024-01-18
Payer: COMMERCIAL

## 2024-01-18 PROBLEM — M40.204 KYPHOSIS OF THORACIC REGION: Status: ACTIVE | Noted: 2024-01-18

## 2024-01-18 NOTE — PROGRESS NOTES
"  DATE: 1/18/2024  PATIENT: Yovani Malik    Attending Physician: Lenin Jones M.D.    HISTORY:  Yovani Malik is a 33 y.o. male who returns to me today for follow up of mid back pain without radiculopathy. Scoliosis X-rays obtained and patient had MRI lumbar spine performed at outside facility. He reports not interval change in his thoracic back pain. Has tried medications, JACOBO, PT, but no surgery.    PMH/PSH/FamHx/SocHx:  Unchanged from prior visit    ROS:  Positive for thoracic and lumbar back pain  Denies perineal paresthesias, bowel or bladder incontinence    EXAM:  Ht 5' 6" (1.676 m)   Wt 103 kg (227 lb 1.2 oz)   BMI 36.65 kg/m²     My physical examination was notable for the following findings: correctable thoracic kyphosis    IMAGING:  AP and Lat scoliosis films demonstrate moderate thoracic kyphosis with mild degenerative changes    Today I was able to personally reviewed the images of the MRI of his lumbar spine that MRI lumbar spine demonstrates no significant stenosis    ASSESSMENT/PLAN:  Yovani Malik is a 33 y.o. male with midline thoracic spine pain without radiculopathy. Xrays consistent with Scheurmann's kyphosis. He does have some degenerative changes in his thoracic spine which could be a cause of his pain. He has no significant central stenosis on MRI C,T,L. No myelopathy.     We discussed that surgical correction of his thoracic kyphosis would require extensive spine fusion. This would come with associated risks, loss of motion, and the possibility that it would correct deformity but not improve his pain. Given patient's young age, I would be especially hesitant to recommend surgery.    Patient has followup with pain management to discuss additional nonoperative interventions (possible injections/RFA?). We discussed maximizing all non-operative modalities before consideration of surgery. Patient understands and agrees with plan. We will schedule followup in spine clinic after his next " pain management visit.

## 2024-01-19 ENCOUNTER — PATIENT MESSAGE (OUTPATIENT)
Dept: ORTHOPEDICS | Facility: CLINIC | Age: 34
End: 2024-01-19
Payer: COMMERCIAL

## 2024-01-23 DIAGNOSIS — G56.02 CARPAL TUNNEL SYNDROME OF LEFT WRIST: Primary | ICD-10-CM

## 2024-01-23 RX ORDER — CEFAZOLIN SODIUM 2 G/50ML
2 SOLUTION INTRAVENOUS
Status: CANCELLED | OUTPATIENT
Start: 2024-01-23

## 2024-01-23 RX ORDER — MUPIROCIN 20 MG/G
OINTMENT TOPICAL
Status: CANCELLED | OUTPATIENT
Start: 2024-01-23

## 2024-01-28 DIAGNOSIS — R20.0 NUMBNESS AND TINGLING IN LEFT ARM: ICD-10-CM

## 2024-01-28 DIAGNOSIS — R20.2 NUMBNESS AND TINGLING IN LEFT ARM: ICD-10-CM

## 2024-01-28 NOTE — TELEPHONE ENCOUNTER
No care due was identified.  [facility] Embedded Care Due Messages. Reference number: [id number].   1/28/2024 3:32:06 AM CST

## 2024-01-29 RX ORDER — GABAPENTIN 300 MG/1
300 CAPSULE ORAL NIGHTLY
Qty: 90 CAPSULE | Refills: 0 | Status: SHIPPED | OUTPATIENT
Start: 2024-01-29

## 2024-02-06 ENCOUNTER — TELEPHONE (OUTPATIENT)
Dept: ORTHOPEDICS | Facility: CLINIC | Age: 34
End: 2024-02-06
Payer: COMMERCIAL

## 2024-02-06 ENCOUNTER — TELEPHONE (OUTPATIENT)
Dept: SURGERY | Facility: HOSPITAL | Age: 34
End: 2024-02-06
Payer: COMMERCIAL

## 2024-02-06 NOTE — TELEPHONE ENCOUNTER
----- Message from Sherif Cardona MA sent at 2/6/2024  9:11 AM CST -----  Regarding: CANCELLATION OF SURGERY  Contact: patient  Patient stated he has to cancel his surgery that is scheduled Thursday 2/8/24 because he didn't have his paperwork filled out for his employer.  He will drop off paperwork either today or tomorrow.    Call back number is [phone]

## 2024-02-06 NOTE — TELEPHONE ENCOUNTER
Good morning,     Mr. Malik states he would like to reschedule his carpal tunnel release with Dr. Li on 2/8. He states he needs certain paperwork filled out by a certain date. Please contact him to discuss.     Thank you!

## 2024-02-07 ENCOUNTER — PATIENT MESSAGE (OUTPATIENT)
Dept: NEUROSURGERY | Facility: CLINIC | Age: 34
End: 2024-02-07
Payer: COMMERCIAL

## 2024-02-29 ENCOUNTER — HOSPITAL ENCOUNTER (OUTPATIENT)
Dept: RADIOLOGY | Facility: HOSPITAL | Age: 34
Discharge: HOME | End: 2024-02-29
Attending: ORTHOPAEDIC SURGERY
Payer: COMMERCIAL

## 2024-02-29 ENCOUNTER — TELEPHONE (OUTPATIENT)
Dept: SPORTS MEDICINE | Facility: CLINIC | Age: 34
End: 2024-02-29
Payer: COMMERCIAL

## 2024-02-29 DIAGNOSIS — M54.9 DORSALGIA, UNSPECIFIED: ICD-10-CM

## 2024-02-29 PROCEDURE — 72148 MRI LUMBAR SPINE W/O DYE: CPT | Mod: 26,,, | Performed by: RADIOLOGY

## 2024-02-29 PROCEDURE — 72148 MRI LUMBAR SPINE W/O DYE: CPT | Mod: TC,PO

## 2024-02-29 NOTE — TELEPHONE ENCOUNTER
----- Message from Diana Hilario sent at 2/29/2024 10:11 AM CST -----  Regarding: injection  Pt calling to reschedule injection appt for 3-18 , please call     Confirmed patient's contact info below:  Contact Name: Yovani Malik  Phone Number: [phone]

## 2024-03-05 ENCOUNTER — OFFICE VISIT (OUTPATIENT)
Dept: NEUROSURGERY | Facility: CLINIC | Age: 34
End: 2024-03-05
Payer: COMMERCIAL

## 2024-03-05 ENCOUNTER — TELEPHONE (OUTPATIENT)
Dept: NEUROSURGERY | Facility: CLINIC | Age: 34
End: 2024-03-05
Payer: COMMERCIAL

## 2024-03-05 VITALS
BODY MASS INDEX: 36.48 KG/M2 | WEIGHT: 227 LBS | HEART RATE: 107 BPM | HEIGHT: 66 IN | SYSTOLIC BLOOD PRESSURE: 145 MMHG | DIASTOLIC BLOOD PRESSURE: 92 MMHG | RESPIRATION RATE: 18 BRPM

## 2024-03-05 DIAGNOSIS — M54.16 LUMBAR RADICULOPATHY: ICD-10-CM

## 2024-03-05 DIAGNOSIS — M54.6 PAIN IN THORACIC SPINE: Primary | ICD-10-CM

## 2024-03-05 PROCEDURE — 99215 OFFICE O/P EST HI 40 MIN: CPT | Mod: S$GLB,,, | Performed by: NEUROLOGICAL SURGERY

## 2024-03-05 PROCEDURE — 1159F MED LIST DOCD IN RCRD: CPT | Mod: CPTII,S$GLB,, | Performed by: NEUROLOGICAL SURGERY

## 2024-03-05 PROCEDURE — 3008F BODY MASS INDEX DOCD: CPT | Mod: CPTII,S$GLB,, | Performed by: NEUROLOGICAL SURGERY

## 2024-03-05 PROCEDURE — 3077F SYST BP >= 140 MM HG: CPT | Mod: CPTII,S$GLB,, | Performed by: NEUROLOGICAL SURGERY

## 2024-03-05 PROCEDURE — 3080F DIAST BP >= 90 MM HG: CPT | Mod: CPTII,S$GLB,, | Performed by: NEUROLOGICAL SURGERY

## 2024-03-05 NOTE — PROGRESS NOTES
"Neurosurgery History and Physical    Patient ID: Yovani Malik is a 33 y.o. male.    Chief Complaint   Patient presents with    Lumbar Spine Pain (L-Spine)     Image review      HPI 3/5/24:  Patient reports 2 years of back pain. He has completed PT, Healthy back, chiropractor care, thoracic ESIs with Dr. Cabrera, trigger point injections, and pain management without relief of pain. He is not sure which levels where injected, but had minimal relief lasting maybe 2 days. There was no trauma or injury that started his pain. He can't sit in the car for long periods of time without pain and can't stand either. His pain is localized in the midthoracic back and as of 3 weeks ago involving the lumbar spine worse on the right side. Pain will "lightening bolt" up through his spine. He leans forward for pain relief. He works at [employer], having to climb ladders and lifting, can't work more than 4 hours at a time without severe pain in the thoracic spine. He has pain radiating into the right hip and posterior leg. He has no pain in the anterior thigh, groin, or belly. He has no pain in his genitals, no trouble controlling his bowel or bladder function. He feels like his balance is normal.     He has seen Dr. 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     He has a complex history regarding his right knee, osteotomy performed in 2020.     Per chart review, he is scheduled for a carpal tunnel release with orthopedics. He has pain and tingling in his first three fingers, worse in bed while holding phone or sleeping.     HPI 10/3/23:  Patient is a 33 year old male who presents to [facility] referred by his PCP for evaluation of worsening upper extremity pain and numbness as well as chronic low back pain. He started to notice worsening right hand pain and the entire hand going numb in April of this year and then about 2 weeks ago his left hand started doing " "the same thing. His pain now travels down the left arm and he has pain in his left sided mid back scapular region as well. He feels like the left hand is weak.. He had a NCS in June 2023 which demonstrated severe right carpal tunnel syndrome, decreased ulnar sensory signal, and no clear cervical radiculopathy. There was no left sided NCS performed.     He has had years of back pain and right sided leg pain, but has always attributed this to his right knee issues after undergoing multiple knee surgeries over the years. Now, he is describing a "lightening miller" in his thoracic spine. It is severe and prevents him from working longer than 4 hours at a time at [employer]. He cannot stand or lie down without severe pain and a loud pop in the mid back when he draws in the left leg. He has minimal pain with sitting and walking, but for no more than 30 minutes at a time.     His pain management physician is Dr. Bogdan Hernández and has seen LESLYE Stahl in the past with [facility]. He has completed Healthy Back Program, gabapentin, Percocet 10, trigger point injections, tens unit therapy, cupping, medrol dosepak and has seen 3 chiropractors without relief of his pain.     Review of Systems   Musculoskeletal:  Positive for arthralgias, back pain, gait problem and neck pain.   Neurological:  Positive for weakness and numbness. Negative for dizziness, facial asymmetry and headaches.   All other systems reviewed and are negative.      Past Medical History:   Diagnosis Date    Anxiety     Borderline diabetes     Hyperlipidemia     Hypertension      Social History     Socioeconomic History    Marital status:    Tobacco Use    Smoking status: Former     Current packs/day: 0.50     Average packs/day: 0.5 packs/day for 1.7 years (0.9 ttl pk-yrs)     Types: Cigarettes     Start date: 6/16/2022    Smokeless tobacco: Never   Substance and Sexual Activity    Alcohol use: No    Drug use: No    Sexual activity: Yes     Partners: " "Female     Family History   Problem Relation Age of Onset    Diabetes Mother     Hypertension Mother     Hyperlipidemia Mother     Diabetes Father     Diabetes Maternal Grandmother     Cancer Maternal Grandmother         breast     Review of patient's allergies indicates:  No Known Allergies    Current Outpatient Medications:     ADDERALL XR 30 mg 24 hr capsule, Take by mouth., Disp: , Rfl:     ALPRAZolam (XANAX) 1 MG tablet, Take 1 mg by mouth 2 (two) times daily as needed., Disp: , Rfl:     gabapentin (NEURONTIN) 300 MG capsule, TAKE 1 CAPSULE(300 MG) BY MOUTH EVERY EVENING, Disp: 90 capsule, Rfl: 0    morphine (MS CONTIN) 15 MG 12 hr tablet, Take 15 mg by mouth every 12 (twelve) hours., Disp: , Rfl:     mupirocin (BACTROBAN) 2 % ointment, Apply topically 3 (three) times daily., Disp: , Rfl:     oxyCODONE-acetaminophen (PERCOCET)  mg per tablet, Take 1 tablet by mouth daily as needed., Disp: , Rfl:   Blood pressure (!) 145/92, pulse 107, resp. rate 18, height 5' 6" (1.676 m), weight 103 kg (227 lb).      Neurologic Exam     Mental Status   Oriented to person, place, and time.   Follows 3 step commands.   Attention: normal. Concentration: normal.   Speech: speech is normal   Level of consciousness: alert  Normal comprehension.     Cranial Nerves     CN III, IV, VI   Extraocular motions are normal.     CN VII   Facial expression full, symmetric.   Right facial weakness: none  Left facial weakness: none    CN VIII   Hearing: intact    CN XI   Right trapezius strength: normal  Left trapezius strength: normal    CN XII   Fasciculations: absent  Tongue deviation: none    Motor Exam   Muscle bulk: normal  Overall muscle tone: normal    Strength   Right deltoid: 5/5  Left deltoid: 5/5  Right biceps: 5/5  Left biceps: 5/5  Right triceps: 5/5  Left triceps: 5/5  Right wrist flexion: 5/5  Left wrist flexion: 5/5  Right wrist extension: 5/5  Left wrist extension: 5/5  Right interossei: 5/5  Left interossei: 5/5  Right " "iliopsoas: 5/5  Left iliopsoas: 5/5  Right quadriceps: 5/5  Left quadriceps: 5/5  Right anterior tibial: 5/5  Left anterior tibial: 5/5  Right posterior tibial: 5/5  Left posterior tibial: 5/5  Right peroneal: 5/5  Left peroneal: 5/5  Right gastroc: 5/5  Left gastroc: 5/5    Sensory Exam   Right leg light touch: normal  Left leg light touch: normal  Bilateral hands numb digits 2-5, no splitting of ring fingers. Negative Tinels at cubital or carpal tunnel.      Gait, Coordination, and Reflexes     Gait  Gait: normal    Coordination   Romberg: negative    Tremor   Resting tremor: absent  Action tremor: absent    Reflexes   Right brachioradialis: 1+  Left brachioradialis: 1+  Right biceps: 1+  Left biceps: 1+  Right triceps: 1+  Left triceps: 1+  Right patellar: 1+  Left patellar: 1+  Right achilles: 2+  Left achilles: 2+  Right plantar: normal  Left plantar: normal  Right Morse: absent  Left Morse: absent  Right ankle clonus: present (3 beats)  Left ankle clonus: present (3 beats)      Physical Exam  Eyes:      Extraocular Movements: EOM normal.   Neurological:      Mental Status: He is oriented to person, place, and time.      Coordination: Romberg Test normal.      Gait: Gait is intact.      Deep Tendon Reflexes:      Reflex Scores:       Tricep reflexes are 1+ on the right side and 1+ on the left side.       Bicep reflexes are 1+ on the right side and 1+ on the left side.       Brachioradialis reflexes are 1+ on the right side and 1+ on the left side.       Patellar reflexes are 1+ on the right side and 1+ on the left side.       Achilles reflexes are 2+ on the right side and 2+ on the left side.  Psychiatric:         Speech: Speech normal.         Vital Signs  Pulse: 107  Resp: 18  BP: (!) 145/92  BP Location: Right arm  Patient Position: Sitting  Pain Score:   7  Pain Loc: Back  Height and Weight  Height: 5' 6" (167.6 cm)  Weight: 103 kg (227 lb)  BSA (Calculated - sq m): 2.19 sq meters  BMI (Calculated): " 36.7  Weight in (lb) to have BMI = 25: 154.6]    Provider dictation:  All imaging was reviewed by Dr. Walker. Scoliosis series reveals no significant sagittal imbalance. MRI lumbar spine 2/29/24 reveals DDD with disc herniations at L4-5 and L5-S1. L4-5 appears to be worse on the right, L5-S1 is most prominent centrally. MRI thoracic spine 10/18/23 reveals DDD without spinal cord compression except for as noted; T11-12 reveals a disc osteophyte complex on the right side that does contact the spinal cord. MRI cervical spine 10/18/23 reveals no spinal cord compression or foraminal stenosis; loss of lordosis.     Surgery expectations discussed to likely improve electrical pain, right sided thoracic pain, but likely would not change his axial back pain. We recommend trying to delay surgery as long as possible.     We discussed all conservative treatment options to include PT, exercise, weight loss, JACOBO or MBBB vs RFA at T11-T12 level.    Numbness in legs, feet, anterior thighs, loss of bladder control or urinary retention, weakness, constitute as emergencies and he will monitor for development of these symptoms of spinal cord compromise.     Visit Diagnosis:  Pain in thoracic spine    Lumbar radiculopathy

## 2024-03-05 NOTE — TELEPHONE ENCOUNTER
S/W patient.  Patient has decided to keep current appt time slot today as Dr. Walker does not have a later time slot, fully booked.        ----- Message from Jazz Nolen sent at 3/5/2024  8:19 AM CST -----  Type: Need Medical Advice   Who Called: patient   Best callback number: [phone]  Additional Information: patient called to ask if today's  appointment can be pushed back to 2pm please call patient to advise  Please call to further assist, Thanks.

## 2024-03-27 ENCOUNTER — OFFICE VISIT (OUTPATIENT)
Dept: SPORTS MEDICINE | Facility: CLINIC | Age: 34
End: 2024-03-27
Payer: COMMERCIAL

## 2024-03-27 VITALS
HEIGHT: 66 IN | HEART RATE: 70 BPM | SYSTOLIC BLOOD PRESSURE: 129 MMHG | DIASTOLIC BLOOD PRESSURE: 89 MMHG | BODY MASS INDEX: 37.06 KG/M2 | WEIGHT: 230.63 LBS

## 2024-03-27 DIAGNOSIS — M17.11 PRIMARY OSTEOARTHRITIS OF RIGHT KNEE: Primary | ICD-10-CM

## 2024-03-27 DIAGNOSIS — E66.01 MORBID OBESITY: ICD-10-CM

## 2024-03-27 PROCEDURE — 3008F BODY MASS INDEX DOCD: CPT | Mod: CPTII,S$GLB,, | Performed by: ORTHOPAEDIC SURGERY

## 2024-03-27 PROCEDURE — 20611 DRAIN/INJ JOINT/BURSA W/US: CPT | Mod: RT,S$GLB,, | Performed by: ORTHOPAEDIC SURGERY

## 2024-03-27 PROCEDURE — 99214 OFFICE O/P EST MOD 30 MIN: CPT | Mod: 25,S$GLB,, | Performed by: ORTHOPAEDIC SURGERY

## 2024-03-27 PROCEDURE — 1159F MED LIST DOCD IN RCRD: CPT | Mod: CPTII,S$GLB,, | Performed by: ORTHOPAEDIC SURGERY

## 2024-03-27 PROCEDURE — 3074F SYST BP LT 130 MM HG: CPT | Mod: CPTII,S$GLB,, | Performed by: ORTHOPAEDIC SURGERY

## 2024-03-27 PROCEDURE — 99999 PR PBB SHADOW E&M-EST. PATIENT-LVL III: CPT | Mod: PBBFAC,,, | Performed by: ORTHOPAEDIC SURGERY

## 2024-03-27 PROCEDURE — 3079F DIAST BP 80-89 MM HG: CPT | Mod: CPTII,S$GLB,, | Performed by: ORTHOPAEDIC SURGERY

## 2024-03-27 RX ORDER — TRIAMCINOLONE ACETONIDE 40 MG/ML
80 INJECTION, SUSPENSION INTRA-ARTICULAR; INTRAMUSCULAR
Status: DISCONTINUED | OUTPATIENT
Start: 2024-03-27 | End: 2024-03-27 | Stop reason: HOSPADM

## 2024-03-27 RX ADMIN — TRIAMCINOLONE ACETONIDE 80 MG: 40 INJECTION, SUSPENSION INTRA-ARTICULAR; INTRAMUSCULAR at 11:03

## 2024-03-27 NOTE — PROGRESS NOTES
Subjective:          Chief Complaint: Yovani Malik is a 33 y.o. male who had concerns including Injections of the Right Knee.    Mr. Malik comes in today for right knee Durolane injection. He has been doing well when he receives these injections every 6 months.       Mr. Malik presents to our clinic today for follow up right knee after receiving right knee Durolane injection on 3/18/22. He reports significant improvement of pain and function for approximately 5 months. He would like to receive the injection again when it is approved. Pain at rest is 5/10. Pain at its worst is 8/10. He is 2 year s/p below procedure.  He states he is doing alright but still has some pain along the anterior and lateral knee which he localizes to his incision as well as the lateral joint line.  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   Notes he had a knee steroid injection from his pain management physician which did not give much relief.  Previous Durolane injection 9/2022 with relief. Complaints of thoracic and lumbar pain with previous MRIs in 2022.         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   HENT:  Negative for hearing loss.    Eyes:  Negative for blurred vision and visual disturbance.   Cardiovascular:  Negative for chest  pain and leg swelling.   Respiratory:  Negative for shortness of breath.    Endocrine: Negative for polyuria.   Hematologic/Lymphatic: Negative for bleeding problem.   Skin:  Negative for rash.   Musculoskeletal:  Positive for back pain and joint pain. Negative for joint swelling, muscle cramps and muscle weakness.   Gastrointestinal:  Negative for melena.   Genitourinary:  Negative for hematuria.   Neurological:  Negative for loss of balance, numbness and paresthesias.   Psychiatric/Behavioral:  Negative for altered mental status.        Pain Related Questions  Over the past 3 days, what was your average pain during activity? (I.e. running, jogging, walking, climbing stairs, getting dressed, ect.): 5  Over the past 3 days, what was your highest pain level?: 5  Over the past 3 days, what was your lowest pain level? : 5    Other  How many nights a week are you awakened by your affected body part?: 4  Was the patient's HEIGHT measured or patient reported?: Patient Reported  Was the patient's WEIGHT measured or patient reported?: Measured      Objective:        General: Yovani is well-developed, well-nourished, appears stated age, in no acute distress, alert and oriented to time, place and person.     General    Vitals reviewed.  Constitutional: He is oriented to person, place, and time. He appears well-developed and well-nourished. No distress.   HENT:   Mouth/Throat: No oropharyngeal exudate.   Eyes: Right eye exhibits no discharge. Left eye exhibits no discharge.   Pulmonary/Chest: Effort normal and breath sounds normal. No respiratory distress.   Neurological: He is alert and oriented to person, place, and time. He has normal reflexes. No cranial nerve deficit. Coordination normal.   Psychiatric: He has a normal mood and affect. His behavior is normal. Judgment and thought content normal.     General Musculoskeletal Exam   Gait: normal       Right Knee Exam     Inspection   Erythema: absent  Scars:  present  Swelling: absent  Effusion: absent  Deformity: absent  Bruising: absent    Tenderness   The patient is tender to palpation of the lateral joint line.    Crepitus   The patient has crepitus of the patella (mild to moderate).    Range of Motion   Extension:  0   Flexion:  abnormal Right knee flexion: 125.    Tests   Meniscus   Kwadwo:  Medial - negative Lateral - negative  Ligament Examination   Lachman: normal (-1 to 2mm)   PCL-Posterior Drawer: normal (0 to 2mm)     MCL - Valgus: normal (0 to 2mm)  LCL - Varus: normal  Pivot Shift: normal (Equal)  Reverse Pivot Shift: normal (Equal)  Dial Test at 30 degrees: normal (< 5 degrees)  Dial Test at 90 degrees: normal (< 5 degrees)  Posterior Sag Test: negative (knee brace)  Posterolateral Corner: stable  Patella   Patellar apprehension: negative  Passive Patellar Tilt: neutral  Patellar Tracking: normal  Patellar Glide (quadrants): Lateral - 1   Medial - 2  Q-Angle at 90 degrees: normal  Patellar Grind: negative  J-Sign: none    Other   Meniscal Cyst: absent  Popliteal (Baker's) Cyst: absent  Sensation: normal    Left Knee Exam     Inspection   Erythema: absent  Scars: absent  Swelling: absent  Effusion: absent  Deformity: absent  Bruising: absent    Tenderness   The patient is experiencing no tenderness.     Range of Motion   Extension:  0   Flexion:  140     Tests   Meniscus   Kwadwo:  Medial - negative Lateral - negative  Stability   Lachman: normal (-1 to 2mm)   PCL-Posterior Drawer: normal (0 to 2mm)  MCL - Valgus: normal (0 to 2mm)  LCL - Varus: normal (0 to 2mm)  Pivot Shift: normal (Equal)  Reverse Pivot Shift: normal (Equal)  Dial Test at 30 degrees: normal (< 5 degrees)  Dial Test at 90 degrees: normal (< 5 degrees)  Posterior Sag Test: negative  Posterolateral Corner: stable  Patella   Patellar apprehension: negative  Passive Patellar Tilt: neutral  Patellar Tracking: normal  Patellar Glide (Quadrants): Lateral - 1 Medial - 2  Q-Angle at 90  degrees: normal  Patellar Grind: negative  J-Sign: J sign absent    Other   Meniscal Cyst: absent  Popliteal (Baker's) Cyst: absent  Sensation: normal    Right Hip Exam     Tests   Marisa: negative  Left Hip Exam     Tests   Marisa: negative          Muscle Strength   Right Lower Extremity   Hip Abduction: 5/5   Quadriceps:  4/5   Hamstrin/5   Left Lower Extremity   Hip Abduction: 5/5   Quadriceps:  5/5   Hamstrin/5     Reflexes     Left Side  Achilles:  2+  Quadriceps:  2+    Right Side   Achilles:  2+  Quadriceps:  2+    Vascular Exam     Right Pulses  Dorsalis Pedis:      2+  Posterior Tibial:      2+        Left Pulses  Dorsalis Pedis:      2+  Posterior Tibial:      2+      MRI Lumbar Spine Without Contrast  Narrative: EXAMINATION:  MRI LUMBAR SPINE WITHOUT CONTRAST    CLINICAL HISTORY:  Low back pain, symptoms persist with > 6wks conservative treatment; Dorsalgia, unspecified    TECHNIQUE:  Multiplanar, multisequence MR images were acquired from the thoracolumbar junction to the sacrum without the administration of contrast.    COMPARISON:  2022    FINDINGS:  Alignment: Within normal limits.    Vertebral column: Vertebral body heights are maintained.  No evidence of an acute fracture or aggressive marrow replacement process. Multilevel disc degeneration with disc desiccation, mild-to-moderate disc space narrowing, degenerative endplate changes and disc bulges at T11-12, T12-L1, L4-5, and L5-S1. Modic type 2 endplate changes anteriorly at T11-12, T12-L1, L2-3.    Cord: Mild flattening of the right ventral aspect of the cord at T11-12 without focal cord signal abnormality, similar to prior exam.  Conus terminates at T12-L1.    Degenerative findings:    T11-12: Moderate disc space narrowing.  Right asymmetric diffuse disc bulge with superimposed right central disc extrusion through an annular fissure, which effaces the right ventral thecal sac and mildly flattens the right ventral aspect of the cord.   No focal cord signal abnormality.  Mild bilateral facet arthropathy and ligamentum flavum thickening.  Mild spinal canal stenosis.  No significant neural foraminal stenosis.    T12-L1: Mild disc space narrowing.  No significant disc bulge or focal herniation.  No neural foraminal or spinal canal stenosis.    L1-L2: Disc is normal in configuration.  Mild bilateral facet arthropathy.  There is no neural foraminal stenosis.  There is no spinal canal stenosis.    L2-L3: Mild diffuse disc bulge.  Mild bilateral facet arthropathy and ligamentum flavum thickening.  There is no neural foraminal stenosis.  There is no spinal canal stenosis.    L3-L4: Mild diffuse disc bulge.  Mild bilateral facet arthropathy.  No significant neural foraminal or spinal canal stenosis.    L4-L5: Moderate disc space narrowing.  Diffuse disc bulge with superimpose right central/subarticular disc protrusion through an annular fissure, which partially effaces the right lateral recess and may impinge upon the descending right L5 nerve root.  Moderate bilateral facet arthropathy.  Ligamentum flavum thickening.  Mild bilateral neural foraminal stenosis.  Mild spinal canal stenosis.    L5-S1: Mild-to-moderate disc space narrowing.  Diffuse disc bulge with small right central disc protrusion through an annular fissure.  Moderate bilateral facet arthropathy.  Mild right neural foraminal stenosis.  There is no spinal canal stenosis.    Paraspinal muscles & soft tissues: No significant abnormalities.  Impression: 1. Mild-to-moderate multilevel degenerative changes of the thoracolumbar spine, as detailed above, without significant interval detrimental change as compared to the prior study on 01/14/2022.  2. Moderate disc degeneration at T11-12 with associated right central disc extrusion, which effaces the right ventral thecal sac and mildly flattens the right ventral aspect of the cord.  No focal cord signal abnormality.  3. Right central/subarticular  disc protrusion through an annular fissure at L4-5, which partially effaces the right lateral recess and may impinge upon the descending right L5 nerve root.    Electronically signed by: Eddie Ward  Date:    02/29/2024  Time:    16:56            Assessment:       Encounter Diagnoses   Name Primary?    Primary osteoarthritis of right knee Yes    Morbid obesity           Plan:       1. RTC in 6 months with Barry Lopez MD. IKDC, SF-12 and KOOS was filled out today in clinic. Patient will fill out IKDC, SF-12 and KOOS on return.    2. Medications: Refills of the following Rx were sent to patients preferred Pharmacy:  No Refills Needed Today    3. Physical Therapy: completed    4. HEP: N/A    5. Procedures/Procedural Planning:   We reviewed with Yovani today, the pathology and natural history of his diagnosis. We had an extensive discussion as to the conservative treatment and management of their condition. We also discussed the variety of treatment options to include medication, physical therapy, diagnostic testing as well as other treatments.The decision was made to go forward with:     knee - right    Durolane performed today, see procedure note.      6. [id number] - Barry Lopez MD, performed a custom orthotic / brace adjustment, fitting and training with the patient. The patient demonstrated understanding and proper care. This was performed for 15 minutes. Viscoskin applied today    7. Work/Sport Status: no interval change    8. Visit Summary: Patient continues to do well with Durolane every 6 months. We will see him back for repeat injection in 6 months                         [facility] patient questionnaires have been collected today.

## 2024-03-27 NOTE — PROCEDURES
"Large Joint Aspiration/Injection: R knee    Date/Time: 3/27/2024 11:15 AM    Performed by: Barry Lopez MD  Authorized by: Barry Lopez MD    Consent Done?:  Yes (Verbal)  Indications:  Pain  Site marked: the procedure site was marked    Timeout: prior to procedure the correct patient, procedure, and site was verified    Prep: patient was prepped and draped in usual sterile fashion    Local anesthesia used?: No    Local anesthetic:  Topical anesthetic (naropin 0.2%)  Anesthetic total (ml):  3      Details:  Needle Size:  22 G  Ultrasonic Guidance for needle placement?: Yes    Images are saved and documented.  Approach: superolateral.  Location:  Knee  Site:  R knee  Medications:  80 mg triamcinolone acetonide 40 mg/mL; 60 mg hyaluronate sodium, stabilized (DUROLANE) 60 mg/3 mL  Aspirate amount (mL):  0  Patient tolerance:  Patient tolerated the procedure well with no immediate complications     Description of ultrasound utilization for needle guidance:   Ultrasound guidance used for needle localization. Images saved and stored for documentation. The knee joint was visualized. Dynamic visualization of the 22g x 1.5" needle was continuous throughout the procedure.     "

## 2024-05-03 NOTE — LETTER
December 8, 2023    Yovani Malik  [address] [place] [address]             [facility]  [address]  [place] [address]  Phone: [phone]  Fax: [phone]   December 8, 2023     Patient: Yovani Malik   YOB: 1990   Date of Visit: 12/8/2023       To Whom it May Concern:    Yovani Malik was seen in my clinic on 12/8/2023. He may return to work on 12/12/2023 without restrictions.    Please excuse him from any work missed.    If you have any questions or concerns, please don't hesitate to call.    Sincerely,          Yolanda Loyola PA-C     
[phone]

## 2024-05-16 ENCOUNTER — PATIENT OUTREACH (OUTPATIENT)
Dept: ADMINISTRATIVE | Facility: HOSPITAL | Age: 34
End: 2024-05-16

## 2024-06-24 DIAGNOSIS — R20.0 NUMBNESS AND TINGLING IN LEFT ARM: ICD-10-CM

## 2024-06-24 DIAGNOSIS — R20.2 NUMBNESS AND TINGLING IN LEFT ARM: ICD-10-CM

## 2024-06-24 RX ORDER — GABAPENTIN 300 MG/1
300 CAPSULE ORAL NIGHTLY
Qty: 90 CAPSULE | Refills: 0 | Status: SHIPPED | OUTPATIENT
Start: 2024-06-24

## 2024-06-24 NOTE — TELEPHONE ENCOUNTER
No care due was identified.  [facility] Embedded Care Due Messages. Reference number: [id number].   6/24/2024 3:36:35 AM CDT

## 2024-07-08 ENCOUNTER — OFFICE VISIT (OUTPATIENT)
Dept: FAMILY MEDICINE | Facility: CLINIC | Age: 34
End: 2024-07-08
Payer: COMMERCIAL

## 2024-07-08 VITALS
DIASTOLIC BLOOD PRESSURE: 90 MMHG | HEIGHT: 66 IN | OXYGEN SATURATION: 99 % | WEIGHT: 234.13 LBS | SYSTOLIC BLOOD PRESSURE: 122 MMHG | BODY MASS INDEX: 37.63 KG/M2 | HEART RATE: 78 BPM

## 2024-07-08 DIAGNOSIS — G89.29 CHRONIC BILATERAL LOW BACK PAIN WITHOUT SCIATICA: ICD-10-CM

## 2024-07-08 DIAGNOSIS — R20.2 NUMBNESS AND TINGLING OF RIGHT ARM: ICD-10-CM

## 2024-07-08 DIAGNOSIS — R20.0 NUMBNESS AND TINGLING IN LEFT ARM: Primary | ICD-10-CM

## 2024-07-08 DIAGNOSIS — R20.0 NUMBNESS AND TINGLING OF RIGHT ARM: ICD-10-CM

## 2024-07-08 DIAGNOSIS — R20.2 NUMBNESS AND TINGLING IN LEFT ARM: Primary | ICD-10-CM

## 2024-07-08 DIAGNOSIS — M54.50 CHRONIC BILATERAL LOW BACK PAIN WITHOUT SCIATICA: ICD-10-CM

## 2024-07-08 PROCEDURE — 99999 PR PBB SHADOW E&M-EST. PATIENT-LVL III: CPT | Mod: PBBFAC,,, | Performed by: PHYSICIAN ASSISTANT

## 2024-07-08 PROCEDURE — 3074F SYST BP LT 130 MM HG: CPT | Mod: CPTII,S$GLB,, | Performed by: PHYSICIAN ASSISTANT

## 2024-07-08 PROCEDURE — 1159F MED LIST DOCD IN RCRD: CPT | Mod: CPTII,S$GLB,, | Performed by: PHYSICIAN ASSISTANT

## 2024-07-08 PROCEDURE — 3008F BODY MASS INDEX DOCD: CPT | Mod: CPTII,S$GLB,, | Performed by: PHYSICIAN ASSISTANT

## 2024-07-08 PROCEDURE — 3080F DIAST BP >= 90 MM HG: CPT | Mod: CPTII,S$GLB,, | Performed by: PHYSICIAN ASSISTANT

## 2024-07-08 PROCEDURE — 99214 OFFICE O/P EST MOD 30 MIN: CPT | Mod: S$GLB,,, | Performed by: PHYSICIAN ASSISTANT

## 2024-07-08 PROCEDURE — 1160F RVW MEDS BY RX/DR IN RCRD: CPT | Mod: CPTII,S$GLB,, | Performed by: PHYSICIAN ASSISTANT

## 2024-07-08 RX ORDER — DULOXETIN HYDROCHLORIDE 30 MG/1
30 CAPSULE, DELAYED RELEASE ORAL DAILY
Qty: 30 CAPSULE | Refills: 3 | Status: SHIPPED | OUTPATIENT
Start: 2024-07-08 | End: 2025-07-08

## 2024-07-08 NOTE — PROGRESS NOTES
"Subjective:      Patient ID: Yovani Malik is a 34 y.o. male.    Chief Complaint: Numbness (Numbness in arms)    HPI  Patient has PMH of SHIRLEY, HLD, HTN, GERD, and chronic low back pain.  Has had metatarsal stress fracture of right foot in the past.     Patient reports right arm numbness, back pain, and knee pain.  Diagnosed with carpal tunnel syndrome in the past.  Having frequent muscle cramps.    Dr. Cabrera is his pain management physician.    Review of Systems   Respiratory:  Negative for shortness of breath.    Cardiovascular:  Negative for chest pain.   Gastrointestinal:  Negative for constipation, diarrhea, nausea and vomiting.   Musculoskeletal:  Positive for arthralgias and back pain.   Neurological:  Positive for numbness.       Objective:   BP (!) 122/90   Pulse 78   Ht 5' 6" (1.676 m)   Wt 106.2 kg (234 lb 2.1 oz)   SpO2 99%   BMI 37.79 kg/m²     Physical Exam  Vitals reviewed.   Constitutional:       Appearance: Normal appearance. He is well-developed.   HENT:      Head: Normocephalic and atraumatic.      Right Ear: External ear normal.      Left Ear: External ear normal.   Eyes:      Conjunctiva/sclera: Conjunctivae normal.   Cardiovascular:      Rate and Rhythm: Normal rate and regular rhythm.      Heart sounds: Normal heart sounds. No murmur heard.     No friction rub. No gallop.   Pulmonary:      Effort: Pulmonary effort is normal. No respiratory distress.      Breath sounds: Normal breath sounds. No wheezing, rhonchi or rales.   Musculoskeletal:         General: Normal range of motion.      Right hand: Decreased sensation.      Left hand: Decreased sensation.   Skin:     General: Skin is warm and dry.      Findings: No rash.   Neurological:      General: No focal deficit present.      Mental Status: He is alert and oriented to person, place, and time.   Psychiatric:         Mood and Affect: Mood normal.         Behavior: Behavior normal.         Judgment: Judgment normal.       Assessment:      1. " Numbness and tingling in left arm    2. Numbness and tingling of right arm    3. Chronic bilateral low back pain without sciatica       Plan:   1. Numbness and tingling in left arm  -START DULoxetine (CYMBALTA) 30 MG capsule; Take 1 capsule (30 mg total) by mouth once daily.  Dispense: 30 capsule; Refill: 3    2. Numbness and tingling of right arm    3. Chronic bilateral low back pain without sciatica  - DULoxetine (CYMBALTA) 30 MG capsule; Take 1 capsule (30 mg total) by mouth once daily.  Dispense: 30 capsule; Refill: 3    Will prescribe Lyrica 75mg after patient checks with pain management.    Follow up in one month with me.  Patient agreed with plan and expressed understanding.    Thank you for allowing me to serve you,

## 2024-07-19 ENCOUNTER — HOSPITAL ENCOUNTER (OUTPATIENT)
Dept: RADIOLOGY | Facility: HOSPITAL | Age: 34
Discharge: HOME | End: 2024-07-19
Attending: NURSE PRACTITIONER
Payer: COMMERCIAL

## 2024-07-19 DIAGNOSIS — M54.2 CERVICALGIA: ICD-10-CM

## 2024-07-19 PROCEDURE — 72146 MRI CHEST SPINE W/O DYE: CPT | Mod: TC,PO

## 2024-07-19 PROCEDURE — 72146 MRI CHEST SPINE W/O DYE: CPT | Mod: 26,,, | Performed by: RADIOLOGY

## 2024-07-19 PROCEDURE — 72141 MRI NECK SPINE W/O DYE: CPT | Mod: 26,,, | Performed by: RADIOLOGY

## 2024-07-19 PROCEDURE — 72141 MRI NECK SPINE W/O DYE: CPT | Mod: TC,PO

## 2024-08-07 ENCOUNTER — TELEPHONE (OUTPATIENT)
Dept: FAMILY MEDICINE | Facility: CLINIC | Age: 34
End: 2024-08-07
Payer: COMMERCIAL

## 2024-08-12 ENCOUNTER — TELEPHONE (OUTPATIENT)
Dept: FAMILY MEDICINE | Facility: CLINIC | Age: 34
End: 2024-08-12
Payer: COMMERCIAL

## 2024-08-12 NOTE — TELEPHONE ENCOUNTER
Attempted to call pt to reschedule appt with Yolanda Loyola.  Yolanda is out of the clinic. Left voicemail with call back number.  Appt canceled.

## 2024-09-30 ENCOUNTER — HOSPITAL ENCOUNTER (OUTPATIENT)
Dept: RADIOLOGY | Facility: HOSPITAL | Age: 34
Discharge: HOME | End: 2024-09-30
Attending: ORTHOPAEDIC SURGERY
Payer: COMMERCIAL

## 2024-09-30 ENCOUNTER — OFFICE VISIT (OUTPATIENT)
Dept: SPORTS MEDICINE | Facility: CLINIC | Age: 34
End: 2024-09-30
Payer: COMMERCIAL

## 2024-09-30 VITALS
WEIGHT: 234.13 LBS | HEIGHT: 66 IN | BODY MASS INDEX: 37.63 KG/M2 | DIASTOLIC BLOOD PRESSURE: 85 MMHG | HEART RATE: 66 BPM | SYSTOLIC BLOOD PRESSURE: 130 MMHG

## 2024-09-30 DIAGNOSIS — G89.29 CHRONIC BILATERAL LOW BACK PAIN WITHOUT SCIATICA: ICD-10-CM

## 2024-09-30 DIAGNOSIS — M17.11 PRIMARY OSTEOARTHRITIS OF RIGHT KNEE: ICD-10-CM

## 2024-09-30 DIAGNOSIS — M54.50 CHRONIC BILATERAL LOW BACK PAIN WITHOUT SCIATICA: ICD-10-CM

## 2024-09-30 DIAGNOSIS — M25.561 RIGHT KNEE PAIN, UNSPECIFIED CHRONICITY: Primary | ICD-10-CM

## 2024-09-30 DIAGNOSIS — M54.6 THORACIC SPINE PAIN: ICD-10-CM

## 2024-09-30 PROCEDURE — 1160F RVW MEDS BY RX/DR IN RCRD: CPT | Mod: CPTII,S$GLB,, | Performed by: ORTHOPAEDIC SURGERY

## 2024-09-30 PROCEDURE — 20611 DRAIN/INJ JOINT/BURSA W/US: CPT | Mod: RT,S$GLB,, | Performed by: ORTHOPAEDIC SURGERY

## 2024-09-30 PROCEDURE — 73564 X-RAY EXAM KNEE 4 OR MORE: CPT | Mod: 26,,, | Performed by: RADIOLOGY

## 2024-09-30 PROCEDURE — 3075F SYST BP GE 130 - 139MM HG: CPT | Mod: CPTII,S$GLB,, | Performed by: ORTHOPAEDIC SURGERY

## 2024-09-30 PROCEDURE — 73564 X-RAY EXAM KNEE 4 OR MORE: CPT | Mod: TC,50

## 2024-09-30 PROCEDURE — 99214 OFFICE O/P EST MOD 30 MIN: CPT | Mod: 25,S$GLB,, | Performed by: ORTHOPAEDIC SURGERY

## 2024-09-30 PROCEDURE — 3008F BODY MASS INDEX DOCD: CPT | Mod: CPTII,S$GLB,, | Performed by: ORTHOPAEDIC SURGERY

## 2024-09-30 PROCEDURE — 99999 PR PBB SHADOW E&M-EST. PATIENT-LVL III: CPT | Mod: PBBFAC,,, | Performed by: ORTHOPAEDIC SURGERY

## 2024-09-30 PROCEDURE — 3079F DIAST BP 80-89 MM HG: CPT | Mod: CPTII,S$GLB,, | Performed by: ORTHOPAEDIC SURGERY

## 2024-09-30 PROCEDURE — 1159F MED LIST DOCD IN RCRD: CPT | Mod: CPTII,S$GLB,, | Performed by: ORTHOPAEDIC SURGERY

## 2024-09-30 RX ORDER — MELOXICAM 15 MG/1
15 TABLET ORAL DAILY
Qty: 30 TABLET | Refills: 6 | Status: SHIPPED | OUTPATIENT
Start: 2024-09-30 | End: 2025-04-28

## 2024-09-30 NOTE — LETTER
Patient: Yovani Malik   YOB: 1990   Clinic Number: [id number]   Today's Date: September 30, 2024     To:    Fax Number:         Work Status Summary     Date of Injury: 2010  right knee arthritis  Diagnosis/ICD-10:right knee arthritis/M17.11    Work Status: ABLE to work --- transitional duty (as follows): MEDIUM WORK: Lifting 50 lbs maximum with frequent lifting and/or carrying of objects weighing up to 25 lbs.    Therapy Recomendations: Physical Therapy completed.    Medications Ordered This Encounter   Medications    meloxicam (MOBIC) 15 MG tablet       Next Appointment: No follow-ups on file. Yovani will be seen by Dr. Barry Lopez.    Comments: Referral to Dr. Fernando Rios for chronic thoracic and lumbar pathology; seeing pain specialist. Advanced pain management.      ______________________ ______         September 30, 2024    Barry Lopez MD  Signed (Provider)

## 2024-09-30 NOTE — PROGRESS NOTES
Subjective:          Chief Complaint: Yovani Malik is a 34 y.o. male who had concerns including Pain of the Right Knee.    Mr. Malik comes in today for right knee Durolane injection. He has been doing well when he receives these injections every 6 months until his last injection in March 2024 which he reports did not really help him much. Also reports increase in pain in the knee since he fell 2 times in one day while mopping the floor.       Mr. Malik presents to our clinic today for follow up right knee after receiving right knee Durolane injection on 3/18/22. He reports significant improvement of pain and function for approximately 5 months. He would like to receive the injection again when it is approved. Pain at rest is 5/10. Pain at its worst is 8/10. He is 2 year s/p below procedure.  He states he is doing alright but still has some pain along the anterior and lateral knee which he localizes to his incision as well as the lateral joint line.  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   Notes he had a knee steroid injection from his pain management physician which did not give much relief.  Previous Durolane injection 9/2022 with relief. Complaints of thoracic and lumbar pain with previous MRIs in 2022.         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   HENT:  Negative for hearing loss.    Eyes:  Negative for blurred vision and visual disturbance.   Cardiovascular:  Negative for chest pain and leg swelling.   Respiratory:  Negative for shortness of breath.    Endocrine: Negative for polyuria.   Hematologic/Lymphatic: Negative for bleeding problem.   Skin:  Negative for rash.   Musculoskeletal:  Positive for back pain and joint pain. Negative for joint swelling, muscle cramps and muscle weakness.   Gastrointestinal:  Negative for melena.   Genitourinary:  Negative for hematuria.   Neurological:  Negative for loss of balance, numbness and paresthesias.   Psychiatric/Behavioral:  Negative for altered mental status.        Pain Related Questions  Over the past 3 days, what was your average pain during activity? (I.e. running, jogging, walking, climbing stairs, getting dressed, ect.): 7  Over the past 3 days, what was your highest pain level?: 8  Over the past 3 days, what was your lowest pain level? : 7    Other  How many nights a week are you awakened by your affected body part?: 0  Was the patient's HEIGHT measured or patient reported?: Measured  Was the patient's WEIGHT measured or patient reported?: Measured      Objective:        General: Yovani is well-developed, well-nourished, appears stated age, in no acute distress, alert and oriented to time, place and person.     General    Vitals reviewed.  Constitutional: He is oriented to person, place, and time. He appears well-developed and well-nourished. No distress.   HENT: Mouth/Throat: No oropharyngeal exudate.   Eyes: Right eye exhibits no discharge. Left eye exhibits no discharge.   Pulmonary/Chest: Effort normal and breath sounds normal. No respiratory distress.   Neurological: He is alert and oriented to person, place, and time. He has normal reflexes. No cranial nerve deficit. Coordination normal.   Psychiatric: He has a normal mood and affect. His  behavior is normal. Judgment and thought content normal.     General Musculoskeletal Exam   Gait: normal       Right Knee Exam     Inspection   Erythema: absent  Scars: present  Swelling: absent  Effusion: absent  Deformity: absent  Bruising: absent    Tenderness   The patient is tender to palpation of the lateral joint line.    Crepitus   The patient has crepitus of the patella (mild to moderate).    Range of Motion   Extension:  0   Flexion:  abnormal Right knee flexion: 125.    Tests   Meniscus   Kwadwo:  Medial - negative Lateral - negative  Ligament Examination   Lachman: normal (-1 to 2mm)   PCL-Posterior Drawer: normal (0 to 2mm)     MCL - Valgus: normal (0 to 2mm)  LCL - Varus: normal  Pivot Shift: normal (Equal)  Reverse Pivot Shift: normal (Equal)  Dial Test at 30 degrees: normal (< 5 degrees)  Dial Test at 90 degrees: normal (< 5 degrees)  Posterior Sag Test: negative (knee brace)  Posterolateral Corner: stable  Patella   Patellar apprehension: negative  Passive Patellar Tilt: neutral  Patellar Tracking: normal  Patellar Glide (quadrants): Lateral - 1   Medial - 2  Q-Angle at 90 degrees: normal  Patellar Grind: negative  J-Sign: none    Other   Meniscal Cyst: absent  Popliteal (Baker's) Cyst: absent  Sensation: normal    Left Knee Exam     Inspection   Erythema: absent  Scars: absent  Swelling: absent  Effusion: absent  Deformity: absent  Bruising: absent    Tenderness   The patient is experiencing no tenderness.     Range of Motion   Extension:  0   Flexion:  140     Tests   Meniscus   Kwadwo:  Medial - negative Lateral - negative  Stability   Lachman: normal (-1 to 2mm)   PCL-Posterior Drawer: normal (0 to 2mm)  MCL - Valgus: normal (0 to 2mm)  LCL - Varus: normal (0 to 2mm)  Pivot Shift: normal (Equal)  Reverse Pivot Shift: normal (Equal)  Dial Test at 30 degrees: normal (< 5 degrees)  Dial Test at 90 degrees: normal (< 5 degrees)  Posterior Sag Test: negative  Posterolateral Corner:  stable  Patella   Patellar apprehension: negative  Passive Patellar Tilt: neutral  Patellar Tracking: normal  Patellar Glide (Quadrants): Lateral - 1 Medial - 2  Q-Angle at 90 degrees: normal  Patellar Grind: negative  J-Sign: J sign absent    Other   Meniscal Cyst: absent  Popliteal (Baker's) Cyst: absent  Sensation: normal    Right Hip Exam     Tests   Marisa: negative  Left Hip Exam     Tests   Marisa: negative          Muscle Strength   Right Lower Extremity   Hip Abduction: 5/5   Quadriceps:  4/5   Hamstrin/5   Left Lower Extremity   Hip Abduction: 5/5   Quadriceps:  5/5   Hamstrin/5     Reflexes     Left Side  Achilles:  2+  Quadriceps:  2+    Right Side   Achilles:  2+  Quadriceps:  2+    Vascular Exam     Right Pulses  Dorsalis Pedis:      2+  Posterior Tibial:      2+        Left Pulses  Dorsalis Pedis:      2+  Posterior Tibial:      2+        MRI Thoracic Spine Without Contrast  Narrative: EXAMINATION:  MRI THORACIC SPINE WITHOUT CONTRAST    CLINICAL HISTORY:  Cervicalgia    TECHNIQUE:  Multiplanar, multisequence MRI of thoracic spine.  Contrast was not administered.    COMPARISON:  10/18/2023    FINDINGS:  Alignment: Within normal limits.    Vertebrae: Vertebral body heights appear maintained.  No acute fracture is identified; however, if trauma is suspected, a CT scan would be a more sensitive examination for fractures. No evidence of an aggressive marrow replacement process.    Cord: Mild flattening of the ventral cord surface at T11-12.  No definite focal cord signal abnormality, noting some motion limitation remainder of the cord demonstrates normal caliber and signal.    Degenerative findings: Multilevel disc degeneration with intervertebral disc space narrowing, disc desiccation, mixed degenerative endplate change with marginal osteophyte formation and disc bulges, most notably at T6-7 and T11-12.  Right central disc protrusion at T6-7 mildly effaces the ventral thecal sac without significant  spinal canal stenosis.  Small right central disc protrusion at T7-8, which mildly effaces the ventral thecal sac without significant spinal canal stenosis.  Redemonstration of a right central disc protrusion with osteophytic ridging at T11-12 with associated cystic endplate change along the posteroinferior T11 vertebral body, which mildly effaces the right ventral thecal sac and mildly flattens the ventral cord surface and contributes to mild spinal canal stenosis.  Multilevel facet arthropathy and ligamentum flavum thickening, most pronounced in the lower thoracic spine.  Mild left neural foraminal stenosis at T10-11.  No significant neural foraminal or spinal canal stenosis elsewhere in the thoracic spine.    Paraspinal muscles & soft tissues: Unremarkable.  Impression: 1. Multilevel degenerative changes of the thoracic spine, as detailed above, without significant interval detrimental change as compared to the prior exam on 10/18/2023.  Findings again are most pronounced at T11-12 with a right central disc protrusion with osteophytic ridging, which mildly efface the ventral thecal sac and mildly flattens the ventral cord surface.  No definite focal cord signal abnormality.  Mild spinal canal stenosis at this level.  2. Mild left neural foraminal stenosis at T10-11.  No significant neural foraminal or spinal canal stenosis elsewhere in the thoracic spine.    Electronically signed by: Eddie Ward  Date:    07/19/2024  Time:    16:34  MRI Cervical Spine Without Contrast  Narrative: EXAMINATION:  MRI CERVICAL SPINE WITHOUT CONTRAST    CLINICAL HISTORY:  Cervicalgia    TECHNIQUE:  Multiplanar, multisequence MR images of the cervical spine were performed without the administration of contrast.    COMPARISON:  10/18/2023    FINDINGS:  Study is mild to moderately degraded by motion artifact.    Alignment: Normal.    Vertebral Column: Vertebral body heights are maintained. No acute fracture is identified; however, if  trauma is suspected, a CT scan would be a more sensitive examination for fractures. No evidence of an aggressive marrow replacement process. Multilevel disc degeneration with intervertebral disc space narrowing, disc desiccation, and marginal osteophyte formation, most pronounced at C5-6.    Cord: No definite focal cord signal abnormality, noting motion limitation.    Skull base and craniocervical junction: Normal.    Degenerative findings:    C2-C3: Minimal posterior disc osteophyte complex.  Mild facet arthropathy.  Mild right uncovertebral joint spurring.  Mild right neural foraminal stenosis.  No spinal canal stenosis.    C3-C4: Mild posterior disc osteophyte complex.  Mild bilateral facet arthropathy.  Moderate right greater than left uncovertebral joint spurring.  Moderate right greater than left neural foraminal stenosis.  No spinal canal stenosis.    C4-C5: Mild posterior disc osteophyte complex.  Mild bilateral facet arthropathy.  Mild bilateral uncovertebral joint spurring.  Mild-to-moderate bilateral neural foraminal stenosis.  No spinal canal stenosis.    C5-C6: Posterior disc osteophyte complex.  Mild-to-moderate bilateral facet arthropathy.  Moderate right mild left uncovertebral joint spurring.  Moderate right and mild left neural foraminal stenosis.  No significant spinal canal stenosis.    C6-C7: Posterior disc osteophyte complex.  Mild-to-moderate bilateral facet arthropathy.  Mild bilateral uncovertebral joint spurring.  Mild-to-moderate left neural foraminal stenosis.  No significant spinal canal stenosis.    C7-T1: The disc is normal in configuration.  Mild bilateral facet arthropathy.  No significant neural foraminal or spinal canal stenosis.    Paraspinal muscles & soft tissues: No significant abnormality.    Normal signal voids are present in the vertebral arteries.  Impression: Mild multilevel degenerative changes of the cervical spine, as detailed above, again most pronounced at C5-6 and  resulting in moderate right and mild left neural foraminal stenosis.  Moderate right greater than left neural foraminal stenosis also noted at C3-4.  No spinal canal stenosis at any cervical level.    Electronically signed by: Eddie Ward  Date:    07/19/2024  Time:    16:26            Assessment:       Encounter Diagnoses   Name Primary?    Right knee pain, unspecified chronicity Yes    Primary osteoarthritis of right knee     Thoracic spine pain     Chronic bilateral low back pain without sciatica           Plan:       1. RTC in 6 months with Barry Lopez MD Durolane injection; IKDC, SF-12 and KOOS was filled out today in clinic. Patient will fill out IKDC, SF-12 and KOOS on return.    2. Medications: Refills of the following Rx were sent to patients preferred Pharmacy:  Meloxicam 15 mg qd prn    3. Physical Therapy: completed    4. HEP: N/A    5. Procedures/Procedural Planning:   We reviewed with Yovani today, the pathology and natural history of his diagnosis. We had an extensive discussion as to the conservative treatment and management of their condition. We also discussed the variety of treatment options to include medication, physical therapy, diagnostic testing as well as other treatments.The decision was made to go forward with:     knee - right    Durolane performed today, see procedure note.      6. DME:none    7. Work/Sport Status: 30-50 lbs. Lifting restrictions.    8. Visit Summary: Patient continues to do well with Durolane every 6 months. We will see him back for repeat injection in 6 months. Referral to Dr. Rios; previously seeing Dr. Jones.                        Elmer patient questionnaires have been collected today.

## 2024-09-30 NOTE — PROCEDURES
Large Joint Aspiration/Injection: R knee    Date/Time: 9/30/2024 10:30 AM    Performed by: Barry Lopez MD  Authorized by: Barry Lopez MD    Indications:  Arthritis  Site marked: the procedure site was marked    Timeout: prior to procedure the correct patient, procedure, and site was verified    Prep: patient was prepped and draped in usual sterile fashion      Local anesthesia used?: Yes    Anesthesia:  Local infiltration    Details:  Needle Size:  21 G  Ultrasonic Guidance for needle placement?: Yes    Images are saved and documented.  Approach:  Anterolateral  Location:  Knee  Site:  R knee  Medications:  60 mg hyaluronate sodium, stabilized (DUROLANE) 60 mg/3 mL  Aspirate amount (mL):  0

## 2024-10-11 ENCOUNTER — TELEPHONE (OUTPATIENT)
Dept: ORTHOPEDICS | Facility: CLINIC | Age: 34
End: 2024-10-11
Payer: COMMERCIAL

## 2024-10-11 DIAGNOSIS — M50.30 DDD (DEGENERATIVE DISC DISEASE), CERVICAL: Primary | ICD-10-CM

## 2024-10-14 ENCOUNTER — TELEPHONE (OUTPATIENT)
Dept: ORTHOPEDICS | Facility: CLINIC | Age: 34
End: 2024-10-14
Payer: COMMERCIAL

## 2024-11-07 ENCOUNTER — HOSPITAL ENCOUNTER (OUTPATIENT)
Dept: RADIOLOGY | Facility: HOSPITAL | Age: 34
Discharge: HOME | End: 2024-11-07
Attending: ANESTHESIOLOGY
Payer: COMMERCIAL

## 2024-11-07 DIAGNOSIS — M79.671 RIGHT FOOT PAIN: ICD-10-CM

## 2024-11-07 PROCEDURE — 73630 X-RAY EXAM OF FOOT: CPT | Mod: 26,RT,, | Performed by: RADIOLOGY

## 2024-11-07 PROCEDURE — 73630 X-RAY EXAM OF FOOT: CPT | Mod: TC,FY,PO,RT

## 2024-11-22 ENCOUNTER — PATIENT MESSAGE (OUTPATIENT)
Dept: PODIATRY | Facility: CLINIC | Age: 34
End: 2024-11-22
Payer: COMMERCIAL

## 2024-12-09 DIAGNOSIS — M25.571 RIGHT ANKLE PAIN, UNSPECIFIED CHRONICITY: Primary | ICD-10-CM

## 2024-12-10 ENCOUNTER — HOSPITAL ENCOUNTER (OUTPATIENT)
Dept: RADIOLOGY | Facility: HOSPITAL | Age: 34
Discharge: HOME | End: 2024-12-10
Attending: ORTHOPAEDIC SURGERY
Payer: COMMERCIAL

## 2024-12-10 ENCOUNTER — OFFICE VISIT (OUTPATIENT)
Dept: ORTHOPEDICS | Facility: CLINIC | Age: 34
End: 2024-12-10
Payer: COMMERCIAL

## 2024-12-10 DIAGNOSIS — M79.671 RIGHT FOOT PAIN: Primary | ICD-10-CM

## 2024-12-10 DIAGNOSIS — S93.621A LISFRANC'S SPRAIN, RIGHT, INITIAL ENCOUNTER: ICD-10-CM

## 2024-12-10 DIAGNOSIS — S92.351A DISPLACED FRACTURE OF FIFTH METATARSAL BONE, RIGHT FOOT, INITIAL ENCOUNTER FOR CLOSED FRACTURE: Primary | ICD-10-CM

## 2024-12-10 DIAGNOSIS — M79.671 RIGHT FOOT PAIN: ICD-10-CM

## 2024-12-10 PROCEDURE — 99999 PR PBB SHADOW E&M-EST. PATIENT-LVL II: CPT | Mod: PBBFAC,,, | Performed by: ORTHOPAEDIC SURGERY

## 2024-12-10 PROCEDURE — 1160F RVW MEDS BY RX/DR IN RCRD: CPT | Mod: CPTII,S$GLB,, | Performed by: ORTHOPAEDIC SURGERY

## 2024-12-10 PROCEDURE — 73630 X-RAY EXAM OF FOOT: CPT | Mod: 26,RT,, | Performed by: RADIOLOGY

## 2024-12-10 PROCEDURE — 73630 X-RAY EXAM OF FOOT: CPT | Mod: TC,PO,RT

## 2024-12-10 PROCEDURE — 99214 OFFICE O/P EST MOD 30 MIN: CPT | Mod: S$GLB,,, | Performed by: ORTHOPAEDIC SURGERY

## 2024-12-10 PROCEDURE — 1159F MED LIST DOCD IN RCRD: CPT | Mod: CPTII,S$GLB,, | Performed by: ORTHOPAEDIC SURGERY

## 2024-12-10 NOTE — PROGRESS NOTES
"Status/Diagnosis: Subacute Right 3rd MT fracture; Stable lisfranc injury.  Date of Surgery: none  Date of Injury: 11/07/2024  Return visit: 2 months  X-rays on Return: WB 3-views Right foot    Chief Complaint:   Chief Complaint   Patient presents with    Right Foot - Swelling, Pain     Present History:  Yovani presents with foot pain that began in early November. X-rays done about a month ago revealed a fracture. He reports self-immobilizing in a boot for approximately three weeks and has been out of the boot for roughly three weeks. He describes the pain as intermittent but noticeable when walking, stating it feels like "crinkled paper" every time he steps off of it. The pain is localized to the top of his foot, with discomfort when moving his toe in certain ways. He is unsure of the original cause of the injury, unable to recall if it occurred before or after work. He denies pain around the ankle. Yovani characterizes the pain as more prominent in a different area than where the fracture was initially identified.   PMH significant for hypertension and hyperlipidemia.  Denies tobacco use.  Works at [employer]. Job involves being on feet frequently during the day. Foot pain affects mobility at work, especially when stepping.         Past Medical History:   Diagnosis Date    Anxiety     Borderline diabetes     Hyperlipidemia     Hypertension        Past Surgical History:   Procedure Laterality Date    ARTHROSCOPY OF KNEE Right 9/13/2019    Procedure: ARTHROSCOPY, KNEE;  Surgeon: Anurag Wells MD;  Location: [facility] OR;  Service: Orthopedics;  Laterality: Right;    ESOPHAGOGASTRODUODENOSCOPY N/A 10/3/2019    Procedure: EGD (ESOPHAGOGASTRODUODENOSCOPY);  Surgeon: Yovani Dominguez MD;  Location: [facility] ENDO;  Service: Endoscopy;  Laterality: N/A;    ESOPHAGOGASTRODUODENOSCOPY N/A 1/23/2020    Procedure: EGD (ESOPHAGOGASTRODUODENOSCOPY);  Surgeon: Yovani Dominguez MD;  Location: [facility] ENDO;  Service: Endoscopy;  " Laterality: N/A;    FEMUR OSTEOTOMY Right 9/15/2020    Procedure: OSTEOTOMY, FEMUR;  Surgeon: Barry Lopez MD;  Location: [facility] OR;  Service: Orthopedics;  Laterality: Right;  Regional w/ Catheter: Epidural, Spinal, Adductor  ANUSHA 50cc    KNEE ARTHROSCOPY      KNEE ARTHROSCOPY W/ MENISCECTOMY Right 9/15/2020    Procedure: ARTHROSCOPY, KNEE, WITH MENISCECTOMY;  Surgeon: Barry Lopez MD;  Location: [facility] OR;  Service: Orthopedics;  Laterality: Right;    KNEE SURGERY Bilateral     x 2     REPAIR OF MENISCUS OF KNEE Right 9/13/2019    Procedure: REPAIR, MENISCUS, KNEE;  Surgeon: Anurag Wells MD;  Location: [facility] OR;  Service: Orthopedics;  Laterality: Right;  lateral meniscal repair    SYNOVECTOMY OF KNEE Right 9/15/2020    Procedure: SYNOVECTOMY, KNEE;  Surgeon: Barry Lopez MD;  Location: [facility] OR;  Service: Orthopedics;  Laterality: Right;    TONSILLECTOMY         Current Outpatient Medications   Medication Sig    ADDERALL XR 30 mg 24 hr capsule Take by mouth.    ALPRAZolam (XANAX) 1 MG tablet Take 1 mg by mouth 2 (two) times daily as needed.    amLODIPine (NORVASC) 5 MG tablet Take 1 tablet (5 mg total) by mouth once daily.    DULoxetine (CYMBALTA) 30 MG capsule Take 1 capsule (30 mg total) by mouth once daily.    meloxicam (MOBIC) 15 MG tablet Take 1 tablet (15 mg total) by mouth once daily.    morphine (MS CONTIN) 15 MG 12 hr tablet Take 15 mg by mouth every 12 (twelve) hours.    mupirocin (BACTROBAN) 2 % ointment Apply topically 3 (three) times daily.    oxyCODONE-acetaminophen (PERCOCET)  mg per tablet Take 1 tablet by mouth daily as needed.     No current facility-administered medications for this visit.       Review of patient's allergies indicates:  No Known Allergies    Family History   Problem Relation Name Age of Onset    Diabetes Mother      Hypertension Mother      Hyperlipidemia Mother      Diabetes Father      Diabetes Maternal Grandmother      Cancer Maternal Grandmother           breast       Social History     Socioeconomic History    Marital status:    Tobacco Use    Smoking status: Former     Current packs/day: 0.50     Average packs/day: 0.5 packs/day for 2.5 years (1.2 ttl pk-yrs)     Types: Cigarettes     Start date: 6/16/2022    Smokeless tobacco: Never   Substance and Sexual Activity    Alcohol use: No    Drug use: No    Sexual activity: Yes     Partners: Female       Physical exam:  There were no vitals filed for this visit.  There is no height or weight on file to calculate BMI.  General: In no apparent distress; well developed and well nourished.  HEENT: normocephalic; atraumatic.  Cardiovascular: regular rate.  Respiratory: no increased work of breathing.  Musculoskeletal:   Gait: mild antalgic  Inspection:   Mild residual swelling about the midfoot and forefoot.  Moderate tenderness on deep palpation at the 2nd and 3rd metatarsal bases of the level of the TMT joint.  Less prominent tenderness at the 3rd metatarsal distal diaphyseal fracture site.  No step-off noted involving the metatarsal heads.  Mild pain with a equivocal laxity on Lisfranc stress.  No pain referable to the ankle.  No laxity with anterior drawer testing.  Silfverskiold: Negative  Alignment:  Knee: neutral               Ankle: neutral              Hindfoot: neutral              Forefoot: neutral   Strength:              Dorsiflexion 5/5  Plantar flexion 5/5  Inversion 5/5  Eversion 5/5  Sensation:              SILT distally  ROM:              Ankle: full and painless              Subtalar: full and painless  Pulses: Palpable pedal pulse                   Imaging Studies/Outside documentation:  I have ordered/reviewed/interpreted the following images/outside documentation:  1. Weight-bearing 3-views of Right foot:   On my independent review, subacute comminuted fracture involving the right 3rd metatarsal distal diaphysis.  Significant interval callus formation from previous clinic films taken 1 month ago.   Difficult to visualize on the lateral view but no evidence of sagittal plane deformity.    Questionable elijah avulsion fracture involving the 2nd metatarsal base.  No joan widening of the Lisfranc articulation on weight-bearing stress x-rays.        Assessment:  Yovani Malik is a 34 y.o. male with Subacute Right 3rd MT fracture; Stable lisfranc injury.     Plan:   Clinical and radiographic findings were discussed at length today.  Questionable elijah avulsion fracture involving the 2nd metatarsal base suggestive of Lisfranc injury however no evidence of widening on weight-bearing stress x-rays today.    Subacute presentation for distal 3rd metatarsal shaft fracture with significant callus formation.    Recommend continued conservative management given delayed presentation.  Discussed obtaining rigid soled shoes, carbon fiber insert handout provided today.    Patient may weightbear as tolerated right lower extremity.    No high impact activities-running, jumping, etc..    Patient voiced understanding.  All questions were answered.  Follow up in 2 months for repeat evaluation and x-ray, or sooner if needed.      This note was created using voice recognition software and may contain grammatical errors.

## 2025-02-05 DIAGNOSIS — S92.351A DISPLACED FRACTURE OF FIFTH METATARSAL BONE, RIGHT FOOT, INITIAL ENCOUNTER FOR CLOSED FRACTURE: Primary | ICD-10-CM

## 2025-03-27 ENCOUNTER — OFFICE VISIT (OUTPATIENT)
Dept: NEUROSURGERY | Facility: CLINIC | Age: 35
End: 2025-03-27
Payer: COMMERCIAL

## 2025-03-27 VITALS — RESPIRATION RATE: 18 BRPM | DIASTOLIC BLOOD PRESSURE: 91 MMHG | HEART RATE: 97 BPM | SYSTOLIC BLOOD PRESSURE: 141 MMHG

## 2025-03-27 DIAGNOSIS — M54.9 DORSALGIA, UNSPECIFIED: ICD-10-CM

## 2025-03-27 DIAGNOSIS — M54.16 LUMBAR RADICULOPATHY: ICD-10-CM

## 2025-03-27 DIAGNOSIS — M54.6 PAIN IN THORACIC SPINE: Primary | ICD-10-CM

## 2025-03-27 PROCEDURE — 99213 OFFICE O/P EST LOW 20 MIN: CPT | Mod: S$GLB,,,

## 2025-03-27 PROCEDURE — 3077F SYST BP >= 140 MM HG: CPT | Mod: CPTII,S$GLB,,

## 2025-03-27 PROCEDURE — 3080F DIAST BP >= 90 MM HG: CPT | Mod: CPTII,S$GLB,,

## 2025-03-27 PROCEDURE — 1159F MED LIST DOCD IN RCRD: CPT | Mod: CPTII,S$GLB,,
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
## 2025-05-05 DIAGNOSIS — M17.11 PRIMARY OSTEOARTHRITIS OF RIGHT KNEE: ICD-10-CM

## 2025-05-05 RX ORDER — MELOXICAM 15 MG/1
TABLET ORAL
Qty: 30 TABLET | Refills: 6 | Status: SHIPPED | OUTPATIENT
Start: 2025-05-05

## 2025-05-07 ENCOUNTER — OFFICE VISIT (OUTPATIENT)
Dept: NEUROSURGERY | Facility: CLINIC | Age: 35
End: 2025-05-07
Payer: COMMERCIAL

## 2025-05-07 VITALS
HEART RATE: 69 BPM | HEIGHT: 66 IN | RESPIRATION RATE: 18 BRPM | SYSTOLIC BLOOD PRESSURE: 141 MMHG | BODY MASS INDEX: 37.63 KG/M2 | WEIGHT: 234.13 LBS | DIASTOLIC BLOOD PRESSURE: 101 MMHG

## 2025-05-07 DIAGNOSIS — M54.6 PAIN IN THORACIC SPINE: Primary | ICD-10-CM

## 2025-05-07 DIAGNOSIS — M54.16 LUMBAR RADICULOPATHY: ICD-10-CM

## 2025-05-07 PROCEDURE — 1159F MED LIST DOCD IN RCRD: CPT | Mod: CPTII,S$GLB,,

## 2025-05-07 PROCEDURE — 3080F DIAST BP >= 90 MM HG: CPT | Mod: CPTII,S$GLB,,

## 2025-05-07 PROCEDURE — 3077F SYST BP >= 140 MM HG: CPT | Mod: CPTII,S$GLB,,

## 2025-05-07 PROCEDURE — 3008F BODY MASS INDEX DOCD: CPT | Mod: CPTII,S$GLB,,

## 2025-05-07 PROCEDURE — 99213 OFFICE O/P EST LOW 20 MIN: CPT | Mod: S$GLB,,,
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
## 2025-05-14 ENCOUNTER — HOSPITAL ENCOUNTER (OUTPATIENT)
Dept: RADIOLOGY | Facility: HOSPITAL | Age: 35
Discharge: HOME | End: 2025-05-14
Attending: ORTHOPAEDIC SURGERY
Payer: COMMERCIAL

## 2025-05-14 ENCOUNTER — OFFICE VISIT (OUTPATIENT)
Dept: SPORTS MEDICINE | Facility: CLINIC | Age: 35
End: 2025-05-14
Payer: COMMERCIAL

## 2025-05-14 VITALS
BODY MASS INDEX: 40.04 KG/M2 | DIASTOLIC BLOOD PRESSURE: 85 MMHG | WEIGHT: 249.13 LBS | HEART RATE: 108 BPM | SYSTOLIC BLOOD PRESSURE: 125 MMHG | HEIGHT: 66 IN

## 2025-05-14 DIAGNOSIS — M54.50 CHRONIC BILATERAL LOW BACK PAIN WITHOUT SCIATICA: ICD-10-CM

## 2025-05-14 DIAGNOSIS — M23.92 ACUTE INTERNAL DERANGEMENT OF LEFT KNEE: ICD-10-CM

## 2025-05-14 DIAGNOSIS — G89.29 CHRONIC BILATERAL LOW BACK PAIN WITHOUT SCIATICA: ICD-10-CM

## 2025-05-14 DIAGNOSIS — M25.561 PAIN IN BOTH KNEES, UNSPECIFIED CHRONICITY: Primary | ICD-10-CM

## 2025-05-14 DIAGNOSIS — M47.16 OSTEOARTHRITIS OF LUMBAR SPINE WITH MYELOPATHY: ICD-10-CM

## 2025-05-14 DIAGNOSIS — M21.061 GENU VALGUM, ACQUIRED, RIGHT: ICD-10-CM

## 2025-05-14 DIAGNOSIS — M25.562 PAIN IN BOTH KNEES, UNSPECIFIED CHRONICITY: Primary | ICD-10-CM

## 2025-05-14 DIAGNOSIS — M94.261 CHONDROMALACIA OF RIGHT KNEE: ICD-10-CM

## 2025-05-14 DIAGNOSIS — M25.561 PAIN IN BOTH KNEES, UNSPECIFIED CHRONICITY: ICD-10-CM

## 2025-05-14 DIAGNOSIS — M25.562 PAIN IN BOTH KNEES, UNSPECIFIED CHRONICITY: ICD-10-CM

## 2025-05-14 PROCEDURE — 99999 PR PBB SHADOW E&M-EST. PATIENT-LVL IV: CPT | Mod: PBBFAC,,, | Performed by: ORTHOPAEDIC SURGERY

## 2025-05-14 PROCEDURE — 73564 X-RAY EXAM KNEE 4 OR MORE: CPT | Mod: 26,,, | Performed by: RADIOLOGY

## 2025-05-14 PROCEDURE — 73564 X-RAY EXAM KNEE 4 OR MORE: CPT | Mod: TC,50

## 2025-05-14 RX ORDER — METHOCARBAMOL 500 MG/1
500 TABLET, FILM COATED ORAL 3 TIMES DAILY
Qty: 90 TABLET | Refills: 3 | Status: SHIPPED | OUTPATIENT
Start: 2025-05-14 | End: 2025-09-11
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
## 2025-05-14 NOTE — LETTER
Patient: Yovani Malik   YOB: 1990   Clinic Number: [id number]   Today's Date: May 14, 2025        Certificate to Return to Work     Yovani Heard was seen by Barry Lopez MD on 5/14/2025.    2 weeks for MRI result review;  Yovani Heard will be seen by Dr. Barry Lopez.    Yovani Heard can return to work on 5/15/25 with limited duty until 6 months.    Specific restrictions: no kneeling, squatting and limited stairs and climbing at his time. Lifting to 35 lbs. But no more. MRI left knee pending and referral to lumbar evaluation.    If you have any questions or concerns, please feel free to contact the office at [phone].    Thank you.    Barry Lopez MD         Signature: __________________________________________________

## 2025-05-14 NOTE — Clinical Note
This is a typical example of a DFO patient. Preoperative hip to ankle films, intra-operative flouroscopic shots and correction degree in the note. Really not possible to have less than this in all patients. As is typical this patient has MRI preoperative as well or we really would not be able to determine and approve the surgery. This really is the minimum work up on all DFOs prior to surgery. Less is NOT possible.

## 2025-06-02 ENCOUNTER — HOSPITAL ENCOUNTER (OUTPATIENT)
Dept: RADIOLOGY | Facility: HOSPITAL | Age: 35
Discharge: HOME | End: 2025-06-02
Attending: ORTHOPAEDIC SURGERY
Payer: COMMERCIAL

## 2025-06-02 DIAGNOSIS — M23.92 ACUTE INTERNAL DERANGEMENT OF LEFT KNEE: ICD-10-CM

## 2025-06-02 PROCEDURE — 73721 MRI JNT OF LWR EXTRE W/O DYE: CPT | Mod: 26,LT,, | Performed by: RADIOLOGY

## 2025-06-02 PROCEDURE — 73721 MRI JNT OF LWR EXTRE W/O DYE: CPT | Mod: TC,PO,LT

## 2025-06-03 ENCOUNTER — RESULTS FOLLOW-UP (OUTPATIENT)
Dept: SPORTS MEDICINE | Facility: CLINIC | Age: 35
End: 2025-06-03

## 2025-06-09 ENCOUNTER — OFFICE VISIT (OUTPATIENT)
Dept: SPORTS MEDICINE | Facility: CLINIC | Age: 35
End: 2025-06-09
Payer: COMMERCIAL

## 2025-06-09 DIAGNOSIS — M25.562 CHRONIC PAIN OF BOTH KNEES: ICD-10-CM

## 2025-06-09 DIAGNOSIS — M23.200 OLD COMPLEX TEAR OF LATERAL MENISCUS OF RIGHT KNEE: ICD-10-CM

## 2025-06-09 DIAGNOSIS — S83.272S: ICD-10-CM

## 2025-06-09 DIAGNOSIS — E66.01 MORBID OBESITY: Primary | ICD-10-CM

## 2025-06-09 DIAGNOSIS — M94.261 CHONDROMALACIA OF RIGHT KNEE: ICD-10-CM

## 2025-06-09 DIAGNOSIS — M25.561 CHRONIC PAIN OF BOTH KNEES: ICD-10-CM

## 2025-06-09 DIAGNOSIS — G89.29 CHRONIC PAIN OF BOTH KNEES: ICD-10-CM

## 2025-06-09 PROCEDURE — 98006 SYNCH AUDIO-VIDEO EST MOD 30: CPT | Mod: 95,,, | Performed by: ORTHOPAEDIC SURGERY

## 2025-06-09 NOTE — PROGRESS NOTES
Telemedicine/Virtual Visit Documentation:     The patient location is: home  The chief complaint leading to consultation is: Pt presents for MRI results and follow-up.       Visit type: audiovisual    Face to Face time with patient:   15 minutes of total time spent on the encounter, which includes face to face time and non-face to face time preparing to see the patient (eg, review of tests), Obtaining and/or reviewing separately obtained history, Documenting clinical information in the electronic or other health record, Independently interpreting results (not separately reported) and communicating results to the patient/family/caregiver, or Care coordination (not separately reported).         Each patient to whom he or she provides medical services by telemedicine is:  (1) informed of the relationship between the physician and patient and the respective role of any other health care provider with respect to management of the patient; and (2) notified that he or she may decline to receive medical services by telemedicine and may withdraw from such care at any time.    H&P  Orthopaedics      SUBJECTIVE:     History of Present Illness:  History of Present Illness    CHIEF COMPLAINT:  - Back pain and knee issues    HPI:  Yovani presents for evaluation of back pain and knee issues. Multiple interventions have been used to treat his back pain, including trigger point injections, hip injections, MNB block, and participation in [facility] program. An MRI has been performed, but not a CT. [facility] recommended a nerve stimulation device for the back, but pain management will not proceed without back surgery first. He expresses frustration with pain management's approach to his back treatment. An orthopedic surgeon noted some degeneration and possibly early disc myopathy in the back.    For the knees, there is a plan for possible left and right knee arthroscopy with Dr. Eagle Rosas in the future.    PREVIOUS  "TREATMENTS:  - Yovani has undergone trigger point injections for back pain  - Yovani has received an MNB block on the back  - Yovani has completed the "[facility] healthy [facility]" program  - Yovani has had hip injections          Review of patient's allergies indicates:  No Known Allergies    Past Medical History:   Diagnosis Date    Anxiety     Borderline diabetes     Hyperlipidemia     Hypertension      Past Surgical History:   Procedure Laterality Date    ARTHROSCOPY OF KNEE Right 9/13/2019    Procedure: ARTHROSCOPY, KNEE;  Surgeon: Anurag Wells MD;  Location: [facility] OR;  Service: Orthopedics;  Laterality: Right;    ESOPHAGOGASTRODUODENOSCOPY N/A 10/3/2019    Procedure: EGD (ESOPHAGOGASTRODUODENOSCOPY);  Surgeon: Yovani Dominguez MD;  Location: [facility] ENDO;  Service: Endoscopy;  Laterality: N/A;    ESOPHAGOGASTRODUODENOSCOPY N/A 1/23/2020    Procedure: EGD (ESOPHAGOGASTRODUODENOSCOPY);  Surgeon: Yovani Dominguez MD;  Location: [facility] ENDO;  Service: Endoscopy;  Laterality: N/A;    FEMUR OSTEOTOMY Right 9/15/2020    Procedure: OSTEOTOMY, FEMUR;  Surgeon: Barry Lopez MD;  Location: [facility] OR;  Service: Orthopedics;  Laterality: Right;  Regional w/ Catheter: Epidural, Spinal, Adductor  ANSUHA 50cc    KNEE ARTHROSCOPY      KNEE ARTHROSCOPY W/ MENISCECTOMY Right 9/15/2020    Procedure: ARTHROSCOPY, KNEE, WITH MENISCECTOMY;  Surgeon: Barry Lopez MD;  Location: [facility] OR;  Service: Orthopedics;  Laterality: Right;    KNEE SURGERY Bilateral     x 2     REPAIR OF MENISCUS OF KNEE Right 9/13/2019    Procedure: REPAIR, MENISCUS, KNEE;  Surgeon: Anurag Wells MD;  Location: [facility] OR;  Service: Orthopedics;  Laterality: Right;  lateral meniscal repair    SYNOVECTOMY OF KNEE Right 9/15/2020    Procedure: SYNOVECTOMY, KNEE;  Surgeon: Barry Lopez MD;  Location: [facility] OR;  Service: Orthopedics;  Laterality: Right;    TONSILLECTOMY       Family History   Problem Relation Name Age of Onset    Diabetes Mother      " Hypertension Mother      Hyperlipidemia Mother      Diabetes Father      Diabetes Maternal Grandmother      Cancer Maternal Grandmother          breast     Social History[1]         OBJECTIVE:     Physical Exam:  Gen:  No acute distress  CV:  Peripherally well-perfused.  Pulses 2+ bilaterally.  Lungs:  Normal respiratory effort.  Abdomen:  Soft, non-tender, non-distended  Head/Neck:  Normocephalic.  Atraumatic. No TTP, AROM and PROM intact without pain  Neuro:  CN intact without deficit, SILT throughout B/L Upper & Lower Extremities    MRI Knee Without Contrast Left  Narrative: EXAMINATION:  MRI KNEE WITHOUT CONTRAST LEFT    CLINICAL HISTORY:  T2 mapping and cartilage specific sequences;Unspecified internal derangement of left knee    TECHNIQUE:  Multiplanar MR imaging of the left knee was performed utilizing coronal T1, coronal and sagittal PD fat-suppressed, and axial and sagittal T2 FSE fat-suppressed pulse sequences.  T2 cartilage mapping was also performed in the sagittal plane at the ordering physician's request.    COMPARISON:  None    FINDINGS:  Cruciate ligaments: The ACL, PCL, MCL, LCL complex, popliteus tendon, and IT band show no acute injury.    Quadriceps and patellar tendon: The quadriceps tendon and patellar tendon show no acute injury.  No definite patellar tendonitis.    Menisci: No medial meniscal tear appreciated.There is an age-indeterminate complex degenerative, primarily radial tear of the body of the lateral meniscus and/or postoperative change of prior partial meniscectomy for treatment of a tear of the lateral meniscus..No parameniscal cyst formation.    Cartilage: There is a 14 x 25 mm area of high-grade partial and full-thickness articular cartilage loss noted over the posterior nonweightbearing lateral femoral condyle.  There is a 12 x 14 mm area of full-thickness abnormal T2 hyperintense signalextending to the tide thang compatible with chondromalacia and chondral fissuring at the  posterior weight-bearing lateral femoral condyle on series 6, image 23 and series 5, image 8.  There is an 11 x 20 mm area of high-grade partial-thickness and full-thickness abnormal T2 hyperintense chondral signal extending to the tide thang within the lateral aspect of the weight-bearing medial femoral condyle on series 6, image 10 and series 5, image 10. there is T2 hypointense signal noted within the patellar eminence on series 7, image 17 and series 6, image 17, possibly reflecting partial-thickness chondromalacia.    Bones: There is tricompartmental osteophyte formation observed in the left knee joint, most pronounced at the lateral tibial plateau.  The visualized bony structures about the knee joint show normal alignment with no evidence of contusion, fracture, or marrow replacement process.  There is degenerative edema like signal and subcortical cyst formation noted at the anterior aspect of the medial tibial plateau on series 3, image 22 and series 6, image 11.    Musculature: The visualized musculature about the knee joint is normal in signal with no evidence of strain, intramuscular hematoma, or mass.    Miscellaneous: There is a small knee joint effusion present.No Baker's cyst.No pes anserine bursitis.  There is gradient susceptibility artifact noted within Hoffa's fat pad, possibly due to previous arthroscopy.  Impression: 1. Age-indeterminate, possibly chronic, complex degenerative, primarily radial tear of the body of the lateral meniscus and/or postoperative change of prior partial meniscectomy for treatment of a lateral meniscal tear at the body of the lateral meniscus.  2. Multifocal articular cartilage loss, fissuring, and chondromalacia, most pronounced at the posterior nonweightbearing lateral femoral condyle.  Additional details are provided above.  3. Small knee joint effusion    Electronically signed by resident: Chris Granda MD  Date:    06/03/2025  Time:    08:16    Electronically signed  by: Chris Granda MD  Date:    06/03/2025  Time:    09:35       ASSESSMENT/PLAN:     A/P: Yovani Malik is a 34 y.o. Dr. Eagle Rosas for later bilateral knee arthroscopic lateral meniscectomies.    Plan:  Assessment & Plan    REFERRALS:  - Referred to Dr. Rios for surgical evaluation of back.  - Referred to Dr. Eagle Rosas for potential future knee arthroscopies.    PROCEDURES:  - Discussed possible future left knee arthroscopy or right knee arthroscopy with Dr. Eagle Rosas.    FOLLOW UP:  - Follow up with back specialist's office for scheduling.                  [1]   Social History  Tobacco Use    Smoking status: Former     Current packs/day: 0.50     Average packs/day: 0.5 packs/day for 3.0 years (1.5 ttl pk-yrs)     Types: Cigarettes     Start date: 6/16/2022    Smokeless tobacco: Never   Substance Use Topics    Alcohol use: No    Drug use: No

## 2025-07-01 ENCOUNTER — TELEPHONE (OUTPATIENT)
Dept: ORTHOPEDICS | Facility: CLINIC | Age: 35
End: 2025-07-01
Payer: COMMERCIAL

## 2025-07-01 NOTE — TELEPHONE ENCOUNTER
Attempted to contact patient to confirm upcoming appointment. No answer. Voicemail left with appointment date and time, and instructions to notify the clinic of any changes.

## 2025-07-02 ENCOUNTER — PATIENT MESSAGE (OUTPATIENT)
Dept: NEUROSURGERY | Facility: CLINIC | Age: 35
End: 2025-07-02
Payer: COMMERCIAL

## 2025-07-02 ENCOUNTER — OFFICE VISIT (OUTPATIENT)
Dept: ORTHOPEDICS | Facility: CLINIC | Age: 35
End: 2025-07-02
Payer: COMMERCIAL

## 2025-07-02 VITALS — BODY MASS INDEX: 39.4 KG/M2 | HEIGHT: 66 IN | WEIGHT: 245.13 LBS

## 2025-07-02 DIAGNOSIS — M47.16 OSTEOARTHRITIS OF LUMBAR SPINE WITH MYELOPATHY: ICD-10-CM

## 2025-07-02 DIAGNOSIS — M48.07 SPINAL STENOSIS, LUMBOSACRAL REGION: ICD-10-CM

## 2025-07-02 DIAGNOSIS — M54.50 CHRONIC MIDLINE LOW BACK PAIN WITHOUT SCIATICA: Primary | ICD-10-CM

## 2025-07-02 DIAGNOSIS — G89.29 CHRONIC MIDLINE LOW BACK PAIN WITHOUT SCIATICA: Primary | ICD-10-CM

## 2025-07-02 PROCEDURE — 99999 PR PBB SHADOW E&M-EST. PATIENT-LVL IV: CPT | Mod: PBBFAC,,, | Performed by: ORTHOPAEDIC SURGERY

## 2025-07-02 NOTE — PROGRESS NOTES
DATE: 7/2/2025  PATIENT: Yovani Malik    Attending Physician: Fernando Rios M.D.    CHIEF COMPLAINT:  LBP    HISTORY:  Yovani Malik is a 35 y.o. male  presents for initial evaluation of low back pain (Back - 10). The pain has been present for 2 years. The patient describes the pain as sharp pain in the lower back and mid back.  The pain is worse with range of motion and lying flat and improved by rest. There is no associated numbness and tingling. There is no subjective weakness. Prior treatments have included physical therapy, chiropractor, trigger point injection, but no surgery.    The Patient denies myelopathic symptoms such as handwriting changes or difficulty with buttons/coins/keys. Denies perineal paresthesias, bowel/bladder dysfunction.    The patient does not smoke, have DM or endorse IVDU. The patient is not on any blood thinners and does not take chronic narcotics. He works at [employer].    PAST MEDICAL/SURGICAL HISTORY:  Past Medical History:   Diagnosis Date    Anxiety     Borderline diabetes     Hyperlipidemia     Hypertension      Past Surgical History:   Procedure Laterality Date    ARTHROSCOPY OF KNEE Right 9/13/2019    Procedure: ARTHROSCOPY, KNEE;  Surgeon: Anurag Wells MD;  Location: [facility];  Service: Orthopedics;  Laterality: Right;    ESOPHAGOGASTRODUODENOSCOPY N/A 10/3/2019    Procedure: EGD (ESOPHAGOGASTRODUODENOSCOPY);  Surgeon: Yovani Dominguez MD;  Location: [facility] ENDO;  Service: Endoscopy;  Laterality: N/A;    ESOPHAGOGASTRODUODENOSCOPY N/A 1/23/2020    Procedure: EGD (ESOPHAGOGASTRODUODENOSCOPY);  Surgeon: Yovani Dominguez MD;  Location: [facility] ENDO;  Service: Endoscopy;  Laterality: N/A;    FEMUR OSTEOTOMY Right 9/15/2020    Procedure: OSTEOTOMY, FEMUR;  Surgeon: Barry Lopez MD;  Location: [facility] OR;  Service: Orthopedics;  Laterality: Right;  Regional w/ Catheter: Epidural, Spinal, Adductor  ANUSHA 50cc    KNEE ARTHROSCOPY      KNEE ARTHROSCOPY W/ MENISCECTOMY  "Right 9/15/2020    Procedure: ARTHROSCOPY, KNEE, WITH MENISCECTOMY;  Surgeon: Barry Lopez MD;  Location: [facility] OR;  Service: Orthopedics;  Laterality: Right;    KNEE SURGERY Bilateral     x 2     REPAIR OF MENISCUS OF KNEE Right 9/13/2019    Procedure: REPAIR, MENISCUS, KNEE;  Surgeon: Anurag Wells MD;  Location: [facility] OR;  Service: Orthopedics;  Laterality: Right;  lateral meniscal repair    SYNOVECTOMY OF KNEE Right 9/15/2020    Procedure: SYNOVECTOMY, KNEE;  Surgeon: Barry Lopez MD;  Location: [facility] OR;  Service: Orthopedics;  Laterality: Right;    TONSILLECTOMY         Current Medications: Current Medications[1]    Social History: Social History[2]    REVIEW OF SYSTEMS:  Constitution: Negative. Negative for chills, fever and night sweats.   Cardiovascular: Negative for chest pain and syncope.   Respiratory: Negative for cough and shortness of breath.   Gastrointestinal: See HPI. Negative for nausea/vomiting. Negative for abdominal pain.  Genitourinary: See HPI. Negative for discoloration or dysuria.  Skin: Negative for dry skin, itching and rash.   Hematologic/Lymphatic:  Negative for bleeding/clotting disorders.   Musculoskeletal: Negative for falls and muscle weakness.   Neurological: See HPI.  Now history of seizures.  Now history of cranial surgery or shunts.  Endocrine: Negative for polydipsia, polyphagia and polyuria.   Allergic/Immunologic: Negative for hives and persistent infections.    PHYSICAL EXAMINATION:    Ht 5' 6" (1.676 m)   Wt 111.2 kg (245 lb 2.4 oz)   BMI 39.57 kg/m²     General: The patient is a  35 y.o. male in no apparent distress, the patient is orientatied to person, place and time.   Psych: Normal mood and affect  HEENT: Vision grossly intact, hearing intact to the spoken word.  Lungs: Respirations unlabored.  Gait: Normal station and gait, no difficulty with toe or heel walk.   Skin: Dorsal lumbar skin negative for rashes, lesions, hairy patches and surgical " scars.  Range of motion: Lumbar range of motion is acceptable. There is mild lumbar tenderness to palpation.  Spinal Balance: Global saggital and coronal spinal balance acceptable, no significant for scoliosis and kyphosis.  Musculoskeletal: No pain with the range of motion of the bilateral hips. No trochanteric tenderness to palpation.  Vascular: Bilateral lower extremities warm and well perfused, Dorsalis pedis pulses 2+ bilaterally.  Neurological: Normal strength and tone in all major motor groups in the bilateral lower extremities. Normal sensation to light touch in the L2-S1 dermatomes bilaterally.  Deep tendon reflexes symmetric 2+ in the bilateral lower extremities.  Negative Babinski bilaterally.    IMAGING:   Today I independently reviewed the following images and my interpretations are as follows:    AP, Lat and Flex/Ex upright L-spine films demonstrate no fractures or listhesis.    MRI lumbar and thoracic showed no significant stenosis.    Body mass index is 39.57 kg/m².  Hemoglobin A1C   Date Value Ref Range Status   07/27/2023 5.3 4.0 - 5.6 % Final     Comment:     ADA Screening Guidelines:  5.7-6.4%  Consistent with prediabetes  >or=6.5%  Consistent with diabetes    High levels of fetal hemoglobin interfere with the HbA1C  assay. Heterozygous hemoglobin variants (HbS, HgC, etc)do  not significantly interfere with this assay.   However, presence of multiple variants may affect accuracy.           ASSESSMENT/PLAN:    Yovani was seen today for pain.    Diagnoses and all orders for this visit:    Chronic midline low back pain without sciatica    Osteoarthritis of lumbar spine with myelopathy  -     Ambulatory referral/consult to Spine Care  -     Ambulatory referral/consult to Orthopedics    Spinal stenosis, lumbosacral region  -     CT Lumbar Spine Without Contrast; Future      Follow up in about 4 weeks (around 7/30/2025).    Patient has low back pain. MRI is not impressive; I ordered lumbar CT to  evaluate any bony abnormality. I discussed the natural history of their diagnoses as well as surgical and nonsurgical treatment options. I educated the patient on the importance of core/back strengthening, correct posture, bending/lifting ergonomics, and low-impact aerobic exercises (walking, elliptical, and aquatherapy). Continue meds. Patient will FU in 4 weeks for CT review.    I have personally examined the patient and agree with the above plan.    Fernando Rios MD  Orthopaedic Spine Surgeon  Department of Orthopaedic Surgery  [phone]          [1]   Current Outpatient Medications:     ADDERALL XR 30 mg 24 hr capsule, Take by mouth., Disp: , Rfl:     ALPRAZolam (XANAX) 1 MG tablet, Take 1 mg by mouth 2 (two) times daily as needed., Disp: , Rfl:     amLODIPine (NORVASC) 5 MG tablet, Take 1 tablet (5 mg total) by mouth once daily. (Patient not taking: Reported on 3/27/2025), Disp: 90 tablet, Rfl: 1    DULoxetine (CYMBALTA) 30 MG capsule, Take 1 capsule (30 mg total) by mouth once daily. (Patient not taking: Reported on 7/2/2025), Disp: 30 capsule, Rfl: 3    meloxicam (MOBIC) 15 MG tablet, TAKE 1 TABLET(15 MG) BY MOUTH DAILY, Disp: 30 tablet, Rfl: 6    methocarbamoL (ROBAXIN) 500 MG Tab, Take 1 tablet (500 mg total) by mouth 3 (three) times daily., Disp: 90 tablet, Rfl: 3    morphine (MS CONTIN) 15 MG 12 hr tablet, Take 15 mg by mouth every 12 (twelve) hours., Disp: , Rfl:     mupirocin (BACTROBAN) 2 % ointment, Apply topically 3 (three) times daily. (Patient not taking: Reported on 7/2/2025), Disp: , Rfl:     oxyCODONE-acetaminophen (PERCOCET)  mg per tablet, Take 1 tablet by mouth daily as needed., Disp: , Rfl:   [2]   Social History  Socioeconomic History    Marital status:    Tobacco Use    Smoking status: Former     Current packs/day: 0.50     Average packs/day: 0.5 packs/day for 3.0 years (1.5 ttl pk-yrs)     Types: Cigarettes     Start date: 6/16/2022    Smokeless tobacco: Never   Substance  and Sexual Activity    Alcohol use: No    Drug use: No    Sexual activity: Yes     Partners: Female   Social History Narrative    ** Merged History Encounter **          Social Drivers of Health     Financial Resource Strain: High Risk (3/13/2025)    Overall Financial Resource Strain (CARDIA)     Difficulty of Paying Living Expenses: Hard   Food Insecurity: Food Insecurity Present (3/13/2025)    Hunger Vital Sign     Worried About Running Out of Food in the Last Year: Sometimes true     Ran Out of Food in the Last Year: Often true   Transportation Needs: Unmet Transportation Needs (3/13/2025)    PRAPARE - Transportation     Lack of Transportation (Medical): Yes     Lack of Transportation (Non-Medical): Yes   Physical Activity: Unknown (3/13/2025)    Exercise Vital Sign     Minutes of Exercise per Session: 0 min   Stress: Stress Concern Present (3/13/2025)    [employer] - Occupational Stress Questionnaire     Feeling of Stress : Very much   Housing Stability: High Risk (3/13/2025)    Housing Stability Vital Sign     Unable to Pay for Housing in the Last Year: Yes     Number of Times Moved in the Last Year: 0     Homeless in the Last Year: No

## 2025-07-16 NOTE — PROGRESS NOTES
The patient location is: [place]  The chief complaint leading to consultation is: CT results    Visit type: audiovisual    Face to Face time with patient: 10 min  15 minutes of total time spent on the encounter, which includes face to face time and non-face to face time preparing to see the patient (eg, review of tests), Obtaining and/or reviewing separately obtained history, Documenting clinical information in the electronic or other health record, Independently interpreting results (not separately reported) and communicating results to the patient/family/caregiver, or Care coordination (not separately reported).     Each patient to whom he or she provides medical services by telemedicine is:  (1) informed of the relationship between the physician and patient and the respective role of any other health care provider with respect to management of the patient; and (2) notified that he or she may decline to receive medical services by telemedicine and may withdraw from such care at any time.    Notes:     DATE: 7/23/2025  PATIENT: Yovani Malik    Attending Physician: Fernando Rios M.D.    HISTORY:  Yovani Malik is a 35 y.o. male who returns to me today for CT results.  He was last seen by Dr. Rios on 7/2/25. Today he is doing well but notes he continues to have low back pain (Back - 10). The pain has been present for 2 years. The patient describes the pain as sharp pain in the lower back and mid back.  The pain is worse with range of motion and lying flat and improved by rest. There is no associated numbness and tingling. There is no subjective weakness. Prior treatments have included physical therapy, chiropractor, trigger point injection, but no surgery.     The Patient denies myelopathic symptoms such as handwriting changes or difficulty with buttons/coins/keys. Denies perineal paresthesias, bowel/bladder dysfunction.      EXAM:  There were no vitals taken for this visit.    My physical examination was  notable for the following findings:     Musculoskeletal and neuro exam stable      IMAGING:    Today I personally re- reviewed AP, Lat and Flex/Ex  upright L-spine that demonstrate no fractures or listhesis.     MRI lumbar and thoracic showed no significant stenosis.    CT lumbar unremarkable    There is no height or weight on file to calculate BMI.    Hemoglobin A1C   Date Value Ref Range Status   07/27/2023 5.3 4.0 - 5.6 % Final     Comment:     ADA Screening Guidelines:  5.7-6.4%  Consistent with prediabetes  >or=6.5%  Consistent with diabetes    High levels of fetal hemoglobin interfere with the HbA1C  assay. Heterozygous hemoglobin variants (HbS, HgC, etc)do  not significantly interfere with this assay.   However, presence of multiple variants may affect accuracy.           ASSESSMENT/PLAN:    There are no diagnoses linked to this encounter.    Today we discussed at length all of the different treatment options including anti-inflammatories, acetaminophen, rest, ice, heat, physical therapy including strengthening and stretching exercises, home exercises, ROM, aerobic conditioning, aqua therapy, other modalities including ultrasound, massage, and dry needling, epidural steroid injections and finally surgical intervention.      Pt presents with chronic low back pain. Failure of conservative rx. No surgical intervention indicated at this time. Will refer to pain mgmt for SCS consult

## 2025-07-17 ENCOUNTER — HOSPITAL ENCOUNTER (OUTPATIENT)
Dept: RADIOLOGY | Facility: HOSPITAL | Age: 35
Discharge: HOME | End: 2025-07-17
Attending: ORTHOPAEDIC SURGERY
Payer: COMMERCIAL

## 2025-07-17 DIAGNOSIS — M48.07 SPINAL STENOSIS, LUMBOSACRAL REGION: ICD-10-CM

## 2025-07-17 PROCEDURE — 72131 CT LUMBAR SPINE W/O DYE: CPT | Mod: TC,PO

## 2025-07-17 PROCEDURE — 72131 CT LUMBAR SPINE W/O DYE: CPT | Mod: 26,,, | Performed by: RADIOLOGY

## 2025-07-21 ENCOUNTER — OFFICE VISIT (OUTPATIENT)
Dept: ORTHOPEDICS | Facility: CLINIC | Age: 35
End: 2025-07-21
Attending: ORTHOPAEDIC SURGERY
Payer: COMMERCIAL

## 2025-07-21 ENCOUNTER — TELEPHONE (OUTPATIENT)
Dept: NEUROSURGERY | Facility: CLINIC | Age: 35
End: 2025-07-21
Payer: COMMERCIAL

## 2025-07-21 DIAGNOSIS — M54.50 CHRONIC MIDLINE LOW BACK PAIN WITHOUT SCIATICA: Primary | ICD-10-CM

## 2025-07-21 DIAGNOSIS — G89.29 CHRONIC MIDLINE LOW BACK PAIN WITHOUT SCIATICA: Primary | ICD-10-CM

## 2025-07-21 PROCEDURE — 98004 SYNCH AUDIO-VIDEO EST SF 10: CPT | Mod: 95,,, | Performed by: ORTHOPAEDIC SURGERY

## 2025-07-22 ENCOUNTER — TELEPHONE (OUTPATIENT)
Dept: PAIN MEDICINE | Facility: CLINIC | Age: 35
End: 2025-07-22
Payer: COMMERCIAL

## 2025-07-22 NOTE — TELEPHONE ENCOUNTER
Copied from CRM #[id number]. Topic: General Inquiry - Patient Advice  >> Jul 22, 2025  4:41 PM Ira wrote:  Type:  Appointment Request    Caller is requesting a appointment.     Name of Caller:pt     When is the first available appointment?not seen     Symptoms:mid and LBP     Would the patient rather a call back or a response via [facility]? Pls call to [facility]     Best Call Back Number:[phone]     Additional Information: referred by Patricia redding pt seen on [facility] for SCS Consult   Please call back to advise. Thanks!

## 2025-07-22 NOTE — TELEPHONE ENCOUNTER
Returned call ,pt VM picked  up   LVM , Pt is advised to contact office staff to schedule an appt

## 2025-07-23 ENCOUNTER — PATIENT MESSAGE (OUTPATIENT)
Dept: ORTHOPEDICS | Facility: CLINIC | Age: 35
End: 2025-07-23
Payer: COMMERCIAL

## 2025-08-19 ENCOUNTER — OFFICE VISIT (OUTPATIENT)
Dept: PAIN MEDICINE | Facility: CLINIC | Age: 35
End: 2025-08-19
Payer: COMMERCIAL

## 2025-08-19 VITALS — BODY MASS INDEX: 39.37 KG/M2 | HEIGHT: 66 IN | WEIGHT: 245 LBS

## 2025-08-19 DIAGNOSIS — M54.9 BACK PAIN, UNSPECIFIED BACK LOCATION, UNSPECIFIED BACK PAIN LATERALITY, UNSPECIFIED CHRONICITY: Primary | ICD-10-CM

## 2025-08-19 PROCEDURE — 99999 PR PBB SHADOW E&M-EST. PATIENT-LVL III: CPT | Mod: PBBFAC,,, | Performed by: STUDENT IN AN ORGANIZED HEALTH CARE EDUCATION/TRAINING PROGRAM

## 2025-08-19 PROCEDURE — 99204 OFFICE O/P NEW MOD 45 MIN: CPT | Mod: S$GLB,,, | Performed by: STUDENT IN AN ORGANIZED HEALTH CARE EDUCATION/TRAINING PROGRAM

## 2025-08-19 PROCEDURE — 1160F RVW MEDS BY RX/DR IN RCRD: CPT | Mod: CPTII,S$GLB,, | Performed by: STUDENT IN AN ORGANIZED HEALTH CARE EDUCATION/TRAINING PROGRAM

## 2025-08-19 PROCEDURE — 1159F MED LIST DOCD IN RCRD: CPT | Mod: CPTII,S$GLB,, | Performed by: STUDENT IN AN ORGANIZED HEALTH CARE EDUCATION/TRAINING PROGRAM

## 2025-08-19 PROCEDURE — 3008F BODY MASS INDEX DOCD: CPT | Mod: CPTII,S$GLB,, | Performed by: STUDENT IN AN ORGANIZED HEALTH CARE EDUCATION/TRAINING PROGRAM

## 2025-08-26 ENCOUNTER — PATIENT MESSAGE (OUTPATIENT)
Dept: NEUROSURGERY | Facility: CLINIC | Age: 35
End: 2025-08-26
Payer: COMMERCIAL

## 2025-08-26 DIAGNOSIS — M54.6 PAIN IN THORACIC SPINE: ICD-10-CM

## 2025-08-26 DIAGNOSIS — M54.16 LUMBAR RADICULOPATHY: Primary | ICD-10-CM

## (undated) DEVICE — SEE MEDLINE ITEM 152530

## (undated) DEVICE — NDL 22GA X1 1/2 REG BEVEL

## (undated) DEVICE — SYR 30CC LUER LOCK

## (undated) DEVICE — APPLICATOR CHLORAPREP ORN 26ML

## (undated) DEVICE — SUT 4-0 ETHILON 18 PS-2

## (undated) DEVICE — GOWN SMARTGOWN LVL4 X-LONG XL

## (undated) DEVICE — Device

## (undated) DEVICE — PROBE ARTHO ENERGY 90 DEG

## (undated) DEVICE — GLOVE SURG BIOGEL LATEX SZ 7.5

## (undated) DEVICE — SEE MEDLINE ITEM 146231

## (undated) DEVICE — SOL IRR NACL .9% 3000ML

## (undated) DEVICE — DRESSING XEROFORM FOIL PK 1X8

## (undated) DEVICE — PAD CAST SPECIALIST STRL 6

## (undated) DEVICE — DRAPE STERI INSTRUMENT 1018

## (undated) DEVICE — BLADE SURG CARBON STEEL SZ11

## (undated) DEVICE — DRAPE PLASTIC U 60X72

## (undated) DEVICE — SET INSTR MENISCUS MENDER II

## (undated) DEVICE — ELECTRODES

## (undated) DEVICE — DRESSING AQUACEL AG 3.5X10IN

## (undated) DEVICE — SEE MEDLINE ITEM 157150

## (undated) DEVICE — SEE MEDLINE ITEM 157131

## (undated) DEVICE — INTERPULSE SET

## (undated) DEVICE — SEE MEDLINE ITEM 153151

## (undated) DEVICE — BLADE SAG 18.0X1.27X100

## (undated) DEVICE — SLEEVE SCD EXPRESS CALF MEDIUM

## (undated) DEVICE — PAD ABD 8X10 STERILE

## (undated) DEVICE — SHAVER ULTRAFFR 4.2MM

## (undated) DEVICE — SPONGE LAP 18X18 PREWASHED

## (undated) DEVICE — TUBE SET INFLOW/OUTFLOW

## (undated) DEVICE — UNDERGLOVES BIOGEL PI SIZE 8.5

## (undated) DEVICE — GAUZE SPONGE 4X4 12PLY

## (undated) DEVICE — DRAPE C-ARMOR EQUIPMENT COVER

## (undated) DEVICE — UNDERGLOVES BIOGEL PI SZ 7 LF

## (undated) DEVICE — GLOVE BIOGEL SKINSENSE PI 8.5

## (undated) DEVICE — GOWN B1 X-LG X-LONG

## (undated) DEVICE — SEE MEDLINE ITEM 146313

## (undated) DEVICE — DRESSING N ADH OIL EMUL 3X3

## (undated) DEVICE — SUT VICRYL PLUS 3-0 SH 18IN

## (undated) DEVICE — BRACE KNEE T SCOPE PREMIER

## (undated) DEVICE — ADHESIVE DERMABOND ADVANCED

## (undated) DEVICE — SUT PDS II 0 CT-1 VIL MONO

## (undated) DEVICE — SEE MEDLINE ITEM 152622

## (undated) DEVICE — STOCKING ANTI-EMBOLIC MED REG

## (undated) DEVICE — TOURNIQUET SB QC DP 34X4IN

## (undated) DEVICE — DRAPE EXTREMITY W/ABC NON-SLIP

## (undated) DEVICE — UNDERGLOVES BIOGEL PI SIZE 8

## (undated) DEVICE — DRESSING AQUACEL AG ADV 3.5X12

## (undated) DEVICE — MANIFOLD 4 PORT

## (undated) DEVICE — PAD COLD THERAPY KNEE WRAP ON

## (undated) DEVICE — GLOVE BIOGEL SKINSENSE PI 7.0

## (undated) DEVICE — NDL SPINAL 18GX3.5 SPINOCAN

## (undated) DEVICE — MAT SUCTION PUDDLEVAC ORANGE

## (undated) DEVICE — PUMP COLD THERAPY

## (undated) DEVICE — SUT PDS II 2-0 CT1

## (undated) DEVICE — PAD ELECTRODE STER 1.5X3

## (undated) DEVICE — SUT MONOCRYL 3-0 PS-1

## (undated) DEVICE — ELECTRODE 90 DEGREE ANGLE

## (undated) DEVICE — SEE MEDLINE ITEM 157169

## (undated) DEVICE — BLADE SHAVER LANZA 4.2X13CM

## (undated) DEVICE — SUT VICRYL+ 1 CT1 18IN

## (undated) DEVICE — PUSHER NOVOCUT SUTURE MANAGER

## (undated) DEVICE — DRAPE STERI U-SHAPED 47X51IN

## (undated) DEVICE — DRAPE INCISE IOBAN 2 23X33IN

## (undated) DEVICE — DRESSING XEROFORM 1X8IN

## (undated) DEVICE — DRESSING TRANS 4X4 TEGADERM

## (undated) DEVICE — BANDAGE ESMARK 6X12

## (undated) DEVICE — PAD KNEE POLAR XL

## (undated) DEVICE — SEE MEDLINE ITEM 157216

## (undated) DEVICE — SUT ETHILON 3-0 PS2 18 BLK

## (undated) DEVICE — CUTTER AGGRESSIVE PLUS 3.5MM